# Patient Record
Sex: MALE | Race: OTHER | HISPANIC OR LATINO | URBAN - METROPOLITAN AREA
[De-identification: names, ages, dates, MRNs, and addresses within clinical notes are randomized per-mention and may not be internally consistent; named-entity substitution may affect disease eponyms.]

---

## 2020-01-01 ENCOUNTER — INPATIENT (INPATIENT)
Facility: HOSPITAL | Age: 35
LOS: 1 days | DRG: 432 | End: 2020-12-27
Attending: SURGERY | Admitting: SURGERY
Payer: MEDICAID

## 2020-01-01 ENCOUNTER — INPATIENT (INPATIENT)
Facility: HOSPITAL | Age: 35
LOS: 17 days | Discharge: HOME | DRG: 897 | End: 2020-10-02
Attending: STUDENT IN AN ORGANIZED HEALTH CARE EDUCATION/TRAINING PROGRAM | Admitting: STUDENT IN AN ORGANIZED HEALTH CARE EDUCATION/TRAINING PROGRAM
Payer: MEDICAID

## 2020-01-01 ENCOUNTER — INPATIENT (INPATIENT)
Facility: HOSPITAL | Age: 35
LOS: 1 days | Discharge: HOME | DRG: 432 | End: 2020-06-12
Attending: STUDENT IN AN ORGANIZED HEALTH CARE EDUCATION/TRAINING PROGRAM | Admitting: STUDENT IN AN ORGANIZED HEALTH CARE EDUCATION/TRAINING PROGRAM
Payer: MEDICAID

## 2020-01-01 VITALS
TEMPERATURE: 99 F | WEIGHT: 199.96 LBS | HEIGHT: 61 IN | OXYGEN SATURATION: 98 % | RESPIRATION RATE: 18 BRPM | HEART RATE: 109 BPM | SYSTOLIC BLOOD PRESSURE: 143 MMHG | DIASTOLIC BLOOD PRESSURE: 84 MMHG

## 2020-01-01 VITALS
HEIGHT: 66.14 IN | WEIGHT: 187.39 LBS | DIASTOLIC BLOOD PRESSURE: 75 MMHG | SYSTOLIC BLOOD PRESSURE: 114 MMHG | TEMPERATURE: 98 F | HEART RATE: 94 BPM | OXYGEN SATURATION: 97 % | RESPIRATION RATE: 18 BRPM

## 2020-01-01 VITALS
OXYGEN SATURATION: 100 % | RESPIRATION RATE: 18 BRPM | DIASTOLIC BLOOD PRESSURE: 74 MMHG | TEMPERATURE: 99 F | SYSTOLIC BLOOD PRESSURE: 138 MMHG | HEART RATE: 76 BPM

## 2020-01-01 VITALS
WEIGHT: 229.94 LBS | OXYGEN SATURATION: 95 % | HEIGHT: 61 IN | DIASTOLIC BLOOD PRESSURE: 60 MMHG | TEMPERATURE: 98 F | HEART RATE: 95 BPM | RESPIRATION RATE: 16 BRPM | SYSTOLIC BLOOD PRESSURE: 91 MMHG

## 2020-01-01 VITALS
RESPIRATION RATE: 17 BRPM | DIASTOLIC BLOOD PRESSURE: 75 MMHG | OXYGEN SATURATION: 100 % | TEMPERATURE: 98 F | SYSTOLIC BLOOD PRESSURE: 123 MMHG | HEART RATE: 74 BPM

## 2020-01-01 VITALS — HEART RATE: 65 BPM | RESPIRATION RATE: 28 BRPM

## 2020-01-01 DIAGNOSIS — E87.6 HYPOKALEMIA: ICD-10-CM

## 2020-01-01 DIAGNOSIS — E43 UNSPECIFIED SEVERE PROTEIN-CALORIE MALNUTRITION: ICD-10-CM

## 2020-01-01 DIAGNOSIS — Z02.9 ENCOUNTER FOR ADMINISTRATIVE EXAMINATIONS, UNSPECIFIED: ICD-10-CM

## 2020-01-01 DIAGNOSIS — E83.42 HYPOMAGNESEMIA: ICD-10-CM

## 2020-01-01 DIAGNOSIS — K76.6 PORTAL HYPERTENSION: ICD-10-CM

## 2020-01-01 DIAGNOSIS — Z29.9 ENCOUNTER FOR PROPHYLACTIC MEASURES, UNSPECIFIED: ICD-10-CM

## 2020-01-01 DIAGNOSIS — K74.60 UNSPECIFIED CIRRHOSIS OF LIVER: ICD-10-CM

## 2020-01-01 DIAGNOSIS — Z71.89 OTHER SPECIFIED COUNSELING: ICD-10-CM

## 2020-01-01 DIAGNOSIS — D64.9 ANEMIA, UNSPECIFIED: ICD-10-CM

## 2020-01-01 DIAGNOSIS — E83.39 OTHER DISORDERS OF PHOSPHORUS METABOLISM: ICD-10-CM

## 2020-01-01 DIAGNOSIS — K72.90 HEPATIC FAILURE, UNSPECIFIED WITHOUT COMA: ICD-10-CM

## 2020-01-01 DIAGNOSIS — I51.7 CARDIOMEGALY: ICD-10-CM

## 2020-01-01 DIAGNOSIS — Z72.89 OTHER PROBLEMS RELATED TO LIFESTYLE: ICD-10-CM

## 2020-01-01 DIAGNOSIS — D69.6 THROMBOCYTOPENIA, UNSPECIFIED: ICD-10-CM

## 2020-01-01 DIAGNOSIS — F10.10 ALCOHOL ABUSE, UNCOMPLICATED: ICD-10-CM

## 2020-01-01 DIAGNOSIS — M79.89 OTHER SPECIFIED SOFT TISSUE DISORDERS: ICD-10-CM

## 2020-01-01 DIAGNOSIS — R79.1 ABNORMAL COAGULATION PROFILE: ICD-10-CM

## 2020-01-01 DIAGNOSIS — R09.89 OTHER SPECIFIED SYMPTOMS AND SIGNS INVOLVING THE CIRCULATORY AND RESPIRATORY SYSTEMS: ICD-10-CM

## 2020-01-01 DIAGNOSIS — N17.9 ACUTE KIDNEY FAILURE, UNSPECIFIED: ICD-10-CM

## 2020-01-01 DIAGNOSIS — E87.1 HYPO-OSMOLALITY AND HYPONATREMIA: ICD-10-CM

## 2020-01-01 DIAGNOSIS — F10.239 ALCOHOL DEPENDENCE WITH WITHDRAWAL, UNSPECIFIED: ICD-10-CM

## 2020-01-01 DIAGNOSIS — I85.00 ESOPHAGEAL VARICES WITHOUT BLEEDING: ICD-10-CM

## 2020-01-01 LAB
% ALBUMIN: 39.9 % — SIGNIFICANT CHANGE UP
% ALPHA 1: 2.4 % — SIGNIFICANT CHANGE UP
% ALPHA 2: 3.5 % — SIGNIFICANT CHANGE UP
% BETA: 13.5 % — SIGNIFICANT CHANGE UP
% GAMMA: 40.7 % — SIGNIFICANT CHANGE UP
-  CANDIDA ALBICANS: SIGNIFICANT CHANGE UP
-  CANDIDA GLABRATA: SIGNIFICANT CHANGE UP
-  CANDIDA KRUSEI: SIGNIFICANT CHANGE UP
-  CANDIDA PARAPSILOSIS: SIGNIFICANT CHANGE UP
-  CANDIDA TROPICALIS: SIGNIFICANT CHANGE UP
-  COAGULASE NEGATIVE STAPHYLOCOCCUS: SIGNIFICANT CHANGE UP
-  K. PNEUMONIAE GROUP: SIGNIFICANT CHANGE UP
-  KPC RESISTANCE GENE: SIGNIFICANT CHANGE UP
-  STREPTOCOCCUS SP. (NOT GRP A, B OR S PNEUMONIAE): SIGNIFICANT CHANGE UP
24R-OH-CALCIDIOL SERPL-MCNC: 8.3 NG/ML — LOW (ref 30–80)
A BAUMANNII DNA SPEC QL NAA+PROBE: SIGNIFICANT CHANGE UP
A1AT SERPL-MCNC: 107 MG/DL — SIGNIFICANT CHANGE UP (ref 90–200)
A1C WITH ESTIMATED AVERAGE GLUCOSE RESULT: 5 % — SIGNIFICANT CHANGE UP (ref 4–5.6)
ABO RH CONFIRMATION: SIGNIFICANT CHANGE UP
AFP-TM SERPL-MCNC: 3.6 NG/ML — SIGNIFICANT CHANGE UP
AFP-TM SERPL-MCNC: 4.8 NG/ML — SIGNIFICANT CHANGE UP
ALBUMIN FLD-MCNC: 0.4 G/DL — SIGNIFICANT CHANGE UP
ALBUMIN SERPL ELPH-MCNC: 0.6 G/DL — LOW (ref 3.5–5)
ALBUMIN SERPL ELPH-MCNC: 0.8 G/DL — LOW (ref 3.5–5)
ALBUMIN SERPL ELPH-MCNC: 1.2 G/DL — LOW (ref 3.5–5)
ALBUMIN SERPL ELPH-MCNC: 1.2 G/DL — LOW (ref 3.5–5)
ALBUMIN SERPL ELPH-MCNC: 1.3 G/DL — LOW (ref 3.5–5)
ALBUMIN SERPL ELPH-MCNC: 1.4 G/DL — LOW (ref 3.5–5)
ALBUMIN SERPL ELPH-MCNC: 1.4 G/DL — LOW (ref 3.5–5)
ALBUMIN SERPL ELPH-MCNC: 1.5 G/DL — LOW (ref 3.5–5)
ALBUMIN SERPL ELPH-MCNC: 1.7 G/DL — LOW (ref 3.5–5)
ALBUMIN SERPL ELPH-MCNC: 1.7 G/DL — LOW (ref 3.5–5)
ALBUMIN SERPL ELPH-MCNC: 1.8 G/DL — LOW (ref 3.5–5)
ALBUMIN SERPL ELPH-MCNC: 1.9 G/DL — LOW (ref 3.5–5)
ALBUMIN SERPL ELPH-MCNC: 2 G/DL — LOW (ref 3.5–5)
ALBUMIN SERPL ELPH-MCNC: 2 G/DL — LOW (ref 3.5–5)
ALBUMIN SERPL ELPH-MCNC: 2.1 G/DL — LOW (ref 3.5–5)
ALBUMIN SERPL ELPH-MCNC: 2.1 G/DL — LOW (ref 3.5–5)
ALBUMIN SERPL ELPH-MCNC: 2.3 G/DL — LOW (ref 3.5–5)
ALBUMIN SERPL ELPH-MCNC: 2.3 G/DL — LOW (ref 3.5–5)
ALBUMIN SERPL ELPH-MCNC: 2.4 G/DL — LOW (ref 3.5–5)
ALBUMIN SERPL ELPH-MCNC: 2.4 G/DL — LOW (ref 3.5–5)
ALBUMIN SERPL ELPH-MCNC: 2.6 G/DL — LOW (ref 3.6–5.5)
ALBUMIN SERPL ELPH-MCNC: 2.7 G/DL — LOW (ref 3.5–5)
ALBUMIN SERPL ELPH-MCNC: 2.7 G/DL — LOW (ref 3.5–5)
ALBUMIN/GLOB SERPL ELPH: 0.6 RATIO — SIGNIFICANT CHANGE UP
ALLERGY+IMMUNOLOGY DIAG STUDY NOTE: SIGNIFICANT CHANGE UP
ALP SERPL-CCNC: 109 U/L — SIGNIFICANT CHANGE UP (ref 40–120)
ALP SERPL-CCNC: 114 U/L — SIGNIFICANT CHANGE UP (ref 40–120)
ALP SERPL-CCNC: 131 U/L — HIGH (ref 40–120)
ALP SERPL-CCNC: 141 U/L — HIGH (ref 40–120)
ALP SERPL-CCNC: 142 U/L — HIGH (ref 40–120)
ALP SERPL-CCNC: 144 U/L — HIGH (ref 40–120)
ALP SERPL-CCNC: 149 U/L — HIGH (ref 40–120)
ALP SERPL-CCNC: 149 U/L — HIGH (ref 40–120)
ALP SERPL-CCNC: 150 U/L — HIGH (ref 40–120)
ALP SERPL-CCNC: 153 U/L — HIGH (ref 40–120)
ALP SERPL-CCNC: 159 U/L — HIGH (ref 40–120)
ALP SERPL-CCNC: 160 U/L — HIGH (ref 40–120)
ALP SERPL-CCNC: 163 U/L — HIGH (ref 40–120)
ALP SERPL-CCNC: 170 U/L — HIGH (ref 40–120)
ALP SERPL-CCNC: 176 U/L — HIGH (ref 40–120)
ALP SERPL-CCNC: 177 U/L — HIGH (ref 40–120)
ALP SERPL-CCNC: 178 U/L — HIGH (ref 40–120)
ALP SERPL-CCNC: 184 U/L — HIGH (ref 40–120)
ALP SERPL-CCNC: 190 U/L — HIGH (ref 40–120)
ALP SERPL-CCNC: 193 U/L — HIGH (ref 40–120)
ALP SERPL-CCNC: 201 U/L — HIGH (ref 40–120)
ALP SERPL-CCNC: 203 U/L — HIGH (ref 40–120)
ALP SERPL-CCNC: 250 U/L — HIGH (ref 40–120)
ALP SERPL-CCNC: 259 U/L — HIGH (ref 40–120)
ALP SERPL-CCNC: 264 U/L — HIGH (ref 40–120)
ALP SERPL-CCNC: 75 U/L — SIGNIFICANT CHANGE UP (ref 40–120)
ALPHA1 GLOB SERPL ELPH-MCNC: 0.2 G/DL — SIGNIFICANT CHANGE UP (ref 0.1–0.4)
ALPHA2 GLOB SERPL ELPH-MCNC: 0.2 G/DL — LOW (ref 0.5–1)
ALT FLD-CCNC: 14 U/L DA — SIGNIFICANT CHANGE UP (ref 10–60)
ALT FLD-CCNC: 163 U/L DA — HIGH (ref 10–60)
ALT FLD-CCNC: 18 U/L DA — SIGNIFICANT CHANGE UP (ref 10–60)
ALT FLD-CCNC: 19 U/L DA — SIGNIFICANT CHANGE UP (ref 10–60)
ALT FLD-CCNC: 24 U/L DA — SIGNIFICANT CHANGE UP (ref 10–60)
ALT FLD-CCNC: 34 U/L DA — SIGNIFICANT CHANGE UP (ref 10–60)
ALT FLD-CCNC: 35 U/L DA — SIGNIFICANT CHANGE UP (ref 10–60)
ALT FLD-CCNC: 37 U/L DA — SIGNIFICANT CHANGE UP (ref 10–60)
ALT FLD-CCNC: 38 U/L DA — SIGNIFICANT CHANGE UP (ref 10–60)
ALT FLD-CCNC: 39 U/L DA — SIGNIFICANT CHANGE UP (ref 10–60)
ALT FLD-CCNC: 40 U/L DA — SIGNIFICANT CHANGE UP (ref 10–60)
ALT FLD-CCNC: 42 U/L DA — SIGNIFICANT CHANGE UP (ref 10–60)
ALT FLD-CCNC: 44 U/L DA — SIGNIFICANT CHANGE UP (ref 10–60)
ALT FLD-CCNC: 44 U/L DA — SIGNIFICANT CHANGE UP (ref 10–60)
ALT FLD-CCNC: 46 U/L DA — SIGNIFICANT CHANGE UP (ref 10–60)
ALT FLD-CCNC: 48 U/L DA — SIGNIFICANT CHANGE UP (ref 10–60)
ALT FLD-CCNC: 51 U/L DA — SIGNIFICANT CHANGE UP (ref 10–60)
ALT FLD-CCNC: 51 U/L DA — SIGNIFICANT CHANGE UP (ref 10–60)
ALT FLD-CCNC: 57 U/L DA — SIGNIFICANT CHANGE UP (ref 10–60)
ALT FLD-CCNC: 61 U/L DA — HIGH (ref 10–60)
ALT FLD-CCNC: 65 U/L DA — HIGH (ref 10–60)
ALT FLD-CCNC: 680 U/L DA — HIGH (ref 10–60)
ALT FLD-CCNC: 72 U/L DA — HIGH (ref 10–60)
ALT FLD-CCNC: 75 U/L DA — HIGH (ref 10–60)
AMMONIA BLD-MCNC: 100 UMOL/L — HIGH (ref 11–32)
AMMONIA BLD-MCNC: 181 UMOL/L — HIGH (ref 11–32)
AMMONIA BLD-MCNC: 41 UMOL/L — HIGH (ref 11–32)
AMMONIA BLD-MCNC: 43 UMOL/L — HIGH (ref 11–32)
AMMONIA BLD-MCNC: 58 UMOL/L — HIGH (ref 11–32)
AMMONIA BLD-MCNC: 79 UMOL/L — HIGH (ref 11–32)
ANA TITR SER: NEGATIVE — SIGNIFICANT CHANGE UP
ANION GAP SERPL CALC-SCNC: 11 MMOL/L — SIGNIFICANT CHANGE UP (ref 5–17)
ANION GAP SERPL CALC-SCNC: 17 MMOL/L — SIGNIFICANT CHANGE UP (ref 5–17)
ANION GAP SERPL CALC-SCNC: 21 MMOL/L — HIGH (ref 5–17)
ANION GAP SERPL CALC-SCNC: 24 MMOL/L — HIGH (ref 5–17)
ANION GAP SERPL CALC-SCNC: 25 MMOL/L — HIGH (ref 5–17)
ANION GAP SERPL CALC-SCNC: 27 MMOL/L — HIGH (ref 5–17)
ANION GAP SERPL CALC-SCNC: 3 MMOL/L — LOW (ref 5–17)
ANION GAP SERPL CALC-SCNC: 4 MMOL/L — LOW (ref 5–17)
ANION GAP SERPL CALC-SCNC: 5 MMOL/L — SIGNIFICANT CHANGE UP (ref 5–17)
ANION GAP SERPL CALC-SCNC: 6 MMOL/L — SIGNIFICANT CHANGE UP (ref 5–17)
ANION GAP SERPL CALC-SCNC: 7 MMOL/L — SIGNIFICANT CHANGE UP (ref 5–17)
ANION GAP SERPL CALC-SCNC: 8 MMOL/L — SIGNIFICANT CHANGE UP (ref 5–17)
ANION GAP SERPL CALC-SCNC: 8 MMOL/L — SIGNIFICANT CHANGE UP (ref 5–17)
ANISOCYTOSIS BLD QL: SLIGHT — SIGNIFICANT CHANGE UP
APAP SERPL-MCNC: <2 UG/ML — LOW (ref 10–30)
APPEARANCE UR: CLEAR — SIGNIFICANT CHANGE UP
APPEARANCE UR: CLEAR — SIGNIFICANT CHANGE UP
APTT BLD: 101.6 SEC — HIGH (ref 27.5–35.5)
APTT BLD: 147 SEC — CRITICAL HIGH (ref 27.5–35.5)
APTT BLD: 47 SEC — HIGH (ref 27.5–36.3)
APTT BLD: 51.4 SEC — HIGH (ref 27.5–35.5)
APTT BLD: 51.5 SEC — HIGH (ref 27.5–35.5)
APTT BLD: 51.7 SEC — HIGH (ref 27.5–35.5)
APTT BLD: 54.3 SEC — HIGH (ref 27.5–35.5)
APTT BLD: 55.3 SEC — HIGH (ref 27.5–35.5)
APTT BLD: 57.3 SEC — HIGH (ref 27.5–35.5)
APTT BLD: 57.8 SEC — HIGH (ref 27.5–35.5)
APTT BLD: 71.9 SEC — HIGH (ref 27.5–36.3)
APTT BLD: 72.8 SEC — HIGH (ref 27.5–35.5)
APTT BLD: 77.1 SEC — HIGH (ref 27.5–35.5)
APTT BLD: 78.1 SEC — HIGH (ref 27.5–35.5)
APTT BLD: 83 SEC — HIGH (ref 27.5–35.5)
APTT BLD: >200 SEC — CRITICAL HIGH (ref 27.5–35.5)
APTT BLD: >200 SEC — CRITICAL HIGH (ref 27.5–35.5)
AST SERPL-CCNC: 100 U/L — HIGH (ref 10–40)
AST SERPL-CCNC: 109 U/L — HIGH (ref 10–40)
AST SERPL-CCNC: 137 U/L — HIGH (ref 10–40)
AST SERPL-CCNC: 141 U/L — HIGH (ref 10–40)
AST SERPL-CCNC: 150 U/L — HIGH (ref 10–40)
AST SERPL-CCNC: 157 U/L — HIGH (ref 10–40)
AST SERPL-CCNC: 192 U/L — HIGH (ref 10–40)
AST SERPL-CCNC: 217 U/L — HIGH (ref 10–40)
AST SERPL-CCNC: 243 U/L — HIGH (ref 10–40)
AST SERPL-CCNC: 259 U/L — HIGH (ref 10–40)
AST SERPL-CCNC: 3518 U/L — HIGH (ref 10–40)
AST SERPL-CCNC: 53 U/L — HIGH (ref 10–40)
AST SERPL-CCNC: 55 U/L — HIGH (ref 10–40)
AST SERPL-CCNC: 59 U/L — HIGH (ref 10–40)
AST SERPL-CCNC: 63 U/L — HIGH (ref 10–40)
AST SERPL-CCNC: 669 U/L — HIGH (ref 10–40)
AST SERPL-CCNC: 68 U/L — HIGH (ref 10–40)
AST SERPL-CCNC: 70 U/L — HIGH (ref 10–40)
AST SERPL-CCNC: 71 U/L — HIGH (ref 10–40)
AST SERPL-CCNC: 74 U/L — HIGH (ref 10–40)
AST SERPL-CCNC: 75 U/L — HIGH (ref 10–40)
AST SERPL-CCNC: 78 U/L — HIGH (ref 10–40)
AST SERPL-CCNC: 84 U/L — HIGH (ref 10–40)
AST SERPL-CCNC: 84 U/L — HIGH (ref 10–40)
AST SERPL-CCNC: 92 U/L — HIGH (ref 10–40)
AST SERPL-CCNC: 95 U/L — HIGH (ref 10–40)
B PERT IGG+IGM PNL SER: CLEAR — SIGNIFICANT CHANGE UP
B-GLOBULIN SERPL ELPH-MCNC: 0.9 G/DL — SIGNIFICANT CHANGE UP (ref 0.5–1)
BACTERIA # UR AUTO: ABNORMAL /HPF
BASE EXCESS BLDA CALC-SCNC: -15.7 MMOL/L — LOW (ref -2–2)
BASE EXCESS BLDA CALC-SCNC: -20.7 MMOL/L — LOW (ref -2–2)
BASE EXCESS BLDA CALC-SCNC: -22 MMOL/L — LOW (ref -2–2)
BASE EXCESS BLDA CALC-SCNC: -22 MMOL/L — LOW (ref -2–2)
BASE EXCESS BLDA CALC-SCNC: -23.8 MMOL/L — LOW (ref -2–2)
BASE EXCESS BLDV CALC-SCNC: -14.9 MMOL/L — LOW (ref -2–2)
BASOPHILS # BLD AUTO: 0 K/UL — SIGNIFICANT CHANGE UP (ref 0–0.2)
BASOPHILS # BLD AUTO: 0.01 K/UL — SIGNIFICANT CHANGE UP (ref 0–0.2)
BASOPHILS # BLD AUTO: 0.02 K/UL — SIGNIFICANT CHANGE UP (ref 0–0.2)
BASOPHILS # BLD AUTO: 0.04 K/UL — SIGNIFICANT CHANGE UP (ref 0–0.2)
BASOPHILS NFR BLD AUTO: 0 % — SIGNIFICANT CHANGE UP (ref 0–2)
BASOPHILS NFR BLD AUTO: 0.1 % — SIGNIFICANT CHANGE UP (ref 0–2)
BASOPHILS NFR BLD AUTO: 0.2 % — SIGNIFICANT CHANGE UP (ref 0–2)
BASOPHILS NFR BLD AUTO: 0.3 % — SIGNIFICANT CHANGE UP (ref 0–2)
BASOPHILS NFR BLD AUTO: 0.4 % — SIGNIFICANT CHANGE UP (ref 0–2)
BASOPHILS NFR BLD AUTO: 0.4 % — SIGNIFICANT CHANGE UP (ref 0–2)
BASOPHILS NFR BLD AUTO: 0.6 % — SIGNIFICANT CHANGE UP (ref 0–2)
BILIRUB DIRECT SERPL-MCNC: 1.3 MG/DL — HIGH (ref 0–0.2)
BILIRUB DIRECT SERPL-MCNC: 1.4 MG/DL — HIGH (ref 0–0.2)
BILIRUB DIRECT SERPL-MCNC: 4.9 MG/DL — HIGH (ref 0–0.2)
BILIRUB DIRECT SERPL-MCNC: 5.2 MG/DL — HIGH (ref 0–0.2)
BILIRUB DIRECT SERPL-MCNC: 5.8 MG/DL — HIGH (ref 0–0.2)
BILIRUB DIRECT SERPL-MCNC: 5.8 MG/DL — HIGH (ref 0–0.2)
BILIRUB DIRECT SERPL-MCNC: 5.9 MG/DL — HIGH (ref 0–0.2)
BILIRUB DIRECT SERPL-MCNC: 7.2 MG/DL — HIGH (ref 0–0.2)
BILIRUB INDIRECT FLD-MCNC: 5.5 MG/DL — HIGH (ref 0.2–1)
BILIRUB SERPL-MCNC: 1.6 MG/DL — HIGH (ref 0.2–1.2)
BILIRUB SERPL-MCNC: 10.3 MG/DL — HIGH (ref 0.2–1.2)
BILIRUB SERPL-MCNC: 11 MG/DL — HIGH (ref 0.2–1.2)
BILIRUB SERPL-MCNC: 11 MG/DL — HIGH (ref 0.2–1.2)
BILIRUB SERPL-MCNC: 11.1 MG/DL — HIGH (ref 0.2–1.2)
BILIRUB SERPL-MCNC: 11.3 MG/DL — HIGH (ref 0.2–1.2)
BILIRUB SERPL-MCNC: 11.6 MG/DL — HIGH (ref 0.2–1.2)
BILIRUB SERPL-MCNC: 12.7 MG/DL — HIGH (ref 0.2–1.2)
BILIRUB SERPL-MCNC: 2 MG/DL — HIGH (ref 0.2–1.2)
BILIRUB SERPL-MCNC: 2.9 MG/DL — HIGH (ref 0.2–1.2)
BILIRUB SERPL-MCNC: 3.6 MG/DL — HIGH (ref 0.2–1.2)
BILIRUB SERPL-MCNC: 3.7 MG/DL — HIGH (ref 0.2–1.2)
BILIRUB SERPL-MCNC: 3.9 MG/DL — HIGH (ref 0.2–1.2)
BILIRUB SERPL-MCNC: 6.1 MG/DL — HIGH (ref 0.2–1.2)
BILIRUB SERPL-MCNC: 6.5 MG/DL — HIGH (ref 0.2–1.2)
BILIRUB SERPL-MCNC: 6.9 MG/DL — HIGH (ref 0.2–1.2)
BILIRUB SERPL-MCNC: 7.4 MG/DL — HIGH (ref 0.2–1.2)
BILIRUB SERPL-MCNC: 7.5 MG/DL — HIGH (ref 0.2–1.2)
BILIRUB SERPL-MCNC: 7.8 MG/DL — HIGH (ref 0.2–1.2)
BILIRUB SERPL-MCNC: 7.8 MG/DL — HIGH (ref 0.2–1.2)
BILIRUB SERPL-MCNC: 8.8 MG/DL — HIGH (ref 0.2–1.2)
BILIRUB SERPL-MCNC: 9.8 MG/DL — HIGH (ref 0.2–1.2)
BILIRUB SERPL-MCNC: 9.8 MG/DL — HIGH (ref 0.2–1.2)
BILIRUB SERPL-MCNC: 9.9 MG/DL — HIGH (ref 0.2–1.2)
BILIRUB UR-MCNC: ABNORMAL
BILIRUB UR-MCNC: NEGATIVE — SIGNIFICANT CHANGE UP
BLD GP AB SCN SERPL QL: SIGNIFICANT CHANGE UP
BLOOD GAS COMMENTS ARTERIAL: SIGNIFICANT CHANGE UP
BLOOD GAS COMMENTS, VENOUS: SIGNIFICANT CHANGE UP
BUN SERPL-MCNC: 10 MG/DL — SIGNIFICANT CHANGE UP (ref 7–18)
BUN SERPL-MCNC: 11 MG/DL — SIGNIFICANT CHANGE UP (ref 7–18)
BUN SERPL-MCNC: 13 MG/DL — SIGNIFICANT CHANGE UP (ref 7–18)
BUN SERPL-MCNC: 2 MG/DL — LOW (ref 7–18)
BUN SERPL-MCNC: 27 MG/DL — HIGH (ref 7–18)
BUN SERPL-MCNC: 29 MG/DL — HIGH (ref 7–18)
BUN SERPL-MCNC: 3 MG/DL — LOW (ref 7–18)
BUN SERPL-MCNC: 30 MG/DL — HIGH (ref 7–18)
BUN SERPL-MCNC: 30 MG/DL — HIGH (ref 7–18)
BUN SERPL-MCNC: 32 MG/DL — HIGH (ref 7–18)
BUN SERPL-MCNC: 4 MG/DL — LOW (ref 7–18)
BUN SERPL-MCNC: 5 MG/DL — LOW (ref 7–18)
BUN SERPL-MCNC: 5 MG/DL — LOW (ref 7–18)
BUN SERPL-MCNC: 8 MG/DL — SIGNIFICANT CHANGE UP (ref 7–18)
BUN SERPL-MCNC: 9 MG/DL — SIGNIFICANT CHANGE UP (ref 7–18)
BURR CELLS BLD QL SMEAR: PRESENT — SIGNIFICANT CHANGE UP
CA-I BLD-SCNC: 1.12 MMOL/L — SIGNIFICANT CHANGE UP (ref 1.12–1.3)
CA-I BLD-SCNC: 1.13 MMOL/L — SIGNIFICANT CHANGE UP (ref 1.12–1.3)
CALCIUM SERPL-MCNC: 5.7 MG/DL — CRITICAL LOW (ref 8.4–10.5)
CALCIUM SERPL-MCNC: 6.6 MG/DL — LOW (ref 8.4–10.5)
CALCIUM SERPL-MCNC: 6.7 MG/DL — LOW (ref 8.4–10.5)
CALCIUM SERPL-MCNC: 6.7 MG/DL — LOW (ref 8.4–10.5)
CALCIUM SERPL-MCNC: 6.8 MG/DL — LOW (ref 8.4–10.5)
CALCIUM SERPL-MCNC: 6.8 MG/DL — LOW (ref 8.4–10.5)
CALCIUM SERPL-MCNC: 7.3 MG/DL — LOW (ref 8.4–10.5)
CALCIUM SERPL-MCNC: 7.4 MG/DL — LOW (ref 8.4–10.5)
CALCIUM SERPL-MCNC: 7.5 MG/DL — LOW (ref 8.4–10.5)
CALCIUM SERPL-MCNC: 7.7 MG/DL — LOW (ref 8.4–10.5)
CALCIUM SERPL-MCNC: 7.8 MG/DL — LOW (ref 8.4–10.5)
CALCIUM SERPL-MCNC: 7.8 MG/DL — LOW (ref 8.4–10.5)
CALCIUM SERPL-MCNC: 7.9 MG/DL — LOW (ref 8.4–10.5)
CALCIUM SERPL-MCNC: 7.9 MG/DL — LOW (ref 8.4–10.5)
CALCIUM SERPL-MCNC: 8 MG/DL — LOW (ref 8.4–10.5)
CALCIUM SERPL-MCNC: 8 MG/DL — LOW (ref 8.4–10.5)
CALCIUM SERPL-MCNC: 8.1 MG/DL — LOW (ref 8.4–10.5)
CALCIUM SERPL-MCNC: 8.2 MG/DL — LOW (ref 8.4–10.5)
CALCIUM SERPL-MCNC: 8.2 MG/DL — LOW (ref 8.4–10.5)
CALCIUM SERPL-MCNC: 8.3 MG/DL — LOW (ref 8.4–10.5)
CALCIUM SERPL-MCNC: 8.4 MG/DL — SIGNIFICANT CHANGE UP (ref 8.4–10.5)
CALCIUM SERPL-MCNC: 8.5 MG/DL — SIGNIFICANT CHANGE UP (ref 8.4–10.5)
CALCIUM SERPL-MCNC: 8.7 MG/DL — SIGNIFICANT CHANGE UP (ref 8.4–10.5)
CERULOPLASMIN SERPL-MCNC: 15 MG/DL — SIGNIFICANT CHANGE UP (ref 15–30)
CHLORIDE SERPL-SCNC: 100 MMOL/L — SIGNIFICANT CHANGE UP (ref 96–108)
CHLORIDE SERPL-SCNC: 101 MMOL/L — SIGNIFICANT CHANGE UP (ref 96–108)
CHLORIDE SERPL-SCNC: 102 MMOL/L — SIGNIFICANT CHANGE UP (ref 96–108)
CHLORIDE SERPL-SCNC: 103 MMOL/L — SIGNIFICANT CHANGE UP (ref 96–108)
CHLORIDE SERPL-SCNC: 104 MMOL/L — SIGNIFICANT CHANGE UP (ref 96–108)
CHLORIDE SERPL-SCNC: 104 MMOL/L — SIGNIFICANT CHANGE UP (ref 96–108)
CHLORIDE SERPL-SCNC: 89 MMOL/L — LOW (ref 96–108)
CHLORIDE SERPL-SCNC: 91 MMOL/L — LOW (ref 96–108)
CHLORIDE SERPL-SCNC: 94 MMOL/L — LOW (ref 96–108)
CHLORIDE SERPL-SCNC: 94 MMOL/L — LOW (ref 96–108)
CHLORIDE SERPL-SCNC: 95 MMOL/L — LOW (ref 96–108)
CHLORIDE SERPL-SCNC: 97 MMOL/L — SIGNIFICANT CHANGE UP (ref 96–108)
CHLORIDE SERPL-SCNC: 99 MMOL/L — SIGNIFICANT CHANGE UP (ref 96–108)
CHLORIDE UR-SCNC: 10 MMOL/L — SIGNIFICANT CHANGE UP
CHOLEST SERPL-MCNC: 66 MG/DL — SIGNIFICANT CHANGE UP (ref 10–199)
CMV DNA CSF QL NAA+PROBE: SIGNIFICANT CHANGE UP
CO2 SERPL-SCNC: 10 MMOL/L — CRITICAL LOW (ref 22–31)
CO2 SERPL-SCNC: 12 MMOL/L — LOW (ref 22–31)
CO2 SERPL-SCNC: 13 MMOL/L — LOW (ref 22–31)
CO2 SERPL-SCNC: 17 MMOL/L — LOW (ref 22–31)
CO2 SERPL-SCNC: 19 MMOL/L — LOW (ref 22–31)
CO2 SERPL-SCNC: 25 MMOL/L — SIGNIFICANT CHANGE UP (ref 22–31)
CO2 SERPL-SCNC: 26 MMOL/L — SIGNIFICANT CHANGE UP (ref 22–31)
CO2 SERPL-SCNC: 27 MMOL/L — SIGNIFICANT CHANGE UP (ref 22–31)
CO2 SERPL-SCNC: 28 MMOL/L — SIGNIFICANT CHANGE UP (ref 22–31)
CO2 SERPL-SCNC: 30 MMOL/L — SIGNIFICANT CHANGE UP (ref 22–31)
CO2 SERPL-SCNC: 32 MMOL/L — HIGH (ref 22–31)
CO2 SERPL-SCNC: 37 MMOL/L — HIGH (ref 22–31)
CO2 SERPL-SCNC: 38 MMOL/L — HIGH (ref 22–31)
COLLECT DURATION TIME UR: 24 HR — SIGNIFICANT CHANGE UP
COLOR FLD: YELLOW — SIGNIFICANT CHANGE UP
COLOR SPEC: ABNORMAL
COLOR SPEC: YELLOW — SIGNIFICANT CHANGE UP
COMMENT - FLUIDS: SIGNIFICANT CHANGE UP
COMMENT - URINE: SIGNIFICANT CHANGE UP
COPPER UR-MCNC: 22 MCG/G CR — SIGNIFICANT CHANGE UP
CREAT ?TM UR-MCNC: 131 MG/DL — SIGNIFICANT CHANGE UP
CREAT ?TM UR-MCNC: 284 MG/DL — SIGNIFICANT CHANGE UP
CREAT ?TM UR-MCNC: 34 MG/DL — SIGNIFICANT CHANGE UP
CREAT SERPL-MCNC: 0.46 MG/DL — LOW (ref 0.5–1.3)
CREAT SERPL-MCNC: 0.53 MG/DL — SIGNIFICANT CHANGE UP (ref 0.5–1.3)
CREAT SERPL-MCNC: 0.56 MG/DL — SIGNIFICANT CHANGE UP (ref 0.5–1.3)
CREAT SERPL-MCNC: 0.57 MG/DL — SIGNIFICANT CHANGE UP (ref 0.5–1.3)
CREAT SERPL-MCNC: 0.6 MG/DL — SIGNIFICANT CHANGE UP (ref 0.5–1.3)
CREAT SERPL-MCNC: 0.61 MG/DL — SIGNIFICANT CHANGE UP (ref 0.5–1.3)
CREAT SERPL-MCNC: 0.61 MG/DL — SIGNIFICANT CHANGE UP (ref 0.5–1.3)
CREAT SERPL-MCNC: 0.62 MG/DL — SIGNIFICANT CHANGE UP (ref 0.5–1.3)
CREAT SERPL-MCNC: 0.64 MG/DL — SIGNIFICANT CHANGE UP (ref 0.5–1.3)
CREAT SERPL-MCNC: 0.65 MG/DL — SIGNIFICANT CHANGE UP (ref 0.5–1.3)
CREAT SERPL-MCNC: 0.67 MG/DL — SIGNIFICANT CHANGE UP (ref 0.5–1.3)
CREAT SERPL-MCNC: 0.67 MG/DL — SIGNIFICANT CHANGE UP (ref 0.5–1.3)
CREAT SERPL-MCNC: 0.68 MG/DL — SIGNIFICANT CHANGE UP (ref 0.5–1.3)
CREAT SERPL-MCNC: 0.7 MG/DL — SIGNIFICANT CHANGE UP (ref 0.5–1.3)
CREAT SERPL-MCNC: 0.72 MG/DL — SIGNIFICANT CHANGE UP (ref 0.5–1.3)
CREAT SERPL-MCNC: 0.72 MG/DL — SIGNIFICANT CHANGE UP (ref 0.5–1.3)
CREAT SERPL-MCNC: 0.73 MG/DL — SIGNIFICANT CHANGE UP (ref 0.5–1.3)
CREAT SERPL-MCNC: 0.75 MG/DL — SIGNIFICANT CHANGE UP (ref 0.5–1.3)
CREAT SERPL-MCNC: 0.78 MG/DL — SIGNIFICANT CHANGE UP (ref 0.5–1.3)
CREAT SERPL-MCNC: 0.89 MG/DL — SIGNIFICANT CHANGE UP (ref 0.5–1.3)
CREAT SERPL-MCNC: 0.93 MG/DL — SIGNIFICANT CHANGE UP (ref 0.5–1.3)
CREAT SERPL-MCNC: 1.01 MG/DL — SIGNIFICANT CHANGE UP (ref 0.5–1.3)
CREAT SERPL-MCNC: 1.05 MG/DL — SIGNIFICANT CHANGE UP (ref 0.5–1.3)
CREAT SERPL-MCNC: 1.81 MG/DL — HIGH (ref 0.5–1.3)
CREAT SERPL-MCNC: 1.94 MG/DL — HIGH (ref 0.5–1.3)
CREAT SERPL-MCNC: 2.08 MG/DL — HIGH (ref 0.5–1.3)
CREAT SERPL-MCNC: 2.21 MG/DL — HIGH (ref 0.5–1.3)
CREAT SERPL-MCNC: 2.28 MG/DL — HIGH (ref 0.5–1.3)
CULTURE RESULTS: NO GROWTH — SIGNIFICANT CHANGE UP
D DIMER BLD IA.RAPID-MCNC: 3159 NG/ML DDU — HIGH
D DIMER BLD IA.RAPID-MCNC: 6487 NG/ML DDU — HIGH
DAT IGG-SP REAG RBC-IMP: ABNORMAL
DIFF PNL FLD: ABNORMAL
DIFF PNL FLD: NEGATIVE — SIGNIFICANT CHANGE UP
DIR ANTIGLOB POLYSPECIFIC INTERPRETATION: ABNORMAL
DRUG SCREEN, SERUM: SIGNIFICANT CHANGE UP
E CLOAC COMP DNA BLD POS QL NAA+PROBE: SIGNIFICANT CHANGE UP
E COLI DNA BLD POS QL NAA+NON-PROBE: SIGNIFICANT CHANGE UP
EBV DNA SERPL NAA+PROBE-ACNC: SIGNIFICANT CHANGE UP IU/ML
ENTEROCOC DNA BLD POS QL NAA+NON-PROBE: SIGNIFICANT CHANGE UP
ENTEROCOC DNA BLD POS QL NAA+NON-PROBE: SIGNIFICANT CHANGE UP
EOSINOPHIL # BLD AUTO: 0 K/UL — SIGNIFICANT CHANGE UP (ref 0–0.5)
EOSINOPHIL # BLD AUTO: 0 K/UL — SIGNIFICANT CHANGE UP (ref 0–0.5)
EOSINOPHIL # BLD AUTO: 0.01 K/UL — SIGNIFICANT CHANGE UP (ref 0–0.5)
EOSINOPHIL # BLD AUTO: 0.02 K/UL — SIGNIFICANT CHANGE UP (ref 0–0.5)
EOSINOPHIL # BLD AUTO: 0.03 K/UL — SIGNIFICANT CHANGE UP (ref 0–0.5)
EOSINOPHIL # BLD AUTO: 0.03 K/UL — SIGNIFICANT CHANGE UP (ref 0–0.5)
EOSINOPHIL # BLD AUTO: 0.05 K/UL — SIGNIFICANT CHANGE UP (ref 0–0.5)
EOSINOPHIL # BLD AUTO: 0.06 K/UL — SIGNIFICANT CHANGE UP (ref 0–0.5)
EOSINOPHIL # BLD AUTO: 0.1 K/UL — SIGNIFICANT CHANGE UP (ref 0–0.5)
EOSINOPHIL # BLD AUTO: 0.11 K/UL — SIGNIFICANT CHANGE UP (ref 0–0.5)
EOSINOPHIL # BLD AUTO: 0.12 K/UL — SIGNIFICANT CHANGE UP (ref 0–0.5)
EOSINOPHIL # BLD AUTO: 0.12 K/UL — SIGNIFICANT CHANGE UP (ref 0–0.5)
EOSINOPHIL # BLD AUTO: 0.14 K/UL — SIGNIFICANT CHANGE UP (ref 0–0.5)
EOSINOPHIL # BLD AUTO: 0.14 K/UL — SIGNIFICANT CHANGE UP (ref 0–0.5)
EOSINOPHIL NFR BLD AUTO: 0 % — SIGNIFICANT CHANGE UP (ref 0–6)
EOSINOPHIL NFR BLD AUTO: 0 % — SIGNIFICANT CHANGE UP (ref 0–6)
EOSINOPHIL NFR BLD AUTO: 0.3 % — SIGNIFICANT CHANGE UP (ref 0–6)
EOSINOPHIL NFR BLD AUTO: 0.3 % — SIGNIFICANT CHANGE UP (ref 0–6)
EOSINOPHIL NFR BLD AUTO: 0.4 % — SIGNIFICANT CHANGE UP (ref 0–6)
EOSINOPHIL NFR BLD AUTO: 0.6 % — SIGNIFICANT CHANGE UP (ref 0–6)
EOSINOPHIL NFR BLD AUTO: 0.6 % — SIGNIFICANT CHANGE UP (ref 0–6)
EOSINOPHIL NFR BLD AUTO: 0.8 % — SIGNIFICANT CHANGE UP (ref 0–6)
EOSINOPHIL NFR BLD AUTO: 0.8 % — SIGNIFICANT CHANGE UP (ref 0–6)
EOSINOPHIL NFR BLD AUTO: 1 % — SIGNIFICANT CHANGE UP (ref 0–6)
EOSINOPHIL NFR BLD AUTO: 1.1 % — SIGNIFICANT CHANGE UP (ref 0–6)
EOSINOPHIL NFR BLD AUTO: 1.1 % — SIGNIFICANT CHANGE UP (ref 0–6)
EOSINOPHIL NFR BLD AUTO: 1.2 % — SIGNIFICANT CHANGE UP (ref 0–6)
EOSINOPHIL NFR BLD AUTO: 1.3 % — SIGNIFICANT CHANGE UP (ref 0–6)
EOSINOPHIL NFR BLD AUTO: 1.4 % — SIGNIFICANT CHANGE UP (ref 0–6)
EOSINOPHIL NFR BLD AUTO: 1.5 % — SIGNIFICANT CHANGE UP (ref 0–6)
EOSINOPHIL NFR BLD AUTO: 2 % — SIGNIFICANT CHANGE UP (ref 0–6)
EOSINOPHIL NFR BLD AUTO: 2 % — SIGNIFICANT CHANGE UP (ref 0–6)
EPI CELLS # UR: SIGNIFICANT CHANGE UP /HPF
ESTIMATED AVERAGE GLUCOSE: 97 MG/DL — SIGNIFICANT CHANGE UP (ref 68–114)
ETHANOL SERPL-MCNC: 238 MG/DL — HIGH (ref 0–10)
ETHANOL SERPL-MCNC: 242 MG/DL — HIGH (ref 0–10)
ETHANOL SERPL-MCNC: 6 MG/DL — SIGNIFICANT CHANGE UP (ref 0–10)
FACT VIII ACT/NOR PPP: 218 % — HIGH (ref 60–125)
FERRITIN SERPL-MCNC: 331 NG/ML — SIGNIFICANT CHANGE UP (ref 30–400)
FERRITIN SERPL-MCNC: 47 NG/ML — SIGNIFICANT CHANGE UP (ref 30–400)
FIBRINOGEN AG PPP IA-MCNC: 55 MG/DL — SIGNIFICANT CHANGE UP
FIBRINOGEN PPP-MCNC: <50 MG/DL (ref 242–442)
FLUID INTAKE SUBSTANCE CLASS: SIGNIFICANT CHANGE UP
FLUID SEGMENTED GRANULOCYTES: 13 % — SIGNIFICANT CHANGE UP
FOLATE SERPL-MCNC: 10.6 NG/ML — SIGNIFICANT CHANGE UP
FOLATE SERPL-MCNC: 6.2 NG/ML — SIGNIFICANT CHANGE UP
GAMMA GLOBULIN: 2.7 G/DL — HIGH (ref 0.6–1.6)
GGT SERPL-CCNC: 91 U/L — HIGH (ref 9–50)
GLUCOSE BLDC GLUCOMTR-MCNC: 145 MG/DL — HIGH (ref 70–99)
GLUCOSE SERPL-MCNC: 103 MG/DL — HIGH (ref 70–99)
GLUCOSE SERPL-MCNC: 104 MG/DL — HIGH (ref 70–99)
GLUCOSE SERPL-MCNC: 105 MG/DL — HIGH (ref 70–99)
GLUCOSE SERPL-MCNC: 106 MG/DL — HIGH (ref 70–99)
GLUCOSE SERPL-MCNC: 107 MG/DL — HIGH (ref 70–99)
GLUCOSE SERPL-MCNC: 112 MG/DL — HIGH (ref 70–99)
GLUCOSE SERPL-MCNC: 114 MG/DL — HIGH (ref 70–99)
GLUCOSE SERPL-MCNC: 118 MG/DL — HIGH (ref 70–99)
GLUCOSE SERPL-MCNC: 119 MG/DL — HIGH (ref 70–99)
GLUCOSE SERPL-MCNC: 119 MG/DL — HIGH (ref 70–99)
GLUCOSE SERPL-MCNC: 124 MG/DL — HIGH (ref 70–99)
GLUCOSE SERPL-MCNC: 128 MG/DL — HIGH (ref 70–99)
GLUCOSE SERPL-MCNC: 128 MG/DL — HIGH (ref 70–99)
GLUCOSE SERPL-MCNC: 129 MG/DL — HIGH (ref 70–99)
GLUCOSE SERPL-MCNC: 136 MG/DL — HIGH (ref 70–99)
GLUCOSE SERPL-MCNC: 143 MG/DL — HIGH (ref 70–99)
GLUCOSE SERPL-MCNC: 144 MG/DL — HIGH (ref 70–99)
GLUCOSE SERPL-MCNC: 153 MG/DL — HIGH (ref 70–99)
GLUCOSE SERPL-MCNC: 397 MG/DL — HIGH (ref 70–99)
GLUCOSE SERPL-MCNC: 43 MG/DL — CRITICAL LOW (ref 70–99)
GLUCOSE SERPL-MCNC: 54 MG/DL — CRITICAL LOW (ref 70–99)
GLUCOSE SERPL-MCNC: 77 MG/DL — SIGNIFICANT CHANGE UP (ref 70–99)
GLUCOSE SERPL-MCNC: 82 MG/DL — SIGNIFICANT CHANGE UP (ref 70–99)
GLUCOSE SERPL-MCNC: 90 MG/DL — SIGNIFICANT CHANGE UP (ref 70–99)
GLUCOSE SERPL-MCNC: 91 MG/DL — SIGNIFICANT CHANGE UP (ref 70–99)
GLUCOSE SERPL-MCNC: 94 MG/DL — SIGNIFICANT CHANGE UP (ref 70–99)
GLUCOSE SERPL-MCNC: 99 MG/DL — SIGNIFICANT CHANGE UP (ref 70–99)
GLUCOSE SERPL-MCNC: 99 MG/DL — SIGNIFICANT CHANGE UP (ref 70–99)
GLUCOSE UR QL: NEGATIVE — SIGNIFICANT CHANGE UP
GLUCOSE UR QL: NEGATIVE — SIGNIFICANT CHANGE UP
GP B STREP DNA BLD POS QL NAA+NON-PROBE: SIGNIFICANT CHANGE UP
GRAM STN FLD: SIGNIFICANT CHANGE UP
GRAM STN FLD: SIGNIFICANT CHANGE UP
HAEM INFLU DNA BLD POS QL NAA+NON-PROBE: SIGNIFICANT CHANGE UP
HAPTOGLOB SERPL-MCNC: 54 MG/DL — SIGNIFICANT CHANGE UP (ref 34–200)
HAPTOGLOB SERPL-MCNC: <20 MG/DL — LOW (ref 34–200)
HAV IGG SER QL IA: REACTIVE
HAV IGM SER-ACNC: SIGNIFICANT CHANGE UP
HBV CORE AB SER-ACNC: SIGNIFICANT CHANGE UP
HBV CORE IGM SER-ACNC: SIGNIFICANT CHANGE UP
HBV CORE IGM SER-ACNC: SIGNIFICANT CHANGE UP
HBV SURFACE AB SER-ACNC: REACTIVE
HBV SURFACE AG SER-ACNC: SIGNIFICANT CHANGE UP
HCO3 BLDA-SCNC: 10 MMOL/L — LOW (ref 23–27)
HCO3 BLDA-SCNC: 14 MMOL/L — LOW (ref 23–27)
HCO3 BLDA-SCNC: 7 MMOL/L — LOW (ref 23–27)
HCO3 BLDA-SCNC: 9 MMOL/L — LOW (ref 23–27)
HCO3 BLDA-SCNC: 9 MMOL/L — LOW (ref 23–27)
HCO3 BLDV-SCNC: 14 MMOL/L — LOW (ref 21–29)
HCT VFR BLD CALC: 10.9 % — CRITICAL LOW (ref 39–50)
HCT VFR BLD CALC: 13.4 % — CRITICAL LOW (ref 39–50)
HCT VFR BLD CALC: 14 % — CRITICAL LOW (ref 39–50)
HCT VFR BLD CALC: 14.3 % — CRITICAL LOW (ref 39–50)
HCT VFR BLD CALC: 17.5 % — CRITICAL LOW (ref 39–50)
HCT VFR BLD CALC: 18.4 % — CRITICAL LOW (ref 39–50)
HCT VFR BLD CALC: 18.5 % — CRITICAL LOW (ref 39–50)
HCT VFR BLD CALC: 19.2 % — CRITICAL LOW (ref 39–50)
HCT VFR BLD CALC: 19.7 % — CRITICAL LOW (ref 39–50)
HCT VFR BLD CALC: 19.8 % — CRITICAL LOW (ref 39–50)
HCT VFR BLD CALC: 20 % — CRITICAL LOW (ref 39–50)
HCT VFR BLD CALC: 20.1 % — CRITICAL LOW (ref 39–50)
HCT VFR BLD CALC: 20.2 % — CRITICAL LOW (ref 39–50)
HCT VFR BLD CALC: 20.4 % — CRITICAL LOW (ref 39–50)
HCT VFR BLD CALC: 20.5 % — CRITICAL LOW (ref 39–50)
HCT VFR BLD CALC: 20.6 % — CRITICAL LOW (ref 39–50)
HCT VFR BLD CALC: 20.6 % — CRITICAL LOW (ref 39–50)
HCT VFR BLD CALC: 21 % — CRITICAL LOW (ref 39–50)
HCT VFR BLD CALC: 21 % — CRITICAL LOW (ref 39–50)
HCT VFR BLD CALC: 21.1 % — LOW (ref 39–50)
HCT VFR BLD CALC: 21.3 % — LOW (ref 39–50)
HCT VFR BLD CALC: 21.4 % — LOW (ref 39–50)
HCT VFR BLD CALC: 21.4 % — LOW (ref 39–50)
HCT VFR BLD CALC: 21.5 % — LOW (ref 39–50)
HCT VFR BLD CALC: 21.8 % — LOW (ref 39–50)
HCT VFR BLD CALC: 22.1 % — LOW (ref 39–50)
HCT VFR BLD CALC: 22.2 % — LOW (ref 39–50)
HCT VFR BLD CALC: 22.7 % — LOW (ref 39–50)
HCT VFR BLD CALC: 23 % — LOW (ref 39–50)
HCT VFR BLD CALC: 23.4 % — LOW (ref 39–50)
HCT VFR BLD CALC: 24 % — LOW (ref 39–50)
HCT VFR BLD CALC: 24.2 % — LOW (ref 39–50)
HCT VFR BLD CALC: 24.3 % — LOW (ref 39–50)
HCT VFR BLD CALC: 24.3 % — LOW (ref 39–50)
HCT VFR BLD CALC: 24.5 % — LOW (ref 39–50)
HCT VFR BLD CALC: 25.2 % — LOW (ref 39–50)
HCT VFR BLD CALC: 25.5 % — LOW (ref 39–50)
HCT VFR BLD CALC: 26.1 % — LOW (ref 39–50)
HCT VFR BLD CALC: 26.2 % — LOW (ref 39–50)
HCT VFR BLD CALC: 27.2 % — LOW (ref 39–50)
HCT VFR BLD CALC: 28.2 % — LOW (ref 39–50)
HCV AB S/CO SERPL IA: 0.18 S/CO — SIGNIFICANT CHANGE UP (ref 0–0.99)
HCV AB S/CO SERPL IA: 0.23 S/CO — SIGNIFICANT CHANGE UP (ref 0–0.99)
HCV AB SERPL-IMP: SIGNIFICANT CHANGE UP
HCV AB SERPL-IMP: SIGNIFICANT CHANGE UP
HCV RNA FLD QL NAA+PROBE: SIGNIFICANT CHANGE UP
HCYS SERPL-MCNC: 15 UMOL/L — SIGNIFICANT CHANGE UP
HDLC SERPL-MCNC: 15 MG/DL — LOW
HEV AB FLD QL: NEGATIVE — SIGNIFICANT CHANGE UP
HEV IGM SER QL: SIGNIFICANT CHANGE UP
HGB BLD-MCNC: 3.3 G/DL — CRITICAL LOW (ref 13–17)
HGB BLD-MCNC: 4.2 G/DL — CRITICAL LOW (ref 13–17)
HGB BLD-MCNC: 4.3 G/DL — CRITICAL LOW (ref 13–17)
HGB BLD-MCNC: 4.6 G/DL — CRITICAL LOW (ref 13–17)
HGB BLD-MCNC: 5.3 G/DL — CRITICAL LOW (ref 13–17)
HGB BLD-MCNC: 6.2 G/DL — CRITICAL LOW (ref 13–17)
HGB BLD-MCNC: 6.6 G/DL — CRITICAL LOW (ref 13–17)
HGB BLD-MCNC: 6.8 G/DL — CRITICAL LOW (ref 13–17)
HGB BLD-MCNC: 6.8 G/DL — CRITICAL LOW (ref 13–17)
HGB BLD-MCNC: 6.9 G/DL — CRITICAL LOW (ref 13–17)
HGB BLD-MCNC: 7 G/DL — CRITICAL LOW (ref 13–17)
HGB BLD-MCNC: 7 G/DL — CRITICAL LOW (ref 13–17)
HGB BLD-MCNC: 7.2 G/DL — LOW (ref 13–17)
HGB BLD-MCNC: 7.2 G/DL — LOW (ref 13–17)
HGB BLD-MCNC: 7.3 G/DL — LOW (ref 13–17)
HGB BLD-MCNC: 7.4 G/DL — LOW (ref 13–17)
HGB BLD-MCNC: 7.4 G/DL — LOW (ref 13–17)
HGB BLD-MCNC: 7.5 G/DL — LOW (ref 13–17)
HGB BLD-MCNC: 7.6 G/DL — LOW (ref 13–17)
HGB BLD-MCNC: 7.7 G/DL — LOW (ref 13–17)
HGB BLD-MCNC: 7.8 G/DL — LOW (ref 13–17)
HGB BLD-MCNC: 7.8 G/DL — LOW (ref 13–17)
HGB BLD-MCNC: 8 G/DL — LOW (ref 13–17)
HGB BLD-MCNC: 8 G/DL — LOW (ref 13–17)
HGB BLD-MCNC: 8.2 G/DL — LOW (ref 13–17)
HGB BLD-MCNC: 8.3 G/DL — LOW (ref 13–17)
HGB BLD-MCNC: 8.5 G/DL — LOW (ref 13–17)
HGB BLD-MCNC: 8.6 G/DL — LOW (ref 13–17)
HGB BLD-MCNC: 8.6 G/DL — LOW (ref 13–17)
HGB BLD-MCNC: 8.7 G/DL — LOW (ref 13–17)
HGB BLD-MCNC: 9.1 G/DL — LOW (ref 13–17)
HGB BLD-MCNC: 9.2 G/DL — LOW (ref 13–17)
HGB BLD-MCNC: 9.4 G/DL — LOW (ref 13–17)
HGB BLD-MCNC: 9.5 G/DL — LOW (ref 13–17)
HIV 1+2 AB+HIV1 P24 AG SERPL QL IA: SIGNIFICANT CHANGE UP
HOROWITZ INDEX BLDA+IHG-RTO: 100 — SIGNIFICANT CHANGE UP
HOROWITZ INDEX BLDA+IHG-RTO: SIGNIFICANT CHANGE UP
HOROWITZ INDEX BLDV+IHG-RTO: 100 — SIGNIFICANT CHANGE UP
HSV1 AB FLD QL: SIGNIFICANT CHANGE UP TITER
HSV2 AB FLD-ACNC: SIGNIFICANT CHANGE UP TITER
HYALINE CASTS # UR AUTO: ABNORMAL /LPF
HYPOCHROMIA BLD QL: SLIGHT — SIGNIFICANT CHANGE UP
IAT COMP-SP REAG SERPL QL: SIGNIFICANT CHANGE UP
IGA FLD-MCNC: 1313 MG/DL — HIGH (ref 84–499)
IGG FLD-MCNC: 3258 MG/DL — HIGH (ref 610–1660)
IGG SERPL-MCNC: 4018 MG/DL — HIGH (ref 603–1613)
IGG1 SER-MCNC: 1714 MG/DL — HIGH (ref 248–810)
IGG2 SER-MCNC: 1259 MG/DL — HIGH (ref 130–555)
IGG3 SER-MCNC: 61 MG/DL — SIGNIFICANT CHANGE UP (ref 15–102)
IGG4 SER-MCNC: 218 MG/DL — HIGH (ref 2–96)
IGM SERPL-MCNC: 130 MG/DL — SIGNIFICANT CHANGE UP (ref 35–242)
IMM GRANULOCYTES NFR BLD AUTO: 0 % — SIGNIFICANT CHANGE UP (ref 0–1.5)
IMM GRANULOCYTES NFR BLD AUTO: 0.2 % — SIGNIFICANT CHANGE UP (ref 0–1.5)
IMM GRANULOCYTES NFR BLD AUTO: 0.3 % — SIGNIFICANT CHANGE UP (ref 0–1.5)
IMM GRANULOCYTES NFR BLD AUTO: 0.4 % — SIGNIFICANT CHANGE UP (ref 0–1.5)
IMM GRANULOCYTES NFR BLD AUTO: 0.4 % — SIGNIFICANT CHANGE UP (ref 0–1.5)
IMM GRANULOCYTES NFR BLD AUTO: 0.5 % — SIGNIFICANT CHANGE UP (ref 0–1.5)
IMM GRANULOCYTES NFR BLD AUTO: 0.5 % — SIGNIFICANT CHANGE UP (ref 0–1.5)
IMM GRANULOCYTES NFR BLD AUTO: 0.6 % — SIGNIFICANT CHANGE UP (ref 0–1.5)
IMM GRANULOCYTES NFR BLD AUTO: 0.6 % — SIGNIFICANT CHANGE UP (ref 0–1.5)
IMM GRANULOCYTES NFR BLD AUTO: 0.7 % — SIGNIFICANT CHANGE UP (ref 0–1.5)
IMM GRANULOCYTES NFR BLD AUTO: 0.8 % — SIGNIFICANT CHANGE UP (ref 0–1.5)
IMM GRANULOCYTES NFR BLD AUTO: 0.9 % — SIGNIFICANT CHANGE UP (ref 0–1.5)
IMM GRANULOCYTES NFR BLD AUTO: 0.9 % — SIGNIFICANT CHANGE UP (ref 0–1.5)
IMM GRANULOCYTES NFR BLD AUTO: 1 % — SIGNIFICANT CHANGE UP (ref 0–1.5)
IMM GRANULOCYTES NFR BLD AUTO: 5.5 % — HIGH (ref 0–1.5)
IMM GRANULOCYTES NFR BLD AUTO: 6 % — HIGH (ref 0–1.5)
INR BLD: (no result) RATIO (ref 0.88–1.16)
INR BLD: 2.26 RATIO — HIGH (ref 0.88–1.16)
INR BLD: 2.54 RATIO — HIGH (ref 0.88–1.16)
INR BLD: 2.54 RATIO — HIGH (ref 0.88–1.16)
INR BLD: 2.62 RATIO — HIGH (ref 0.88–1.16)
INR BLD: 2.9 RATIO — HIGH (ref 0.88–1.16)
INR BLD: 3.4 RATIO — HIGH (ref 0.88–1.16)
INR BLD: 3.42 RATIO — HIGH (ref 0.88–1.16)
INR BLD: 3.65 RATIO — HIGH (ref 0.88–1.16)
INR BLD: 3.86 RATIO — HIGH (ref 0.88–1.16)
INR BLD: 4.07 RATIO — HIGH (ref 0.88–1.16)
INR BLD: 4.12 RATIO — HIGH (ref 0.88–1.16)
INR BLD: 4.14 RATIO — HIGH (ref 0.88–1.16)
INR BLD: 4.15 RATIO — HIGH (ref 0.88–1.16)
INR BLD: 4.32 RATIO — HIGH (ref 0.88–1.16)
INR BLD: 4.37 RATIO — HIGH (ref 0.88–1.16)
INR BLD: 4.45 RATIO — HIGH (ref 0.88–1.16)
INR BLD: 4.63 RATIO — HIGH (ref 0.88–1.16)
INR BLD: 4.95 RATIO — HIGH (ref 0.88–1.16)
INR BLD: 5.51 RATIO — CRITICAL HIGH (ref 0.88–1.16)
INR BLD: 6 RATIO — CRITICAL HIGH (ref 0.88–1.16)
INR BLD: >15 RATIO (ref 0.88–1.16)
INTERPRETATION SERPL IFE-IMP: SIGNIFICANT CHANGE UP
IRON SATN MFR SERPL: 110 UG/DL — SIGNIFICANT CHANGE UP (ref 65–170)
IRON SATN MFR SERPL: 20 % — SIGNIFICANT CHANGE UP (ref 20–55)
IRON SATN MFR SERPL: 34 UG/DL — LOW (ref 65–170)
IRON SATN MFR SERPL: 91 % — HIGH (ref 20–55)
K OXYTOCA DNA BLD POS QL NAA+NON-PROBE: SIGNIFICANT CHANGE UP
KAPPA LC SER QL IFE: 4.67 MG/DL — HIGH (ref 0.33–1.94)
KAPPA LC SER QL IFE: 8.33 MG/DL — HIGH (ref 0.33–1.94)
KAPPA/LAMBDA FREE LIGHT CHAIN RATIO, SERUM: 1.06 RATIO — SIGNIFICANT CHANGE UP (ref 0.26–1.65)
KAPPA/LAMBDA FREE LIGHT CHAIN RATIO, SERUM: 1.43 RATIO — SIGNIFICANT CHANGE UP (ref 0.26–1.65)
KETONES UR-MCNC: ABNORMAL
KETONES UR-MCNC: NEGATIVE — SIGNIFICANT CHANGE UP
L MONOCYTOG DNA BLD POS QL NAA+NON-PROBE: SIGNIFICANT CHANGE UP
LACTATE SERPL-SCNC: 13 MMOL/L — CRITICAL HIGH (ref 0.7–2)
LACTATE SERPL-SCNC: 16.6 MMOL/L — CRITICAL HIGH (ref 0.7–2)
LACTATE SERPL-SCNC: 19 MMOL/L — CRITICAL HIGH (ref 0.7–2)
LACTATE SERPL-SCNC: 8.7 MMOL/L — CRITICAL HIGH (ref 0.7–2)
LAMBDA LC SER QL IFE: 3.26 MG/DL — HIGH (ref 0.57–2.63)
LAMBDA LC SER QL IFE: 7.89 MG/DL — HIGH (ref 0.57–2.63)
LDH SERPL L TO P-CCNC: 397 U/L — HIGH (ref 120–225)
LDH SERPL L TO P-CCNC: 401 U/L — HIGH (ref 120–225)
LDH SERPL L TO P-CCNC: 405 U/L — HIGH (ref 120–225)
LDH SERPL L TO P-CCNC: 657 U/L — HIGH (ref 120–225)
LDH SERPL L TO P-CCNC: 809 U/L — HIGH (ref 120–225)
LEUKOCYTE ESTERASE UR-ACNC: NEGATIVE — SIGNIFICANT CHANGE UP
LEUKOCYTE ESTERASE UR-ACNC: NEGATIVE — SIGNIFICANT CHANGE UP
LG PLATELETS BLD QL AUTO: SLIGHT — SIGNIFICANT CHANGE UP
LIPID PNL WITH DIRECT LDL SERPL: 37 MG/DL — SIGNIFICANT CHANGE UP
LKM AB SER-ACNC: <20.1 UNITS — SIGNIFICANT CHANGE UP (ref 0–20)
LKM AB SER-ACNC: <20.1 UNITS — SIGNIFICANT CHANGE UP (ref 0–20)
LYMPHOCYTES # BLD AUTO: 0.42 K/UL — LOW (ref 1–3.3)
LYMPHOCYTES # BLD AUTO: 0.45 K/UL — LOW (ref 1–3.3)
LYMPHOCYTES # BLD AUTO: 0.72 K/UL — LOW (ref 1–3.3)
LYMPHOCYTES # BLD AUTO: 0.76 K/UL — LOW (ref 1–3.3)
LYMPHOCYTES # BLD AUTO: 0.93 K/UL — LOW (ref 1–3.3)
LYMPHOCYTES # BLD AUTO: 0.96 K/UL — LOW (ref 1–3.3)
LYMPHOCYTES # BLD AUTO: 0.96 K/UL — LOW (ref 1–3.3)
LYMPHOCYTES # BLD AUTO: 0.99 K/UL — LOW (ref 1–3.3)
LYMPHOCYTES # BLD AUTO: 1.1 K/UL — SIGNIFICANT CHANGE UP (ref 1–3.3)
LYMPHOCYTES # BLD AUTO: 1.2 K/UL — SIGNIFICANT CHANGE UP (ref 1–3.3)
LYMPHOCYTES # BLD AUTO: 1.27 K/UL — SIGNIFICANT CHANGE UP (ref 1–3.3)
LYMPHOCYTES # BLD AUTO: 1.32 K/UL — SIGNIFICANT CHANGE UP (ref 1–3.3)
LYMPHOCYTES # BLD AUTO: 1.36 K/UL — SIGNIFICANT CHANGE UP (ref 1–3.3)
LYMPHOCYTES # BLD AUTO: 1.47 K/UL — SIGNIFICANT CHANGE UP (ref 1–3.3)
LYMPHOCYTES # BLD AUTO: 1.68 K/UL — SIGNIFICANT CHANGE UP (ref 1–3.3)
LYMPHOCYTES # BLD AUTO: 10.5 % — LOW (ref 13–44)
LYMPHOCYTES # BLD AUTO: 10.5 % — LOW (ref 13–44)
LYMPHOCYTES # BLD AUTO: 11.3 % — LOW (ref 13–44)
LYMPHOCYTES # BLD AUTO: 12 % — LOW (ref 13–44)
LYMPHOCYTES # BLD AUTO: 12.5 % — LOW (ref 13–44)
LYMPHOCYTES # BLD AUTO: 12.8 % — LOW (ref 13–44)
LYMPHOCYTES # BLD AUTO: 14.7 % — SIGNIFICANT CHANGE UP (ref 13–44)
LYMPHOCYTES # BLD AUTO: 15 % — SIGNIFICANT CHANGE UP (ref 13–44)
LYMPHOCYTES # BLD AUTO: 15.4 % — SIGNIFICANT CHANGE UP (ref 13–44)
LYMPHOCYTES # BLD AUTO: 16 % — SIGNIFICANT CHANGE UP (ref 13–44)
LYMPHOCYTES # BLD AUTO: 18 % — SIGNIFICANT CHANGE UP (ref 13–44)
LYMPHOCYTES # BLD AUTO: 18.3 % — SIGNIFICANT CHANGE UP (ref 13–44)
LYMPHOCYTES # BLD AUTO: 2.04 K/UL — SIGNIFICANT CHANGE UP (ref 1–3.3)
LYMPHOCYTES # BLD AUTO: 2.49 K/UL — SIGNIFICANT CHANGE UP (ref 1–3.3)
LYMPHOCYTES # BLD AUTO: 21.3 % — SIGNIFICANT CHANGE UP (ref 13–44)
LYMPHOCYTES # BLD AUTO: 22.9 % — SIGNIFICANT CHANGE UP (ref 13–44)
LYMPHOCYTES # BLD AUTO: 24 % — SIGNIFICANT CHANGE UP (ref 13–44)
LYMPHOCYTES # BLD AUTO: 29.3 % — SIGNIFICANT CHANGE UP (ref 13–44)
LYMPHOCYTES # BLD AUTO: 29.4 % — SIGNIFICANT CHANGE UP (ref 13–44)
LYMPHOCYTES # BLD AUTO: 3.53 K/UL — HIGH (ref 1–3.3)
LYMPHOCYTES # BLD AUTO: 43 % — SIGNIFICANT CHANGE UP (ref 13–44)
LYMPHOCYTES # FLD: 53 % — SIGNIFICANT CHANGE UP
MACROCYTES BLD QL: SLIGHT — SIGNIFICANT CHANGE UP
MAGNESIUM SERPL-MCNC: 1 MG/DL — CRITICAL LOW (ref 1.6–2.6)
MAGNESIUM SERPL-MCNC: 1.3 MG/DL — LOW (ref 1.6–2.6)
MAGNESIUM SERPL-MCNC: 1.5 MG/DL — LOW (ref 1.6–2.6)
MAGNESIUM SERPL-MCNC: 1.6 MG/DL — SIGNIFICANT CHANGE UP (ref 1.6–2.6)
MAGNESIUM SERPL-MCNC: 1.7 MG/DL — SIGNIFICANT CHANGE UP (ref 1.6–2.6)
MAGNESIUM SERPL-MCNC: 1.8 MG/DL — SIGNIFICANT CHANGE UP (ref 1.6–2.6)
MAGNESIUM SERPL-MCNC: 1.8 MG/DL — SIGNIFICANT CHANGE UP (ref 1.6–2.6)
MAGNESIUM SERPL-MCNC: 1.9 MG/DL — SIGNIFICANT CHANGE UP (ref 1.6–2.6)
MAGNESIUM SERPL-MCNC: 2.5 MG/DL — SIGNIFICANT CHANGE UP (ref 1.6–2.6)
MANUAL SMEAR VERIFICATION: SIGNIFICANT CHANGE UP
MCHC RBC-ENTMCNC: 29.2 PG — SIGNIFICANT CHANGE UP (ref 27–34)
MCHC RBC-ENTMCNC: 29.4 GM/DL — LOW (ref 32–36)
MCHC RBC-ENTMCNC: 29.4 PG — SIGNIFICANT CHANGE UP (ref 27–34)
MCHC RBC-ENTMCNC: 30 GM/DL — LOW (ref 32–36)
MCHC RBC-ENTMCNC: 30.3 GM/DL — LOW (ref 32–36)
MCHC RBC-ENTMCNC: 30.3 GM/DL — LOW (ref 32–36)
MCHC RBC-ENTMCNC: 30.9 PG — SIGNIFICANT CHANGE UP (ref 27–34)
MCHC RBC-ENTMCNC: 30.9 PG — SIGNIFICANT CHANGE UP (ref 27–34)
MCHC RBC-ENTMCNC: 31.1 PG — SIGNIFICANT CHANGE UP (ref 27–34)
MCHC RBC-ENTMCNC: 31.4 PG — SIGNIFICANT CHANGE UP (ref 27–34)
MCHC RBC-ENTMCNC: 31.4 PG — SIGNIFICANT CHANGE UP (ref 27–34)
MCHC RBC-ENTMCNC: 31.8 PG — SIGNIFICANT CHANGE UP (ref 27–34)
MCHC RBC-ENTMCNC: 31.9 PG — SIGNIFICANT CHANGE UP (ref 27–34)
MCHC RBC-ENTMCNC: 32 PG — SIGNIFICANT CHANGE UP (ref 27–34)
MCHC RBC-ENTMCNC: 32.1 GM/DL — SIGNIFICANT CHANGE UP (ref 32–36)
MCHC RBC-ENTMCNC: 32.2 GM/DL — SIGNIFICANT CHANGE UP (ref 32–36)
MCHC RBC-ENTMCNC: 32.2 PG — SIGNIFICANT CHANGE UP (ref 27–34)
MCHC RBC-ENTMCNC: 32.2 PG — SIGNIFICANT CHANGE UP (ref 27–34)
MCHC RBC-ENTMCNC: 32.4 PG — SIGNIFICANT CHANGE UP (ref 27–34)
MCHC RBC-ENTMCNC: 32.6 GM/DL — SIGNIFICANT CHANGE UP (ref 32–36)
MCHC RBC-ENTMCNC: 32.6 PG — SIGNIFICANT CHANGE UP (ref 27–34)
MCHC RBC-ENTMCNC: 32.8 PG — SIGNIFICANT CHANGE UP (ref 27–34)
MCHC RBC-ENTMCNC: 32.9 GM/DL — SIGNIFICANT CHANGE UP (ref 32–36)
MCHC RBC-ENTMCNC: 33 GM/DL — SIGNIFICANT CHANGE UP (ref 32–36)
MCHC RBC-ENTMCNC: 33 GM/DL — SIGNIFICANT CHANGE UP (ref 32–36)
MCHC RBC-ENTMCNC: 33 PG — SIGNIFICANT CHANGE UP (ref 27–34)
MCHC RBC-ENTMCNC: 33 PG — SIGNIFICANT CHANGE UP (ref 27–34)
MCHC RBC-ENTMCNC: 33.1 PG — SIGNIFICANT CHANGE UP (ref 27–34)
MCHC RBC-ENTMCNC: 33.2 PG — SIGNIFICANT CHANGE UP (ref 27–34)
MCHC RBC-ENTMCNC: 33.3 GM/DL — SIGNIFICANT CHANGE UP (ref 32–36)
MCHC RBC-ENTMCNC: 33.3 PG — SIGNIFICANT CHANGE UP (ref 27–34)
MCHC RBC-ENTMCNC: 33.3 PG — SIGNIFICANT CHANGE UP (ref 27–34)
MCHC RBC-ENTMCNC: 33.5 GM/DL — SIGNIFICANT CHANGE UP (ref 32–36)
MCHC RBC-ENTMCNC: 33.5 PG — SIGNIFICANT CHANGE UP (ref 27–34)
MCHC RBC-ENTMCNC: 33.6 PG — SIGNIFICANT CHANGE UP (ref 27–34)
MCHC RBC-ENTMCNC: 33.7 GM/DL — SIGNIFICANT CHANGE UP (ref 32–36)
MCHC RBC-ENTMCNC: 33.7 GM/DL — SIGNIFICANT CHANGE UP (ref 32–36)
MCHC RBC-ENTMCNC: 33.7 PG — SIGNIFICANT CHANGE UP (ref 27–34)
MCHC RBC-ENTMCNC: 33.7 PG — SIGNIFICANT CHANGE UP (ref 27–34)
MCHC RBC-ENTMCNC: 33.8 PG — SIGNIFICANT CHANGE UP (ref 27–34)
MCHC RBC-ENTMCNC: 33.9 PG — SIGNIFICANT CHANGE UP (ref 27–34)
MCHC RBC-ENTMCNC: 33.9 PG — SIGNIFICANT CHANGE UP (ref 27–34)
MCHC RBC-ENTMCNC: 34 PG — SIGNIFICANT CHANGE UP (ref 27–34)
MCHC RBC-ENTMCNC: 34 PG — SIGNIFICANT CHANGE UP (ref 27–34)
MCHC RBC-ENTMCNC: 34.1 GM/DL — SIGNIFICANT CHANGE UP (ref 32–36)
MCHC RBC-ENTMCNC: 34.1 PG — HIGH (ref 27–34)
MCHC RBC-ENTMCNC: 34.3 GM/DL — SIGNIFICANT CHANGE UP (ref 32–36)
MCHC RBC-ENTMCNC: 34.3 GM/DL — SIGNIFICANT CHANGE UP (ref 32–36)
MCHC RBC-ENTMCNC: 34.3 PG — HIGH (ref 27–34)
MCHC RBC-ENTMCNC: 34.3 PG — HIGH (ref 27–34)
MCHC RBC-ENTMCNC: 34.4 PG — HIGH (ref 27–34)
MCHC RBC-ENTMCNC: 34.5 GM/DL — SIGNIFICANT CHANGE UP (ref 32–36)
MCHC RBC-ENTMCNC: 34.6 GM/DL — SIGNIFICANT CHANGE UP (ref 32–36)
MCHC RBC-ENTMCNC: 34.6 GM/DL — SIGNIFICANT CHANGE UP (ref 32–36)
MCHC RBC-ENTMCNC: 34.7 GM/DL — SIGNIFICANT CHANGE UP (ref 32–36)
MCHC RBC-ENTMCNC: 34.8 GM/DL — SIGNIFICANT CHANGE UP (ref 32–36)
MCHC RBC-ENTMCNC: 35 GM/DL — SIGNIFICANT CHANGE UP (ref 32–36)
MCHC RBC-ENTMCNC: 35.1 GM/DL — SIGNIFICANT CHANGE UP (ref 32–36)
MCHC RBC-ENTMCNC: 35.1 GM/DL — SIGNIFICANT CHANGE UP (ref 32–36)
MCHC RBC-ENTMCNC: 35.2 GM/DL — SIGNIFICANT CHANGE UP (ref 32–36)
MCHC RBC-ENTMCNC: 35.2 GM/DL — SIGNIFICANT CHANGE UP (ref 32–36)
MCHC RBC-ENTMCNC: 35.7 GM/DL — SIGNIFICANT CHANGE UP (ref 32–36)
MCHC RBC-ENTMCNC: 35.7 GM/DL — SIGNIFICANT CHANGE UP (ref 32–36)
MCHC RBC-ENTMCNC: 35.8 GM/DL — SIGNIFICANT CHANGE UP (ref 32–36)
MCHC RBC-ENTMCNC: 35.9 GM/DL — SIGNIFICANT CHANGE UP (ref 32–36)
MCHC RBC-ENTMCNC: 35.9 GM/DL — SIGNIFICANT CHANGE UP (ref 32–36)
MCHC RBC-ENTMCNC: 36.1 GM/DL — HIGH (ref 32–36)
MCHC RBC-ENTMCNC: 36.3 GM/DL — HIGH (ref 32–36)
MCHC RBC-ENTMCNC: 36.4 GM/DL — HIGH (ref 32–36)
MCHC RBC-ENTMCNC: 36.5 GM/DL — HIGH (ref 32–36)
MCHC RBC-ENTMCNC: 36.7 GM/DL — HIGH (ref 32–36)
MCV RBC AUTO: 100 FL — SIGNIFICANT CHANGE UP (ref 80–100)
MCV RBC AUTO: 100 FL — SIGNIFICANT CHANGE UP (ref 80–100)
MCV RBC AUTO: 102.8 FL — HIGH (ref 80–100)
MCV RBC AUTO: 103.6 FL — HIGH (ref 80–100)
MCV RBC AUTO: 103.7 FL — HIGH (ref 80–100)
MCV RBC AUTO: 107.2 FL — HIGH (ref 80–100)
MCV RBC AUTO: 91.3 FL — SIGNIFICANT CHANGE UP (ref 80–100)
MCV RBC AUTO: 91.3 FL — SIGNIFICANT CHANGE UP (ref 80–100)
MCV RBC AUTO: 91.6 FL — SIGNIFICANT CHANGE UP (ref 80–100)
MCV RBC AUTO: 92 FL — SIGNIFICANT CHANGE UP (ref 80–100)
MCV RBC AUTO: 92.3 FL — SIGNIFICANT CHANGE UP (ref 80–100)
MCV RBC AUTO: 92.6 FL — SIGNIFICANT CHANGE UP (ref 80–100)
MCV RBC AUTO: 92.8 FL — SIGNIFICANT CHANGE UP (ref 80–100)
MCV RBC AUTO: 93.4 FL — SIGNIFICANT CHANGE UP (ref 80–100)
MCV RBC AUTO: 93.4 FL — SIGNIFICANT CHANGE UP (ref 80–100)
MCV RBC AUTO: 93.7 FL — SIGNIFICANT CHANGE UP (ref 80–100)
MCV RBC AUTO: 93.8 FL — SIGNIFICANT CHANGE UP (ref 80–100)
MCV RBC AUTO: 93.9 FL — SIGNIFICANT CHANGE UP (ref 80–100)
MCV RBC AUTO: 94 FL — SIGNIFICANT CHANGE UP (ref 80–100)
MCV RBC AUTO: 94.2 FL — SIGNIFICANT CHANGE UP (ref 80–100)
MCV RBC AUTO: 94.5 FL — SIGNIFICANT CHANGE UP (ref 80–100)
MCV RBC AUTO: 94.9 FL — SIGNIFICANT CHANGE UP (ref 80–100)
MCV RBC AUTO: 95.7 FL — SIGNIFICANT CHANGE UP (ref 80–100)
MCV RBC AUTO: 96 FL — SIGNIFICANT CHANGE UP (ref 80–100)
MCV RBC AUTO: 96.1 FL — SIGNIFICANT CHANGE UP (ref 80–100)
MCV RBC AUTO: 96.2 FL — SIGNIFICANT CHANGE UP (ref 80–100)
MCV RBC AUTO: 96.4 FL — SIGNIFICANT CHANGE UP (ref 80–100)
MCV RBC AUTO: 96.5 FL — SIGNIFICANT CHANGE UP (ref 80–100)
MCV RBC AUTO: 96.7 FL — SIGNIFICANT CHANGE UP (ref 80–100)
MCV RBC AUTO: 96.8 FL — SIGNIFICANT CHANGE UP (ref 80–100)
MCV RBC AUTO: 96.8 FL — SIGNIFICANT CHANGE UP (ref 80–100)
MCV RBC AUTO: 96.9 FL — SIGNIFICANT CHANGE UP (ref 80–100)
MCV RBC AUTO: 97.4 FL — SIGNIFICANT CHANGE UP (ref 80–100)
MCV RBC AUTO: 97.5 FL — SIGNIFICANT CHANGE UP (ref 80–100)
MCV RBC AUTO: 98.5 FL — SIGNIFICANT CHANGE UP (ref 80–100)
MCV RBC AUTO: 99 FL — SIGNIFICANT CHANGE UP (ref 80–100)
MESOTHL CELL # FLD: 17 % — SIGNIFICANT CHANGE UP
METHOD TYPE: SIGNIFICANT CHANGE UP
MICROCYTES BLD QL: SLIGHT — SIGNIFICANT CHANGE UP
MONOCYTES # BLD AUTO: 0.19 K/UL — SIGNIFICANT CHANGE UP (ref 0–0.9)
MONOCYTES # BLD AUTO: 0.26 K/UL — SIGNIFICANT CHANGE UP (ref 0–0.9)
MONOCYTES # BLD AUTO: 0.28 K/UL — SIGNIFICANT CHANGE UP (ref 0–0.9)
MONOCYTES # BLD AUTO: 0.36 K/UL — SIGNIFICANT CHANGE UP (ref 0–0.9)
MONOCYTES # BLD AUTO: 0.38 K/UL — SIGNIFICANT CHANGE UP (ref 0–0.9)
MONOCYTES # BLD AUTO: 0.42 K/UL — SIGNIFICANT CHANGE UP (ref 0–0.9)
MONOCYTES # BLD AUTO: 0.42 K/UL — SIGNIFICANT CHANGE UP (ref 0–0.9)
MONOCYTES # BLD AUTO: 0.56 K/UL — SIGNIFICANT CHANGE UP (ref 0–0.9)
MONOCYTES # BLD AUTO: 0.56 K/UL — SIGNIFICANT CHANGE UP (ref 0–0.9)
MONOCYTES # BLD AUTO: 0.72 K/UL — SIGNIFICANT CHANGE UP (ref 0–0.9)
MONOCYTES # BLD AUTO: 0.85 K/UL — SIGNIFICANT CHANGE UP (ref 0–0.9)
MONOCYTES # BLD AUTO: 0.87 K/UL — SIGNIFICANT CHANGE UP (ref 0–0.9)
MONOCYTES # BLD AUTO: 0.88 K/UL — SIGNIFICANT CHANGE UP (ref 0–0.9)
MONOCYTES # BLD AUTO: 0.88 K/UL — SIGNIFICANT CHANGE UP (ref 0–0.9)
MONOCYTES # BLD AUTO: 1 K/UL — HIGH (ref 0–0.9)
MONOCYTES # BLD AUTO: 1.06 K/UL — HIGH (ref 0–0.9)
MONOCYTES NFR BLD AUTO: 11 % — SIGNIFICANT CHANGE UP (ref 2–14)
MONOCYTES NFR BLD AUTO: 12 % — SIGNIFICANT CHANGE UP (ref 2–14)
MONOCYTES NFR BLD AUTO: 14.4 % — HIGH (ref 2–14)
MONOCYTES NFR BLD AUTO: 14.6 % — HIGH (ref 2–14)
MONOCYTES NFR BLD AUTO: 15.8 % — HIGH (ref 2–14)
MONOCYTES NFR BLD AUTO: 17 % — HIGH (ref 2–14)
MONOCYTES NFR BLD AUTO: 3.1 % — SIGNIFICANT CHANGE UP (ref 2–14)
MONOCYTES NFR BLD AUTO: 4.6 % — SIGNIFICANT CHANGE UP (ref 2–14)
MONOCYTES NFR BLD AUTO: 5 % — SIGNIFICANT CHANGE UP (ref 2–14)
MONOCYTES NFR BLD AUTO: 5.2 % — SIGNIFICANT CHANGE UP (ref 2–14)
MONOCYTES NFR BLD AUTO: 5.4 % — SIGNIFICANT CHANGE UP (ref 2–14)
MONOCYTES NFR BLD AUTO: 8.1 % — SIGNIFICANT CHANGE UP (ref 2–14)
MONOCYTES NFR BLD AUTO: 8.7 % — SIGNIFICANT CHANGE UP (ref 2–14)
MONOCYTES NFR BLD AUTO: 9.1 % — SIGNIFICANT CHANGE UP (ref 2–14)
MONOCYTES NFR BLD AUTO: 9.5 % — SIGNIFICANT CHANGE UP (ref 2–14)
MONOCYTES NFR BLD AUTO: 9.6 % — SIGNIFICANT CHANGE UP (ref 2–14)
MONOCYTES NFR BLD AUTO: 9.6 % — SIGNIFICANT CHANGE UP (ref 2–14)
MONOCYTES NFR BLD AUTO: 9.7 % — SIGNIFICANT CHANGE UP (ref 2–14)
MONOS+MACROS # FLD: 17 % — SIGNIFICANT CHANGE UP
MRSA SPEC QL CULT: SIGNIFICANT CHANGE UP
MSSA DNA SPEC QL NAA+PROBE: SIGNIFICANT CHANGE UP
N MEN ISLT CULT: SIGNIFICANT CHANGE UP
NEUTROPHILS # BLD AUTO: 2.12 K/UL — SIGNIFICANT CHANGE UP (ref 1.8–7.4)
NEUTROPHILS # BLD AUTO: 2.84 K/UL — SIGNIFICANT CHANGE UP (ref 1.8–7.4)
NEUTROPHILS # BLD AUTO: 2.91 K/UL — SIGNIFICANT CHANGE UP (ref 1.8–7.4)
NEUTROPHILS # BLD AUTO: 3.16 K/UL — SIGNIFICANT CHANGE UP (ref 1.8–7.4)
NEUTROPHILS # BLD AUTO: 3.23 K/UL — SIGNIFICANT CHANGE UP (ref 1.8–7.4)
NEUTROPHILS # BLD AUTO: 3.74 K/UL — SIGNIFICANT CHANGE UP (ref 1.8–7.4)
NEUTROPHILS # BLD AUTO: 4.12 K/UL — SIGNIFICANT CHANGE UP (ref 1.8–7.4)
NEUTROPHILS # BLD AUTO: 4.19 K/UL — SIGNIFICANT CHANGE UP (ref 1.8–7.4)
NEUTROPHILS # BLD AUTO: 4.29 K/UL — SIGNIFICANT CHANGE UP (ref 1.8–7.4)
NEUTROPHILS # BLD AUTO: 4.29 K/UL — SIGNIFICANT CHANGE UP (ref 1.8–7.4)
NEUTROPHILS # BLD AUTO: 4.82 K/UL — SIGNIFICANT CHANGE UP (ref 1.8–7.4)
NEUTROPHILS # BLD AUTO: 5.23 K/UL — SIGNIFICANT CHANGE UP (ref 1.8–7.4)
NEUTROPHILS # BLD AUTO: 5.5 K/UL — SIGNIFICANT CHANGE UP (ref 1.8–7.4)
NEUTROPHILS # BLD AUTO: 5.7 K/UL — SIGNIFICANT CHANGE UP (ref 1.8–7.4)
NEUTROPHILS # BLD AUTO: 7.15 K/UL — SIGNIFICANT CHANGE UP (ref 1.8–7.4)
NEUTROPHILS # BLD AUTO: 8.22 K/UL — HIGH (ref 1.8–7.4)
NEUTROPHILS # BLD AUTO: 8.66 K/UL — HIGH (ref 1.8–7.4)
NEUTROPHILS # BLD AUTO: 8.98 K/UL — HIGH (ref 1.8–7.4)
NEUTROPHILS NFR BLD AUTO: 45.6 % — SIGNIFICANT CHANGE UP (ref 43–77)
NEUTROPHILS NFR BLD AUTO: 58 % — SIGNIFICANT CHANGE UP (ref 43–77)
NEUTROPHILS NFR BLD AUTO: 59.5 % — SIGNIFICANT CHANGE UP (ref 43–77)
NEUTROPHILS NFR BLD AUTO: 61.6 % — SIGNIFICANT CHANGE UP (ref 43–77)
NEUTROPHILS NFR BLD AUTO: 65.5 % — SIGNIFICANT CHANGE UP (ref 43–77)
NEUTROPHILS NFR BLD AUTO: 67.2 % — SIGNIFICANT CHANGE UP (ref 43–77)
NEUTROPHILS NFR BLD AUTO: 67.8 % — SIGNIFICANT CHANGE UP (ref 43–77)
NEUTROPHILS NFR BLD AUTO: 68.1 % — SIGNIFICANT CHANGE UP (ref 43–77)
NEUTROPHILS NFR BLD AUTO: 68.5 % — SIGNIFICANT CHANGE UP (ref 43–77)
NEUTROPHILS NFR BLD AUTO: 70 % — SIGNIFICANT CHANGE UP (ref 43–77)
NEUTROPHILS NFR BLD AUTO: 71.9 % — SIGNIFICANT CHANGE UP (ref 43–77)
NEUTROPHILS NFR BLD AUTO: 72.4 % — SIGNIFICANT CHANGE UP (ref 43–77)
NEUTROPHILS NFR BLD AUTO: 75.6 % — SIGNIFICANT CHANGE UP (ref 43–77)
NEUTROPHILS NFR BLD AUTO: 77.3 % — HIGH (ref 43–77)
NEUTROPHILS NFR BLD AUTO: 78.2 % — HIGH (ref 43–77)
NEUTROPHILS NFR BLD AUTO: 80 % — HIGH (ref 43–77)
NEUTROPHILS NFR BLD AUTO: 81.4 % — HIGH (ref 43–77)
NEUTROPHILS NFR BLD AUTO: 83 % — HIGH (ref 43–77)
NITRITE UR-MCNC: NEGATIVE — SIGNIFICANT CHANGE UP
NITRITE UR-MCNC: NEGATIVE — SIGNIFICANT CHANGE UP
NRBC # BLD: 0 /100 WBCS — SIGNIFICANT CHANGE UP (ref 0–0)
NRBC # BLD: 0 /100 — SIGNIFICANT CHANGE UP (ref 0–0)
NRBC # BLD: 13 /100 WBCS — HIGH (ref 0–0)
NRBC # BLD: 2 /100 WBCS — HIGH (ref 0–0)
NRBC # BLD: 21 /100 WBCS — HIGH (ref 0–0)
NRBC # BLD: 7 /100 WBCS — HIGH (ref 0–0)
NT-PROBNP SERPL-SCNC: 18 PG/ML — SIGNIFICANT CHANGE UP (ref 0–125)
OB PNL STL: NEGATIVE — SIGNIFICANT CHANGE UP
OSMOLALITY SERPL: 282 MOSMOL/KG — SIGNIFICANT CHANGE UP (ref 275–300)
OSMOLALITY SERPL: 294 MOSMOL/KG — SIGNIFICANT CHANGE UP (ref 275–300)
OSMOLALITY UR: 524 MOS/KG — SIGNIFICANT CHANGE UP (ref 50–1200)
OSMOLALITY UR: 574 MOS/KG — SIGNIFICANT CHANGE UP (ref 50–1200)
OSMOLALITY UR: 667 MOS/KG — SIGNIFICANT CHANGE UP (ref 50–1200)
OVALOCYTES BLD QL SMEAR: SLIGHT — SIGNIFICANT CHANGE UP
PCO2 BLDA: 46 MMHG — SIGNIFICANT CHANGE UP (ref 32–46)
PCO2 BLDA: 54 MMHG — HIGH (ref 32–46)
PCO2 BLDA: 56 MMHG — HIGH (ref 32–46)
PCO2 BLDA: 57 MMHG — HIGH (ref 32–46)
PCO2 BLDA: 58 MMHG — HIGH (ref 32–46)
PCO2 BLDV: 57 MMHG — HIGH (ref 35–50)
PCP SPEC-MCNC: SIGNIFICANT CHANGE UP
PH BLDA: 6.83 — CRITICAL LOW (ref 7.35–7.45)
PH BLDA: 6.83 — CRITICAL LOW (ref 7.35–7.45)
PH BLDA: 6.84 — CRITICAL LOW (ref 7.35–7.45)
PH BLDA: 6.9 — CRITICAL LOW (ref 7.35–7.45)
PH BLDA: 7 — CRITICAL LOW (ref 7.35–7.45)
PH BLDV: 7.02 — CRITICAL LOW (ref 7.35–7.45)
PH UR: 7 — SIGNIFICANT CHANGE UP (ref 5–8)
PH UR: 8 — SIGNIFICANT CHANGE UP (ref 5–8)
PHOSPHATE SERPL-MCNC: 2.3 MG/DL — LOW (ref 2.5–4.5)
PHOSPHATE SERPL-MCNC: 2.4 MG/DL — LOW (ref 2.5–4.5)
PHOSPHATE SERPL-MCNC: 2.5 MG/DL — SIGNIFICANT CHANGE UP (ref 2.5–4.5)
PHOSPHATE SERPL-MCNC: 2.7 MG/DL — SIGNIFICANT CHANGE UP (ref 2.5–4.5)
PHOSPHATE SERPL-MCNC: 3 MG/DL — SIGNIFICANT CHANGE UP (ref 2.5–4.5)
PHOSPHATE SERPL-MCNC: 3 MG/DL — SIGNIFICANT CHANGE UP (ref 2.5–4.5)
PHOSPHATE SERPL-MCNC: 3.2 MG/DL — SIGNIFICANT CHANGE UP (ref 2.5–4.5)
PHOSPHATE SERPL-MCNC: 3.2 MG/DL — SIGNIFICANT CHANGE UP (ref 2.5–4.5)
PHOSPHATE SERPL-MCNC: 3.3 MG/DL — SIGNIFICANT CHANGE UP (ref 2.5–4.5)
PHOSPHATE SERPL-MCNC: 3.3 MG/DL — SIGNIFICANT CHANGE UP (ref 2.5–4.5)
PHOSPHATE SERPL-MCNC: 3.4 MG/DL — SIGNIFICANT CHANGE UP (ref 2.5–4.5)
PHOSPHATE SERPL-MCNC: 3.6 MG/DL — SIGNIFICANT CHANGE UP (ref 2.5–4.5)
PHOSPHATE SERPL-MCNC: 3.8 MG/DL — SIGNIFICANT CHANGE UP (ref 2.5–4.5)
PHOSPHATE SERPL-MCNC: 4.1 MG/DL — SIGNIFICANT CHANGE UP (ref 2.5–4.5)
PHOSPHATE SERPL-MCNC: 8 MG/DL — HIGH (ref 2.5–4.5)
PHOSPHATE SERPL-MCNC: 8.9 MG/DL — HIGH (ref 2.5–4.5)
PHOSPHATE SERPL-MCNC: 9.4 MG/DL — HIGH (ref 2.5–4.5)
PLAT MORPH BLD: NORMAL — SIGNIFICANT CHANGE UP
PLATELET # BLD AUTO: 104 K/UL — LOW (ref 150–400)
PLATELET # BLD AUTO: 22 K/UL — LOW (ref 150–400)
PLATELET # BLD AUTO: 25 K/UL — LOW (ref 150–400)
PLATELET # BLD AUTO: 37 K/UL — LOW (ref 150–400)
PLATELET # BLD AUTO: 39 K/UL — LOW (ref 150–400)
PLATELET # BLD AUTO: 40 K/UL — LOW (ref 150–400)
PLATELET # BLD AUTO: 42 K/UL — LOW (ref 150–400)
PLATELET # BLD AUTO: 48 K/UL — LOW (ref 150–400)
PLATELET # BLD AUTO: 50 K/UL — LOW (ref 150–400)
PLATELET # BLD AUTO: 51 K/UL — LOW (ref 150–400)
PLATELET # BLD AUTO: 54 K/UL — LOW (ref 150–400)
PLATELET # BLD AUTO: 56 K/UL — LOW (ref 150–400)
PLATELET # BLD AUTO: 56 K/UL — LOW (ref 150–400)
PLATELET # BLD AUTO: 58 K/UL — LOW (ref 150–400)
PLATELET # BLD AUTO: 59 K/UL — LOW (ref 150–400)
PLATELET # BLD AUTO: 60 K/UL — LOW (ref 150–400)
PLATELET # BLD AUTO: 61 K/UL — LOW (ref 150–400)
PLATELET # BLD AUTO: 61 K/UL — LOW (ref 150–400)
PLATELET # BLD AUTO: 62 K/UL — LOW (ref 150–400)
PLATELET # BLD AUTO: 62 K/UL — LOW (ref 150–400)
PLATELET # BLD AUTO: 63 K/UL — LOW (ref 150–400)
PLATELET # BLD AUTO: 67 K/UL — LOW (ref 150–400)
PLATELET # BLD AUTO: 67 K/UL — LOW (ref 150–400)
PLATELET # BLD AUTO: 68 K/UL — LOW (ref 150–400)
PLATELET # BLD AUTO: 68 K/UL — LOW (ref 150–400)
PLATELET # BLD AUTO: 69 K/UL — LOW (ref 150–400)
PLATELET # BLD AUTO: 69 K/UL — LOW (ref 150–400)
PLATELET # BLD AUTO: 70 K/UL — LOW (ref 150–400)
PLATELET # BLD AUTO: 70 K/UL — LOW (ref 150–400)
PLATELET # BLD AUTO: 72 K/UL — LOW (ref 150–400)
PLATELET # BLD AUTO: 73 K/UL — LOW (ref 150–400)
PLATELET # BLD AUTO: 73 K/UL — LOW (ref 150–400)
PLATELET # BLD AUTO: 74 K/UL — LOW (ref 150–400)
PLATELET # BLD AUTO: 76 K/UL — LOW (ref 150–400)
PLATELET # BLD AUTO: 78 K/UL — LOW (ref 150–400)
PLATELET # BLD AUTO: 79 K/UL — LOW (ref 150–400)
PLATELET # BLD AUTO: 95 K/UL — LOW (ref 150–400)
PLATELET COUNT - ESTIMATE: ABNORMAL
PO2 BLDA: 39 MMHG — CRITICAL LOW (ref 74–108)
PO2 BLDA: 45 MMHG — CRITICAL LOW (ref 74–108)
PO2 BLDA: 47 MMHG — CRITICAL LOW (ref 74–108)
PO2 BLDA: 50 MMHG — CRITICAL LOW (ref 74–108)
PO2 BLDA: 62 MMHG — LOW (ref 74–108)
PO2 BLDV: 50 MMHG — HIGH (ref 25–45)
POIKILOCYTOSIS BLD QL AUTO: SLIGHT — SIGNIFICANT CHANGE UP
POLYCHROMASIA BLD QL SMEAR: SLIGHT — SIGNIFICANT CHANGE UP
POTASSIUM SERPL-MCNC: 2.7 MMOL/L — CRITICAL LOW (ref 3.5–5.3)
POTASSIUM SERPL-MCNC: 3.1 MMOL/L — LOW (ref 3.5–5.3)
POTASSIUM SERPL-MCNC: 3.2 MMOL/L — LOW (ref 3.5–5.3)
POTASSIUM SERPL-MCNC: 3.3 MMOL/L — LOW (ref 3.5–5.3)
POTASSIUM SERPL-MCNC: 3.4 MMOL/L — LOW (ref 3.5–5.3)
POTASSIUM SERPL-MCNC: 3.4 MMOL/L — LOW (ref 3.5–5.3)
POTASSIUM SERPL-MCNC: 3.5 MMOL/L — SIGNIFICANT CHANGE UP (ref 3.5–5.3)
POTASSIUM SERPL-MCNC: 3.5 MMOL/L — SIGNIFICANT CHANGE UP (ref 3.5–5.3)
POTASSIUM SERPL-MCNC: 3.6 MMOL/L — SIGNIFICANT CHANGE UP (ref 3.5–5.3)
POTASSIUM SERPL-MCNC: 3.7 MMOL/L — SIGNIFICANT CHANGE UP (ref 3.5–5.3)
POTASSIUM SERPL-MCNC: 3.8 MMOL/L — SIGNIFICANT CHANGE UP (ref 3.5–5.3)
POTASSIUM SERPL-MCNC: 3.9 MMOL/L — SIGNIFICANT CHANGE UP (ref 3.5–5.3)
POTASSIUM SERPL-MCNC: 3.9 MMOL/L — SIGNIFICANT CHANGE UP (ref 3.5–5.3)
POTASSIUM SERPL-MCNC: 4 MMOL/L — SIGNIFICANT CHANGE UP (ref 3.5–5.3)
POTASSIUM SERPL-MCNC: 4.1 MMOL/L — SIGNIFICANT CHANGE UP (ref 3.5–5.3)
POTASSIUM SERPL-MCNC: 4.5 MMOL/L — SIGNIFICANT CHANGE UP (ref 3.5–5.3)
POTASSIUM SERPL-MCNC: 5 MMOL/L — SIGNIFICANT CHANGE UP (ref 3.5–5.3)
POTASSIUM SERPL-MCNC: 5.7 MMOL/L — HIGH (ref 3.5–5.3)
POTASSIUM SERPL-SCNC: 2.7 MMOL/L — CRITICAL LOW (ref 3.5–5.3)
POTASSIUM SERPL-SCNC: 3.1 MMOL/L — LOW (ref 3.5–5.3)
POTASSIUM SERPL-SCNC: 3.2 MMOL/L — LOW (ref 3.5–5.3)
POTASSIUM SERPL-SCNC: 3.3 MMOL/L — LOW (ref 3.5–5.3)
POTASSIUM SERPL-SCNC: 3.4 MMOL/L — LOW (ref 3.5–5.3)
POTASSIUM SERPL-SCNC: 3.4 MMOL/L — LOW (ref 3.5–5.3)
POTASSIUM SERPL-SCNC: 3.5 MMOL/L — SIGNIFICANT CHANGE UP (ref 3.5–5.3)
POTASSIUM SERPL-SCNC: 3.5 MMOL/L — SIGNIFICANT CHANGE UP (ref 3.5–5.3)
POTASSIUM SERPL-SCNC: 3.6 MMOL/L — SIGNIFICANT CHANGE UP (ref 3.5–5.3)
POTASSIUM SERPL-SCNC: 3.7 MMOL/L — SIGNIFICANT CHANGE UP (ref 3.5–5.3)
POTASSIUM SERPL-SCNC: 3.8 MMOL/L — SIGNIFICANT CHANGE UP (ref 3.5–5.3)
POTASSIUM SERPL-SCNC: 3.9 MMOL/L — SIGNIFICANT CHANGE UP (ref 3.5–5.3)
POTASSIUM SERPL-SCNC: 3.9 MMOL/L — SIGNIFICANT CHANGE UP (ref 3.5–5.3)
POTASSIUM SERPL-SCNC: 4 MMOL/L — SIGNIFICANT CHANGE UP (ref 3.5–5.3)
POTASSIUM SERPL-SCNC: 4.1 MMOL/L — SIGNIFICANT CHANGE UP (ref 3.5–5.3)
POTASSIUM SERPL-SCNC: 4.5 MMOL/L — SIGNIFICANT CHANGE UP (ref 3.5–5.3)
POTASSIUM SERPL-SCNC: 5 MMOL/L — SIGNIFICANT CHANGE UP (ref 3.5–5.3)
POTASSIUM SERPL-SCNC: 5.7 MMOL/L — HIGH (ref 3.5–5.3)
POTASSIUM UR-SCNC: 39 MMOL/L — SIGNIFICANT CHANGE UP
PROT 24H UR-MRATE: 972 MG/24 H — HIGH (ref 50–100)
PROT ?TM UR-MCNC: 209 MG/DL — HIGH (ref 0–12)
PROT ?TM UR-MCNC: 249 MG/DL — HIGH (ref 0–12)
PROT ?TM UR-MCNC: 7 MG/DL — SIGNIFICANT CHANGE UP (ref 0–12)
PROT FLD-MCNC: 1.2 G/DL — SIGNIFICANT CHANGE UP
PROT PATTERN SERPL ELPH-IMP: SIGNIFICANT CHANGE UP
PROT SERPL-MCNC: 2 G/DL — LOW (ref 6–8.3)
PROT SERPL-MCNC: 3.2 G/DL — LOW (ref 6–8.3)
PROT SERPL-MCNC: 3.6 G/DL — LOW (ref 6–8.3)
PROT SERPL-MCNC: 5.2 G/DL — LOW (ref 6–8.3)
PROT SERPL-MCNC: 6.5 G/DL — SIGNIFICANT CHANGE UP (ref 6–8.3)
PROT SERPL-MCNC: 6.6 G/DL — SIGNIFICANT CHANGE UP (ref 6–8.3)
PROT SERPL-MCNC: 6.6 G/DL — SIGNIFICANT CHANGE UP (ref 6–8.3)
PROT SERPL-MCNC: 6.8 G/DL — SIGNIFICANT CHANGE UP (ref 6–8.3)
PROT SERPL-MCNC: 6.8 G/DL — SIGNIFICANT CHANGE UP (ref 6–8.3)
PROT SERPL-MCNC: 6.9 G/DL — SIGNIFICANT CHANGE UP (ref 6–8.3)
PROT SERPL-MCNC: 7 G/DL — SIGNIFICANT CHANGE UP (ref 6–8.3)
PROT SERPL-MCNC: 7 G/DL — SIGNIFICANT CHANGE UP (ref 6–8.3)
PROT SERPL-MCNC: 7.1 G/DL — SIGNIFICANT CHANGE UP (ref 6–8.3)
PROT SERPL-MCNC: 7.1 G/DL — SIGNIFICANT CHANGE UP (ref 6–8.3)
PROT SERPL-MCNC: 7.3 G/DL — SIGNIFICANT CHANGE UP (ref 6–8.3)
PROT SERPL-MCNC: 7.4 G/DL — SIGNIFICANT CHANGE UP (ref 6–8.3)
PROT SERPL-MCNC: 7.4 G/DL — SIGNIFICANT CHANGE UP (ref 6–8.3)
PROT SERPL-MCNC: 7.6 G/DL — SIGNIFICANT CHANGE UP (ref 6–8.3)
PROT SERPL-MCNC: 7.6 G/DL — SIGNIFICANT CHANGE UP (ref 6–8.3)
PROT SERPL-MCNC: 7.7 G/DL — SIGNIFICANT CHANGE UP (ref 6–8.3)
PROT SERPL-MCNC: 8 G/DL — SIGNIFICANT CHANGE UP (ref 6–8.3)
PROT SERPL-MCNC: 8 G/DL — SIGNIFICANT CHANGE UP (ref 6–8.3)
PROT SERPL-MCNC: 8.2 G/DL — SIGNIFICANT CHANGE UP (ref 6–8.3)
PROT UR-MCNC: NEGATIVE — SIGNIFICANT CHANGE UP
PROT UR-MCNC: NEGATIVE — SIGNIFICANT CHANGE UP
PROTHROM AB SERPL-ACNC: 26.2 SEC — HIGH (ref 10–12.9)
PROTHROM AB SERPL-ACNC: 29.6 SEC — HIGH (ref 10–12.9)
PROTHROM AB SERPL-ACNC: 29.6 SEC — HIGH (ref 10–12.9)
PROTHROM AB SERPL-ACNC: 30.5 SEC — HIGH (ref 10–12.9)
PROTHROM AB SERPL-ACNC: 32.8 SEC — HIGH (ref 10.6–13.6)
PROTHROM AB SERPL-ACNC: 37.5 SEC — HIGH (ref 10.6–13.6)
PROTHROM AB SERPL-ACNC: 37.7 SEC — HIGH (ref 10.6–13.6)
PROTHROM AB SERPL-ACNC: 40.2 SEC — HIGH (ref 10.6–13.6)
PROTHROM AB SERPL-ACNC: 42.4 SEC — HIGH (ref 10.6–13.6)
PROTHROM AB SERPL-ACNC: 44.5 SEC — HIGH (ref 10.6–13.6)
PROTHROM AB SERPL-ACNC: 45.1 SEC — HIGH (ref 10.6–13.6)
PROTHROM AB SERPL-ACNC: 45.3 SEC — HIGH (ref 10.6–13.6)
PROTHROM AB SERPL-ACNC: 45.4 SEC — HIGH (ref 10.6–13.6)
PROTHROM AB SERPL-ACNC: 47.1 SEC — HIGH (ref 10.6–13.6)
PROTHROM AB SERPL-ACNC: 47.7 SEC — HIGH (ref 10.6–13.6)
PROTHROM AB SERPL-ACNC: 48.5 SEC — HIGH (ref 10.6–13.6)
PROTHROM AB SERPL-ACNC: 50.4 SEC — HIGH (ref 10.6–13.6)
PROTHROM AB SERPL-ACNC: 53.7 SEC — HIGH (ref 10.6–13.6)
PROTHROM AB SERPL-ACNC: 53.9 SEC — HIGH (ref 10.6–13.6)
PROTHROM AB SERPL-ACNC: 55.1 SEC — HIGH (ref 10.6–13.6)
PROTHROM AB SERPL-ACNC: 59.3 SEC — HIGH (ref 10.6–13.6)
PROTHROM AB SERPL-ACNC: 60.4 SEC — HIGH (ref 10.6–13.6)
PROTHROM AB SERPL-ACNC: 65.6 SEC — HIGH (ref 10.6–13.6)
PROTHROM AB SERPL-ACNC: > 150 SEC (ref 10.6–13.6)
RBC # BLD: 1.06 M/UL — LOW (ref 4.2–5.8)
RBC # BLD: 1.25 M/UL — LOW (ref 4.2–5.8)
RBC # BLD: 1.25 M/UL — LOW (ref 4.2–5.8)
RBC # BLD: 1.35 M/UL — LOW (ref 4.2–5.8)
RBC # BLD: 1.43 M/UL — LOW (ref 4.2–5.8)
RBC # BLD: 1.69 M/UL — LOW (ref 4.2–5.8)
RBC # BLD: 1.89 M/UL — LOW (ref 4.2–5.8)
RBC # BLD: 1.98 M/UL — LOW (ref 4.2–5.8)
RBC # BLD: 2.03 M/UL — LOW (ref 4.2–5.8)
RBC # BLD: 2.05 M/UL — LOW (ref 4.2–5.8)
RBC # BLD: 2.06 M/UL — LOW (ref 4.2–5.8)
RBC # BLD: 2.06 M/UL — LOW (ref 4.2–5.8)
RBC # BLD: 2.11 M/UL — LOW (ref 4.2–5.8)
RBC # BLD: 2.15 M/UL — LOW (ref 4.2–5.8)
RBC # BLD: 2.15 M/UL — LOW (ref 4.2–5.8)
RBC # BLD: 2.16 M/UL — LOW (ref 4.2–5.8)
RBC # BLD: 2.18 M/UL — LOW (ref 4.2–5.8)
RBC # BLD: 2.21 M/UL — LOW (ref 4.2–5.8)
RBC # BLD: 2.21 M/UL — LOW (ref 4.2–5.8)
RBC # BLD: 2.23 M/UL — LOW (ref 4.2–5.8)
RBC # BLD: 2.23 M/UL — LOW (ref 4.2–5.8)
RBC # BLD: 2.24 M/UL — LOW (ref 4.2–5.8)
RBC # BLD: 2.24 M/UL — LOW (ref 4.2–5.8)
RBC # BLD: 2.26 M/UL — LOW (ref 4.2–5.8)
RBC # BLD: 2.28 M/UL — LOW (ref 4.2–5.8)
RBC # BLD: 2.29 M/UL — LOW (ref 4.2–5.8)
RBC # BLD: 2.3 M/UL — LOW (ref 4.2–5.8)
RBC # BLD: 2.31 M/UL — LOW (ref 4.2–5.8)
RBC # BLD: 2.33 M/UL — LOW (ref 4.2–5.8)
RBC # BLD: 2.33 M/UL — LOW (ref 4.2–5.8)
RBC # BLD: 2.36 M/UL — LOW (ref 4.2–5.8)
RBC # BLD: 2.38 M/UL — LOW (ref 4.2–5.8)
RBC # BLD: 2.39 M/UL — LOW (ref 4.2–5.8)
RBC # BLD: 2.39 M/UL — LOW (ref 4.2–5.8)
RBC # BLD: 2.42 M/UL — LOW (ref 4.2–5.8)
RBC # BLD: 2.48 M/UL — LOW (ref 4.2–5.8)
RBC # BLD: 2.51 M/UL — LOW (ref 4.2–5.8)
RBC # BLD: 2.54 M/UL — LOW (ref 4.2–5.8)
RBC # BLD: 2.59 M/UL — LOW (ref 4.2–5.8)
RBC # BLD: 2.59 M/UL — LOW (ref 4.2–5.8)
RBC # BLD: 2.6 M/UL — LOW (ref 4.2–5.8)
RBC # BLD: 2.61 M/UL — LOW (ref 4.2–5.8)
RBC # BLD: 2.69 M/UL — LOW (ref 4.2–5.8)
RBC # BLD: 2.77 M/UL — LOW (ref 4.2–5.8)
RBC # BLD: 2.79 M/UL — LOW (ref 4.2–5.8)
RBC # BLD: 2.82 M/UL — LOW (ref 4.2–5.8)
RBC # FLD: 14.3 % — SIGNIFICANT CHANGE UP (ref 10.3–14.5)
RBC # FLD: 14.5 % — SIGNIFICANT CHANGE UP (ref 10.3–14.5)
RBC # FLD: 15.9 % — HIGH (ref 10.3–14.5)
RBC # FLD: 15.9 % — HIGH (ref 10.3–14.5)
RBC # FLD: 16.1 % — HIGH (ref 10.3–14.5)
RBC # FLD: 16.1 % — HIGH (ref 10.3–14.5)
RBC # FLD: 16.2 % — HIGH (ref 10.3–14.5)
RBC # FLD: 16.3 % — HIGH (ref 10.3–14.5)
RBC # FLD: 16.9 % — HIGH (ref 10.3–14.5)
RBC # FLD: 17.2 % — HIGH (ref 10.3–14.5)
RBC # FLD: 17.7 % — HIGH (ref 10.3–14.5)
RBC # FLD: 17.7 % — HIGH (ref 10.3–14.5)
RBC # FLD: 17.8 % — HIGH (ref 10.3–14.5)
RBC # FLD: 17.9 % — HIGH (ref 10.3–14.5)
RBC # FLD: 17.9 % — HIGH (ref 10.3–14.5)
RBC # FLD: 18.2 % — HIGH (ref 10.3–14.5)
RBC # FLD: 18.2 % — HIGH (ref 10.3–14.5)
RBC # FLD: 18.3 % — HIGH (ref 10.3–14.5)
RBC # FLD: 18.6 % — HIGH (ref 10.3–14.5)
RBC # FLD: 18.8 % — HIGH (ref 10.3–14.5)
RBC # FLD: 18.8 % — HIGH (ref 10.3–14.5)
RBC # FLD: 18.9 % — HIGH (ref 10.3–14.5)
RBC # FLD: 19 % — HIGH (ref 10.3–14.5)
RBC # FLD: 19.1 % — HIGH (ref 10.3–14.5)
RBC # FLD: 19.1 % — HIGH (ref 10.3–14.5)
RBC # FLD: 19.2 % — HIGH (ref 10.3–14.5)
RBC # FLD: 19.2 % — HIGH (ref 10.3–14.5)
RBC # FLD: 19.4 % — HIGH (ref 10.3–14.5)
RBC # FLD: 19.6 % — HIGH (ref 10.3–14.5)
RBC # FLD: 19.7 % — HIGH (ref 10.3–14.5)
RBC # FLD: 19.8 % — HIGH (ref 10.3–14.5)
RBC BLD AUTO: ABNORMAL
RBC BLD AUTO: NORMAL — SIGNIFICANT CHANGE UP
RBC CASTS # UR COMP ASSIST: ABNORMAL /HPF (ref 0–2)
RCV VOL RI: 280 /UL — HIGH (ref 0–5)
RETICS #: 108.3 K/UL — SIGNIFICANT CHANGE UP (ref 25–125)
RETICS #: 51.9 K/UL — SIGNIFICANT CHANGE UP (ref 25–125)
RETICS #: 61.6 K/UL — SIGNIFICANT CHANGE UP (ref 25–125)
RETICS #: 63.1 K/UL — SIGNIFICANT CHANGE UP (ref 25–125)
RETICS #: 77.1 K/UL — SIGNIFICANT CHANGE UP (ref 25–125)
RETICS/RBC NFR: 2.5 % — SIGNIFICANT CHANGE UP (ref 0.5–2.5)
RETICS/RBC NFR: 2.6 % — HIGH (ref 0.5–2.5)
RETICS/RBC NFR: 3.4 % — HIGH (ref 0.5–2.5)
RETICS/RBC NFR: 4.5 % — HIGH (ref 0.5–2.5)
RETICS/RBC NFR: 5.1 % — HIGH (ref 0.5–2.5)
S MARCESCENS DNA BLD POS NAA+NON-PROBE: SIGNIFICANT CHANGE UP
S PNEUM DNA BLD POS QL NAA+NON-PROBE: SIGNIFICANT CHANGE UP
S PYO DNA BLD POS QL NAA+NON-PROBE: SIGNIFICANT CHANGE UP
SALICYLATES SERPL-MCNC: <1 MG/DL — LOW (ref 2.8–20)
SAO2 % BLDA: 51 % — LOW (ref 92–96)
SAO2 % BLDA: 60 % — LOW (ref 92–96)
SAO2 % BLDA: 66 % — LOW (ref 92–96)
SAO2 % BLDA: 67 % — LOW (ref 92–96)
SAO2 % BLDA: 79 % — LOW (ref 92–96)
SAO2 % BLDV: 67 % — SIGNIFICANT CHANGE UP (ref 67–88)
SARS-COV-2 IGG SERPL QL IA: NEGATIVE — SIGNIFICANT CHANGE UP
SARS-COV-2 IGG SERPL QL IA: NEGATIVE — SIGNIFICANT CHANGE UP
SARS-COV-2 IGM SERPL IA-ACNC: 0.21 INDEX — SIGNIFICANT CHANGE UP
SARS-COV-2 IGM SERPL IA-ACNC: <0.1 INDEX — SIGNIFICANT CHANGE UP
SARS-COV-2 RNA SPEC QL NAA+PROBE: SIGNIFICANT CHANGE UP
SCHISTOCYTES BLD QL AUTO: SLIGHT — SIGNIFICANT CHANGE UP
SMOOTH MUSCLE AB SER-ACNC: SIGNIFICANT CHANGE UP
SMOOTH MUSCLE AB SER-ACNC: SIGNIFICANT CHANGE UP
SODIUM SERPL-SCNC: 131 MMOL/L — LOW (ref 135–145)
SODIUM SERPL-SCNC: 132 MMOL/L — LOW (ref 135–145)
SODIUM SERPL-SCNC: 133 MMOL/L — LOW (ref 135–145)
SODIUM SERPL-SCNC: 134 MMOL/L — LOW (ref 135–145)
SODIUM SERPL-SCNC: 135 MMOL/L — SIGNIFICANT CHANGE UP (ref 135–145)
SODIUM SERPL-SCNC: 135 MMOL/L — SIGNIFICANT CHANGE UP (ref 135–145)
SODIUM SERPL-SCNC: 136 MMOL/L — SIGNIFICANT CHANGE UP (ref 135–145)
SODIUM SERPL-SCNC: 137 MMOL/L — SIGNIFICANT CHANGE UP (ref 135–145)
SODIUM SERPL-SCNC: 138 MMOL/L — SIGNIFICANT CHANGE UP (ref 135–145)
SODIUM SERPL-SCNC: 139 MMOL/L — SIGNIFICANT CHANGE UP (ref 135–145)
SODIUM SERPL-SCNC: 139 MMOL/L — SIGNIFICANT CHANGE UP (ref 135–145)
SODIUM SERPL-SCNC: 140 MMOL/L — SIGNIFICANT CHANGE UP (ref 135–145)
SODIUM UR-SCNC: 56 MMOL/L — SIGNIFICANT CHANGE UP
SODIUM UR-SCNC: 6 MMOL/L — SIGNIFICANT CHANGE UP
SODIUM UR-SCNC: <5 MMOL/L — SIGNIFICANT CHANGE UP
SOLUBLE LIVER IGG SER IA-ACNC: < 20.1 UNITS — SIGNIFICANT CHANGE UP
SP GR SPEC: 1.01 — SIGNIFICANT CHANGE UP (ref 1.01–1.02)
SP GR SPEC: 1.01 — SIGNIFICANT CHANGE UP (ref 1.01–1.02)
SPECIMEN SOURCE: SIGNIFICANT CHANGE UP
SPECIMEN SOURCE: SIGNIFICANT CHANGE UP
SPHEROCYTES BLD QL SMEAR: SLIGHT — SIGNIFICANT CHANGE UP
TARGETS BLD QL SMEAR: SLIGHT — SIGNIFICANT CHANGE UP
TIBC SERPL-MCNC: 121 UG/DL — LOW (ref 250–450)
TIBC SERPL-MCNC: 169 UG/DL — LOW (ref 250–450)
TOTAL CHOLESTEROL/HDL RATIO MEASUREMENT: 4.4 RATIO — SIGNIFICANT CHANGE UP (ref 3.4–9.6)
TOTAL NUCLEATED CELL COUNT, BODY FLUID: 61 /UL — SIGNIFICANT CHANGE UP
TOTAL VOLUME - 24 HOUR: 900 ML — SIGNIFICANT CHANGE UP
TRANSFERRIN SERPL-MCNC: 136 MG/DL — LOW (ref 200–360)
TRIGL SERPL-MCNC: 68 MG/DL — SIGNIFICANT CHANGE UP (ref 10–149)
TROPONIN I SERPL-MCNC: 0.02 NG/ML — SIGNIFICANT CHANGE UP (ref 0–0.04)
TROPONIN I SERPL-MCNC: <0.015 NG/ML — SIGNIFICANT CHANGE UP (ref 0–0.04)
TSH SERPL-MCNC: 1.39 UU/ML — SIGNIFICANT CHANGE UP (ref 0.34–4.82)
TUBE TYPE: SIGNIFICANT CHANGE UP
UIBC SERPL-MCNC: 11 UG/DL — LOW (ref 110–370)
UIBC SERPL-MCNC: 135 UG/DL — SIGNIFICANT CHANGE UP (ref 110–370)
URATE SERPL-MCNC: 2.7 MG/DL — LOW (ref 3.4–8.8)
URATE UR-MCNC: 57.2 MG/DL — SIGNIFICANT CHANGE UP
URINE CREATININE CALCULATION: 1.7 G/24 H — SIGNIFICANT CHANGE UP (ref 0.6–2.5)
UROBILINOGEN FLD QL: 1
UROBILINOGEN FLD QL: NEGATIVE — SIGNIFICANT CHANGE UP
VIT B12 SERPL-MCNC: 1070 PG/ML — SIGNIFICANT CHANGE UP (ref 232–1245)
VIT B12 SERPL-MCNC: 1775 PG/ML — HIGH (ref 232–1245)
VZV DNA, PCR RESULT: NEGATIVE — SIGNIFICANT CHANGE UP
WBC # BLD: 10.88 K/UL — HIGH (ref 3.8–10.5)
WBC # BLD: 10.99 K/UL — HIGH (ref 3.8–10.5)
WBC # BLD: 11.07 K/UL — HIGH (ref 3.8–10.5)
WBC # BLD: 11.2 K/UL — HIGH (ref 3.8–10.5)
WBC # BLD: 11.23 K/UL — HIGH (ref 3.8–10.5)
WBC # BLD: 11.59 K/UL — HIGH (ref 3.8–10.5)
WBC # BLD: 11.71 K/UL — HIGH (ref 3.8–10.5)
WBC # BLD: 12.7 K/UL — HIGH (ref 3.8–10.5)
WBC # BLD: 2.93 K/UL — LOW (ref 3.8–10.5)
WBC # BLD: 3.48 K/UL — LOW (ref 3.8–10.5)
WBC # BLD: 3.49 K/UL — LOW (ref 3.8–10.5)
WBC # BLD: 4.64 K/UL — SIGNIFICANT CHANGE UP (ref 3.8–10.5)
WBC # BLD: 4.81 K/UL — SIGNIFICANT CHANGE UP (ref 3.8–10.5)
WBC # BLD: 4.95 K/UL — SIGNIFICANT CHANGE UP (ref 3.8–10.5)
WBC # BLD: 5.01 K/UL — SIGNIFICANT CHANGE UP (ref 3.8–10.5)
WBC # BLD: 5.25 K/UL — SIGNIFICANT CHANGE UP (ref 3.8–10.5)
WBC # BLD: 5.3 K/UL — SIGNIFICANT CHANGE UP (ref 3.8–10.5)
WBC # BLD: 5.3 K/UL — SIGNIFICANT CHANGE UP (ref 3.8–10.5)
WBC # BLD: 5.46 K/UL — SIGNIFICANT CHANGE UP (ref 3.8–10.5)
WBC # BLD: 5.46 K/UL — SIGNIFICANT CHANGE UP (ref 3.8–10.5)
WBC # BLD: 5.49 K/UL — SIGNIFICANT CHANGE UP (ref 3.8–10.5)
WBC # BLD: 5.73 K/UL — SIGNIFICANT CHANGE UP (ref 3.8–10.5)
WBC # BLD: 5.96 K/UL — SIGNIFICANT CHANGE UP (ref 3.8–10.5)
WBC # BLD: 5.99 K/UL — SIGNIFICANT CHANGE UP (ref 3.8–10.5)
WBC # BLD: 6.07 K/UL — SIGNIFICANT CHANGE UP (ref 3.8–10.5)
WBC # BLD: 6.22 K/UL — SIGNIFICANT CHANGE UP (ref 3.8–10.5)
WBC # BLD: 6.33 K/UL — SIGNIFICANT CHANGE UP (ref 3.8–10.5)
WBC # BLD: 6.51 K/UL — SIGNIFICANT CHANGE UP (ref 3.8–10.5)
WBC # BLD: 6.87 K/UL — SIGNIFICANT CHANGE UP (ref 3.8–10.5)
WBC # BLD: 6.88 K/UL — SIGNIFICANT CHANGE UP (ref 3.8–10.5)
WBC # BLD: 6.93 K/UL — SIGNIFICANT CHANGE UP (ref 3.8–10.5)
WBC # BLD: 6.95 K/UL — SIGNIFICANT CHANGE UP (ref 3.8–10.5)
WBC # BLD: 7.14 K/UL — SIGNIFICANT CHANGE UP (ref 3.8–10.5)
WBC # BLD: 7.28 K/UL — SIGNIFICANT CHANGE UP (ref 3.8–10.5)
WBC # BLD: 7.35 K/UL — SIGNIFICANT CHANGE UP (ref 3.8–10.5)
WBC # BLD: 7.82 K/UL — SIGNIFICANT CHANGE UP (ref 3.8–10.5)
WBC # BLD: 8.03 K/UL — SIGNIFICANT CHANGE UP (ref 3.8–10.5)
WBC # BLD: 8.21 K/UL — SIGNIFICANT CHANGE UP (ref 3.8–10.5)
WBC # BLD: 8.49 K/UL — SIGNIFICANT CHANGE UP (ref 3.8–10.5)
WBC # BLD: 9.15 K/UL — SIGNIFICANT CHANGE UP (ref 3.8–10.5)
WBC # BLD: 9.97 K/UL — SIGNIFICANT CHANGE UP (ref 3.8–10.5)
WBC # FLD AUTO: 10.88 K/UL — HIGH (ref 3.8–10.5)
WBC # FLD AUTO: 10.99 K/UL — HIGH (ref 3.8–10.5)
WBC # FLD AUTO: 11.07 K/UL — HIGH (ref 3.8–10.5)
WBC # FLD AUTO: 11.2 K/UL — HIGH (ref 3.8–10.5)
WBC # FLD AUTO: 11.23 K/UL — HIGH (ref 3.8–10.5)
WBC # FLD AUTO: 11.59 K/UL — HIGH (ref 3.8–10.5)
WBC # FLD AUTO: 11.71 K/UL — HIGH (ref 3.8–10.5)
WBC # FLD AUTO: 12.7 K/UL — HIGH (ref 3.8–10.5)
WBC # FLD AUTO: 2.93 K/UL — LOW (ref 3.8–10.5)
WBC # FLD AUTO: 3.48 K/UL — LOW (ref 3.8–10.5)
WBC # FLD AUTO: 3.49 K/UL — LOW (ref 3.8–10.5)
WBC # FLD AUTO: 4.64 K/UL — SIGNIFICANT CHANGE UP (ref 3.8–10.5)
WBC # FLD AUTO: 4.81 K/UL — SIGNIFICANT CHANGE UP (ref 3.8–10.5)
WBC # FLD AUTO: 4.95 K/UL — SIGNIFICANT CHANGE UP (ref 3.8–10.5)
WBC # FLD AUTO: 5.01 K/UL — SIGNIFICANT CHANGE UP (ref 3.8–10.5)
WBC # FLD AUTO: 5.25 K/UL — SIGNIFICANT CHANGE UP (ref 3.8–10.5)
WBC # FLD AUTO: 5.3 K/UL — SIGNIFICANT CHANGE UP (ref 3.8–10.5)
WBC # FLD AUTO: 5.3 K/UL — SIGNIFICANT CHANGE UP (ref 3.8–10.5)
WBC # FLD AUTO: 5.46 K/UL — SIGNIFICANT CHANGE UP (ref 3.8–10.5)
WBC # FLD AUTO: 5.46 K/UL — SIGNIFICANT CHANGE UP (ref 3.8–10.5)
WBC # FLD AUTO: 5.49 K/UL — SIGNIFICANT CHANGE UP (ref 3.8–10.5)
WBC # FLD AUTO: 5.73 K/UL — SIGNIFICANT CHANGE UP (ref 3.8–10.5)
WBC # FLD AUTO: 5.96 K/UL — SIGNIFICANT CHANGE UP (ref 3.8–10.5)
WBC # FLD AUTO: 5.99 K/UL — SIGNIFICANT CHANGE UP (ref 3.8–10.5)
WBC # FLD AUTO: 6.07 K/UL — SIGNIFICANT CHANGE UP (ref 3.8–10.5)
WBC # FLD AUTO: 6.22 K/UL — SIGNIFICANT CHANGE UP (ref 3.8–10.5)
WBC # FLD AUTO: 6.33 K/UL — SIGNIFICANT CHANGE UP (ref 3.8–10.5)
WBC # FLD AUTO: 6.51 K/UL — SIGNIFICANT CHANGE UP (ref 3.8–10.5)
WBC # FLD AUTO: 6.87 K/UL — SIGNIFICANT CHANGE UP (ref 3.8–10.5)
WBC # FLD AUTO: 6.88 K/UL — SIGNIFICANT CHANGE UP (ref 3.8–10.5)
WBC # FLD AUTO: 6.93 K/UL — SIGNIFICANT CHANGE UP (ref 3.8–10.5)
WBC # FLD AUTO: 6.95 K/UL — SIGNIFICANT CHANGE UP (ref 3.8–10.5)
WBC # FLD AUTO: 7.14 K/UL — SIGNIFICANT CHANGE UP (ref 3.8–10.5)
WBC # FLD AUTO: 7.28 K/UL — SIGNIFICANT CHANGE UP (ref 3.8–10.5)
WBC # FLD AUTO: 7.35 K/UL — SIGNIFICANT CHANGE UP (ref 3.8–10.5)
WBC # FLD AUTO: 7.82 K/UL — SIGNIFICANT CHANGE UP (ref 3.8–10.5)
WBC # FLD AUTO: 8.03 K/UL — SIGNIFICANT CHANGE UP (ref 3.8–10.5)
WBC # FLD AUTO: 8.21 K/UL — SIGNIFICANT CHANGE UP (ref 3.8–10.5)
WBC # FLD AUTO: 8.49 K/UL — SIGNIFICANT CHANGE UP (ref 3.8–10.5)
WBC # FLD AUTO: 9.15 K/UL — SIGNIFICANT CHANGE UP (ref 3.8–10.5)
WBC # FLD AUTO: 9.97 K/UL — SIGNIFICANT CHANGE UP (ref 3.8–10.5)
WBC UR QL: SIGNIFICANT CHANGE UP /HPF (ref 0–5)

## 2020-01-01 PROCEDURE — 93970 EXTREMITY STUDY: CPT | Mod: 26

## 2020-01-01 PROCEDURE — 87205 SMEAR GRAM STAIN: CPT

## 2020-01-01 PROCEDURE — 82140 ASSAY OF AMMONIA: CPT

## 2020-01-01 PROCEDURE — 99233 SBSQ HOSP IP/OBS HIGH 50: CPT

## 2020-01-01 PROCEDURE — 99252 IP/OBS CONSLTJ NEW/EST SF 35: CPT

## 2020-01-01 PROCEDURE — P9059: CPT

## 2020-01-01 PROCEDURE — 80048 BASIC METABOLIC PNL TOTAL CA: CPT

## 2020-01-01 PROCEDURE — 84155 ASSAY OF PROTEIN SERUM: CPT

## 2020-01-01 PROCEDURE — 93005 ELECTROCARDIOGRAM TRACING: CPT

## 2020-01-01 PROCEDURE — 99223 1ST HOSP IP/OBS HIGH 75: CPT

## 2020-01-01 PROCEDURE — 99053 MED SERV 10PM-8AM 24 HR FAC: CPT

## 2020-01-01 PROCEDURE — 83010 ASSAY OF HAPTOGLOBIN QUANT: CPT

## 2020-01-01 PROCEDURE — 99233 SBSQ HOSP IP/OBS HIGH 50: CPT | Mod: GC

## 2020-01-01 PROCEDURE — 86965 POOLING BLOOD PLATELETS: CPT

## 2020-01-01 PROCEDURE — 82803 BLOOD GASES ANY COMBINATION: CPT

## 2020-01-01 PROCEDURE — 83930 ASSAY OF BLOOD OSMOLALITY: CPT

## 2020-01-01 PROCEDURE — 85610 PROTHROMBIN TIME: CPT

## 2020-01-01 PROCEDURE — 87340 HEPATITIS B SURFACE AG IA: CPT

## 2020-01-01 PROCEDURE — 83036 HEMOGLOBIN GLYCOSYLATED A1C: CPT

## 2020-01-01 PROCEDURE — 86901 BLOOD TYPING SEROLOGIC RH(D): CPT

## 2020-01-01 PROCEDURE — 86900 BLOOD TYPING SEROLOGIC ABO: CPT

## 2020-01-01 PROCEDURE — 86709 HEPATITIS A IGM ANTIBODY: CPT

## 2020-01-01 PROCEDURE — 86880 COOMBS TEST DIRECT: CPT

## 2020-01-01 PROCEDURE — 82105 ALPHA-FETOPROTEIN SERUM: CPT

## 2020-01-01 PROCEDURE — 83550 IRON BINDING TEST: CPT

## 2020-01-01 PROCEDURE — 85379 FIBRIN DEGRADATION QUANT: CPT

## 2020-01-01 PROCEDURE — 87521 HEPATITIS C PROBE&RVRS TRNSC: CPT

## 2020-01-01 PROCEDURE — 99254 IP/OBS CNSLTJ NEW/EST MOD 60: CPT

## 2020-01-01 PROCEDURE — 86038 ANTINUCLEAR ANTIBODIES: CPT

## 2020-01-01 PROCEDURE — P9040: CPT

## 2020-01-01 PROCEDURE — 76705 ECHO EXAM OF ABDOMEN: CPT | Mod: 26

## 2020-01-01 PROCEDURE — 86769 SARS-COV-2 COVID-19 ANTIBODY: CPT

## 2020-01-01 PROCEDURE — 86376 MICROSOMAL ANTIBODY EACH: CPT

## 2020-01-01 PROCEDURE — P9047: CPT

## 2020-01-01 PROCEDURE — 99239 HOSP IP/OBS DSCHRG MGMT >30: CPT | Mod: GC

## 2020-01-01 PROCEDURE — 82330 ASSAY OF CALCIUM: CPT

## 2020-01-01 PROCEDURE — 86255 FLUORESCENT ANTIBODY SCREEN: CPT

## 2020-01-01 PROCEDURE — 99285 EMERGENCY DEPT VISIT HI MDM: CPT | Mod: 25

## 2020-01-01 PROCEDURE — 80074 ACUTE HEPATITIS PANEL: CPT

## 2020-01-01 PROCEDURE — 71045 X-RAY EXAM CHEST 1 VIEW: CPT | Mod: 26

## 2020-01-01 PROCEDURE — 83615 LACTATE (LD) (LDH) ENZYME: CPT

## 2020-01-01 PROCEDURE — 84443 ASSAY THYROID STIM HORMONE: CPT

## 2020-01-01 PROCEDURE — 82746 ASSAY OF FOLIC ACID SERUM: CPT

## 2020-01-01 PROCEDURE — 85027 COMPLETE CBC AUTOMATED: CPT

## 2020-01-01 PROCEDURE — 83540 ASSAY OF IRON: CPT

## 2020-01-01 PROCEDURE — 82784 ASSAY IGA/IGD/IGG/IGM EACH: CPT

## 2020-01-01 PROCEDURE — 70450 CT HEAD/BRAIN W/O DYE: CPT | Mod: 26

## 2020-01-01 PROCEDURE — 87075 CULTR BACTERIA EXCEPT BLOOD: CPT

## 2020-01-01 PROCEDURE — 85045 AUTOMATED RETICULOCYTE COUNT: CPT

## 2020-01-01 PROCEDURE — 71045 X-RAY EXAM CHEST 1 VIEW: CPT

## 2020-01-01 PROCEDURE — 84157 ASSAY OF PROTEIN OTHER: CPT

## 2020-01-01 PROCEDURE — 99232 SBSQ HOSP IP/OBS MODERATE 35: CPT

## 2020-01-01 PROCEDURE — 84156 ASSAY OF PROTEIN URINE: CPT

## 2020-01-01 PROCEDURE — 82570 ASSAY OF URINE CREATININE: CPT

## 2020-01-01 PROCEDURE — 93970 EXTREMITY STUDY: CPT

## 2020-01-01 PROCEDURE — 86923 COMPATIBILITY TEST ELECTRIC: CPT

## 2020-01-01 PROCEDURE — 87150 DNA/RNA AMPLIFIED PROBE: CPT

## 2020-01-01 PROCEDURE — 82390 ASSAY OF CERULOPLASMIN: CPT

## 2020-01-01 PROCEDURE — 82607 VITAMIN B-12: CPT

## 2020-01-01 PROCEDURE — 84165 PROTEIN E-PHORESIS SERUM: CPT

## 2020-01-01 PROCEDURE — 36430 TRANSFUSION BLD/BLD COMPNT: CPT

## 2020-01-01 PROCEDURE — 74174 CTA ABD&PLVS W/CONTRAST: CPT

## 2020-01-01 PROCEDURE — 94003 VENT MGMT INPAT SUBQ DAY: CPT

## 2020-01-01 PROCEDURE — 84484 ASSAY OF TROPONIN QUANT: CPT

## 2020-01-01 PROCEDURE — 80053 COMPREHEN METABOLIC PANEL: CPT

## 2020-01-01 PROCEDURE — 99291 CRITICAL CARE FIRST HOUR: CPT

## 2020-01-01 PROCEDURE — 99255 IP/OBS CONSLTJ NEW/EST HI 80: CPT

## 2020-01-01 PROCEDURE — 81332 SERPINA1 GENE: CPT

## 2020-01-01 PROCEDURE — 86850 RBC ANTIBODY SCREEN: CPT

## 2020-01-01 PROCEDURE — 86870 RBC ANTIBODY IDENTIFICATION: CPT

## 2020-01-01 PROCEDURE — P9037: CPT

## 2020-01-01 PROCEDURE — 36415 COLL VENOUS BLD VENIPUNCTURE: CPT

## 2020-01-01 PROCEDURE — 88112 CYTOPATH CELL ENHANCE TECH: CPT | Mod: 26

## 2020-01-01 PROCEDURE — 71275 CT ANGIOGRAPHY CHEST: CPT | Mod: 26

## 2020-01-01 PROCEDURE — 86922 COMPATIBILITY TEST ANTIGLOB: CPT

## 2020-01-01 PROCEDURE — 82525 ASSAY OF COPPER: CPT

## 2020-01-01 PROCEDURE — 83880 ASSAY OF NATRIURETIC PEPTIDE: CPT

## 2020-01-01 PROCEDURE — 87798 DETECT AGENT NOS DNA AMP: CPT

## 2020-01-01 PROCEDURE — 83735 ASSAY OF MAGNESIUM: CPT

## 2020-01-01 PROCEDURE — 87040 BLOOD CULTURE FOR BACTERIA: CPT

## 2020-01-01 PROCEDURE — 87640 STAPH A DNA AMP PROBE: CPT

## 2020-01-01 PROCEDURE — 80061 LIPID PANEL: CPT

## 2020-01-01 PROCEDURE — 82436 ASSAY OF URINE CHLORIDE: CPT

## 2020-01-01 PROCEDURE — 82728 ASSAY OF FERRITIN: CPT

## 2020-01-01 PROCEDURE — 99222 1ST HOSP IP/OBS MODERATE 55: CPT

## 2020-01-01 PROCEDURE — 76700 US EXAM ABDOM COMPLETE: CPT

## 2020-01-01 PROCEDURE — 83090 ASSAY OF HOMOCYSTEINE: CPT

## 2020-01-01 PROCEDURE — 80307 DRUG TEST PRSMV CHEM ANLYZR: CPT

## 2020-01-01 PROCEDURE — 80076 HEPATIC FUNCTION PANEL: CPT

## 2020-01-01 PROCEDURE — 86706 HEP B SURFACE ANTIBODY: CPT

## 2020-01-01 PROCEDURE — 96374 THER/PROPH/DIAG INJ IV PUSH: CPT

## 2020-01-01 PROCEDURE — 81001 URINALYSIS AUTO W/SCOPE: CPT

## 2020-01-01 PROCEDURE — 71275 CT ANGIOGRAPHY CHEST: CPT

## 2020-01-01 PROCEDURE — 87641 MR-STAPH DNA AMP PROBE: CPT

## 2020-01-01 PROCEDURE — 88112 CYTOPATH CELL ENHANCE TECH: CPT

## 2020-01-01 PROCEDURE — 82787 IGG 1 2 3 OR 4 EACH: CPT

## 2020-01-01 PROCEDURE — 82945 GLUCOSE OTHER FLUID: CPT

## 2020-01-01 PROCEDURE — 87389 HIV-1 AG W/HIV-1&-2 AB AG IA: CPT

## 2020-01-01 PROCEDURE — 87635 SARS-COV-2 COVID-19 AMP PRB: CPT

## 2020-01-01 PROCEDURE — 43235 EGD DIAGNOSTIC BRUSH WASH: CPT

## 2020-01-01 PROCEDURE — 84133 ASSAY OF URINE POTASSIUM: CPT

## 2020-01-01 PROCEDURE — 85025 COMPLETE CBC W/AUTO DIFF WBC: CPT

## 2020-01-01 PROCEDURE — 86790 VIRUS ANTIBODY NOS: CPT

## 2020-01-01 PROCEDURE — P9012: CPT

## 2020-01-01 PROCEDURE — 82272 OCCULT BLD FECES 1-3 TESTS: CPT

## 2020-01-01 PROCEDURE — 83520 IMMUNOASSAY QUANT NOS NONAB: CPT

## 2020-01-01 PROCEDURE — 86704 HEP B CORE ANTIBODY TOTAL: CPT

## 2020-01-01 PROCEDURE — 85385 FIBRINOGEN ANTIGEN: CPT

## 2020-01-01 PROCEDURE — 74174 CTA ABD&PLVS W/CONTRAST: CPT | Mod: 26

## 2020-01-01 PROCEDURE — 84550 ASSAY OF BLOOD/URIC ACID: CPT

## 2020-01-01 PROCEDURE — 85730 THROMBOPLASTIN TIME PARTIAL: CPT

## 2020-01-01 PROCEDURE — 88305 TISSUE EXAM BY PATHOLOGIST: CPT | Mod: 26

## 2020-01-01 PROCEDURE — 83605 ASSAY OF LACTIC ACID: CPT

## 2020-01-01 PROCEDURE — U0003: CPT

## 2020-01-01 PROCEDURE — 82962 GLUCOSE BLOOD TEST: CPT

## 2020-01-01 PROCEDURE — 86695 HERPES SIMPLEX TYPE 1 TEST: CPT

## 2020-01-01 PROCEDURE — 83521 IG LIGHT CHAINS FREE EACH: CPT

## 2020-01-01 PROCEDURE — 99291 CRITICAL CARE FIRST HOUR: CPT | Mod: 25

## 2020-01-01 PROCEDURE — 87799 DETECT AGENT NOS DNA QUANT: CPT

## 2020-01-01 PROCEDURE — 84466 ASSAY OF TRANSFERRIN: CPT

## 2020-01-01 PROCEDURE — 84560 ASSAY OF URINE/URIC ACID: CPT

## 2020-01-01 PROCEDURE — 86334 IMMUNOFIX E-PHORESIS SERUM: CPT

## 2020-01-01 PROCEDURE — P9017: CPT

## 2020-01-01 PROCEDURE — 76705 ECHO EXAM OF ABDOMEN: CPT

## 2020-01-01 PROCEDURE — 82977 ASSAY OF GGT: CPT

## 2020-01-01 PROCEDURE — 82248 BILIRUBIN DIRECT: CPT

## 2020-01-01 PROCEDURE — 86708 HEPATITIS A ANTIBODY: CPT

## 2020-01-01 PROCEDURE — 81003 URINALYSIS AUTO W/O SCOPE: CPT

## 2020-01-01 PROCEDURE — 97162 PT EVAL MOD COMPLEX 30 MIN: CPT

## 2020-01-01 PROCEDURE — 99285 EMERGENCY DEPT VISIT HI MDM: CPT

## 2020-01-01 PROCEDURE — 84100 ASSAY OF PHOSPHORUS: CPT

## 2020-01-01 PROCEDURE — 96375 TX/PRO/DX INJ NEW DRUG ADDON: CPT

## 2020-01-01 PROCEDURE — 82306 VITAMIN D 25 HYDROXY: CPT

## 2020-01-01 PROCEDURE — 84300 ASSAY OF URINE SODIUM: CPT

## 2020-01-01 PROCEDURE — 85384 FIBRINOGEN ACTIVITY: CPT

## 2020-01-01 PROCEDURE — 93306 TTE W/DOPPLER COMPLETE: CPT

## 2020-01-01 PROCEDURE — 85240 CLOT FACTOR VIII AHG 1 STAGE: CPT

## 2020-01-01 PROCEDURE — 87070 CULTURE OTHR SPECIMN AEROBIC: CPT

## 2020-01-01 PROCEDURE — 88305 TISSUE EXAM BY PATHOLOGIST: CPT

## 2020-01-01 PROCEDURE — 76700 US EXAM ABDOM COMPLETE: CPT | Mod: 26

## 2020-01-01 PROCEDURE — 70450 CT HEAD/BRAIN W/O DYE: CPT

## 2020-01-01 PROCEDURE — 83935 ASSAY OF URINE OSMOLALITY: CPT

## 2020-01-01 PROCEDURE — 86860 RBC ANTIBODY ELUTION: CPT

## 2020-01-01 PROCEDURE — 89051 BODY FLUID CELL COUNT: CPT

## 2020-01-01 PROCEDURE — 82042 OTHER SOURCE ALBUMIN QUAN EA: CPT

## 2020-01-01 RX ORDER — MAGNESIUM SULFATE 500 MG/ML
1 VIAL (ML) INJECTION ONCE
Refills: 0 | Status: COMPLETED | OUTPATIENT
Start: 2020-01-01 | End: 2020-01-01

## 2020-01-01 RX ORDER — PHYTONADIONE (VIT K1) 5 MG
2.5 TABLET ORAL ONCE
Refills: 0 | Status: COMPLETED | OUTPATIENT
Start: 2020-01-01 | End: 2020-01-01

## 2020-01-01 RX ORDER — SODIUM CHLORIDE 9 MG/ML
1000 INJECTION, SOLUTION INTRAVENOUS
Refills: 0 | Status: DISCONTINUED | OUTPATIENT
Start: 2020-01-01 | End: 2020-01-01

## 2020-01-01 RX ORDER — PANTOPRAZOLE SODIUM 20 MG/1
40 TABLET, DELAYED RELEASE ORAL DAILY
Refills: 0 | Status: DISCONTINUED | OUTPATIENT
Start: 2020-01-01 | End: 2020-01-01

## 2020-01-01 RX ORDER — PIPERACILLIN AND TAZOBACTAM 4; .5 G/20ML; G/20ML
3.38 INJECTION, POWDER, LYOPHILIZED, FOR SOLUTION INTRAVENOUS EVERY 8 HOURS
Refills: 0 | Status: DISCONTINUED | OUTPATIENT
Start: 2020-01-01 | End: 2020-01-01

## 2020-01-01 RX ORDER — CEFTRIAXONE 500 MG/1
INJECTION, POWDER, FOR SOLUTION INTRAMUSCULAR; INTRAVENOUS
Refills: 0 | Status: DISCONTINUED | OUTPATIENT
Start: 2020-01-01 | End: 2020-01-01

## 2020-01-01 RX ORDER — FOLIC ACID 0.8 MG
1 TABLET ORAL
Qty: 30 | Refills: 0
Start: 2020-01-01 | End: 2020-01-01

## 2020-01-01 RX ORDER — NADOLOL 80 MG/1
1 TABLET ORAL
Qty: 30 | Refills: 0
Start: 2020-01-01 | End: 2020-01-01

## 2020-01-01 RX ORDER — MAGNESIUM SULFATE 500 MG/ML
1 VIAL (ML) INJECTION ONCE
Refills: 0 | Status: DISCONTINUED | OUTPATIENT
Start: 2020-01-01 | End: 2020-01-01

## 2020-01-01 RX ORDER — NADOLOL 80 MG/1
20 TABLET ORAL DAILY
Refills: 0 | Status: DISCONTINUED | OUTPATIENT
Start: 2020-01-01 | End: 2020-01-01

## 2020-01-01 RX ORDER — PANTOPRAZOLE SODIUM 20 MG/1
40 TABLET, DELAYED RELEASE ORAL
Refills: 0 | Status: DISCONTINUED | OUTPATIENT
Start: 2020-01-01 | End: 2020-01-01

## 2020-01-01 RX ORDER — LACTULOSE 10 G/15ML
30 SOLUTION ORAL
Qty: 840 | Refills: 0
Start: 2020-01-01 | End: 2020-01-01

## 2020-01-01 RX ORDER — PREGABALIN 225 MG/1
1000 CAPSULE ORAL DAILY
Refills: 0 | Status: DISCONTINUED | OUTPATIENT
Start: 2020-01-01 | End: 2020-01-01

## 2020-01-01 RX ORDER — PREGABALIN 225 MG/1
1 CAPSULE ORAL
Qty: 30 | Refills: 0
Start: 2020-01-01 | End: 2020-01-01

## 2020-01-01 RX ORDER — PHYTONADIONE (VIT K1) 5 MG
10 TABLET ORAL ONCE
Refills: 0 | Status: COMPLETED | OUTPATIENT
Start: 2020-01-01 | End: 2020-01-01

## 2020-01-01 RX ORDER — ETOMIDATE 2 MG/ML
10 INJECTION INTRAVENOUS ONCE
Refills: 0 | Status: COMPLETED | OUTPATIENT
Start: 2020-01-01 | End: 2020-01-01

## 2020-01-01 RX ORDER — NOREPINEPHRINE BITARTRATE/D5W 8 MG/250ML
5 PLASTIC BAG, INJECTION (ML) INTRAVENOUS
Qty: 32 | Refills: 0 | Status: DISCONTINUED | OUTPATIENT
Start: 2020-01-01 | End: 2020-01-01

## 2020-01-01 RX ORDER — POTASSIUM CHLORIDE 20 MEQ
40 PACKET (EA) ORAL EVERY 4 HOURS
Refills: 0 | Status: COMPLETED | OUTPATIENT
Start: 2020-01-01 | End: 2020-01-01

## 2020-01-01 RX ORDER — FOLIC ACID 0.8 MG
1 TABLET ORAL DAILY
Refills: 0 | Status: DISCONTINUED | OUTPATIENT
Start: 2020-01-01 | End: 2020-01-01

## 2020-01-01 RX ORDER — ACETAMINOPHEN 500 MG
650 TABLET ORAL EVERY 6 HOURS
Refills: 0 | Status: DISCONTINUED | OUTPATIENT
Start: 2020-01-01 | End: 2020-01-01

## 2020-01-01 RX ORDER — THIAMINE MONONITRATE (VIT B1) 100 MG
500 TABLET ORAL EVERY 8 HOURS
Refills: 0 | Status: DISCONTINUED | OUTPATIENT
Start: 2020-01-01 | End: 2020-01-01

## 2020-01-01 RX ORDER — PHYTONADIONE (VIT K1) 5 MG
10 TABLET ORAL DAILY
Refills: 0 | Status: COMPLETED | OUTPATIENT
Start: 2020-01-01 | End: 2020-01-01

## 2020-01-01 RX ORDER — ALBUMIN HUMAN 25 %
100 VIAL (ML) INTRAVENOUS EVERY 6 HOURS
Refills: 0 | Status: COMPLETED | OUTPATIENT
Start: 2020-01-01 | End: 2020-01-01

## 2020-01-01 RX ORDER — LACTULOSE 10 G/15ML
15 SOLUTION ORAL
Refills: 0 | Status: DISCONTINUED | OUTPATIENT
Start: 2020-01-01 | End: 2020-01-01

## 2020-01-01 RX ORDER — THIAMINE MONONITRATE (VIT B1) 100 MG
500 TABLET ORAL DAILY
Refills: 0 | Status: COMPLETED | OUTPATIENT
Start: 2020-01-01 | End: 2020-01-01

## 2020-01-01 RX ORDER — PIPERACILLIN AND TAZOBACTAM 4; .5 G/20ML; G/20ML
3.38 INJECTION, POWDER, LYOPHILIZED, FOR SOLUTION INTRAVENOUS ONCE
Refills: 0 | Status: DISCONTINUED | OUTPATIENT
Start: 2020-01-01 | End: 2020-01-01

## 2020-01-01 RX ORDER — POTASSIUM CHLORIDE 20 MEQ
40 PACKET (EA) ORAL ONCE
Refills: 0 | Status: COMPLETED | OUTPATIENT
Start: 2020-01-01 | End: 2020-01-01

## 2020-01-01 RX ORDER — CEFTRIAXONE 500 MG/1
1000 INJECTION, POWDER, FOR SOLUTION INTRAMUSCULAR; INTRAVENOUS EVERY 24 HOURS
Refills: 0 | Status: COMPLETED | OUTPATIENT
Start: 2020-01-01 | End: 2020-01-01

## 2020-01-01 RX ORDER — TRAMADOL HYDROCHLORIDE 50 MG/1
50 TABLET ORAL ONCE
Refills: 0 | Status: DISCONTINUED | OUTPATIENT
Start: 2020-01-01 | End: 2020-01-01

## 2020-01-01 RX ORDER — CIPROFLOXACIN LACTATE 400MG/40ML
1 VIAL (ML) INTRAVENOUS
Qty: 14 | Refills: 0
Start: 2020-01-01 | End: 2020-01-01

## 2020-01-01 RX ORDER — LISINOPRIL 2.5 MG/1
1 TABLET ORAL
Qty: 14 | Refills: 0
Start: 2020-01-01 | End: 2020-01-01

## 2020-01-01 RX ORDER — DIAZEPAM 5 MG
5 TABLET ORAL ONCE
Refills: 0 | Status: DISCONTINUED | OUTPATIENT
Start: 2020-01-01 | End: 2020-01-01

## 2020-01-01 RX ORDER — THIAMINE MONONITRATE (VIT B1) 100 MG
500 TABLET ORAL EVERY 8 HOURS
Refills: 0 | Status: COMPLETED | OUTPATIENT
Start: 2020-01-01 | End: 2020-01-01

## 2020-01-01 RX ORDER — METOCLOPRAMIDE HCL 10 MG
10 TABLET ORAL ONCE
Refills: 0 | Status: COMPLETED | OUTPATIENT
Start: 2020-01-01 | End: 2020-01-01

## 2020-01-01 RX ORDER — NOREPINEPHRINE BITARTRATE/D5W 8 MG/250ML
2 PLASTIC BAG, INJECTION (ML) INTRAVENOUS
Qty: 32 | Refills: 0 | Status: DISCONTINUED | OUTPATIENT
Start: 2020-01-01 | End: 2020-01-01

## 2020-01-01 RX ORDER — LISINOPRIL 2.5 MG/1
2.5 TABLET ORAL DAILY
Refills: 0 | Status: DISCONTINUED | OUTPATIENT
Start: 2020-01-01 | End: 2020-01-01

## 2020-01-01 RX ORDER — FUROSEMIDE 40 MG
20 TABLET ORAL DAILY
Refills: 0 | Status: DISCONTINUED | OUTPATIENT
Start: 2020-01-01 | End: 2020-01-01

## 2020-01-01 RX ORDER — PANTOPRAZOLE SODIUM 20 MG/1
80 TABLET, DELAYED RELEASE ORAL ONCE
Refills: 0 | Status: COMPLETED | OUTPATIENT
Start: 2020-01-01 | End: 2020-01-01

## 2020-01-01 RX ORDER — PANTOPRAZOLE SODIUM 20 MG/1
8 TABLET, DELAYED RELEASE ORAL
Qty: 80 | Refills: 0 | Status: DISCONTINUED | OUTPATIENT
Start: 2020-01-01 | End: 2020-01-01

## 2020-01-01 RX ORDER — PANTOPRAZOLE SODIUM 20 MG/1
1 TABLET, DELAYED RELEASE ORAL
Qty: 14 | Refills: 0
Start: 2020-01-01 | End: 2020-01-01

## 2020-01-01 RX ORDER — SODIUM BICARBONATE 1 MEQ/ML
0.11 SYRINGE (ML) INTRAVENOUS
Qty: 150 | Refills: 0 | Status: DISCONTINUED | OUTPATIENT
Start: 2020-01-01 | End: 2020-01-01

## 2020-01-01 RX ORDER — FUROSEMIDE 40 MG
1 TABLET ORAL
Qty: 30 | Refills: 0
Start: 2020-01-01 | End: 2020-01-01

## 2020-01-01 RX ORDER — PREDNISOLONE 5 MG
40 TABLET ORAL DAILY
Refills: 0 | Status: DISCONTINUED | OUTPATIENT
Start: 2020-01-01 | End: 2020-01-01

## 2020-01-01 RX ORDER — SODIUM BICARBONATE 1 MEQ/ML
100 SYRINGE (ML) INTRAVENOUS ONCE
Refills: 0 | Status: DISCONTINUED | OUTPATIENT
Start: 2020-01-01 | End: 2020-01-01

## 2020-01-01 RX ORDER — DEXTROSE 50 % IN WATER 50 %
100 SYRINGE (ML) INTRAVENOUS ONCE
Refills: 0 | Status: DISCONTINUED | OUTPATIENT
Start: 2020-01-01 | End: 2020-01-01

## 2020-01-01 RX ORDER — DEXTROSE 50 % IN WATER 50 %
50 SYRINGE (ML) INTRAVENOUS ONCE
Refills: 0 | Status: COMPLETED | OUTPATIENT
Start: 2020-01-01 | End: 2020-01-01

## 2020-01-01 RX ORDER — LACTULOSE 10 G/15ML
20 SOLUTION ORAL THREE TIMES A DAY
Refills: 0 | Status: DISCONTINUED | OUTPATIENT
Start: 2020-01-01 | End: 2020-01-01

## 2020-01-01 RX ORDER — SODIUM CHLORIDE 9 MG/ML
1000 INJECTION INTRAMUSCULAR; INTRAVENOUS; SUBCUTANEOUS ONCE
Refills: 0 | Status: COMPLETED | OUTPATIENT
Start: 2020-01-01 | End: 2020-01-01

## 2020-01-01 RX ORDER — HEPARIN SODIUM 5000 [USP'U]/ML
5000 INJECTION INTRAVENOUS; SUBCUTANEOUS EVERY 8 HOURS
Refills: 0 | Status: DISCONTINUED | OUTPATIENT
Start: 2020-01-01 | End: 2020-01-01

## 2020-01-01 RX ORDER — PREDNISOLONE 5 MG
40 TABLET ORAL DAILY
Refills: 0 | Status: COMPLETED | OUTPATIENT
Start: 2020-01-01 | End: 2020-01-01

## 2020-01-01 RX ORDER — SODIUM CHLORIDE 9 MG/ML
3 INJECTION INTRAMUSCULAR; INTRAVENOUS; SUBCUTANEOUS ONCE
Refills: 0 | Status: COMPLETED | OUTPATIENT
Start: 2020-01-01 | End: 2020-01-01

## 2020-01-01 RX ORDER — PHENYLEPHRINE HYDROCHLORIDE 10 MG/ML
6 INJECTION INTRAVENOUS
Qty: 160 | Refills: 0 | Status: DISCONTINUED | OUTPATIENT
Start: 2020-01-01 | End: 2020-01-01

## 2020-01-01 RX ORDER — SODIUM BICARBONATE 1 MEQ/ML
150 SYRINGE (ML) INTRAVENOUS ONCE
Refills: 0 | Status: COMPLETED | OUTPATIENT
Start: 2020-01-01 | End: 2020-01-01

## 2020-01-01 RX ORDER — VASOPRESSIN 20 [USP'U]/ML
0.04 INJECTION INTRAVENOUS
Qty: 50 | Refills: 0 | Status: DISCONTINUED | OUTPATIENT
Start: 2020-01-01 | End: 2020-01-01

## 2020-01-01 RX ORDER — ALBUMIN HUMAN 25 %
50 VIAL (ML) INTRAVENOUS ONCE
Refills: 0 | Status: COMPLETED | OUTPATIENT
Start: 2020-01-01 | End: 2020-01-01

## 2020-01-01 RX ORDER — SPIRONOLACTONE 25 MG/1
2 TABLET, FILM COATED ORAL
Qty: 28 | Refills: 0
Start: 2020-01-01 | End: 2020-01-01

## 2020-01-01 RX ORDER — THIAMINE MONONITRATE (VIT B1) 100 MG
100 TABLET ORAL DAILY
Refills: 0 | Status: COMPLETED | OUTPATIENT
Start: 2020-01-01 | End: 2020-01-01

## 2020-01-01 RX ORDER — PHYTONADIONE (VIT K1) 5 MG
5 TABLET ORAL ONCE
Refills: 0 | Status: DISCONTINUED | OUTPATIENT
Start: 2020-01-01 | End: 2020-01-01

## 2020-01-01 RX ORDER — THIAMINE MONONITRATE (VIT B1) 100 MG
100 TABLET ORAL DAILY
Refills: 0 | Status: DISCONTINUED | OUTPATIENT
Start: 2020-01-01 | End: 2020-01-01

## 2020-01-01 RX ORDER — MAGNESIUM SULFATE 500 MG/ML
2 VIAL (ML) INJECTION ONCE
Refills: 0 | Status: COMPLETED | OUTPATIENT
Start: 2020-01-01 | End: 2020-01-01

## 2020-01-01 RX ORDER — ACETYLCYSTEINE 200 MG/ML
9 VIAL (ML) MISCELLANEOUS ONCE
Refills: 0 | Status: COMPLETED | OUTPATIENT
Start: 2020-01-01 | End: 2020-01-01

## 2020-01-01 RX ORDER — SPIRONOLACTONE 25 MG/1
2 TABLET, FILM COATED ORAL
Qty: 60 | Refills: 0
Start: 2020-01-01 | End: 2020-01-01

## 2020-01-01 RX ORDER — CHLORHEXIDINE GLUCONATE 213 G/1000ML
15 SOLUTION TOPICAL EVERY 12 HOURS
Refills: 0 | Status: DISCONTINUED | OUTPATIENT
Start: 2020-01-01 | End: 2020-01-01

## 2020-01-01 RX ORDER — PHENYLEPHRINE HYDROCHLORIDE 10 MG/ML
0.3 INJECTION INTRAVENOUS
Qty: 40 | Refills: 0 | Status: DISCONTINUED | OUTPATIENT
Start: 2020-01-01 | End: 2020-01-01

## 2020-01-01 RX ORDER — NADOLOL 80 MG/1
1 TABLET ORAL
Qty: 14 | Refills: 0
Start: 2020-01-01 | End: 2020-01-01

## 2020-01-01 RX ORDER — PHYTONADIONE (VIT K1) 5 MG
2.5 TABLET ORAL ONCE
Refills: 0 | Status: DISCONTINUED | OUTPATIENT
Start: 2020-01-01 | End: 2020-01-01

## 2020-01-01 RX ORDER — POTASSIUM CHLORIDE 20 MEQ
10 PACKET (EA) ORAL
Refills: 0 | Status: DISCONTINUED | OUTPATIENT
Start: 2020-01-01 | End: 2020-01-01

## 2020-01-01 RX ORDER — POTASSIUM CHLORIDE 20 MEQ
10 PACKET (EA) ORAL ONCE
Refills: 0 | Status: COMPLETED | OUTPATIENT
Start: 2020-01-01 | End: 2020-01-01

## 2020-01-01 RX ORDER — IRON SUCROSE 20 MG/ML
100 INJECTION, SOLUTION INTRAVENOUS ONCE
Refills: 0 | Status: COMPLETED | OUTPATIENT
Start: 2020-01-01 | End: 2020-01-01

## 2020-01-01 RX ORDER — LACTULOSE 10 G/15ML
10 SOLUTION ORAL
Refills: 0 | Status: DISCONTINUED | OUTPATIENT
Start: 2020-01-01 | End: 2020-01-01

## 2020-01-01 RX ORDER — ACETYLCYSTEINE 200 MG/ML
9 VIAL (ML) MISCELLANEOUS
Refills: 0 | Status: DISCONTINUED | OUTPATIENT
Start: 2020-01-01 | End: 2020-01-01

## 2020-01-01 RX ORDER — POTASSIUM CHLORIDE 20 MEQ
10 PACKET (EA) ORAL
Refills: 0 | Status: COMPLETED | OUTPATIENT
Start: 2020-01-01 | End: 2020-01-01

## 2020-01-01 RX ORDER — SODIUM BICARBONATE 1 MEQ/ML
100 SYRINGE (ML) INTRAVENOUS ONCE
Refills: 0 | Status: COMPLETED | OUTPATIENT
Start: 2020-01-01 | End: 2020-01-01

## 2020-01-01 RX ORDER — THIAMINE MONONITRATE (VIT B1) 100 MG
1 TABLET ORAL
Qty: 14 | Refills: 0
Start: 2020-01-01 | End: 2020-01-01

## 2020-01-01 RX ORDER — ERGOCALCIFEROL 1.25 MG/1
1 CAPSULE ORAL
Qty: 6 | Refills: 0
Start: 2020-01-01 | End: 2020-01-01

## 2020-01-01 RX ORDER — ONDANSETRON 8 MG/1
4 TABLET, FILM COATED ORAL ONCE
Refills: 0 | Status: COMPLETED | OUTPATIENT
Start: 2020-01-01 | End: 2020-01-01

## 2020-01-01 RX ORDER — FUROSEMIDE 40 MG
1 TABLET ORAL
Qty: 14 | Refills: 0
Start: 2020-01-01 | End: 2020-01-01

## 2020-01-01 RX ORDER — OCTREOTIDE ACETATE 200 UG/ML
50 INJECTION, SOLUTION INTRAVENOUS; SUBCUTANEOUS
Qty: 500 | Refills: 0 | Status: DISCONTINUED | OUTPATIENT
Start: 2020-01-01 | End: 2020-01-01

## 2020-01-01 RX ORDER — PANTOPRAZOLE SODIUM 20 MG/1
1 TABLET, DELAYED RELEASE ORAL
Qty: 30 | Refills: 0
Start: 2020-01-01 | End: 2020-01-01

## 2020-01-01 RX ORDER — CEFTRIAXONE 500 MG/1
1000 INJECTION, POWDER, FOR SOLUTION INTRAMUSCULAR; INTRAVENOUS EVERY 24 HOURS
Refills: 0 | Status: DISCONTINUED | OUTPATIENT
Start: 2020-01-01 | End: 2020-01-01

## 2020-01-01 RX ORDER — ETOMIDATE 2 MG/ML
20 INJECTION INTRAVENOUS ONCE
Refills: 0 | Status: DISCONTINUED | OUTPATIENT
Start: 2020-01-01 | End: 2020-01-01

## 2020-01-01 RX ORDER — SODIUM,POTASSIUM PHOSPHATES 278-250MG
1 POWDER IN PACKET (EA) ORAL
Refills: 0 | Status: COMPLETED | OUTPATIENT
Start: 2020-01-01 | End: 2020-01-01

## 2020-01-01 RX ORDER — ACETYLCYSTEINE 200 MG/ML
9 VIAL (ML) MISCELLANEOUS
Refills: 0 | Status: COMPLETED | OUTPATIENT
Start: 2020-01-01 | End: 2020-01-01

## 2020-01-01 RX ORDER — DEXMEDETOMIDINE HYDROCHLORIDE IN 0.9% SODIUM CHLORIDE 4 UG/ML
0.5 INJECTION INTRAVENOUS
Qty: 400 | Refills: 0 | Status: DISCONTINUED | OUTPATIENT
Start: 2020-01-01 | End: 2020-01-01

## 2020-01-01 RX ORDER — INFLUENZA VIRUS VACCINE 15; 15; 15; 15 UG/.5ML; UG/.5ML; UG/.5ML; UG/.5ML
0.5 SUSPENSION INTRAMUSCULAR ONCE
Refills: 0 | Status: DISCONTINUED | OUTPATIENT
Start: 2020-01-01 | End: 2020-01-01

## 2020-01-01 RX ORDER — SODIUM CHLORIDE 9 MG/ML
500 INJECTION INTRAMUSCULAR; INTRAVENOUS; SUBCUTANEOUS ONCE
Refills: 0 | Status: COMPLETED | OUTPATIENT
Start: 2020-01-01 | End: 2020-01-01

## 2020-01-01 RX ORDER — HALOPERIDOL DECANOATE 100 MG/ML
2 INJECTION INTRAMUSCULAR ONCE
Refills: 0 | Status: COMPLETED | OUTPATIENT
Start: 2020-01-01 | End: 2020-01-01

## 2020-01-01 RX ORDER — SPIRONOLACTONE 25 MG/1
50 TABLET, FILM COATED ORAL DAILY
Refills: 0 | Status: DISCONTINUED | OUTPATIENT
Start: 2020-01-01 | End: 2020-01-01

## 2020-01-01 RX ORDER — CEFTRIAXONE 500 MG/1
1000 INJECTION, POWDER, FOR SOLUTION INTRAMUSCULAR; INTRAVENOUS ONCE
Refills: 0 | Status: COMPLETED | OUTPATIENT
Start: 2020-01-01 | End: 2020-01-01

## 2020-01-01 RX ORDER — MAGNESIUM SULFATE 500 MG/ML
2 VIAL (ML) INJECTION
Refills: 0 | Status: COMPLETED | OUTPATIENT
Start: 2020-01-01 | End: 2020-01-01

## 2020-01-01 RX ORDER — NADOLOL 80 MG/1
20 TABLET ORAL AT BEDTIME
Refills: 0 | Status: DISCONTINUED | OUTPATIENT
Start: 2020-01-01 | End: 2020-01-01

## 2020-01-01 RX ORDER — NOREPINEPHRINE BITARTRATE/D5W 8 MG/250ML
0.05 PLASTIC BAG, INJECTION (ML) INTRAVENOUS
Qty: 32 | Refills: 0 | Status: DISCONTINUED | OUTPATIENT
Start: 2020-01-01 | End: 2020-01-01

## 2020-01-01 RX ORDER — NOREPINEPHRINE BITARTRATE/D5W 8 MG/250ML
0.05 PLASTIC BAG, INJECTION (ML) INTRAVENOUS
Qty: 16 | Refills: 0 | Status: DISCONTINUED | OUTPATIENT
Start: 2020-01-01 | End: 2020-01-01

## 2020-01-01 RX ORDER — DEXMEDETOMIDINE HYDROCHLORIDE IN 0.9% SODIUM CHLORIDE 4 UG/ML
0.1 INJECTION INTRAVENOUS
Qty: 200 | Refills: 0 | Status: DISCONTINUED | OUTPATIENT
Start: 2020-01-01 | End: 2020-01-01

## 2020-01-01 RX ORDER — CIPROFLOXACIN LACTATE 400MG/40ML
500 VIAL (ML) INTRAVENOUS DAILY
Refills: 0 | Status: DISCONTINUED | OUTPATIENT
Start: 2020-01-01 | End: 2020-01-01

## 2020-01-01 RX ORDER — FOLIC ACID 0.8 MG
1 TABLET ORAL
Qty: 14 | Refills: 0
Start: 2020-01-01 | End: 2020-01-01

## 2020-01-01 RX ORDER — ACETYLCYSTEINE 200 MG/ML
4.3 VIAL (ML) MISCELLANEOUS ONCE
Refills: 0 | Status: COMPLETED | OUTPATIENT
Start: 2020-01-01 | End: 2020-01-01

## 2020-01-01 RX ORDER — PREDNISOLONE 5 MG
13 TABLET ORAL
Qty: 134 | Refills: 0
Start: 2020-01-01 | End: 2020-01-01

## 2020-01-01 RX ORDER — IRON SUCROSE 20 MG/ML
100 INJECTION, SOLUTION INTRAVENOUS EVERY 24 HOURS
Refills: 0 | Status: COMPLETED | OUTPATIENT
Start: 2020-01-01 | End: 2020-01-01

## 2020-01-01 RX ORDER — OCTREOTIDE ACETATE 200 UG/ML
50 INJECTION, SOLUTION INTRAVENOUS; SUBCUTANEOUS ONCE
Refills: 0 | Status: COMPLETED | OUTPATIENT
Start: 2020-01-01 | End: 2020-01-01

## 2020-01-01 RX ORDER — CHLORHEXIDINE GLUCONATE 213 G/1000ML
1 SOLUTION TOPICAL DAILY
Refills: 0 | Status: DISCONTINUED | OUTPATIENT
Start: 2020-01-01 | End: 2020-01-01

## 2020-01-01 RX ORDER — ERGOCALCIFEROL 1.25 MG/1
50000 CAPSULE ORAL
Refills: 0 | Status: DISCONTINUED | OUTPATIENT
Start: 2020-01-01 | End: 2020-01-01

## 2020-01-01 RX ORDER — LACTULOSE 10 G/15ML
10 SOLUTION ORAL THREE TIMES A DAY
Refills: 0 | Status: DISCONTINUED | OUTPATIENT
Start: 2020-01-01 | End: 2020-01-01

## 2020-01-01 RX ORDER — ACETYLCYSTEINE 200 MG/ML
13 VIAL (ML) MISCELLANEOUS ONCE
Refills: 0 | Status: COMPLETED | OUTPATIENT
Start: 2020-01-01 | End: 2020-01-01

## 2020-01-01 RX ORDER — LACTULOSE 10 G/15ML
22.5 SOLUTION ORAL
Qty: 1350 | Refills: 0
Start: 2020-01-01 | End: 2020-01-01

## 2020-01-01 RX ORDER — CHLORHEXIDINE GLUCONATE 213 G/1000ML
1 SOLUTION TOPICAL
Refills: 0 | Status: DISCONTINUED | OUTPATIENT
Start: 2020-01-01 | End: 2020-01-01

## 2020-01-01 RX ORDER — DEXTROSE 10 % IN WATER 10 %
1000 INTRAVENOUS SOLUTION INTRAVENOUS
Refills: 0 | Status: DISCONTINUED | OUTPATIENT
Start: 2020-01-01 | End: 2020-01-01

## 2020-01-01 RX ORDER — LACTULOSE 10 G/15ML
20 SOLUTION ORAL
Refills: 0 | Status: DISCONTINUED | OUTPATIENT
Start: 2020-01-01 | End: 2020-01-01

## 2020-01-01 RX ADMIN — Medication 65.31 GRAM(S): at 18:24

## 2020-01-01 RX ADMIN — ONDANSETRON 4 MILLIGRAM(S): 8 TABLET, FILM COATED ORAL at 21:24

## 2020-01-01 RX ADMIN — Medication 2 MILLIGRAM(S): at 02:55

## 2020-01-01 RX ADMIN — Medication 2 MILLIGRAM(S): at 05:27

## 2020-01-01 RX ADMIN — Medication 25 MILLIGRAM(S): at 17:16

## 2020-01-01 RX ADMIN — LACTULOSE 20 GRAM(S): 10 SOLUTION ORAL at 11:17

## 2020-01-01 RX ADMIN — Medication 40 MILLIEQUIVALENT(S): at 08:37

## 2020-01-01 RX ADMIN — Medication 105 MILLIGRAM(S): at 05:47

## 2020-01-01 RX ADMIN — LACTULOSE 15 GRAM(S): 10 SOLUTION ORAL at 22:52

## 2020-01-01 RX ADMIN — SPIRONOLACTONE 50 MILLIGRAM(S): 25 TABLET, FILM COATED ORAL at 22:52

## 2020-01-01 RX ADMIN — Medication 4.89 MICROGRAM(S)/KG/MIN: at 16:53

## 2020-01-01 RX ADMIN — Medication 105 MILLIGRAM(S): at 15:20

## 2020-01-01 RX ADMIN — LACTULOSE 20 GRAM(S): 10 SOLUTION ORAL at 21:12

## 2020-01-01 RX ADMIN — PANTOPRAZOLE SODIUM 40 MILLIGRAM(S): 20 TABLET, DELAYED RELEASE ORAL at 05:27

## 2020-01-01 RX ADMIN — LACTULOSE 20 GRAM(S): 10 SOLUTION ORAL at 13:52

## 2020-01-01 RX ADMIN — Medication 50 MILLIGRAM(S): at 08:26

## 2020-01-01 RX ADMIN — LACTULOSE 15 GRAM(S): 10 SOLUTION ORAL at 05:12

## 2020-01-01 RX ADMIN — Medication 2 MILLIGRAM(S): at 20:20

## 2020-01-01 RX ADMIN — Medication 1 MILLIGRAM(S): at 12:33

## 2020-01-01 RX ADMIN — LACTULOSE 20 GRAM(S): 10 SOLUTION ORAL at 17:16

## 2020-01-01 RX ADMIN — LACTULOSE 20 GRAM(S): 10 SOLUTION ORAL at 21:38

## 2020-01-01 RX ADMIN — Medication 1 MILLIGRAM(S): at 11:49

## 2020-01-01 RX ADMIN — PANTOPRAZOLE SODIUM 40 MILLIGRAM(S): 20 TABLET, DELAYED RELEASE ORAL at 06:17

## 2020-01-01 RX ADMIN — Medication 2 MILLIGRAM(S): at 07:32

## 2020-01-01 RX ADMIN — Medication 40 MILLIEQUIVALENT(S): at 12:03

## 2020-01-01 RX ADMIN — Medication 105 MILLIGRAM(S): at 05:12

## 2020-01-01 RX ADMIN — Medication 100 GRAM(S): at 11:02

## 2020-01-01 RX ADMIN — PANTOPRAZOLE SODIUM 40 MILLIGRAM(S): 20 TABLET, DELAYED RELEASE ORAL at 05:51

## 2020-01-01 RX ADMIN — Medication 100 GRAM(S): at 17:16

## 2020-01-01 RX ADMIN — Medication 100 MILLIEQUIVALENT(S): at 13:44

## 2020-01-01 RX ADMIN — OCTREOTIDE ACETATE 10 MICROGRAM(S)/HR: 200 INJECTION, SOLUTION INTRAVENOUS; SUBCUTANEOUS at 17:18

## 2020-01-01 RX ADMIN — Medication 105 MILLIGRAM(S): at 14:37

## 2020-01-01 RX ADMIN — Medication 25 MILLIGRAM(S): at 15:24

## 2020-01-01 RX ADMIN — NADOLOL 20 MILLIGRAM(S): 80 TABLET ORAL at 06:10

## 2020-01-01 RX ADMIN — LACTULOSE 15 GRAM(S): 10 SOLUTION ORAL at 05:26

## 2020-01-01 RX ADMIN — SODIUM CHLORIDE 3 MILLILITER(S): 9 INJECTION INTRAMUSCULAR; INTRAVENOUS; SUBCUTANEOUS at 00:10

## 2020-01-01 RX ADMIN — Medication 25 MILLIGRAM(S): at 17:44

## 2020-01-01 RX ADMIN — NADOLOL 20 MILLIGRAM(S): 80 TABLET ORAL at 05:23

## 2020-01-01 RX ADMIN — PANTOPRAZOLE SODIUM 40 MILLIGRAM(S): 20 TABLET, DELAYED RELEASE ORAL at 17:56

## 2020-01-01 RX ADMIN — Medication 4.89 MICROGRAM(S)/KG/MIN: at 18:03

## 2020-01-01 RX ADMIN — PREGABALIN 1000 MICROGRAM(S): 225 CAPSULE ORAL at 11:34

## 2020-01-01 RX ADMIN — Medication 125 MILLIGRAM(S): at 06:25

## 2020-01-01 RX ADMIN — Medication 65.31 GRAM(S): at 14:06

## 2020-01-01 RX ADMIN — OCTREOTIDE ACETATE 10 MICROGRAM(S)/HR: 200 INJECTION, SOLUTION INTRAVENOUS; SUBCUTANEOUS at 00:17

## 2020-01-01 RX ADMIN — Medication 2 MILLIGRAM(S): at 18:22

## 2020-01-01 RX ADMIN — LACTULOSE 20 GRAM(S): 10 SOLUTION ORAL at 14:47

## 2020-01-01 RX ADMIN — Medication 105 MILLIGRAM(S): at 13:42

## 2020-01-01 RX ADMIN — Medication 50 MILLILITER(S): at 23:01

## 2020-01-01 RX ADMIN — LACTULOSE 20 GRAM(S): 10 SOLUTION ORAL at 17:43

## 2020-01-01 RX ADMIN — LACTULOSE 20 GRAM(S): 10 SOLUTION ORAL at 13:44

## 2020-01-01 RX ADMIN — Medication 105 MILLIGRAM(S): at 05:22

## 2020-01-01 RX ADMIN — SODIUM CHLORIDE 75 MILLILITER(S): 9 INJECTION, SOLUTION INTRAVENOUS at 16:02

## 2020-01-01 RX ADMIN — Medication 1 MILLIGRAM(S): at 12:13

## 2020-01-01 RX ADMIN — PANTOPRAZOLE SODIUM 40 MILLIGRAM(S): 20 TABLET, DELAYED RELEASE ORAL at 17:15

## 2020-01-01 RX ADMIN — LACTULOSE 20 GRAM(S): 10 SOLUTION ORAL at 23:08

## 2020-01-01 RX ADMIN — Medication 100 MILLIEQUIVALENT(S): at 00:24

## 2020-01-01 RX ADMIN — Medication 500 MILLIGRAM(S): at 18:41

## 2020-01-01 RX ADMIN — PHENYLEPHRINE HYDROCHLORIDE 117 MICROGRAM(S)/KG/MIN: 10 INJECTION INTRAVENOUS at 12:25

## 2020-01-01 RX ADMIN — Medication 105 MILLIGRAM(S): at 21:42

## 2020-01-01 RX ADMIN — Medication 4.89 MICROGRAM(S)/KG/MIN: at 06:34

## 2020-01-01 RX ADMIN — LACTULOSE 20 GRAM(S): 10 SOLUTION ORAL at 06:10

## 2020-01-01 RX ADMIN — Medication 105 MILLIGRAM(S): at 22:04

## 2020-01-01 RX ADMIN — Medication 105 MILLIGRAM(S): at 13:28

## 2020-01-01 RX ADMIN — HEPARIN SODIUM 5000 UNIT(S): 5000 INJECTION INTRAVENOUS; SUBCUTANEOUS at 05:12

## 2020-01-01 RX ADMIN — Medication 500 MILLIGRAM(S): at 12:23

## 2020-01-01 RX ADMIN — Medication 50 MILLILITER(S): at 23:08

## 2020-01-01 RX ADMIN — LACTULOSE 20 GRAM(S): 10 SOLUTION ORAL at 11:41

## 2020-01-01 RX ADMIN — LACTULOSE 20 GRAM(S): 10 SOLUTION ORAL at 00:50

## 2020-01-01 RX ADMIN — LACTULOSE 20 GRAM(S): 10 SOLUTION ORAL at 17:33

## 2020-01-01 RX ADMIN — Medication 100 MILLIEQUIVALENT(S): at 11:31

## 2020-01-01 RX ADMIN — PANTOPRAZOLE SODIUM 80 MILLIGRAM(S): 20 TABLET, DELAYED RELEASE ORAL at 05:00

## 2020-01-01 RX ADMIN — CHLORHEXIDINE GLUCONATE 15 MILLILITER(S): 213 SOLUTION TOPICAL at 17:25

## 2020-01-01 RX ADMIN — Medication 2 MILLIGRAM(S): at 06:13

## 2020-01-01 RX ADMIN — LACTULOSE 20 GRAM(S): 10 SOLUTION ORAL at 05:40

## 2020-01-01 RX ADMIN — Medication 1 MILLIGRAM(S): at 11:15

## 2020-01-01 RX ADMIN — Medication 2 MILLIGRAM(S): at 06:12

## 2020-01-01 RX ADMIN — Medication 10 MILLIGRAM(S): at 08:48

## 2020-01-01 RX ADMIN — Medication 50 MILLILITER(S): at 12:33

## 2020-01-01 RX ADMIN — OCTREOTIDE ACETATE 50 MICROGRAM(S): 200 INJECTION, SOLUTION INTRAVENOUS; SUBCUTANEOUS at 23:16

## 2020-01-01 RX ADMIN — TRAMADOL HYDROCHLORIDE 50 MILLIGRAM(S): 50 TABLET ORAL at 23:08

## 2020-01-01 RX ADMIN — IRON SUCROSE 210 MILLIGRAM(S): 20 INJECTION, SOLUTION INTRAVENOUS at 09:57

## 2020-01-01 RX ADMIN — Medication 50 MILLIGRAM(S): at 13:30

## 2020-01-01 RX ADMIN — LACTULOSE 20 GRAM(S): 10 SOLUTION ORAL at 00:27

## 2020-01-01 RX ADMIN — Medication 1 MILLIGRAM(S): at 11:17

## 2020-01-01 RX ADMIN — Medication 1 MILLIGRAM(S): at 12:23

## 2020-01-01 RX ADMIN — Medication 40 MILLIGRAM(S): at 05:50

## 2020-01-01 RX ADMIN — Medication 20 MILLIGRAM(S): at 05:12

## 2020-01-01 RX ADMIN — LACTULOSE 20 GRAM(S): 10 SOLUTION ORAL at 21:42

## 2020-01-01 RX ADMIN — LACTULOSE 20 GRAM(S): 10 SOLUTION ORAL at 06:14

## 2020-01-01 RX ADMIN — LACTULOSE 20 GRAM(S): 10 SOLUTION ORAL at 14:23

## 2020-01-01 RX ADMIN — CEFTRIAXONE 100 MILLIGRAM(S): 500 INJECTION, POWDER, FOR SOLUTION INTRAMUSCULAR; INTRAVENOUS at 11:37

## 2020-01-01 RX ADMIN — PANTOPRAZOLE SODIUM 40 MILLIGRAM(S): 20 TABLET, DELAYED RELEASE ORAL at 05:15

## 2020-01-01 RX ADMIN — Medication 2.5 MILLIGRAM(S): at 17:36

## 2020-01-01 RX ADMIN — PANTOPRAZOLE SODIUM 10 MG/HR: 20 TABLET, DELAYED RELEASE ORAL at 03:21

## 2020-01-01 RX ADMIN — Medication 105 MILLIGRAM(S): at 13:04

## 2020-01-01 RX ADMIN — PREGABALIN 1000 MICROGRAM(S): 225 CAPSULE ORAL at 22:52

## 2020-01-01 RX ADMIN — ETOMIDATE 10 MILLIGRAM(S): 2 INJECTION INTRAVENOUS at 04:01

## 2020-01-01 RX ADMIN — PANTOPRAZOLE SODIUM 40 MILLIGRAM(S): 20 TABLET, DELAYED RELEASE ORAL at 05:36

## 2020-01-01 RX ADMIN — Medication 50 MILLIGRAM(S): at 21:22

## 2020-01-01 RX ADMIN — Medication 40 MILLIGRAM(S): at 06:10

## 2020-01-01 RX ADMIN — Medication 4.89 MICROGRAM(S)/KG/MIN: at 13:39

## 2020-01-01 RX ADMIN — IRON SUCROSE 210 MILLIGRAM(S): 20 INJECTION, SOLUTION INTRAVENOUS at 09:54

## 2020-01-01 RX ADMIN — Medication 4.89 MICROGRAM(S)/KG/MIN: at 07:03

## 2020-01-01 RX ADMIN — OCTREOTIDE ACETATE 10 MICROGRAM(S)/HR: 200 INJECTION, SOLUTION INTRAVENOUS; SUBCUTANEOUS at 23:23

## 2020-01-01 RX ADMIN — VASOPRESSIN 2.4 UNIT(S)/MIN: 20 INJECTION INTRAVENOUS at 16:37

## 2020-01-01 RX ADMIN — Medication 50 MILLILITER(S): at 17:03

## 2020-01-01 RX ADMIN — Medication 1 MILLIGRAM(S): at 11:29

## 2020-01-01 RX ADMIN — SPIRONOLACTONE 50 MILLIGRAM(S): 25 TABLET, FILM COATED ORAL at 06:17

## 2020-01-01 RX ADMIN — Medication 50 MILLILITER(S): at 12:45

## 2020-01-01 RX ADMIN — PANTOPRAZOLE SODIUM 40 MILLIGRAM(S): 20 TABLET, DELAYED RELEASE ORAL at 05:44

## 2020-01-01 RX ADMIN — Medication 25 MILLIGRAM(S): at 05:21

## 2020-01-01 RX ADMIN — Medication 100 MILLIGRAM(S): at 12:56

## 2020-01-01 RX ADMIN — Medication 1 MILLIGRAM(S): at 12:03

## 2020-01-01 RX ADMIN — LACTULOSE 20 GRAM(S): 10 SOLUTION ORAL at 17:08

## 2020-01-01 RX ADMIN — LACTULOSE 10 GRAM(S): 10 SOLUTION ORAL at 22:04

## 2020-01-01 RX ADMIN — Medication 150 MILLIEQUIVALENT(S): at 14:17

## 2020-01-01 RX ADMIN — LACTULOSE 20 GRAM(S): 10 SOLUTION ORAL at 05:22

## 2020-01-01 RX ADMIN — Medication 2 MILLIGRAM(S): at 13:23

## 2020-01-01 RX ADMIN — Medication 2.5 MILLIGRAM(S): at 13:35

## 2020-01-01 RX ADMIN — Medication 1 MILLIGRAM(S): at 11:32

## 2020-01-01 RX ADMIN — Medication 40 MILLIGRAM(S): at 20:39

## 2020-01-01 RX ADMIN — OCTREOTIDE ACETATE 10 MICROGRAM(S)/HR: 200 INJECTION, SOLUTION INTRAVENOUS; SUBCUTANEOUS at 01:23

## 2020-01-01 RX ADMIN — Medication 2 MILLIGRAM(S): at 12:38

## 2020-01-01 RX ADMIN — LACTULOSE 20 GRAM(S): 10 SOLUTION ORAL at 06:17

## 2020-01-01 RX ADMIN — Medication 1 MILLIGRAM(S): at 12:02

## 2020-01-01 RX ADMIN — Medication 2 MILLIGRAM(S): at 05:02

## 2020-01-01 RX ADMIN — CEFTRIAXONE 100 MILLIGRAM(S): 500 INJECTION, POWDER, FOR SOLUTION INTRAMUSCULAR; INTRAVENOUS at 11:17

## 2020-01-01 RX ADMIN — IRON SUCROSE 210 MILLIGRAM(S): 20 INJECTION, SOLUTION INTRAVENOUS at 09:26

## 2020-01-01 RX ADMIN — Medication 100 MILLIEQUIVALENT(S): at 10:16

## 2020-01-01 RX ADMIN — PANTOPRAZOLE SODIUM 40 MILLIGRAM(S): 20 TABLET, DELAYED RELEASE ORAL at 17:32

## 2020-01-01 RX ADMIN — Medication 1 MILLIGRAM(S): at 11:01

## 2020-01-01 RX ADMIN — CHLORHEXIDINE GLUCONATE 1 APPLICATION(S): 213 SOLUTION TOPICAL at 05:56

## 2020-01-01 RX ADMIN — LACTULOSE 20 GRAM(S): 10 SOLUTION ORAL at 22:16

## 2020-01-01 RX ADMIN — CEFTRIAXONE 100 MILLIGRAM(S): 500 INJECTION, POWDER, FOR SOLUTION INTRAMUSCULAR; INTRAVENOUS at 11:41

## 2020-01-01 RX ADMIN — Medication 100 MILLIEQUIVALENT(S): at 09:50

## 2020-01-01 RX ADMIN — Medication 25 MILLIGRAM(S): at 05:45

## 2020-01-01 RX ADMIN — SODIUM CHLORIDE 3 MILLILITER(S): 9 INJECTION INTRAMUSCULAR; INTRAVENOUS; SUBCUTANEOUS at 01:27

## 2020-01-01 RX ADMIN — PHENYLEPHRINE HYDROCHLORIDE 117 MICROGRAM(S)/KG/MIN: 10 INJECTION INTRAVENOUS at 10:37

## 2020-01-01 RX ADMIN — Medication 50 MILLIGRAM(S): at 21:45

## 2020-01-01 RX ADMIN — PHENYLEPHRINE HYDROCHLORIDE 117 MICROGRAM(S)/KG/MIN: 10 INJECTION INTRAVENOUS at 14:51

## 2020-01-01 RX ADMIN — SODIUM CHLORIDE 1000 MILLILITER(S): 9 INJECTION INTRAMUSCULAR; INTRAVENOUS; SUBCUTANEOUS at 21:24

## 2020-01-01 RX ADMIN — CHLORHEXIDINE GLUCONATE 1 APPLICATION(S): 213 SOLUTION TOPICAL at 07:00

## 2020-01-01 RX ADMIN — PANTOPRAZOLE SODIUM 40 MILLIGRAM(S): 20 TABLET, DELAYED RELEASE ORAL at 05:40

## 2020-01-01 RX ADMIN — Medication 25 GRAM(S): at 09:20

## 2020-01-01 RX ADMIN — Medication 4.89 MICROGRAM(S)/KG/MIN: at 21:50

## 2020-01-01 RX ADMIN — Medication 25 MILLIGRAM(S): at 05:39

## 2020-01-01 RX ADMIN — Medication 50 GRAM(S): at 13:30

## 2020-01-01 RX ADMIN — PANTOPRAZOLE SODIUM 40 MILLIGRAM(S): 20 TABLET, DELAYED RELEASE ORAL at 05:23

## 2020-01-01 RX ADMIN — Medication 1 MILLIGRAM(S): at 17:46

## 2020-01-01 RX ADMIN — Medication 40 MILLIEQUIVALENT(S): at 15:23

## 2020-01-01 RX ADMIN — Medication 40 MILLIGRAM(S): at 05:51

## 2020-01-01 RX ADMIN — SPIRONOLACTONE 50 MILLIGRAM(S): 25 TABLET, FILM COATED ORAL at 05:25

## 2020-01-01 RX ADMIN — OCTREOTIDE ACETATE 10 MICROGRAM(S)/HR: 200 INJECTION, SOLUTION INTRAVENOUS; SUBCUTANEOUS at 03:40

## 2020-01-01 RX ADMIN — Medication 1 MILLIGRAM(S): at 12:26

## 2020-01-01 RX ADMIN — IRON SUCROSE 210 MILLIGRAM(S): 20 INJECTION, SOLUTION INTRAVENOUS at 12:56

## 2020-01-01 RX ADMIN — Medication 102 MILLIGRAM(S): at 11:02

## 2020-01-01 RX ADMIN — Medication 650 MILLIGRAM(S): at 10:41

## 2020-01-01 RX ADMIN — PANTOPRAZOLE SODIUM 40 MILLIGRAM(S): 20 TABLET, DELAYED RELEASE ORAL at 06:10

## 2020-01-01 RX ADMIN — PANTOPRAZOLE SODIUM 40 MILLIGRAM(S): 20 TABLET, DELAYED RELEASE ORAL at 17:43

## 2020-01-01 RX ADMIN — CHLORHEXIDINE GLUCONATE 1 APPLICATION(S): 213 SOLUTION TOPICAL at 11:26

## 2020-01-01 RX ADMIN — LACTULOSE 15 GRAM(S): 10 SOLUTION ORAL at 17:03

## 2020-01-01 RX ADMIN — Medication 100 MILLIEQUIVALENT(S): at 03:29

## 2020-01-01 RX ADMIN — Medication 105 MILLIGRAM(S): at 14:30

## 2020-01-01 RX ADMIN — HALOPERIDOL DECANOATE 2 MILLIGRAM(S): 100 INJECTION INTRAMUSCULAR at 15:29

## 2020-01-01 RX ADMIN — PANTOPRAZOLE SODIUM 40 MILLIGRAM(S): 20 TABLET, DELAYED RELEASE ORAL at 17:09

## 2020-01-01 RX ADMIN — Medication 50 MILLILITER(S): at 18:45

## 2020-01-01 RX ADMIN — PHENYLEPHRINE HYDROCHLORIDE 11.7 MICROGRAM(S)/KG/MIN: 10 INJECTION INTRAVENOUS at 00:41

## 2020-01-01 RX ADMIN — LACTULOSE 20 GRAM(S): 10 SOLUTION ORAL at 17:02

## 2020-01-01 RX ADMIN — LACTULOSE 20 GRAM(S): 10 SOLUTION ORAL at 17:05

## 2020-01-01 RX ADMIN — LACTULOSE 20 GRAM(S): 10 SOLUTION ORAL at 00:13

## 2020-01-01 RX ADMIN — LACTULOSE 20 GRAM(S): 10 SOLUTION ORAL at 17:49

## 2020-01-01 RX ADMIN — ERGOCALCIFEROL 50000 UNIT(S): 1.25 CAPSULE ORAL at 19:08

## 2020-01-01 RX ADMIN — Medication 1 MILLIGRAM(S): at 11:41

## 2020-01-01 RX ADMIN — PANTOPRAZOLE SODIUM 40 MILLIGRAM(S): 20 TABLET, DELAYED RELEASE ORAL at 17:50

## 2020-01-01 RX ADMIN — Medication 50 MILLILITER(S): at 12:48

## 2020-01-01 RX ADMIN — Medication 105 MILLIGRAM(S): at 13:18

## 2020-01-01 RX ADMIN — PHENYLEPHRINE HYDROCHLORIDE 117 MICROGRAM(S)/KG/MIN: 10 INJECTION INTRAVENOUS at 23:59

## 2020-01-01 RX ADMIN — Medication 100 MILLIGRAM(S): at 11:32

## 2020-01-01 RX ADMIN — Medication 100 GRAM(S): at 17:40

## 2020-01-01 RX ADMIN — PANTOPRAZOLE SODIUM 40 MILLIGRAM(S): 20 TABLET, DELAYED RELEASE ORAL at 05:17

## 2020-01-01 RX ADMIN — NADOLOL 20 MILLIGRAM(S): 80 TABLET ORAL at 06:17

## 2020-01-01 RX ADMIN — Medication 100 MILLIEQUIVALENT(S): at 09:03

## 2020-01-01 RX ADMIN — LACTULOSE 20 GRAM(S): 10 SOLUTION ORAL at 12:03

## 2020-01-01 RX ADMIN — Medication 20 MILLIGRAM(S): at 05:20

## 2020-01-01 RX ADMIN — LACTULOSE 20 GRAM(S): 10 SOLUTION ORAL at 05:51

## 2020-01-01 RX ADMIN — CEFTRIAXONE 1000 MILLIGRAM(S): 500 INJECTION, POWDER, FOR SOLUTION INTRAMUSCULAR; INTRAVENOUS at 04:30

## 2020-01-01 RX ADMIN — LACTULOSE 20 GRAM(S): 10 SOLUTION ORAL at 05:36

## 2020-01-01 RX ADMIN — Medication 40 MILLIEQUIVALENT(S): at 13:15

## 2020-01-01 RX ADMIN — Medication 1 MILLIGRAM(S): at 12:16

## 2020-01-01 RX ADMIN — Medication 1 TABLET(S): at 11:33

## 2020-01-01 RX ADMIN — LACTULOSE 15 GRAM(S): 10 SOLUTION ORAL at 17:02

## 2020-01-01 RX ADMIN — CEFTRIAXONE 100 MILLIGRAM(S): 500 INJECTION, POWDER, FOR SOLUTION INTRAMUSCULAR; INTRAVENOUS at 04:05

## 2020-01-01 RX ADMIN — LACTULOSE 10 GRAM(S): 10 SOLUTION ORAL at 05:41

## 2020-01-01 RX ADMIN — Medication 102 MILLIGRAM(S): at 16:46

## 2020-01-01 RX ADMIN — Medication 315 GRAM(S): at 10:43

## 2020-01-01 RX ADMIN — Medication 4.89 MICROGRAM(S)/KG/MIN: at 15:50

## 2020-01-01 RX ADMIN — Medication 40 MILLIGRAM(S): at 05:28

## 2020-01-01 RX ADMIN — CHLORHEXIDINE GLUCONATE 15 MILLILITER(S): 213 SOLUTION TOPICAL at 05:56

## 2020-01-01 RX ADMIN — LACTULOSE 20 GRAM(S): 10 SOLUTION ORAL at 13:15

## 2020-01-01 RX ADMIN — CHLORHEXIDINE GLUCONATE 1 APPLICATION(S): 213 SOLUTION TOPICAL at 11:01

## 2020-01-01 RX ADMIN — Medication 50 MILLIGRAM(S): at 05:16

## 2020-01-01 RX ADMIN — Medication 50 MILLIGRAM(S): at 16:51

## 2020-01-01 RX ADMIN — PANTOPRAZOLE SODIUM 40 MILLIGRAM(S): 20 TABLET, DELAYED RELEASE ORAL at 17:57

## 2020-01-01 RX ADMIN — CEFTRIAXONE 100 MILLIGRAM(S): 500 INJECTION, POWDER, FOR SOLUTION INTRAMUSCULAR; INTRAVENOUS at 11:01

## 2020-01-01 RX ADMIN — Medication 105 MILLIGRAM(S): at 05:23

## 2020-01-01 RX ADMIN — Medication 1 TABLET(S): at 08:04

## 2020-01-01 RX ADMIN — Medication 105 MILLIGRAM(S): at 21:15

## 2020-01-01 RX ADMIN — Medication 2 MILLIGRAM(S): at 02:03

## 2020-01-01 RX ADMIN — Medication 65.31 GRAM(S): at 12:56

## 2020-01-01 RX ADMIN — Medication 4 MILLIGRAM(S): at 13:07

## 2020-01-01 RX ADMIN — Medication 100 MILLIEQUIVALENT(S): at 12:02

## 2020-01-01 RX ADMIN — TRAMADOL HYDROCHLORIDE 50 MILLIGRAM(S): 50 TABLET ORAL at 23:53

## 2020-01-01 RX ADMIN — Medication 10 MILLIGRAM(S): at 08:38

## 2020-01-01 RX ADMIN — Medication 500 MILLIGRAM(S): at 12:15

## 2020-01-01 RX ADMIN — LACTULOSE 20 GRAM(S): 10 SOLUTION ORAL at 05:23

## 2020-01-01 RX ADMIN — Medication 50 MILLILITER(S): at 17:33

## 2020-01-01 RX ADMIN — SPIRONOLACTONE 50 MILLIGRAM(S): 25 TABLET, FILM COATED ORAL at 05:20

## 2020-01-01 RX ADMIN — Medication 105 MILLIGRAM(S): at 06:15

## 2020-01-01 RX ADMIN — Medication 100 MILLIEQUIVALENT(S): at 10:48

## 2020-01-01 RX ADMIN — Medication 20 MILLIGRAM(S): at 06:17

## 2020-01-01 RX ADMIN — Medication 500 MILLIGRAM(S): at 12:33

## 2020-01-01 RX ADMIN — Medication 4.89 MICROGRAM(S)/KG/MIN: at 12:38

## 2020-01-01 RX ADMIN — SODIUM CHLORIDE 500 MILLILITER(S): 9 INJECTION INTRAMUSCULAR; INTRAVENOUS; SUBCUTANEOUS at 23:24

## 2020-01-01 RX ADMIN — NADOLOL 20 MILLIGRAM(S): 80 TABLET ORAL at 22:04

## 2020-01-01 RX ADMIN — Medication 1 MILLIGRAM(S): at 12:09

## 2020-01-01 RX ADMIN — PANTOPRAZOLE SODIUM 40 MILLIGRAM(S): 20 TABLET, DELAYED RELEASE ORAL at 18:27

## 2020-01-01 RX ADMIN — Medication 100 MILLIEQUIVALENT(S): at 01:05

## 2020-01-01 RX ADMIN — VASOPRESSIN 2.4 UNIT(S)/MIN: 20 INJECTION INTRAVENOUS at 05:52

## 2020-01-01 RX ADMIN — Medication 10 MILLIGRAM(S): at 12:13

## 2020-01-01 RX ADMIN — OCTREOTIDE ACETATE 10 MICROGRAM(S)/HR: 200 INJECTION, SOLUTION INTRAVENOUS; SUBCUTANEOUS at 13:01

## 2020-01-01 RX ADMIN — Medication 25 MILLIGRAM(S): at 05:50

## 2020-01-01 RX ADMIN — Medication 500 MILLIGRAM(S): at 11:29

## 2020-01-01 RX ADMIN — PANTOPRAZOLE SODIUM 40 MILLIGRAM(S): 20 TABLET, DELAYED RELEASE ORAL at 18:22

## 2020-01-01 RX ADMIN — Medication 100 MILLIGRAM(S): at 12:26

## 2020-01-01 RX ADMIN — PANTOPRAZOLE SODIUM 10 MG/HR: 20 TABLET, DELAYED RELEASE ORAL at 06:37

## 2020-01-01 RX ADMIN — Medication 100 GRAM(S): at 12:24

## 2020-01-01 RX ADMIN — Medication 40 MILLIGRAM(S): at 11:40

## 2020-01-01 RX ADMIN — Medication 105 MILLIGRAM(S): at 21:43

## 2020-01-01 RX ADMIN — Medication 100 MILLIGRAM(S): at 12:16

## 2020-01-01 RX ADMIN — CEFTRIAXONE 100 MILLIGRAM(S): 500 INJECTION, POWDER, FOR SOLUTION INTRAMUSCULAR; INTRAVENOUS at 11:49

## 2020-01-01 RX ADMIN — Medication 40 MILLIEQUIVALENT(S): at 21:35

## 2020-01-01 RX ADMIN — Medication 100 MILLIGRAM(S): at 12:55

## 2020-01-01 RX ADMIN — Medication 40 MILLIEQUIVALENT(S): at 01:05

## 2020-01-01 RX ADMIN — Medication 10 MILLIGRAM(S): at 12:04

## 2020-01-01 RX ADMIN — SODIUM CHLORIDE 1000 MILLILITER(S): 9 INJECTION INTRAMUSCULAR; INTRAVENOUS; SUBCUTANEOUS at 10:12

## 2020-01-01 RX ADMIN — Medication 100 GRAM(S): at 09:14

## 2020-01-01 RX ADMIN — Medication 20 MILLIGRAM(S): at 22:52

## 2020-01-01 RX ADMIN — NADOLOL 20 MILLIGRAM(S): 80 TABLET ORAL at 16:16

## 2020-01-01 RX ADMIN — Medication 650 MILLIGRAM(S): at 23:12

## 2020-01-01 RX ADMIN — Medication 196 MICROGRAM(S)/KG/MIN: at 03:22

## 2020-01-01 RX ADMIN — Medication 105 MILLIGRAM(S): at 21:27

## 2020-01-01 RX ADMIN — PHENYLEPHRINE HYDROCHLORIDE 117 MICROGRAM(S)/KG/MIN: 10 INJECTION INTRAVENOUS at 16:54

## 2020-01-01 RX ADMIN — Medication 20 MILLIGRAM(S): at 18:27

## 2020-01-01 RX ADMIN — PANTOPRAZOLE SODIUM 40 MILLIGRAM(S): 20 TABLET, DELAYED RELEASE ORAL at 11:02

## 2020-01-01 RX ADMIN — Medication 40 MILLIEQUIVALENT(S): at 17:41

## 2020-01-01 RX ADMIN — Medication 40 MILLIGRAM(S): at 05:22

## 2020-01-01 RX ADMIN — Medication 50 MILLILITER(S): at 05:24

## 2020-01-01 RX ADMIN — Medication 1 TABLET(S): at 17:50

## 2020-01-01 RX ADMIN — Medication 40 MILLIGRAM(S): at 07:00

## 2020-01-01 RX ADMIN — Medication 105 MILLIGRAM(S): at 21:46

## 2020-01-01 RX ADMIN — OCTREOTIDE ACETATE 10 MICROGRAM(S)/HR: 200 INJECTION, SOLUTION INTRAVENOUS; SUBCUTANEOUS at 12:44

## 2020-01-01 RX ADMIN — PREGABALIN 1000 MICROGRAM(S): 225 CAPSULE ORAL at 11:02

## 2020-01-01 RX ADMIN — Medication 40 MILLIEQUIVALENT(S): at 10:20

## 2020-01-01 RX ADMIN — LACTULOSE 20 GRAM(S): 10 SOLUTION ORAL at 23:00

## 2020-01-01 RX ADMIN — IRON SUCROSE 210 MILLIGRAM(S): 20 INJECTION, SOLUTION INTRAVENOUS at 09:17

## 2020-01-01 RX ADMIN — DEXMEDETOMIDINE HYDROCHLORIDE IN 0.9% SODIUM CHLORIDE 10.6 MICROGRAM(S)/KG/HR: 4 INJECTION INTRAVENOUS at 13:54

## 2020-01-01 RX ADMIN — Medication 105 MILLIGRAM(S): at 05:05

## 2020-01-01 RX ADMIN — Medication 10 MILLIGRAM(S): at 17:02

## 2020-01-01 RX ADMIN — LACTULOSE 20 GRAM(S): 10 SOLUTION ORAL at 14:21

## 2020-01-01 RX ADMIN — Medication 2 MILLIGRAM(S): at 18:43

## 2020-01-01 RX ADMIN — Medication 100 MILLIEQUIVALENT(S): at 05:28

## 2020-01-01 RX ADMIN — LACTULOSE 10 GRAM(S): 10 SOLUTION ORAL at 17:36

## 2020-01-01 RX ADMIN — Medication 105 MILLIGRAM(S): at 13:35

## 2020-01-01 RX ADMIN — Medication 102 MILLIGRAM(S): at 12:10

## 2020-01-01 RX ADMIN — Medication 100 GRAM(S): at 18:33

## 2020-01-01 RX ADMIN — Medication 100 MILLIEQUIVALENT(S): at 04:59

## 2020-01-01 RX ADMIN — LACTULOSE 20 GRAM(S): 10 SOLUTION ORAL at 11:01

## 2020-01-01 RX ADMIN — Medication 2 MILLIGRAM(S): at 21:14

## 2020-01-01 RX ADMIN — Medication 102 MILLIGRAM(S): at 10:06

## 2020-01-01 RX ADMIN — LACTULOSE 20 GRAM(S): 10 SOLUTION ORAL at 21:04

## 2020-01-01 RX ADMIN — ERGOCALCIFEROL 50000 UNIT(S): 1.25 CAPSULE ORAL at 17:57

## 2020-01-01 RX ADMIN — Medication 105 MILLIGRAM(S): at 21:13

## 2020-01-01 RX ADMIN — Medication 2 MILLIGRAM(S): at 05:05

## 2020-01-01 RX ADMIN — Medication 25 GRAM(S): at 10:50

## 2020-01-01 RX ADMIN — LACTULOSE 20 GRAM(S): 10 SOLUTION ORAL at 05:05

## 2020-01-01 RX ADMIN — Medication 102 MILLIGRAM(S): at 11:49

## 2020-01-01 RX ADMIN — PANTOPRAZOLE SODIUM 40 MILLIGRAM(S): 20 TABLET, DELAYED RELEASE ORAL at 17:19

## 2020-01-01 RX ADMIN — LACTULOSE 20 GRAM(S): 10 SOLUTION ORAL at 23:18

## 2020-01-01 RX ADMIN — CEFTRIAXONE 100 MILLIGRAM(S): 500 INJECTION, POWDER, FOR SOLUTION INTRAMUSCULAR; INTRAVENOUS at 12:02

## 2020-01-01 RX ADMIN — Medication 2 MILLIGRAM(S): at 03:17

## 2020-01-01 RX ADMIN — Medication 1 MILLIGRAM(S): at 11:33

## 2020-01-01 RX ADMIN — PANTOPRAZOLE SODIUM 40 MILLIGRAM(S): 20 TABLET, DELAYED RELEASE ORAL at 05:48

## 2020-01-01 RX ADMIN — LACTULOSE 20 GRAM(S): 10 SOLUTION ORAL at 00:09

## 2020-01-01 RX ADMIN — Medication 100 MILLIGRAM(S): at 11:29

## 2020-01-01 RX ADMIN — CHLORHEXIDINE GLUCONATE 1 APPLICATION(S): 213 SOLUTION TOPICAL at 12:10

## 2020-01-01 RX ADMIN — LACTULOSE 20 GRAM(S): 10 SOLUTION ORAL at 05:27

## 2020-01-01 RX ADMIN — Medication 100 MILLIGRAM(S): at 13:56

## 2020-01-01 RX ADMIN — NADOLOL 20 MILLIGRAM(S): 80 TABLET ORAL at 05:35

## 2020-01-01 RX ADMIN — PHENYLEPHRINE HYDROCHLORIDE 117 MICROGRAM(S)/KG/MIN: 10 INJECTION INTRAVENOUS at 09:16

## 2020-01-01 RX ADMIN — LACTULOSE 20 GRAM(S): 10 SOLUTION ORAL at 11:50

## 2020-01-01 RX ADMIN — Medication 100 MILLIGRAM(S): at 12:57

## 2020-01-01 RX ADMIN — Medication 40 MILLIGRAM(S): at 05:40

## 2020-01-01 RX ADMIN — HEPARIN SODIUM 5000 UNIT(S): 5000 INJECTION INTRAVENOUS; SUBCUTANEOUS at 13:17

## 2020-01-01 RX ADMIN — Medication 2 MILLIGRAM(S): at 21:45

## 2020-01-01 RX ADMIN — Medication 10 MILLIGRAM(S): at 21:24

## 2020-01-01 RX ADMIN — Medication 50 MILLIGRAM(S): at 06:14

## 2020-01-01 RX ADMIN — Medication 50 MILLILITER(S): at 19:40

## 2020-01-01 RX ADMIN — Medication 4.89 MICROGRAM(S)/KG/MIN: at 14:59

## 2020-01-01 RX ADMIN — Medication 50 MILLILITER(S): at 05:05

## 2020-01-01 RX ADMIN — Medication 102 MILLIGRAM(S): at 05:20

## 2020-01-01 RX ADMIN — PHENYLEPHRINE HYDROCHLORIDE 117 MICROGRAM(S)/KG/MIN: 10 INJECTION INTRAVENOUS at 04:22

## 2020-01-01 RX ADMIN — CHLORHEXIDINE GLUCONATE 15 MILLILITER(S): 213 SOLUTION TOPICAL at 08:59

## 2020-01-01 RX ADMIN — Medication 105 MILLIGRAM(S): at 15:08

## 2020-01-01 RX ADMIN — Medication 100 MILLIGRAM(S): at 11:41

## 2020-01-01 RX ADMIN — Medication 130.38 GRAM(S): at 12:56

## 2020-01-01 RX ADMIN — Medication 10 MILLIGRAM(S): at 15:25

## 2020-01-01 RX ADMIN — Medication 105 MILLIGRAM(S): at 05:14

## 2020-01-01 RX ADMIN — LACTULOSE 20 GRAM(S): 10 SOLUTION ORAL at 23:03

## 2020-01-01 RX ADMIN — Medication 2 MILLIGRAM(S): at 14:01

## 2020-01-01 RX ADMIN — Medication 100 GRAM(S): at 12:57

## 2020-01-01 RX ADMIN — HEPARIN SODIUM 5000 UNIT(S): 5000 INJECTION INTRAVENOUS; SUBCUTANEOUS at 21:41

## 2020-01-01 RX ADMIN — CHLORHEXIDINE GLUCONATE 1 APPLICATION(S): 213 SOLUTION TOPICAL at 11:49

## 2020-01-01 RX ADMIN — Medication 40 MILLIEQUIVALENT(S): at 09:13

## 2020-01-01 RX ADMIN — PREGABALIN 1000 MICROGRAM(S): 225 CAPSULE ORAL at 11:20

## 2020-01-01 RX ADMIN — LACTULOSE 20 GRAM(S): 10 SOLUTION ORAL at 11:14

## 2020-01-01 RX ADMIN — LACTULOSE 20 GRAM(S): 10 SOLUTION ORAL at 05:14

## 2020-01-01 RX ADMIN — PANTOPRAZOLE SODIUM 40 MILLIGRAM(S): 20 TABLET, DELAYED RELEASE ORAL at 11:19

## 2020-01-01 RX ADMIN — Medication 1 MILLIGRAM(S): at 11:20

## 2020-01-01 RX ADMIN — LACTULOSE 20 GRAM(S): 10 SOLUTION ORAL at 05:44

## 2020-01-01 RX ADMIN — Medication 2 MILLIGRAM(S): at 23:35

## 2020-01-01 RX ADMIN — LACTULOSE 20 GRAM(S): 10 SOLUTION ORAL at 12:17

## 2020-01-01 RX ADMIN — Medication 500 MILLIGRAM(S): at 12:04

## 2020-01-01 RX ADMIN — LACTULOSE 20 GRAM(S): 10 SOLUTION ORAL at 12:25

## 2020-01-01 RX ADMIN — OCTREOTIDE ACETATE 10 MICROGRAM(S)/HR: 200 INJECTION, SOLUTION INTRAVENOUS; SUBCUTANEOUS at 04:42

## 2020-06-10 NOTE — H&P ADULT - PROBLEM SELECTOR PLAN 1
Pt coming in with leg swelling for 1 week  Can be secondary to liver disease, nephrotic, dvt or cardiac cause  No significant warmth, redness noted  Will perform doppler lower ext to evaluate for dvt  Pt noted to have negative trop with bnp within normal limit  F/u urine protein and creatinine ratio

## 2020-06-10 NOTE — CONSULT NOTE ADULT - ASSESSMENT
GI asked to evaluate this 34 yr old male with h/o liver disease and ETOH use who presented to ED for evaluation for leg swelling x 1 week. Patient denies chest pain, dizziness, SOB or palpitations. Reports drinking 6-7 cans of beers daily. Patient reports left foot fracture around 3 years ago for which he was hospitalized and found to have liver disease. He states he has not seen a doctor since that time. He denies bloody stools but reports bloody vomitus around 2 weeks ago after taking Advil for a headache. Patient denies ever having an EGD or colonoscopy. GI asked to evaluate this 34 yr old male with h/o liver disease and ETOH use who presented to ED for evaluation for leg swelling x 1 week. Patient denies chest pain, dizziness, SOB or palpitations. Reports drinking 6-7 cans of beers daily. Patient reports left foot fracture around 3 years ago for which he was hospitalized and found to have liver disease. He states he has not seen a doctor since that time. He denies bloody stools but reports bloody vomitus around 2 weeks ago after taking Advil for a headache. Patient denies ever having an EGD or colonoscopy.  [place]  # [id number] and [id number]

## 2020-06-10 NOTE — ED ADULT NURSE NOTE - OBJECTIVE STATEMENT
pt compliant of swelling to both legs that he noticed 1 week ago and he started feeling short of breathe. pt talking in complete sentences

## 2020-06-10 NOTE — H&P ADULT - PROBLEM SELECTOR PLAN 2
Pt noted to have transaminitis with elevated INR, elevated bili and low albumin  Pt noted to have cirrhosis on ct chest, due to recent contrast consider going ct abdomen with contrast for further evaluation  Will order usg abdomen to evaluate for ascites, liver and spleen  Meld 17, 6% 3 month mortality  Will consult GI Dr Gerber  Unsure if pt had liver biopsy  Will check hepatitis panel, hiv, auto-immune workup

## 2020-06-10 NOTE — CONSULT NOTE ADULT - PROBLEM SELECTOR RECOMMENDATION 4
SW consult  [facility] protocol  thiamine, folate and vitamin b 12 supplementation. SW consult  [facility] protocol  thiamine, folate and vitamin b 12 supplementation  add MVI

## 2020-06-10 NOTE — ED ADULT TRIAGE NOTE - CHIEF COMPLAINT QUOTE
pt compliant of swelling to both legs that he noticed 1 week ago and he started feeling short of breathe. pt talking in complete sentences.

## 2020-06-10 NOTE — CONSULT NOTE ADULT - SUBJECTIVE AND OBJECTIVE BOX
[facility]    Patient is a 34y old  Male who presents with a chief complaint of Leg swelling (10 Edy 2020 05:49)    HPI:  Pt is a 34 yr old male with h/o liver disease presented to ED for evaluation for leg swelling. Pt states that his leg swelling started 1 week ago, denies any similar episodes in past. Pt denies any prolonged sitting, recent travel. Pt also complain of face, arm and epigastric pain. Pt unable to explain if he underwent tissue biopsy for diagnosis of his liver disease. Pt states he uses some syrup to clean up his stomach, does report some episodes of bloody vomiting with post recent 2 weeks ago. Pt states he doesn't follow a liver doctor as out-patient. Pt denies any fever, headache, weakness, chest pain, shortness of breath, palpitations, nausea or diarrhea. In ED, Pt vitals were  Temp 99    /84  RR 18/98 (10 Edy 2020 05:49)    PMH/PSH:  PAST MEDICAL & SURGICAL HISTORY:  No pertinent past medical history  No significant past surgical history    FH:  FAMILY HISTORY:      MEDS:  MEDICATIONS  (STANDING):  cyanocobalamin 1000 MICROGram(s) Oral daily  folic acid 1 milliGRAM(s) Oral daily  heparin   Injectable 5000 Unit(s) SubCutaneous every 8 hours  pantoprazole  Injectable 40 milliGRAM(s) IV Push daily  thiamine IVPB 500 milliGRAM(s) IV Intermittent every 8 hours    MEDICATIONS  (PRN):  LORazepam   Injectable 2 milliGRAM(s) IV Push every 6 hours PRN Symptom-triggered: each CIWA -Ar score 8 or GREATER    Allergies    No Known Allergies    Intolerances            CONSTITUTIONAL:  No weight loss, fever, chills, weakness or fatigue.  HEENT:  Eyes:  No visual loss, blurred vision, double vision or yellow sclerae. Ears, Nose, Throat:  No hearing loss, sneezing, congestion, runny nose or sore throat.  SKIN:  No rash or itching.  CARDIOVASCULAR:  No chest pain, chest pressure or chest discomfort. No palpitations or edema.  RESPIRATORY:  No shortness of breath, cough or sputum.  GASTROINTESTINAL:  SEE HPI  GENITOURINARY:  No dysuria, hematuria, urinary frequency  NEUROLOGICAL: +headache at times, no dizziness, syncope, paralysis, ataxia, numbness or tingling in the extremities. No change in bowel or bladder control.  MUSCULOSKELETAL:  No muscle, back pain, joint pain or stiffness.  HEMATOLOGIC:  No anemia, bleeding or bruising.  LYMPHATICS:  No enlarged nodes. No history of splenectomy.  PSYCHIATRIC:  No history of depression or anxiety.  ENDOCRINOLOGIC:  No reports of sweating, cold or heat intolerance. No polyuria or polydipsia.      ______________________________________________________________________  PHYSICAL EXAM:  T(C): 36.9 (06-10-20 @ 07:54), Max: 37.3 (06-10-20 @ 00:26)  HR: 102 (06-10-20 @ 07:54)  BP: 127/76 (06-10-20 @ 07:54)  RR: 16 (06-10-20 @ 07:54)  SpO2: 100% (06-10-20 @ 07:54)  Wt(kg): --      GEN: NAD, normocephalic - Barbadian speaking male  CVS: S1S2+  CHEST: clear to auscultation  ABD: soft , nontender, nondistended, bowel sounds present  EXTR: LE swelling L>R left LLE 2+  NEURO: Awake and alert; oriented to person, place, time, and situation  SKIN:  warm;  non icteric    ______________________________________________________________________  LABS:                        7.3    6.93  )-----------( 104      ( 10 Edy 2020 01:28 )             22.1     06-10    139  |  103  |  3<L>  ----------------------------<  119<H>  4.0   |  28  |  0.61    Ca    7.4<L>      10 Edy 2020 01:28    TPro  8.0  /  Alb  1.9<L>  /  TBili  1.6<H>  /  DBili  x   /  AST  75<H>  /  ALT  19  /  AlkPhos  193<H>  06-10    LIVER FUNCTIONS - ( 10 Edy 2020 01:28 )  Alb: 1.9 g/dL / Pro: 8.0 g/dL / ALK PHOS: 193 U/L / ALT: 19 U/L DA / AST: 75 U/L / GGT: x           PT/INR - ( 10 Edy 2020 01:28 )   PT: 26.2 sec;   INR: 2.26 ratio         PTT - ( 10 Edy 2020 01:28 )  PTT:47.0 sec  ____________________________________________    IMAGING:    ______________________________________________________________________  ASSESSMENT:  34y Male    [facility]    Patient is a 34y old  Male who presents with a chief complaint of Leg swelling (10 Edy 2020 05:49)    HPI:  Pt is a 34 yr old male with h/o liver disease presented to ED for evaluation for leg swelling. Pt states that his leg swelling started 1 week ago, denies any similar episodes in past. Pt denies any prolonged sitting, recent travel. Pt also complain of face, arm and epigastric pain. Pt unable to explain if he underwent tissue biopsy for diagnosis of his liver disease. Pt states he uses some syrup to clean up his stomach, does report some episodes of bloody vomiting with post recent 2 weeks ago. Pt states he doesn't follow a liver doctor as out-patient. Pt denies any fever, headache, weakness, chest pain, shortness of breath, palpitations, nausea or diarrhea. In ED, Pt vitals were  Temp 99    /84  RR 18/98 (10 Edy 2020 05:49)    PMH/PSH:  PAST MEDICAL & SURGICAL HISTORY:  No pertinent past medical history  No significant past surgical history    FH:  FAMILY HISTORY:      MEDS:  MEDICATIONS  (STANDING):  cyanocobalamin 1000 MICROGram(s) Oral daily  folic acid 1 milliGRAM(s) Oral daily  heparin   Injectable 5000 Unit(s) SubCutaneous every 8 hours  pantoprazole  Injectable 40 milliGRAM(s) IV Push daily  thiamine IVPB 500 milliGRAM(s) IV Intermittent every 8 hours    MEDICATIONS  (PRN):  LORazepam   Injectable 2 milliGRAM(s) IV Push every 6 hours PRN Symptom-triggered: each CIWA -Ar score 8 or GREATER    Allergies    No Known Allergies    Intolerances            CONSTITUTIONAL:  No weight loss, fever, chills, weakness or fatigue.  HEENT:  Eyes:  No visual loss, blurred vision, double vision or yellow sclerae. Ears, Nose, Throat:  No hearing loss, sneezing, congestion, runny nose or sore throat.  SKIN:  No rash or itching.  CARDIOVASCULAR:  No chest pain, chest pressure or chest discomfort. No palpitations or edema.  RESPIRATORY:  No shortness of breath, cough or sputum.  GASTROINTESTINAL:  SEE HPI  GENITOURINARY:  No dysuria, hematuria, urinary frequency  NEUROLOGICAL: +headache at times, no dizziness, syncope, paralysis, ataxia, numbness or tingling in the extremities. No change in bowel or bladder control.  MUSCULOSKELETAL:  No muscle, back pain, joint pain or stiffness.  HEMATOLOGIC:  No anemia, bleeding or bruising.  LYMPHATICS:  No enlarged nodes. No history of splenectomy.  PSYCHIATRIC:  No history of depression or anxiety.  ENDOCRINOLOGIC:  No reports of sweating, cold or heat intolerance. No polyuria or polydipsia.      ______________________________________________________________________  PHYSICAL EXAM:  T(C): 36.9 (06-10-20 @ 07:54), Max: 37.3 (06-10-20 @ 00:26)  HR: 102 (06-10-20 @ 07:54)  BP: 127/76 (06-10-20 @ 07:54)  RR: 16 (06-10-20 @ 07:54)  SpO2: 100% (06-10-20 @ 07:54)  Wt(kg): --      GEN: NAD, normocephalic - Bulgarian speaking male  CVS: S1S2+  CHEST: clear to auscultation  ABD: soft , nontender, nondistended, bowel sounds present  EXTR: LE swelling L>R left LLE 2+  NEURO: Awake and alert; oriented to person, place, time, and situation  SKIN:  warm;  non icteric    ______________________________________________________________________  LABS:                        7.3    6.93  )-----------( 104      ( 10 Edy 2020 01:28 )             22.1     06-10    139  |  103  |  3<L>  ----------------------------<  119<H>  4.0   |  28  |  0.61    Ca    7.4<L>      10 Edy 2020 01:28    TPro  8.0  /  Alb  1.9<L>  /  TBili  1.6<H>  /  DBili  x   /  AST  75<H>  /  ALT  19  /  AlkPhos  193<H>  06-10    LIVER FUNCTIONS - ( 10 Edy 2020 01:28 )  Alb: 1.9 g/dL / Pro: 8.0 g/dL / ALK PHOS: 193 U/L / ALT: 19 U/L DA / AST: 75 U/L / GGT: x           PT/INR - ( 10 Edy 2020 01:28 )   PT: 26.2 sec;   INR: 2.26 ratio         PTT - ( 10 Edy 2020 01:28 )  PTT:47.0 sec  ____________________________________________

## 2020-06-10 NOTE — H&P ADULT - PROBLEM SELECTOR PLAN 5
Pt high risk due to increased risk of coagulopathy  Will start on heparin q 8 for dvt ppx  Pantoprazole for GI ppx

## 2020-06-10 NOTE — ED PROVIDER NOTE - OBJECTIVE STATEMENT
Chief complaint of b/l leg swelling x 1 week L.R. pt with intermittent shortness of breath. No chest pain on evaluation. Denies fever, no blood per rectum, no melena.

## 2020-06-10 NOTE — CONSULT NOTE ADULT - PROBLEM SELECTOR RECOMMENDATION 2
likely 2/2 to ETOH  f/u US  f/u hep panel likely 2/2 to ETOH  US showing significant ascites - recommend starting diuretics  lasix 20mg daily  Aldactone 50mg daily  daily weights  monitor PT/INR

## 2020-06-10 NOTE — ED ADULT NURSE REASSESSMENT NOTE - NS ED NURSE REASSESS COMMENT FT1
Pt reassessed, observed laying in bed, breathing via NC @ 2l/min, tolerating well, in no respiratory distress at this time. Pt is A&O x3, able ot make needs known, denies nay distress/discomfort at this time. Pt ambulates independently, skin intact, right AC #20Ga in place, no meds to be administered at this time.   Admitted to [facility], awaiting bed, endorsed to PERLA [facility] for holding.

## 2020-06-10 NOTE — H&P ADULT - ATTENDING COMMENTS
Patient seen and examined ; case was discussed with the admitting resident  ROS: as in the HPI; all other ROS negative  SH and family history as above    Vital Signs Last 24 Hrs  T(C): 36.7 (10 Edy 2020 06:35), Max: 37.3 (10 Edy 2020 00:26)  T(F): 98.1 (10 Edy 2020 06:35), Max: 99.2 (10 Edy 2020 00:26)  HR: 100 (10 Edy 2020 06:35) (100 - 109)  BP: 128/78 (10 Edy 2020 06:35) (128/78 - 143/84)  BP(mean): --  RR: 17 (10 Edy 2020 06:35) (17 - 18)  SpO2: 100% (10 Edy 2020 06:35) (98% - 100%)    GEN: NAD  HEENT- normocephalic; mouth moist  CVS- S1S2+  LUNGS- clear to auscultation; no wheezing  ABD: Soft , nontender, nondistended, Bowel sounds are present  EXTREMITY: RLE no calf tenderness, no cyanosis, no edema LLE- pitting edema to thigh   NEURO: AAOx3; non focal neurologic exam; cranial nerves grossly intact  PSYCH: normal affect and behavior  BACK: no swelling or mass;   VASCULAR: ++ distal peripheral pulses  SKIN: warm and dry.       Labs Reviewed:                         7.3    6.93  )-----------( 104      ( 10 Edy 2020 01:28 )             22.1     06-10    139  |  103  |  3<L>  ----------------------------<  119<H>  4.0   |  28  |  0.61    Ca    7.4<L>      10 Edy 2020 01:28    TPro  8.0  /  Alb  1.9<L>  /  TBili  1.6<H>  /  DBili  x   /  AST  75<H>  /  ALT  19  /  AlkPhos  193<H>  06-10    CARDIAC MARKERS ( 10 Edy 2020 01:28 )  <0.015 ng/mL / x     / x     / x     / x            PT/INR - ( 10 Edy 2020 01:28 )   PT: 26.2 sec;   INR: 2.26 ratio         PTT - ( 10 Edy 2020 01:28 )  PTT:47.0 sec  BNP: Serum Pro-Brain Natriuretic Peptide: 18 pg/mL (06-10 @ 01:28)    MEDICATIONS  (STANDING):  cyanocobalamin 1000 MICROGram(s) Oral daily  folic acid 1 milliGRAM(s) Oral daily  heparin   Injectable 5000 Unit(s) SubCutaneous every 8 hours  pantoprazole  Injectable 40 milliGRAM(s) IV Push daily  thiamine IVPB 500 milliGRAM(s) IV Intermittent every 8 hours    MEDICATIONS  (PRN):  LORazepam   Injectable 2 milliGRAM(s) IV Push every 6 hours PRN Symptom-triggered: each CIWA -Ar score 8 or GREATER  CXR reviewed  EKG Reviewed        Plan of care discussed with patient ;  all questions and concerns were addressed. Patient seen and examined ; case was discussed with the admitting resident  ROS: as in the HPI; all other ROS negative  SH and family history as above    Vital Signs Last 24 Hrs  T(C): 36.7 (10 Edy 2020 06:35), Max: 37.3 (10 Edy 2020 00:26)  T(F): 98.1 (10 Edy 2020 06:35), Max: 99.2 (10 Edy 2020 00:26)  HR: 100 (10 Edy 2020 06:35) (100 - 109)  BP: 128/78 (10 Edy 2020 06:35) (128/78 - 143/84)  BP(mean): --  RR: 17 (10 Edy 2020 06:35) (17 - 18)  SpO2: 100% (10 Edy 2020 06:35) (98% - 100%)    GEN: NAD  HEENT- normocephalic; mouth moist  CVS- S1S2+  LUNGS- clear to auscultation; no wheezing  ABD: Soft , nontender, nondistended, Bowel sounds are present  EXTREMITY: RLE no calf tenderness, no cyanosis, no edema LLE- pitting edema to thigh   NEURO: AAOx3; non focal neurologic exam; cranial nerves grossly intact  PSYCH: normal affect and behavior  BACK: no swelling or mass;   VASCULAR: ++ distal peripheral pulses  SKIN: warm and dry.       Labs Reviewed:                         7.3    6.93  )-----------( 104      ( 10 Edy 2020 01:28 )             22.1     06-10    139  |  103  |  3<L>  ----------------------------<  119<H>  4.0   |  28  |  0.61    Ca    7.4<L>      10 Edy 2020 01:28    TPro  8.0  /  Alb  1.9<L>  /  TBili  1.6<H>  /  DBili  x   /  AST  75<H>  /  ALT  19  /  AlkPhos  193<H>  06-10    CARDIAC MARKERS ( 10 Edy 2020 01:28 )  <0.015 ng/mL / x     / x     / x     / x            PT/INR - ( 10 Edy 2020 01:28 )   PT: 26.2 sec;   INR: 2.26 ratio         PTT - ( 10 Edy 2020 01:28 )  PTT:47.0 sec  BNP: Serum Pro-Brain Natriuretic Peptide: 18 pg/mL (06-10 @ 01:28)    MEDICATIONS  (STANDING):  cyanocobalamin 1000 MICROGram(s) Oral daily  folic acid 1 milliGRAM(s) Oral daily  heparin   Injectable 5000 Unit(s) SubCutaneous every 8 hours  pantoprazole  Injectable 40 milliGRAM(s) IV Push daily  thiamine IVPB 500 milliGRAM(s) IV Intermittent every 8 hours    MEDICATIONS  (PRN):  LORazepam   Injectable 2 milliGRAM(s) IV Push every 6 hours PRN Symptom-triggered: each CIWA -Ar score 8 or GREATER  CXR reviewed  EKG Reviewed    35 y/o M with cirrhosis, chronic alcohol abuse admitted with LLE edema concerning for DVT, also found to have anemia and thrombocytopenia.     1. Cirrhosis- appears compensated but needs US to eval       Plan of care discussed with patient ;  all questions and concerns were addressed. Patient seen and examined ; case was discussed with the admitting resident  ROS: as in the HPI; all other ROS negative  SH and family history as above    Vital Signs Last 24 Hrs  T(C): 36.7 (10 Edy 2020 06:35), Max: 37.3 (10 Edy 2020 00:26)  T(F): 98.1 (10 Edy 2020 06:35), Max: 99.2 (10 Edy 2020 00:26)  HR: 100 (10 Edy 2020 06:35) (100 - 109)  BP: 128/78 (10 Edy 2020 06:35) (128/78 - 143/84)  BP(mean): --  RR: 17 (10 Edy 2020 06:35) (17 - 18)  SpO2: 100% (10 Edy 2020 06:35) (98% - 100%)    GEN: NAD  HEENT- normocephalic; mouth moist  CVS- S1S2+  LUNGS- clear to auscultation; no wheezing  ABD: Soft , nontender, nondistended, Bowel sounds are present  EXTREMITY: RLE no calf tenderness, no cyanosis, no edema LLE- pitting edema to thigh   NEURO: AAOx3; non focal neurologic exam; cranial nerves grossly intact  PSYCH: normal affect and behavior  BACK: no swelling or mass;   VASCULAR: ++ distal peripheral pulses  SKIN: warm and dry.       Labs Reviewed:                         7.3    6.93  )-----------( 104      ( 10 Edy 2020 01:28 )             22.1     06-10    139  |  103  |  3<L>  ----------------------------<  119<H>  4.0   |  28  |  0.61    Ca    7.4<L>      10 Edy 2020 01:28    TPro  8.0  /  Alb  1.9<L>  /  TBili  1.6<H>  /  DBili  x   /  AST  75<H>  /  ALT  19  /  AlkPhos  193<H>  06-10    CARDIAC MARKERS ( 10 Edy 2020 01:28 )  <0.015 ng/mL / x     / x     / x     / x            PT/INR - ( 10 Edy 2020 01:28 )   PT: 26.2 sec;   INR: 2.26 ratio         PTT - ( 10 Edy 2020 01:28 )  PTT:47.0 sec  BNP: Serum Pro-Brain Natriuretic Peptide: 18 pg/mL (06-10 @ 01:28)    MEDICATIONS  (STANDING):  cyanocobalamin 1000 MICROGram(s) Oral daily  folic acid 1 milliGRAM(s) Oral daily  heparin   Injectable 5000 Unit(s) SubCutaneous every 8 hours  pantoprazole  Injectable 40 milliGRAM(s) IV Push daily  thiamine IVPB 500 milliGRAM(s) IV Intermittent every 8 hours    MEDICATIONS  (PRN):  LORazepam   Injectable 2 milliGRAM(s) IV Push every 6 hours PRN Symptom-triggered: each CIWA -Ar score 8 or GREATER  CXR reviewed  EKG Reviewed    33 y/o M with cirrhosis, chronic alcohol abuse admitted with LLE edema concerning for DVT, also found to have anemia and thrombocytopenia. Denies chest pain, dyspnea, N/V/D, bleeding. Patient is a limited historian, given different answers to same questions asked at different times.      1. Cirrhosis- appears compensated but needs US to eval ascites, MELD 18, check AIH markers, hepatitis panel, markers of infiltrative disease, although suspect from EtOH. Patient denies drinking but with positive EtOH level. Counseled on alcohol cessation. Will request GI consultation.   2. Alcohol abuse- thiamine IV, MVI, folate, monitor CIWA for withdrawal, SW for resources   3. Elevated hepatic enzymes   4. LE edema- r/o DVT  5. Elevated D dimer- DVT eval as above, no clear PE on CT chest although limited study for lobar through subsegmental branches but patient denies all symptoms.   6. Protein gap- check HIV   7. Elevated hepatic enzymes- high PT with high DF but presentation not consistent with alcoholic hepatitis. Appreciate GI eval as above   8. Anemia- denies overt bleeding, check anemia panel, suspect some BM suppression from EtOH   9. Thrombocytopenia- likely due to cirrhosis     Plan of care discussed with patient ;  all questions and concerns were addressed. Patient seen and examined ; case was discussed with the admitting resident  ROS: as in the HPI; all other ROS negative  SH and family history as above    Vital Signs Last 24 Hrs  T(C): 36.7 (10 Edy 2020 06:35), Max: 37.3 (10 Edy 2020 00:26)  T(F): 98.1 (10 Edy 2020 06:35), Max: 99.2 (10 Edy 2020 00:26)  HR: 100 (10 Edy 2020 06:35) (100 - 109)  BP: 128/78 (10 Edy 2020 06:35) (128/78 - 143/84)  BP(mean): --  RR: 17 (10 Edy 2020 06:35) (17 - 18)  SpO2: 100% (10 Edy 2020 06:35) (98% - 100%)    GEN: NAD  HEENT- normocephalic; mouth moist  CVS- S1S2+  LUNGS- clear to auscultation; no wheezing  ABD: Soft , nontender, nondistended, Bowel sounds are present  EXTREMITY: RLE no calf tenderness, no cyanosis, no edema LLE- pitting edema to thigh   NEURO: AAOx3; non focal neurologic exam; cranial nerves grossly intact  PSYCH: normal affect and behavior  BACK: no swelling or mass;   VASCULAR: ++ distal peripheral pulses  SKIN: warm and dry.       Labs Reviewed:                         7.3    6.93  )-----------( 104      ( 10 Edy 2020 01:28 )             22.1     06-10    139  |  103  |  3<L>  ----------------------------<  119<H>  4.0   |  28  |  0.61    Ca    7.4<L>      10 Edy 2020 01:28    TPro  8.0  /  Alb  1.9<L>  /  TBili  1.6<H>  /  DBili  x   /  AST  75<H>  /  ALT  19  /  AlkPhos  193<H>  06-10    CARDIAC MARKERS ( 10 Edy 2020 01:28 )  <0.015 ng/mL / x     / x     / x     / x            PT/INR - ( 10 Edy 2020 01:28 )   PT: 26.2 sec;   INR: 2.26 ratio         PTT - ( 10 Dey 2020 01:28 )  PTT:47.0 sec  BNP: Serum Pro-Brain Natriuretic Peptide: 18 pg/mL (06-10 @ 01:28)    MEDICATIONS  (STANDING):  cyanocobalamin 1000 MICROGram(s) Oral daily  folic acid 1 milliGRAM(s) Oral daily  heparin   Injectable 5000 Unit(s) SubCutaneous every 8 hours  pantoprazole  Injectable 40 milliGRAM(s) IV Push daily  thiamine IVPB 500 milliGRAM(s) IV Intermittent every 8 hours    MEDICATIONS  (PRN):  LORazepam   Injectable 2 milliGRAM(s) IV Push every 6 hours PRN Symptom-triggered: each CIWA -Ar score 8 or GREATER  CXR reviewed  EKG Reviewed    33 y/o M with cirrhosis, chronic alcohol abuse admitted with LLE edema concerning for DVT, also found to have anemia and thrombocytopenia. Denies chest pain, dyspnea, N/V/D, bleeding. Patient is a limited historian, given different answers to same questions asked at different times.      1. Cirrhosis- appears compensated but needs US to eval ascites, MELD 18, check AIH markers, hepatitis panel, markers of infiltrative disease, although suspect from EtOH. Patient denies drinking but with positive EtOH level. Counseled on alcohol cessation. Will request GI consultation.   2. Alcohol abuse- thiamine IV, MVI, folate, monitor CIWA for withdrawal, SW for resources   3. Elevated hepatic enzymes-high PT with high DF but presentation not consistent with alcoholic hepatitis. Appreciate GI eval as above  4. LE edema- r/o DVT  5. Elevated D dimer- DVT eval as above, no clear PE on CT chest although limited study for lobar through subsegmental branches but patient denies all symptoms.   6. Protein gap- check HIV   7. Anemia- denies overt bleeding, check anemia panel, suspect some BM suppression from EtOH   8. Thrombocytopenia- likely due to cirrhosis     Plan of care discussed with patient ;  all questions and concerns were addressed.

## 2020-06-10 NOTE — ED PROVIDER NOTE - CLINICAL SUMMARY MEDICAL DECISION MAKING FREE TEXT BOX
Pt admitted for H/H monitoring, GI/liver consult, Duplex LE, anemia work up.   MAR and Dr. Nieto endorsed. Pt agrees with admission. I had a detailed discussion with the patient and/or guardian regarding the historical points, exam findings, and any diagnostic results supporting the admit diagnosis.

## 2020-06-10 NOTE — H&P ADULT - PROBLEM SELECTOR PLAN 3
Pt noted to have anemia, with h/o bloody vomiting  NO acute episode at present and occult negative  F/u anemia panel  Holding blood transfusion due to concern for increasing risk of portal venous pressure

## 2020-06-10 NOTE — H&P ADULT - NSHPPHYSICALEXAM_GEN_ALL_CORE
PHYSICAL EXAM:  GENERAL: NAD, speaks in full sentences, no signs of respiratory distress  HEAD:  Atraumatic, Normocephalic  EYES: EOMI, PERRLA, conjunctiva and sclera clear  NECK: Supple, No JVD  CHEST/LUNG: Clear to auscultation bilaterally; No wheeze; No crackles; No accessory muscles used  HEART: Regular rate and rhythm; No murmurs;   ABDOMEN: full, mild tenderness on luq region, + bs,   EXTREMITIES:  b/l lower ext swelling, + 2 extending up to knees  PSYCH: AAOx3  NEUROLOGY: non-focal  SKIN: No rashes or lesions

## 2020-06-10 NOTE — CONSULT NOTE ADULT - PROBLEM SELECTOR RECOMMENDATION 9
Stool for occult blood negative  Monitor CBC  PPI daily  f/u Anemia panel Stool for occult blood negative  Monitor CBC  PPI daily  f/u Anemia panel  will likely need anemia BLACK Stool for occult blood negative  Monitor CBC  PPI daily  f/u Anemia panel  will likely need anemia BLACK (r/o Varices) Stool for occult blood negative  Monitor CBC  PPI daily  f/u Anemia panel  will need anemia BLACK (r/o Varices) plan for EGD tomorrow  NPO after MN  give Vit K 10mg now  can give another dose tomorrow  INR goal <1.0 Stool for occult blood negative  Monitor CBC  PPI daily  f/u Anemia panel  Send T&S  will need anemia BLACK (r/o Varices) plan for EGD tomorrow  NPO after MN  give Vit K 10mg now  can give another dose tomorrow  INR goal <1.0
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
## 2020-06-10 NOTE — H&P ADULT - HISTORY OF PRESENT ILLNESS
Pt is a 34 yr old male with h/o liver disease presented to ED for evaluation for leg swelling. Pt states that his leg swelling started 1 week ago, denies any similar episodes in past. Pt denies any prolonged sitting, recent travel. Pt also complain of face, arm and epigastric pain. Pt unable to explain if he underwent tissue biopsy for diagnosis of his liver disease. Pt states he uses some syrup to clean up his stomach, does report some episodes of bloody vomiting with post recent 2 weeks ago. Pt states he doesn't follow a liver doctor as out-patient. Pt denies any fever, headache, weakness, chest pain, shortness of breath, palpitations, nausea or diarrhea. In ED, Pt vitals were  Temp 99    /84  RR 18/98

## 2020-06-10 NOTE — H&P ADULT - PROBLEM SELECTOR PLAN 4
Pt states to have 8-10 beers about 4 times a week  Last drink on friday as per patient    Will start on ciwa protocol with prn coverage  Social work consult Pt states to have 8-10 beers about 4 times a week  Last drink on friday as per patient    Will start on ciwa protocol with prn coverage  Social work consult  thiamine, folate and vitamin b 12 supplementation

## 2020-06-11 NOTE — DISCHARGE NOTE PROVIDER - NSDCPNSUBOBJ_GEN_ALL_CORE
Patient is a 34y old  Male who presents with a chief complaint of Leg swelling (11 Jun 2020 17:00)        Patient was seen and examined at bedside      used [id number]    Denies any pain in abd, no nausea, vomiting         INTERVAL HPI/OVERNIGHT EVENTS:    T(C): 36.6 (06-12-20 @ 08:26), Max: 37.2 (06-11-20 @ 15:36)    HR: 71 (06-12-20 @ 08:26) (71 - 87)    BP: 128/86 (06-12-20 @ 08:26) (121/80 - 141/86)    RR: 17 (06-12-20 @ 08:26) (17 - 25)    SpO2: 100% (06-12-20 @ 08:26) (99% - 100%)    Wt(kg): --    I&O's Summary        11 Jun 2020 07:01  -  12 Jun 2020 07:00    --------------------------------------------------------    IN: 390 mL / OUT: 0 mL / NET: 390 mL                REVIEW OF SYSTEMS: denies fever, chills, SOB, palpitations, chest pain, abdominal pain, nausea, vomiting, diarrhea, constipation, dizziness        MEDICATIONS  (STANDING):    cyanocobalamin 1000 MICROGram(s) Oral daily    folic acid 1 milliGRAM(s) Oral daily    furosemide    Tablet 20 milliGRAM(s) Oral daily    lactulose Syrup 15 Gram(s) Oral two times a day    nadolol 20 milliGRAM(s) Oral at bedtime    pantoprazole    Tablet 40 milliGRAM(s) Oral before breakfast    spironolactone 50 milliGRAM(s) Oral daily    thiamine IVPB 500 milliGRAM(s) IV Intermittent every 8 hours        MEDICATIONS  (PRN):            PHYSICAL EXAM:    GENERAL: NAD    NERVOUS SYSTEM:  Alert & Oriented X3, Good concentration; Motor Strength 5/5 B/L upper and lower extremities    CHEST/LUNG: Clear to auscultation bilaterally; No rales, rhonchi, wheezing, or rubs    HEART: Regular rate and rhythm; No murmurs, rubs, or gallops    ABDOMEN: Soft, Nontender, Nondistended; Bowel sounds present    EXTREMITIES:  2+ Peripheral Pulses, No clubbing, cyanosis, or edema    SKIN: No rashes or lesions        LABS:                            8.0      5.49  )-----------( 78       ( 12 Jun 2020 06:08 )               24.3             136  |  101  |  5<L>    ----------------------------<  82    3.3<L>   |  28  |  0.56        Ca    8.0<L>      12 Jun 2020 06:08        TPro  7.4  /  Alb  1.7<L>  /  TBili  3.9<H>  /  DBili  x   /  AST  55<H>  /  ALT  14  /  AlkPhos  149<H>  06-12        PT/INR - ( 12 Jun 2020 06:08 )   PT: 30.5 sec;   INR: 2.62 ratio           Assessment and plan:    1. Normocytic anemia    2. Decompensated Cirrhosis of liver     3. Chronic alcohol abuse     4. Esophageal varices     5. Portal hypertensive gastropathy         Plan:    Hb stable    EGD showed small esophageal varices distally, not banded as FFP did not arrive due to lab error. Had portal hypertensive gastropathy. Biopsy not taken due to INR. He was started on Nadolol 20mg qhs. Needs transplant referral but actively drinking.     Cont with starting Lasix, spironolactone     FOBT neg     Lactulose to ensure 2-3 bowel movement/day     Thiamine and folic acid    Bleeding precaution     Patient reports left ankle swollen since a fall few years ago    LE doppler neg for DVT    Patient was counselled extensively to stop drinking alcohol, to follow up with Dr Gerber as outpatient.

## 2020-06-11 NOTE — PROGRESS NOTE ADULT - PROBLEM SELECTOR PLAN 1
Pt admitted for liver cirrhosis likely due to ETOH use  Noted to have transaminitis with elevated INR, elevated bilirubin and low albumin on admission   US significant for ascites-currently on diuretics  Unsure if pt had liver bx  PO Lasix  PO Aldactone 50 mg  Daily weights  Monitor PT/INR Pt admitted for liver cirrhosis likely due to ETOH use  Noted to have transaminitis with elevated INR, elevated bilirubin and low albumin on admission  US significant for ascites-currently on diuretics  Unsure if pt had liver bx  PO Lasix  PO Aldactone 50 mg  Daily weights  Monitor PT/INR Pt admitted for liver cirrhosis likely due to ETOH use  Noted to have transaminitis with elevated INR, elevated bilirubin and low albumin on admission  US significant for ascites-currently on diuretics  Unsure if pt had liver bx  PO Lasix  PO Aldactone 50 mg  Daily weights  Monitor CBC, CMP, PT/INR

## 2020-06-11 NOTE — PROGRESS NOTE ADULT - ASSESSMENT
1. Normocytic anemia  2. Decompensated Cirrhosis of liver   3. Chronic alcohol abuse     Plan:  Will transfuse one unit PRBC Hb 6.9  2 large bore IV canula  Monitor Hb q12hrs   EGD today  Agree with starting Lasix, spironolactone   FOBT neg   Lactulose to ensure 2-3 bowel movement/day   Thiamine and folic acid  CIWA protocol Ativan as needed  Heparin SQ for DVT ppx   Bleeding precaution 1. Normocytic anemia  2. Decompensated Cirrhosis of liver   3. Chronic alcohol abuse     Plan:  Will transfuse one unit PRBC Hb 6.9  2 large bore IV canula  Monitor Hb q12hrs   EGD today  Agree with starting Lasix, spironolactone   FOBT neg   Lactulose to ensure 2-3 bowel movement/day   Thiamine and folic acid  CIWA protocol Ativan as needed  Heparin SQ for DVT ppx   Bleeding precaution   Patient reports left ankle swollen since a fall few years ago  LE doppler neg for DVT

## 2020-06-11 NOTE — CHART NOTE - NSCHARTNOTEFT_GEN_A_CORE
Paged by RN that patient is back from PACU- reinstated previous orders, holding DVT chemo ppx as pt's hgb was low in the am. will order SCD. Primary team to follow up on resuming chemo ppx. Resuming clear liquid diet as per GI Paged by RN that patient is back from PACU- reinstated previous orders, holding DVT chemo ppx as pt's hgb was low in the am s/p 1 unit prbc. EGD report shows banding and cautery and clipping was performed. Writer will order SCD. Primary team to follow up on resuming chemo ppx. Resuming clear liquid diet as GI is ok with resuming diet

## 2020-06-11 NOTE — PROGRESS NOTE ADULT - SUBJECTIVE AND OBJECTIVE BOX
Pt is a 34 yr old male with h/o liver disease presented to ED for evaluation for leg swelling. Pt states that his leg swelling started 1 week ago. Pt denies any prolonged sitting, recent travel. Pt also complain of face, arm and epigastric pain. Pt unable to explain if he underwent tissue biopsy for diagnosis of his liver disease. He reports using a syrup to clean up his stomach. He also reports history of bloody vomiting most recently 2 weeks ago. Pt states he doesn't follow a liver doctor as out patient. Pt is for possible EGD this AM, but found to have low H/H on morning labs. 1 unit PRBC to be transfused today prior to procedure. Yesterday he received PO Vitamin K 10 mg for INR 2.26. INR elevated again this AM, and another dose of Vitamin K was given.     INTERVAL HPI/OVERNIGHT EVENTS: Seen at bedside, no new complaints     MEDICATIONS  (STANDING):  cyanocobalamin 1000 MICROGram(s) Oral daily  folic acid 1 milliGRAM(s) Oral daily  furosemide    Tablet 20 milliGRAM(s) Oral daily  heparin   Injectable 5000 Unit(s) SubCutaneous every 8 hours  lactulose Syrup 15 Gram(s) Oral two times a day  pantoprazole  Injectable 40 milliGRAM(s) IV Push daily  spironolactone 50 milliGRAM(s) Oral daily  thiamine IVPB 500 milliGRAM(s) IV Intermittent every 8 hours    MEDICATIONS  (PRN):  acetaminophen   Tablet .. 650 milliGRAM(s) Oral every 6 hours PRN Mild Pain (1 - 3)  LORazepam   Injectable 2 milliGRAM(s) IV Push every 6 hours PRN Symptom-triggered: each CIWA -Ar score 8 or GREATER    __________________________________________________  REVIEW OF SYSTEMS:    CONSTITUTIONAL: No fever,   EYES: no acute visual disturbances  NECK: No pain or stiffness  RESPIRATORY: No cough; No shortness of breath  CARDIOVASCULAR: No chest pain, no palpitations  GASTROINTESTINAL: No pain. No nausea or vomiting; No diarrhea   NEUROLOGICAL: No headache or numbness, no tremors  MUSCULOSKELETAL: No joint pain, no muscle pain  GENITOURINARY: no dysuria, no frequency, no hesitancy  PSYCHIATRY: no depression , no anxiety  ALL OTHER  ROS negative        Vital Signs Last 24 Hrs  T(C): 36.9 (2020 00:18), Max: 36.9 (2020 00:18)  T(F): 98.5 (2020 00:18), Max: 98.5 (2020 00:18)  HR: 94 (2020 00:18) (94 - 101)  BP: 137/73 (2020 00:18) (111/71 - 137/73)  BP(mean): --  RR: 15 (2020 00:18) (15 - 16)  SpO2: 100% (2020 00:18) (99% - 100%)    ________________________________________________  PHYSICAL EXAM:  GENERAL: NAD  CHEST/LUNG: Clear to auscultation bilaterally with good air entry   HEART: S1 S2  regular; no murmurs, gallops or rubs  ABDOMEN: Soft, Nontender, Nondistended; Bowel sounds present  EXTREMITIES: no cyanosis; no edema; no calf tenderness  SKIN: warm and dry; no rash  NERVOUS SYSTEM:  Awake and alert; Oriented  to place, person and time     _________________________________________________  LABS:                        6.9    5.46  )-----------( 95       ( 2020 06:50 )             21.4     06-11    139  |  103  |  2<L>  ----------------------------<  90  3.5   |  25  |  0.46<L>    Ca    8.3<L>      2020 06:50    TPro  7.6  /  Alb  1.7<L>  /  TBili  2.9<H>  /  DBili  1.4<H>  /  AST  59<H>  /  ALT  18  /  AlkPhos  163<H>  06-11    PT/INR - ( 2020 06:50 )   PT: 29.6 sec;   INR: 2.54 ratio         PTT - ( 2020 06:50 )  PTT:71.9 sec  Urinalysis Basic - ( 10 Edy 2020 08:51 )    Color: Yellow / Appearance: Clear / S.010 / pH: x  Gluc: x / Ketone: Negative  / Bili: Negative / Urobili: Negative   Blood: x / Protein: Negative / Nitrite: Negative   Leuk Esterase: Negative / RBC: x / WBC x   Sq Epi: x / Non Sq Epi: x / Bacteria: x      CAPILLARY BLOOD GLUCOSE    RADIOLOGY & ADDITIONAL TESTS:    INTERPRETATION:  CLINICAL INFORMATION: Elevated d-dimer    COMPARISON: None.    PROCEDURE:   CT Angiography of the Chest.    IMPRESSION:   No pulmonary embolus in the pulmonary trunk or main pulmonary arteries. Lobar through subsegmental branches are not well evaluated due to suboptimal opacification.  Elevated right diaphragm with adjacent compressive atelectasis.  Cirrhosis and upper abdominal ascites.          EXAM:  XR CHEST PORTABLE IMMED 1V                          PROCEDURE DATE:  06/10/2020      INTERPRETATION:  CLINICAL INFORMATION: Shortness of Breath.    IMPRESSION:  Mild bibasilar linear atelectasis. No consolidation or pleural effusion. Low lung volumes limits detailed elevation.  Diffuse haziness throughout the limited visualized upper abdomen, concerning for ascites.        EXAM:  US ABDOMEN COMPLETE                          PROCEDURE DATE:  06/10/2020  FINDINGS:  Liver: Borderline liver size. Liver is 16.0cm in sagittal diameter. Irregular hepatic borders concerning for cirrhosis. No hepatic mass.  Bile ducts: Normal caliber. Common bile duct measures 6mm.   Gallbladder: Increased intestinal gas limits visualization. Tiny calcific gallstones are present. No significant gallbladder wall thickening.     Pancreas: Visualized portions are within normal limits.  Spleen: 12.3cm. Spleen normal-sized. There is hypoechoic splenic cyst vs. accessory spleen - measuring 1.9 x 1.8 x 1.6cm.  Right kidney: 11.9cm. No hydronephrosis.   Left kidney: 11.5cm.  No hydronephrosis. Lower pole cyst is 2.9cm.  Ascites: Significant ascites. RUQ is 9.7cm; RLQ = 7.7cm;LLQ = 4.7cm; LUQ = 6.9cm.  Aorta and IVC: Visualized portions are within normal limits.    IMPRESSION:   1. Cirrhotic liver.  2. Tiny calcific gallstones.  3. Question of small  accessory spleen.  4. Left kidney cyst.  5. Significant ascites.        EXAM:  US DPLX LWR EXT VEINS COMPL BI                          PROCEDURE DATE:  06/10/2020      INTERPRETATION:  CLINICAL INFORMATION: Leg swelling    FINDINGS:  There is normal compressibility of the bilateral common femoral, femoral and popliteal veins.   Doppler examination shows normal spontaneous and phasic flow.    No calf vein thrombosis is detected.    IMPRESSION:   No evidence of deep venous thrombosis in either lower extremity.              Imaging  Reviewed:  YES    Consultant(s) Notes Reviewed:   YES      Plan of care was discussed with patient and /or primary care giver; all questions and concerns were addressed Pt is a 34 yr old male with h/o liver disease presented to ED for evaluation for leg swelling. Pt states that his leg swelling started 1 week ago. Pt denies any prolonged sitting, recent travel. Pt also complain of face, arm and epigastric pain. Pt unable to explain if he underwent tissue biopsy for diagnosis of his liver disease. He reports using a syrup to clean up his stomach. He also reports history of bloody vomiting most recently 2 weeks ago. Pt states he doesn't follow a liver doctor as out patient. Pt is for possible EGD this AM, but found to have low H/H on morning labs. 1 unit PRBC to be transfused today prior to procedure. Yesterday he received PO Vitamin K 10 mg for INR 2.26. INR elevated again this AM, and another dose of Vitamin K was given.     INTERVAL HPI/OVERNIGHT EVENTS: Seen at bedside, no new complaints     MEDICATIONS  (STANDING):  cyanocobalamin 1000 MICROGram(s) Oral daily  folic acid 1 milliGRAM(s) Oral daily  furosemide    Tablet 20 milliGRAM(s) Oral daily  heparin   Injectable 5000 Unit(s) SubCutaneous every 8 hours  lactulose Syrup 15 Gram(s) Oral two times a day  pantoprazole  Injectable 40 milliGRAM(s) IV Push daily  spironolactone 50 milliGRAM(s) Oral daily  thiamine IVPB 500 milliGRAM(s) IV Intermittent every 8 hours    MEDICATIONS  (PRN):  acetaminophen   Tablet .. 650 milliGRAM(s) Oral every 6 hours PRN Mild Pain (1 - 3)  LORazepam   Injectable 2 milliGRAM(s) IV Push every 6 hours PRN Symptom-triggered: each CIWA -Ar score 8 or GREATER    __________________________________________________  REVIEW OF SYSTEMS:    CONSTITUTIONAL: No fever,   EYES: no acute visual disturbances  NECK: No pain or stiffness  RESPIRATORY: No cough; No shortness of breath  CARDIOVASCULAR: No chest pain, no palpitations  GASTROINTESTINAL: No pain. No nausea or vomiting; No diarrhea   NEUROLOGICAL: No headache or numbness, no tremors  MUSCULOSKELETAL: No joint pain, no muscle pain  GENITOURINARY: no dysuria, no frequency, no hesitancy  PSYCHIATRY: no depression , no anxiety  ALL OTHER  ROS negative        Vital Signs Last 24 Hrs  T(C): 36.9 (2020 00:18), Max: 36.9 (2020 00:18)  T(F): 98.5 (2020 00:18), Max: 98.5 (2020 00:18)  HR: 94 (2020 00:18) (94 - 101)  BP: 137/73 (2020 00:18) (111/71 - 137/73)  BP(mean): --  RR: 15 (2020 00:18) (15 - 16)  SpO2: 100% (2020 00:18) (99% - 100%)    ________________________________________________  PHYSICAL EXAM: Lying in bed, calm and cooperative   GENERAL: NAD  CHEST/LUNG: Clear to auscultation bilaterally with good air entry   HEART: S1 S2  regular; no murmurs, gallops or rubs  ABDOMEN: Soft, Nontender, Nondistended; Bowel sounds present  EXTREMITIES: BLLE with swelling   SKIN: warm and dry; no rash  NERVOUS SYSTEM:  Awake and alert; Oriented  to place, person and time     _________________________________________________  LABS:                        6.9    5.46  )-----------( 95       ( 2020 06:50 )             21.4     06-11    139  |  103  |  2<L>  ----------------------------<  90  3.5   |  25  |  0.46<L>    Ca    8.3<L>      2020 06:50    TPro  7.6  /  Alb  1.7<L>  /  TBili  2.9<H>  /  DBili  1.4<H>  /  AST  59<H>  /  ALT  18  /  AlkPhos  163<H>  06-11    PT/INR - ( 2020 06:50 )   PT: 29.6 sec;   INR: 2.54 ratio         PTT - ( 2020 06:50 )  PTT:71.9 sec  Urinalysis Basic - ( 10 Edy 2020 08:51 )    Color: Yellow / Appearance: Clear / S.010 / pH: x  Gluc: x / Ketone: Negative  / Bili: Negative / Urobili: Negative   Blood: x / Protein: Negative / Nitrite: Negative   Leuk Esterase: Negative / RBC: x / WBC x   Sq Epi: x / Non Sq Epi: x / Bacteria: x      CAPILLARY BLOOD GLUCOSE    RADIOLOGY & ADDITIONAL TESTS:    INTERPRETATION:  CLINICAL INFORMATION: Elevated d-dimer    COMPARISON: None.    PROCEDURE:   CT Angiography of the Chest.    IMPRESSION:   No pulmonary embolus in the pulmonary trunk or main pulmonary arteries. Lobar through subsegmental branches are not well evaluated due to suboptimal opacification.  Elevated right diaphragm with adjacent compressive atelectasis.  Cirrhosis and upper abdominal ascites.          EXAM:  XR CHEST PORTABLE IMMED 1V                          PROCEDURE DATE:  06/10/2020      INTERPRETATION:  CLINICAL INFORMATION: Shortness of Breath.    IMPRESSION:  Mild bibasilar linear atelectasis. No consolidation or pleural effusion. Low lung volumes limits detailed elevation.  Diffuse haziness throughout the limited visualized upper abdomen, concerning for ascites.        EXAM:  US ABDOMEN COMPLETE                          PROCEDURE DATE:  06/10/2020  FINDINGS:  Liver: Borderline liver size. Liver is 16.0cm in sagittal diameter. Irregular hepatic borders concerning for cirrhosis. No hepatic mass.  Bile ducts: Normal caliber. Common bile duct measures 6mm.   Gallbladder: Increased intestinal gas limits visualization. Tiny calcific gallstones are present. No significant gallbladder wall thickening.     Pancreas: Visualized portions are within normal limits.  Spleen: 12.3cm. Spleen normal-sized. There is hypoechoic splenic cyst vs. accessory spleen - measuring 1.9 x 1.8 x 1.6cm.  Right kidney: 11.9cm. No hydronephrosis.   Left kidney: 11.5cm.  No hydronephrosis. Lower pole cyst is 2.9cm.  Ascites: Significant ascites. RUQ is 9.7cm; RLQ = 7.7cm;LLQ = 4.7cm; LUQ = 6.9cm.  Aorta and IVC: Visualized portions are within normal limits.    IMPRESSION:   1. Cirrhotic liver.  2. Tiny calcific gallstones.  3. Question of small  accessory spleen.  4. Left kidney cyst.  5. Significant ascites.        EXAM:  US DPLX LWR EXT VEINS COMPL BI                          PROCEDURE DATE:  06/10/2020      INTERPRETATION:  CLINICAL INFORMATION: Leg swelling    FINDINGS:  There is normal compressibility of the bilateral common femoral, femoral and popliteal veins.   Doppler examination shows normal spontaneous and phasic flow.    No calf vein thrombosis is detected.    IMPRESSION:   No evidence of deep venous thrombosis in either lower extremity.              Imaging  Reviewed:  YES    Consultant(s) Notes Reviewed:   YES      Plan of care was discussed with patient and /or primary care giver; all questions and concerns were addressed Pt is a 34 yr old male with h/o liver disease presented to ED for evaluation for leg swelling. Pt states that his leg swelling started 1 week ago. Pt denies any prolonged sitting, recent travel. Pt also complain of face, arm and epigastric pain. Pt unable to explain if he underwent tissue biopsy for diagnosis of his liver disease. He reports using a syrup to clean up his stomach. He also reports history of bloody vomiting most recently 2 weeks ago. Pt states he doesn't follow a liver doctor as out patient. Pt is for possible EGD this AM, but found to have low H/H on morning labs. 1 unit PRBC to be transfused today prior to procedure. Yesterday he received PO Vitamin K 10 mg for INR 2.26. INR elevated again this AM, and another dose of Vitamin K was given.     INTERVAL HPI/OVERNIGHT EVENTS: Seen at bedside, no new complaints     MEDICATIONS  (STANDING):  cyanocobalamin 1000 MICROGram(s) Oral daily  folic acid 1 milliGRAM(s) Oral daily  furosemide    Tablet 20 milliGRAM(s) Oral daily  heparin   Injectable 5000 Unit(s) SubCutaneous every 8 hours  lactulose Syrup 15 Gram(s) Oral two times a day  pantoprazole  Injectable 40 milliGRAM(s) IV Push daily  spironolactone 50 milliGRAM(s) Oral daily  thiamine IVPB 500 milliGRAM(s) IV Intermittent every 8 hours    MEDICATIONS  (PRN):  acetaminophen   Tablet .. 650 milliGRAM(s) Oral every 6 hours PRN Mild Pain (1 - 3)  LORazepam   Injectable 2 milliGRAM(s) IV Push every 6 hours PRN Symptom-triggered: each CIWA -Ar score 8 or GREATER    __________________________________________________  REVIEW OF SYSTEMS:    CONSTITUTIONAL: No fever,   EYES: no acute visual disturbances  NECK: No pain or stiffness  RESPIRATORY: No cough; No shortness of breath  CARDIOVASCULAR: No chest pain, no palpitations  GASTROINTESTINAL: No pain. No nausea or vomiting; No diarrhea   NEUROLOGICAL: No headache or numbness, no tremors  MUSCULOSKELETAL: No joint pain, no muscle pain  GENITOURINARY: no dysuria, no frequency, no hesitancy  PSYCHIATRY: no depression , no anxiety  ALL OTHER  ROS negative        Vital Signs Last 24 Hrs  T(C): 36.9 (2020 00:18), Max: 36.9 (2020 00:18)  T(F): 98.5 (2020 00:18), Max: 98.5 (2020 00:18)  HR: 94 (2020 00:18) (94 - 101)  BP: 137/73 (2020 00:18) (111/71 - 137/73)  BP(mean): --  RR: 15 (2020 00:18) (15 - 16)  SpO2: 100% (2020 00:18) (99% - 100%)    ________________________________________________  PHYSICAL EXAM: Lying in bed, calm and cooperative   GENERAL: NAD  CHEST/LUNG: Clear to auscultation bilaterally with good air entry   HEART: S1 S2  regular; no murmurs, gallops or rubs  ABDOMEN: Soft, Nontender, Nondistended; Bowel sounds present  EXTREMITIES: BLLE with swelling   SKIN: warm and dry; no rash  NERVOUS SYSTEM:  Awake and alert; Oriented  to place, person and time     _________________________________________________  LABS:                        6.9    5.46  )-----------( 95       ( 2020 06:50 )             21.4     06-11    139  |  103  |  2<L>  ----------------------------<  90  3.5   |  25  |  0.46<L>    Ca    8.3<L>      2020 06:50    TPro  7.6  /  Alb  1.7<L>  /  TBili  2.9<H>  /  DBili  1.4<H>  /  AST  59<H>  /  ALT  18  /  AlkPhos  163<H>  06-11    PT/INR - ( 2020 06:50 )   PT: 29.6 sec;   INR: 2.54 ratio         PTT - ( 2020 06:50 )  PTT:71.9 sec  Urinalysis Basic - ( 10 Edy 2020 08:51 )    Color: Yellow / Appearance: Clear / S.010 / pH: x  Gluc: x / Ketone: Negative  / Bili: Negative / Urobili: Negative   Blood: x / Protein: Negative / Nitrite: Negative   Leuk Esterase: Negative / RBC: x / WBC x   Sq Epi: x / Non Sq Epi: x / Bacteria: x      CAPILLARY BLOOD GLUCOSE    RADIOLOGY & ADDITIONAL TESTS:    INTERPRETATION:  CLINICAL INFORMATION: Elevated d-dimer    COMPARISON: None.    PROCEDURE:   CT Angiography of the Chest.    IMPRESSION:   No pulmonary embolus in the pulmonary trunk or main pulmonary arteries. Lobar through subsegmental branches are not well evaluated due to suboptimal opacification.  Elevated right diaphragm with adjacent compressive atelectasis.  Cirrhosis and upper abdominal ascites.        EXAM:  XR CHEST PORTABLE IMMED 1V                          PROCEDURE DATE:  06/10/2020      INTERPRETATION:  CLINICAL INFORMATION: Shortness of Breath.    IMPRESSION:  Mild bibasilar linear atelectasis. No consolidation or pleural effusion. Low lung volumes limits detailed elevation.  Diffuse haziness throughout the limited visualized upper abdomen, concerning for ascites.        EXAM:  US ABDOMEN COMPLETE                          PROCEDURE DATE:  06/10/2020  FINDINGS:  Liver: Borderline liver size. Liver is 16.0cm in sagittal diameter. Irregular hepatic borders concerning for cirrhosis. No hepatic mass.  Bile ducts: Normal caliber. Common bile duct measures 6mm.   Gallbladder: Increased intestinal gas limits visualization. Tiny calcific gallstones are present. No significant gallbladder wall thickening.     Pancreas: Visualized portions are within normal limits.  Spleen: 12.3cm. Spleen normal-sized. There is hypoechoic splenic cyst vs. accessory spleen - measuring 1.9 x 1.8 x 1.6cm.  Right kidney: 11.9cm. No hydronephrosis.   Left kidney: 11.5cm.  No hydronephrosis. Lower pole cyst is 2.9cm.  Ascites: Significant ascites. RUQ is 9.7cm; RLQ = 7.7cm;LLQ = 4.7cm; LUQ = 6.9cm.  Aorta and IVC: Visualized portions are within normal limits.    IMPRESSION:   1. Cirrhotic liver.  2. Tiny calcific gallstones.  3. Question of small  accessory spleen.  4. Left kidney cyst.  5. Significant ascites.        EXAM:  US DPLX LWR EXT VEINS COMPL BI                          PROCEDURE DATE:  06/10/2020      INTERPRETATION:  CLINICAL INFORMATION: Leg swelling    FINDINGS:  There is normal compressibility of the bilateral common femoral, femoral and popliteal veins.   Doppler examination shows normal spontaneous and phasic flow.    No calf vein thrombosis is detected.    IMPRESSION:   No evidence of deep venous thrombosis in either lower extremity.      Imaging  Reviewed:  YES    Consultant(s) Notes Reviewed:   YES      Plan of care was discussed with patient and /or primary care giver; all questions and concerns were addressed

## 2020-06-11 NOTE — DISCHARGE NOTE PROVIDER - CARE PROVIDER_API CALL
Sophy Krishnan)  [facility]  [address] [place] [address]  Phone: [phone]  Fax: [phone]  Follow Up Time: 1 week Sophy Krishnan)  [facility]  [address] [place] [address]  Phone: [phone]  Fax: [phone]  Follow Up Time: 1 week    Sridhar Gerber)  [facility]  [address] [place] [address]  Phone: [phone]  Fax: [phone]  Follow Up Time: 1 week Sridhar Gerber)  [facility]  [address] [address] [place] [address]  Phone: [phone]  Fax: [phone]  Follow Up Time: 1 week

## 2020-06-11 NOTE — DISCHARGE NOTE PROVIDER - PROVIDER TOKENS
PROVIDER:[TOKEN:[[id number]],FOLLOWUP:[1 week]] PROVIDER:[TOKEN:[[id number]],FOLLOWUP:[1 week]],PROVIDER:[TOKEN:[[id number]],FOLLOWUP:[1 week]] PROVIDER:[TOKEN:[[id number]],FOLLOWUP:[1 week]]

## 2020-06-11 NOTE — DISCHARGE NOTE PROVIDER - NSDCCPCAREPLAN_GEN_ALL_CORE_FT
PRINCIPAL DISCHARGE DIAGNOSIS  Diagnosis: Liver cirrhosis  Assessment and Plan of Treatment: Cirrhosis is scarring of the liver which can cause heavy bleeding, swelling, & breathing problems  Call your doctor with belly & leg swelling, shortness of breath, bruising or bleeding easily, feeling full, tired, trouble getting enough or too much sleep, yellowing of skin or whites of eye, sudden confusion, or coma  Causes may include heavy alcohol use, hepatitis B or C, or fatty liver.    You can help yourself by avoiding alcohol, speak with your doctor before starting on any new medication, herbs, vitamins, or supplements which may cause more damage to the liver  Treatment includes treating the cause, reducing blood pressure, low salt diet, diuretics, belly fluid drainage, treating infections, getting vaccinations to prevent common infections, &/or lactulose to improve confusion  It is important to get help if you have an alcohol problem, use condoms when having sex, and nor sharing drug needles if this applies to you        SECONDARY DISCHARGE DIAGNOSES  Diagnosis: Anemia  Assessment and Plan of Treatment: Your blood count was critically low, so you recieved a blood transfusion. You blood counts were monitored. It is now at an acceptable range. Please contact your PCP if you experience these symptoms: bleeding, palpitations, fatigue, pale skin, cold skin, dizziness. PRINCIPAL DISCHARGE DIAGNOSIS  Diagnosis: Liver cirrhosis  Assessment and Plan of Treatment: Cirrhosis is scarring of the liver which can cause heavy bleeding, swelling, & breathing problems.  Call your doctor with belly & leg swelling, shortness of breath, bruising or bleeding easily, feeling full, tired, trouble getting enough or too much sleep, yellowing of skin or whites of eye, sudden confusion, or coma  Causes may include heavy alcohol use, hepatitis B or C, or fatty liver.    You can help yourself by avoiding alcohol, speak with your doctor before starting on any new medication, herbs, vitamins, or supplements which may cause more damage to the liver.  Treatment includes treating the cause, reducing blood pressure, low salt diet, diuretics, belly fluid drainage, treating infections, getting vaccinations to prevent common infections, &/or lactulose to improve bowel movement. You should have at least 2 bowel movements a day.   It is important that you stop drinking alcohol. You should follow up with gastroenterologist within 1 week of your discharge. You need referral for transplant.      SECONDARY DISCHARGE DIAGNOSES  Diagnosis: Esophageal varices in cirrhosis  Assessment and Plan of Treatment: You had endoscopy which showed you have dilated blood vessels in your esophagus. Please continue to take Nadolol to decrease pressure in the blood vessels. Please come to emergency ASAP if you notice any bloody vomiting, blood in your stool or black stool.    Diagnosis: Alcohol use  Assessment and Plan of Treatment: Please stop alcohol intake.    Diagnosis: Anemia  Assessment and Plan of Treatment: Your blood count was critically low, so you recieved a blood transfusion. You blood counts were monitored. It is now at an acceptable range. Please contact your PCP if you experience these symptoms: bleeding, palpitations, fatigue, pale skin, cold skin, dizziness. You have to follow up with your primary care provider to repeat blood work.

## 2020-06-11 NOTE — PROGRESS NOTE ADULT - PROBLEM SELECTOR PLAN 2
H/H is critical today-->6.9/21.4- 1 unit PRBC to be transfused today  For possible EGD after blood transfusion  INR elevated-->2.54, Vitamin K 10 mg now   INR goal <1.0  Stool for occult blood, negative  Monitor CBC  Continue PPI H/H is critical today-->6.9/21.4- 1 unit PRBC to be transfused today  NPO status For possible EGD after blood transfusion  INR elevated-->2.54, Vitamin K 10 mg now   INR goal <1.0  Stool for occult blood, negative  Monitor CBC  Continue PPI H/H is critical today-->6.9/21.4- 1 unit PRBC to be transfused today. Consent obtained via  ID # [id number]   NPO status For possible EGD after blood transfusion  INR elevated-->2.54, Vitamin K 10 mg now   INR goal <1.0  Stool for occult blood, negative  Monitor CBC  Continue PPI H/H is critical today-->6.9/21.4- 1 unit PRBC to be transfused today. Consent obtained via  ID # [id number]   NPO status for possible EGD after blood transfusion  INR elevated-->2.54, Vitamin K 10 mg now   INR goal <1.0  Stool for occult blood, negative  Monitor CBC  Continue PPI

## 2020-06-11 NOTE — PROGRESS NOTE ADULT - SUBJECTIVE AND OBJECTIVE BOX
Patient is a 34y old  Male who presents with a chief complaint of Leg swelling (2020 08:50)    Patient was seen and examined at bedside   used [id number]  Reports diffuse abd pain and nausea, denies any vomiting, blood in stool   Reports he knows about known liver disease due to heavy alcohol intake, denies any family h/o liver disease, denies any IV drug use     INTERVAL HPI/OVERNIGHT EVENTS:  T(C): 36.9 (20 @ 12:13), Max: 36.9 (20 @ 00:18)  HR: 90 (20 @ 12:13) (88 - 101)  BP: 134/77 (20 @ 12:13) (111/71 - 137/73)  RR: 18 (20 @ 12:13) (15 - 18)  SpO2: 98% (20 @ 12:13) (98% - 100%)  Wt(kg): --  I&O's Summary      REVIEW OF SYSTEMS: denies fever, chills, SOB, palpitations, chest pain, constipation, dizziness    MEDICATIONS  (STANDING):  cyanocobalamin 1000 MICROGram(s) Oral daily  folic acid 1 milliGRAM(s) Oral daily  furosemide    Tablet 20 milliGRAM(s) Oral daily  heparin   Injectable 5000 Unit(s) SubCutaneous every 8 hours  lactulose Syrup 15 Gram(s) Oral two times a day  pantoprazole  Injectable 40 milliGRAM(s) IV Push daily  spironolactone 50 milliGRAM(s) Oral daily  thiamine IVPB 500 milliGRAM(s) IV Intermittent every 8 hours    MEDICATIONS  (PRN):  acetaminophen   Tablet .. 650 milliGRAM(s) Oral every 6 hours PRN Mild Pain (1 - 3)  LORazepam   Injectable 2 milliGRAM(s) IV Push every 6 hours PRN Symptom-triggered: each CIWA -Ar score 8 or GREATER      PHYSICAL EXAM:  GENERAL: NA  NERVOUS SYSTEM:  Alert & Oriented X3, Good concentration; Motor Strength 5/5 B/L upper and lower extremities  CHEST/LUNG: Clear to auscultation bilaterally; No rales, rhonchi, wheezing, or rubs  HEART: Regular rate and rhythm; No murmurs, rubs, or gallops  ABDOMEN: Soft, mild tenderness in epigastric area, Nondistended; Bowel sounds present  EXTREMITIES:  2+ Peripheral Pulses, No clubbing, cyanosis, or edema  SKIN: No rashes or lesions  LABS:                        6.9    5.46  )-----------( 95       ( 2020 06:50 )             21.4       139  |  103  |  2<L>  ----------------------------<  90  3.5   |  25  |  0.46<L>    Ca    8.3<L>      2020 06:50    TPro  7.6  /  Alb  1.7<L>  /  TBili  2.9<H>  /  DBili  1.4<H>  /  AST  59<H>  /  ALT  18  /  AlkPhos  163<H>  0611    PT/INR - ( 2020 06:50 )   PT: 29.6 sec;   INR: 2.54 ratio         PTT - ( 2020 06:50 )  PTT:71.9 sec  Urinalysis Basic - ( 10 Edy 2020 08:51 )    Color: Yellow / Appearance: Clear / S.010 / pH: x  Gluc: x / Ketone: Negative  / Bili: Negative / Urobili: Negative   Blood: x / Protein: Negative / Nitrite: Negative   Leuk Esterase: Negative / RBC: x / WBC x   Sq Epi: x / Non Sq Epi: x / Bacteria: x

## 2020-06-11 NOTE — DISCHARGE NOTE PROVIDER - NSDCMRMEDTOKEN_GEN_ALL_CORE_FT
cyanocobalamin 1000 mcg oral tablet: 1 tab(s) orally once a day  folic acid 1 mg oral tablet: 1 tab(s) orally once a day  furosemide 20 mg oral tablet: 1 tab(s) orally once a day  lactulose 10 g/15 mL oral syrup: 22.5 milliliter(s) orally 2 times a day  nadolol 20 mg oral tablet: 1 tab(s) orally once a day (at bedtime)  pantoprazole 40 mg oral delayed release tablet: 1 tab(s) orally once a day (before a meal)  spironolactone 25 mg oral tablet: 2 tab(s) orally once a day

## 2020-06-11 NOTE — CHART NOTE - NSCHARTNOTEFT_GEN_A_CORE
Repeat INR, remains unchanged 2.54 s/p Vitamin K 10 mg PO in AM.  Pt is for EGD this afternoon, will give 1 unit FFP now prior to procedure.     Above discussed with GI.

## 2020-06-11 NOTE — DISCHARGE NOTE PROVIDER - HOSPITAL COURSE
Pt is a 34 yr old male with PMH of  liver disease presented to ED for evaluation for leg swelling. Pt states that his leg swelling started 1 week ago. Pt denies any prolonged sitting, recent travel. Pt also complained of face, arm and epigastric pain. Pt unable to explain if he underwent tissue biopsy for diagnosis of liver disease. He reports using a syrup to clean up his stomach. He also reports history of  recent bloody vomiting. Pt does not follow up with a liver doctor as out patient. In the ED, pt was found to have transaminitis with elevated INR, elevated bilirubin and low albumin. CT chest revealed cirrhosis and upper abdominal ascites. Pt was admitted to medicine for hepatic cirrhosis. GI consulted. He was found to be anemic and received 1 unit of PRBC. INR levels remained elevated, PO Vit k was given. However, repeat INR remained unchanged. 1 unit FFP was given. EGD showed small varices with no bleed. Pt is a 34 yr old male with PMH of  liver disease presented to ED for evaluation for leg swelling. Pt states that his leg swelling started 1 week ago. Pt denies any prolonged sitting, recent travel. Pt also complained of face, arm and epigastric pain. Pt unable to explain if he underwent tissue biopsy for diagnosis of liver disease. He reports using a syrup to clean up his stomach. He also reports history of  recent bloody vomiting. Pt does not follow up with a liver doctor as out patient. In the ED, pt was found to have transaminitis with elevated INR, elevated bilirubin and low albumin. CT chest revealed cirrhosis and upper abdominal ascites. Pt was admitted to medicine for hepatic cirrhosis. GI consulted. He was found to be anemic and received 1 unit of PRBC. INR levels remained elevated, PO Vit k was given x2. However, repeat INR remained unchanged. s/p 1 unit FFP.  EGD showed small esophageal varices with no bleed. unable to perform biopsy due to elevated INR.     Pt is medically optimized for discharge with [facility], which was sent. Pt is advised for follow up with GI within one week after discharge.         last update on 6/12.     awaiting for [facility] to be delivered. Pt is a 34 yr old male with PMH of  liver disease presented to ED for evaluation for leg swelling. Pt states that his leg swelling started 1 week ago. Pt denies any prolonged sitting, recent travel. Pt also complained of face, arm and epigastric pain. Pt unable to explain if he underwent tissue biopsy for diagnosis of liver disease. He reports using a syrup to clean up his stomach. He also reports history of  recent bloody vomiting. Pt does not follow up with a liver doctor as out patient. In the ED, pt was found to have transaminitis with elevated INR, elevated bilirubin and low albumin. CT chest revealed cirrhosis and upper abdominal ascites. Pt was admitted to medicine for hepatic cirrhosis. GI consulted. He was found to be anemic and received 1 unit of PRBC. INR levels remained elevated, PO Vit k was given x2. However, repeat INR remained unchanged. s/p 1 unit FFP.  EGD showed small esophageal varices with no bleed. unable to perform biopsy due to elevated INR.     Pt is medically optimized for discharge with [facility]. Pt is advised for follow up with GI within one week after discharge.

## 2020-06-12 NOTE — PROGRESS NOTE ADULT - PROBLEM SELECTOR PLAN 8
DC home when [facility] are delivered.   pt needs to f/u w/ outpatient GI dr. Gerber within one week.

## 2020-06-12 NOTE — PROGRESS NOTE ADULT - PROBLEM SELECTOR PLAN 2
s/p I unit PRBC 6/11 for H/H 6.9/21.4   H/H stable today  s/p EGD  INR elevated-->2.62 Vitamin K 10 mg given x1  INR goal <1.0  Stool for occult blood, negative  Monitor CBC  Continue PPI

## 2020-06-12 NOTE — PROGRESS NOTE ADULT - PROBLEM SELECTOR PLAN 5
Pt came in for leg swelling x 1 week  US doppler, negative for DVT  Continue DVT ppx
DVT ppx--SQ heparin  GI ppx-- IV protonix

## 2020-06-12 NOTE — PROGRESS NOTE ADULT - SUBJECTIVE AND OBJECTIVE BOX
NP Note discussed with  Primary Attending    Patient is a 34y old  Male who presents with a chief complaint of Leg swelling (11 Jun 2020 17:00)      Pt is a 34 yr old male with PMH of  liver disease presented to ED for evaluation for leg swelling. Pt states that his leg swelling started 1 week ago. Pt denies any prolonged sitting, recent travel. Pt also complained of face, arm and epigastric pain. Pt unable to explain if he underwent tissue biopsy for diagnosis of liver disease. He reports using a syrup to clean up his stomach. He also reports history of  recent bloody vomiting. Pt does not follow up with a liver doctor as out patient. In the ED, pt was found to have transaminitis with elevated INR, elevated bilirubin and low albumin. CT chest revealed cirrhosis and upper abdominal ascites.   Pt was admitted to medicine for hepatic cirrhosis. GI consulted. He was found to be anemic and received 1 unit of PRBC. INR levels remained elevated, PO Vit k was given. However, repeat INR remained unchanged. s/p 1 unit FFP.  EGD showed small esophageal varices with no bleed. unable to perform biopsy due to elevated INR.   INR 2.62 today. another vt K was given. K was replaced.     Pt is medically optimized for discharge [facility], awaiting for delivery. Pt is advised for follow up with GI within one week after discharge.     INTERVAL HPI/OVERNIGHT EVENTS: no new complaints. tolerates well with resumed diet.     MEDICATIONS  (STANDING):  cyanocobalamin 1000 MICROGram(s) Oral daily  folic acid 1 milliGRAM(s) Oral daily  furosemide    Tablet 20 milliGRAM(s) Oral daily  lactulose Syrup 15 Gram(s) Oral two times a day  nadolol 20 milliGRAM(s) Oral at bedtime  pantoprazole    Tablet 40 milliGRAM(s) Oral before breakfast  spironolactone 50 milliGRAM(s) Oral daily  thiamine IVPB 500 milliGRAM(s) IV Intermittent every 8 hours    MEDICATIONS  (PRN):      __________________________________________________  REVIEW OF SYSTEMS:    CONSTITUTIONAL: No fever,   EYES: no acute visual disturbances  NECK: No pain or stiffness  RESPIRATORY: No cough; No shortness of breath  CARDIOVASCULAR: No chest pain, no palpitations  GASTROINTESTINAL: No pain. No nausea or vomiting; No diarrhea   NEUROLOGICAL: No headache or numbness, no tremors  MUSCULOSKELETAL: No joint pain, no muscle pain  GENITOURINARY: no dysuria, no frequency, no hesitancy  PSYCHIATRY: no depression , no anxiety  ALL OTHER  ROS negative        Vital Signs Last 24 Hrs  T(C): 36.4 (12 Jun 2020 16:09), Max: 36.6 (12 Jun 2020 08:26)  T(F): 97.6 (12 Jun 2020 16:09), Max: 97.8 (12 Jun 2020 08:26)  HR: 74 (12 Jun 2020 16:09) (71 - 85)  BP: 123/75 (12 Jun 2020 16:09) (121/80 - 132/79)  BP(mean): --  RR: 17 (12 Jun 2020 16:09) (17 - 17)  SpO2: 100% (12 Jun 2020 16:09) (100% - 100%)    ________________________________________________  PHYSICAL EXAM:    GENERAL: NAD conversant. calm and cooperative on exam.   CHEST/LUNG: Clear to auscultation bilaterally with good air entry   HEART: S1 S2  regular; no murmurs, gallops or rubs  ABDOMEN: Soft, Nontender, Nondistended; Bowel sounds present  EXTREMITIES: BLLE with swelling   SKIN: warm and dry; no rash  NERVOUS SYSTEM:  Awake and alert; Oriented  to place, person, forgetful with date/time.    _________________________________________________  LABS:                        8.0    5.49  )-----------( 78       ( 12 Jun 2020 06:08 )             24.3     06-12    136  |  101  |  5<L>  ----------------------------<  82  3.3<L>   |  28  |  0.56    Ca    8.0<L>      12 Jun 2020 06:08    TPro  7.4  /  Alb  1.7<L>  /  TBili  3.9<H>  /  DBili  x   /  AST  55<H>  /  ALT  14  /  AlkPhos  149<H>  06-12    PT/INR - ( 12 Jun 2020 06:08 )   PT: 30.5 sec;   INR: 2.62 ratio         PTT - ( 11 Jun 2020 06:50 )  PTT:71.9 sec    CAPILLARY BLOOD GLUCOSE            RADIOLOGY & ADDITIONAL TESTS:    Imaging  Reviewed:  YES  < from: CT Angio Chest w/ IV Cont (06.10.20 @ 03:38) >    EXAM:  CT ANGIO CHEST (W)AW IC                            PROCEDURE DATE:  06/10/2020          INTERPRETATION:  CLINICAL INFORMATION: Elevated d-dimer    COMPARISON: None.    PROCEDURE:   CT Angiography of the Chest.  70 ml of Omnipaque 350 was injected intravenously. 30 ml were discarded.  Sagittal and coronal reformats were performed as well as 3D (MIP) reconstructions.    FINDINGS:    LUNGS AND AIRWAYS: Patent central airways.  Elevated right diaphragm with adjacent compressive atelectasis. Subsegmental lingular atelectasis..  PLEURA: No pleural effusion.  MEDIASTINUM AND HEATHER: No lymphadenopathy.  VESSELS: Suboptimal opacification of the pulmonary arteries limits evaluation. No evidence for pulmonary embolus in the pulmonary trunk or main pulmonary arteries. Lobar through subsegmental branches not well evaluated. Coronary artery calcifications. Aorta within normal limits.  HEART: Heart size is normal. No pericardial effusion.  CHEST WALL AND LOWER NECK: Within normal limits.  VISUALIZED UPPER ABDOMEN: Hepatic cirrhosis. Upper abdominal ascites..  BONES: Degenerative changes of the spine.    IMPRESSION:   No pulmonary embolus in the pulmonary trunk or main pulmonary arteries. Lobar through subsegmental branches are not well evaluated due to suboptimal opacification.    Elevated right diaphragm with adjacent compressive atelectasis.    Cirrhosis and upper abdominal ascites.                NANCY ANDERSON M.D., ATTENDING RADIOLOGIST  This document has been electronically signed.Edy 10 2020  3:51AM                < end of copied text >  < from: US Duplex Venous Lower Ext Complete, Bilateral (06.10.20 @ 10:00) >    EXAM:  US DPLX LWR EXT VEINS COMPL BI                            PROCEDURE DATE:  06/10/2020          INTERPRETATION:  CLINICAL INFORMATION: Leg swelling    COMPARISON: None available.    TECHNIQUE: Duplex sonography of the BILATERAL LOWER extremity veins with color and spectral Doppler, with and without compression.      FINDINGS:  There is normal compressibility of the bilateral common femoral, femoral and popliteal veins.   Doppler examination shows normal spontaneous and phasic flow.    No calf vein thrombosis is detected.    IMPRESSION:   No evidence of deep venous thrombosis in either lower extremity.                    RICH PARKER M.D., ATTENDING RADIOLOGIST  This document has been electronically signed. Edy 10 2020 10:18AM              < end of copied text >  < from: Xray Chest 1 View-PORTABLE IMMEDIATE (06.10.20 @ 01:47) >    EXAM:  XR CHEST PORTABLE IMMED 1V                            PROCEDURE DATE:  06/10/2020          INTERPRETATION:  CLINICAL INFORMATION: Shortness of Breath.    EXAM: Single frontal view chest radiograph    COMPARISON: None.    FINDINGS:    Apparent cardiomegaly, likely due to suboptimal respiratory effort and technique.    Mild bibasilar linear atelectasis. No consolidation or pleural effusion. Low lung volumes limits detailed elevation.    Diffuse haziness throughout the limited visualized upper abdomen, concerning for ascites.    Multilevel degenerative changes of the spine.    IMPRESSION:    Mild bibasilar linear atelectasis. No consolidation or pleural effusion. Low lung volumes limits detailed elevation.    Diffuse haziness throughoutthe limited visualized upper abdomen, concerning for ascites.                SJ SILVA M.D., ATTENDING RADIOLOGIST  This document has been electronically signed. Edy 10 2020  5:44AM                < end of copied text >        Consultant(s) Notes Reviewed:   YES    GI     Plan of care was discussed with patient and /or primary care giver; all questions and concerns were addressed

## 2020-06-12 NOTE — PROGRESS NOTE ADULT - PROBLEM SELECTOR PLAN 4
JAKY protocol   Thiamine, Folate and Vitamin B 12 supplementation   SW consult
Pt came in for leg swelling x 1 week  US doppler, negative for DVT  Continue DVT ppx

## 2020-06-12 NOTE — DISCHARGE NOTE NURSING/CASE MANAGEMENT/SOCIAL WORK - PATIENT PORTAL LINK FT
You can access the FollowMyHealth Patient Portal offered by [facility] by registering at the following website: [email or web address]. By joining [facility]’s FollowMyHealth portal, you will also be able to view your health information using other applications (apps) compatible with our system.

## 2020-06-12 NOTE — PROGRESS NOTE ADULT - PROBLEM SELECTOR PLAN 1
Pt admitted for liver cirrhosis likely due to ETOH use  Noted to have transaminitis with elevated INR, elevated bilirubin and low albumin on admission  US significant for ascites-currently on diuretics  Unsure if pt had liver bx  Followed by GI dr. Gerber- esophageal varices unable to perform biopsy due to risk for bleeding. GI f/u after discharge.   PO Lasix  PO Aldactone 50 mg  Daily weights  Monitor CBC, CMP, PT/INR

## 2020-09-14 NOTE — ED PROVIDER NOTE - CARE PLAN
Principal Discharge DX:	Hypokalemia  Secondary Diagnosis:	Liver function abnormality  Secondary Diagnosis:	Thrombocytopenia

## 2020-09-14 NOTE — ED PROVIDER NOTE - X-RAY INTERPRETATION
10/8/2018    Brandy Kinney  1966  [id number]        Assessment  History of nephrolithiasis, incontinence, family history of renal cell carcinoma      Discussion  I recommend obtaining a retroperitoneal ultrasound  This will assess for any possible stone burden at this time as well as to look for any renal abnormalities or lesions  We discussed timed voiding and weight loss for her incontinence and will reassess the possibility of anticholinergic medication at her next office visit in 6 weeks time  History of Present Illness  46 y o  female with a history of incontinence which has been present for some time  She does not describe classic stress nor classic urge incontinence  She states that she wears 1-2 thin panty liners per day and that there often wet at the end of the day but she does not even realize that she may be leaking  She is trying to empty her bladder on a more regular basis  She denies ever seeing gross hematuria  Her brother has a history of a right renal cell carcinoma and is status post right hand assisted laparoscopic nephrectomy  She is concerned about the possibility of an underlying renal cell carcinoma  She denies any other first-degree family members with renal cell carcinoma  She states that on a low carbohydrate diet that she has lost 17 lb in the last few months  AUA Symptom Score      Review of Systems  Review of Systems   Constitutional: Negative  HENT: Negative  Eyes: Negative  Respiratory: Negative  Cardiovascular: Negative  Gastrointestinal: Negative  Endocrine: Negative  Genitourinary: Negative  Musculoskeletal: Negative  Skin: Negative  Allergic/Immunologic: Negative  Neurological: Negative  Hematological: Negative  Psychiatric/Behavioral: Negative            Past Medical History  Past Medical History:   Diagnosis Date    Asthma due to seasonal allergies     Kidney stone        Past Social History  Past Surgical History:   Procedure Laterality Date    APPENDECTOMY  10/1982     SECTION      two- [address]    partial    REPLACEMENT TOTAL KNEE BILATERAL      right- , left-        Past Family History  Family History   Problem Relation Age of Onset    Heart disease Father     Hypertension Father     Urolithiasis Mother     Kidney cancer Brother     Urolithiasis Brother     Hepatitis Brother         hep c    Kidney disease Brother     Urolithiasis Sister     Diabetes Sister     Kidney disease Sister     Urolithiasis Daughter        Past Social history  Social History     Social History    Marital status: /Civil Union     Spouse name: N/A    Number of children: N/A    Years of education: N/A     Occupational History    Not on file  Social History Main Topics    Smoking status: Never Smoker    Smokeless tobacco: Never Used    Alcohol use Yes      Comment: occasional    Drug use: No    Sexual activity: Not on file     Other Topics Concern    Not on file     Social History Narrative    No narrative on file       Current Medications  Current Outpatient Prescriptions   Medication Sig Dispense Refill    cetirizine (ZyrTEC) 10 mg tablet Take 10 mg by mouth      omeprazole (PriLOSEC) 10 mg delayed release capsule Take 10 mg by mouth       No current facility-administered medications for this visit  Allergies  Allergies   Allergen Reactions    Prednisone Other (See Comments)     Headaches and blood pressure spikes      Sulfa Antibiotics Hives    Vicodin [Hydrocodone-Acetaminophen] Anxiety    Codeine Hives    Colistin      Other reaction(s): Other (See Comments)  colymycin otic    Other      Other reaction(s): Other (See Comments)  burn       Past Medical History, Social History, Family History, medications and allergies were reviewed      Vitals  Vitals:    10/08/18 1323   BP: 142/98   BP Location: Right arm   Patient Position: Sitting   Cuff Size: Extra-Large   Weight: 119 kg (262 lb)   Height: 5' 6 5" (1 689 m)       Physical Exam  Physical Exam  On examination she is in no acute distress  Her abdomen is soft nontender nondistended, obese   examination reveals no CVA tenderness  The bladder is not palpable  Skin is warm  Extremities without edema  Bilateral knee replacement scars noted  Skin is warm  Extremities without edema    Neurologic is grossly intact and nonfocal   Gait normal   Affect normal       Results  No results found for: PSA  Lab Results   Component Value Date    GLUCOSE 91 06/15/2015    CALCIUM 8 6 06/15/2015     06/15/2015    K 3 9 06/15/2015    CO2 25 06/15/2015     06/15/2015    BUN 16 06/15/2015    CREATININE 0 62 06/15/2015     Lab Results   Component Value Date    WBC 9 44 06/15/2015    HGB 14 9 06/15/2015    HCT 46 4 (H) 06/15/2015    MCV 87 06/15/2015     06/15/2015         Office Urine Dip  Recent Results (from the past 1 hour(s))   POCT urine dip    Collection Time: 10/08/18  1:26 PM   Result Value Ref Range    LEUKOCYTE ESTERASE,UA -     NITRITE,UA -     SL AMB POCT UROBILINOGEN -     SL AMB POCT URINE PROTEIN -      PH,UA 6 5      BLOOD,UA -     SPECIFIC GRAVITY,UA 1 010     KETONES,UA -      BILIRUBIN,UA -     GLUCOSE, UA -      COLOR,UA yellow      CLARITY,UA transparent    ] ER physician

## 2020-09-14 NOTE — ED PROVIDER NOTE - CLINICAL SUMMARY MEDICAL DECISION MAKING FREE TEXT BOX
Pt with hypokalemia, worsening LFTs, assumed thrombocytopenia (count pending), +ecchymosis. MAR and Dr. Workman endorsed. Pt agrees with admission for K correction , LFT monitoring, possible platelet transfusion. . I had a detailed discussion with the patient and/or guardian regarding the historical points, exam findings, and any diagnostic results supporting the admit diagnosis.

## 2020-09-14 NOTE — ED PROVIDER NOTE - OBJECTIVE STATEMENT
Chief complaint of progressive weakness, nausea, x 1 week. Pt also noted ecchymosis to arms. No trauma. No chest pain, no fever, no shortness of breath.   wife states pt hd hx of frequent ETOH consumption, [t states last consumed alcohol 2 days ago.

## 2020-09-15 NOTE — PROGRESS NOTE ADULT - PROBLEM SELECTOR PLAN 3
Pt with hx of liver cirrhosis with mild ascites   US hepatic and pancreatic-see above  Diuretics on hold due to worsening liver fx test, pending diagnostic paracentesis-once INR improves   Hepatologist following Pt with hx of liver cirrhosis with mild ascites   US hepatic and pancreatic-see above  Diuretics on hold due to worsening liver fx test, pending diagnostic paracentesis-once INR improves   F/u hep panel   Hepatologist following

## 2020-09-15 NOTE — H&P ADULT - NSHPPHYSICALEXAM_GEN_ALL_CORE
Vital Signs Last 24 Hrs  T(C): 36.6 (15 Sep 2020 01:34), Max: 36.6 (15 Sep 2020 01:34)  T(F): 97.9 (15 Sep 2020 01:34), Max: 97.9 (15 Sep 2020 01:34)  HR: 88 (15 Sep 2020 01:34) (88 - 94)  BP: 106/62 (15 Sep 2020 01:34) (106/62 - 114/75)  BP(mean): --  RR: 18 (15 Sep 2020 01:34) (18 - 18)  SpO2: 98% (15 Sep 2020 01:34) (97% - 98%)    GENERAL: NAD, speaks in full sentences, no signs of respiratory distress  HEAD:  Atraumatic, Normocephalic  EYES: EOMI, PERRLA, scleral icterus +ve   NECK: Supple, No JVD  CHEST/LUNG: Clear to auscultation bilaterally; No wheeze; No crackles; No accessory muscles used  HEART: Regular rate and rhythm; No murmurs;   ABDOMEN: Soft, Nontender, Nondistended; Bowel sounds present; No guarding  EXTREMITIES:  2+ Peripheral Pulses,  small echymotic patches on upper extremities   PSYCH:  Normal Affect  NEUROLOGY: non-focal, AAO X 3. Strength is 5/5. no sensory loss.  SKIN: No rashes or lesions

## 2020-09-15 NOTE — H&P ADULT - PROBLEM SELECTOR PLAN 2
Patients potassium was low 2.7, likely secondary to vomiting .  replaced 40 meq KCL PO X 2 and IV 10 meq KCL X 3 .  follow BMP in am

## 2020-09-15 NOTE — H&P ADULT - ATTENDING COMMENTS
Patient is a 34 year old male with a PMH of Chronic Alcohol Abuse,         P/E: As above MAR    A/P:  Alcohol Withdrawal:  -Blood Alcohol= 242  -Serial CIWA scoring  -Due to patient's liver cirrhosis, will avoid adminstration of longer acting benzodiazepines  -Ativan Q4H PRN      Chronic Alcohol Abuse:  -Should ask  to see patient to discuss his willingness to participate in outpatient programs to help with cessation of alcohol (such as Alcoholics Anonymous, etc.)  -Should also send Thiamine, Folate, B12 levels with AM labs      Liver Cirrhosis:  -CTA (6/10/2020) identified cirrhosis and upper abdominal ascites  -Child-Bojorquez Score for Cirrhosis Mortality=   -MELD Score=   -Patient should be seen by Gastroenterology to determine if any additional diagnostic workup is warranted at this time or as outpatient (ie. Endoscopy to evaluate for varices)  -Should also send Ammonia and GGT with AM labs      Severe Thrombocytopenia:  -Platelet= 61 (78 on 6/12/2020)  -As patient is chronically thrombocytopenic as a result of liver cirrhosis, without any s/sx of active bleeding and with no planned invasive procedure to be performed at this time, no current need for transfusion of blood products  -Must continue to closely monitor for development of s/sx of bleeding  -Should send Retic count and Iron Panel (Iron, TIBC, Ferritin)  -Should have hematology weigh-in for additional treatment options      Hypokalemia:  -Will replete as necessary  -Will recheck K and Mg with AM labs      DVT PPx:  -Compression Stockings (for now) Patient is a 34 year old male with a PMH of Chronic Alcohol Abuse, Liver Cirrhosis with Esophageal Varices, who presented with a chief complaint of generalized weakness.  Patient states that over the past week he has been experiencing a progressively worsening generalized body weakness, associated with nausea, as well as at least one episode of hematemesis as well as blood soaked stools.    Patient endorses to be an everyday drinker of a type of liquor (though he was unable to elucidate further), and his last reported drink was around 2 days prior to current presentation.    Patient denies any headache, dizziness, chest pain, abdominal pain, nausea, vomiting, diarrhea.    T(C): 36.6 (09-15-20 @ 01:34), Max: 36.6 (09-15-20 @ 01:34)  T(F): 97.9 (09-15-20 @ 01:34), Max: 97.9 (09-15-20 @ 01:34)  HR: 88 (09-15-20 @ 01:34) (88 - 94)  BP: 106/62 (09-15-20 @ 01:34) (106/62 - 114/75)  RR: 18 (09-15-20 @ 01:34) (18 - 18)  SpO2: 98% (09-15-20 @ 01:34) (97% - 98%)  Wt(kg): --    P/E: As above MAR    A/P:  Alcohol Withdrawal:  -Blood Alcohol= 242  -Serial CIWA scoring  -Due to patient's liver cirrhosis, will avoid adminstration of longer acting benzodiazepines  -Ativan Q4H PRN      Chronic Alcohol Abuse:  -Should ask  to see patient to discuss his willingness to participate in outpatient programs to help with cessation of alcohol (such as Alcoholics Anonymous, etc.)  -Should also send Thiamine, Folate, B12 levels with AM labs      Liver Cirrhosis with Esophageal Varices  -CTA (6/10/2020) identified cirrhosis and upper abdominal ascites  -Child-Bojorquez Score for Cirrhosis Mortality=   -MELD Score=   -Patient should be seen by Gastroenterology to determine if any additional diagnostic workup is warranted at this time or as outpatient (ie. repeat Endoscopy)  -Should also send Ammonia and GGT with AM labs  -FOBT, hold administration of H2-blocker and/or PPI until result available    Severe Thrombocytopenia:  -Platelet= 61 (78 on 6/12/2020)  -As patient is chronically thrombocytopenic as a result of liver cirrhosis, without any s/sx of active bleeding and with no planned invasive procedure to be performed at this time, no current need for transfusion of blood products  -Must continue to closely monitor for development of s/sx of bleeding  -Should send Retic count and Iron Panel (Iron, TIBC, Ferritin)  -Should have hematology weigh-in for additional treatment options      Hypokalemia:  -Will replete as necessary  -Will recheck K and Mg with AM labs      DVT PPx:  -Compression Stockings (for now) Patient is a 34 year old male with a PMH of Chronic Alcohol Abuse, Liver Cirrhosis with Esophageal Varices, who presented with a chief complaint of generalized weakness.  Patient states that over the past week he has been experiencing a progressively worsening generalized body weakness, associated with nausea, as well as at least one episode of hematemesis as well as blood soaked stools.    Patient endorses to be an everyday drinker of a type of liquor (though he was unable to elucidate further), and his last reported drink was around 2 days prior to current presentation.    Patient denies any headache, dizziness, chest pain, abdominal pain, nausea, vomiting, diarrhea.    T(C): 36.6 (09-15-20 @ 01:34), Max: 36.6 (09-15-20 @ 01:34)  T(F): 97.9 (09-15-20 @ 01:34), Max: 97.9 (09-15-20 @ 01:34)  HR: 88 (09-15-20 @ 01:34) (88 - 94)  BP: 106/62 (09-15-20 @ 01:34) (106/62 - 114/75)  RR: 18 (09-15-20 @ 01:34) (18 - 18)  SpO2: 98% (09-15-20 @ 01:34) (97% - 98%)  Wt(kg): --    P/E: As above MAR    A/P:  Alcohol Withdrawal:  -Blood Alcohol= 242  -Serial CIWA scoring  -Due to patient's liver cirrhosis, will avoid adminstration of longer acting benzodiazepines  -Ativan Q4H PRN      Chronic Alcohol Abuse:  -Should ask  to see patient to discuss his willingness to participate in outpatient programs to help with cessation of alcohol (such as Alcoholics Anonymous, etc.)  -Should also send Thiamine, Folate, B12 levels with AM labs      Liver Cirrhosis with Esophageal Varices  -CTA (6/10/2020) identified cirrhosis and upper abdominal ascites  -Child-Bojorquez Score for Cirrhosis Mortality= 13 points (Bilirubin >3, Albumin<2.8, INR>2.2, Ascites= Slight, Encephalopathy= Grade 1-2), Child Class C, Life Expectancy : 1-3 years, Abdominal surgery molly-operative mortality: 82%  -MELD Score= 32 points, 52.6% Estimated 3-Month Mortality  -Patient should be seen by Gastroenterology to determine if any additional diagnostic workup is warranted at this time or as outpatient (ie. repeat Endoscopy)  -Should also send Ammonia and GGT with AM labs  -FOBT, hold administration of H2-blocker and/or PPI until result available    Severe Thrombocytopenia:  -Platelet= 61 (78 on 6/12/2020)  -As patient is chronically thrombocytopenic as a result of liver cirrhosis, without any s/sx of active bleeding and with no planned invasive procedure to be performed at this time, no current need for transfusion of blood products  -Must continue to closely monitor for development of s/sx of bleeding  -Should send Retic count and Iron Panel (Iron, TIBC, Ferritin)  -Should have hematology weigh-in for additional treatment options      Hypokalemia:  -Will replete as necessary  -Will recheck K and Mg with AM labs      DVT PPx:  -Compression Stockings (for now) Patient is a 34 year old male with a PMH of Chronic Alcohol Abuse, Liver Cirrhosis with Esophageal Varices, who presented with a chief complaint of generalized weakness.  Patient states that over the past week he has been experiencing a progressively worsening generalized body weakness, associated with nausea, as well as at least one episode of hematemesis as well as blood soaked stools.    Patient endorses to be an everyday drinker of a type of liquor (though he was unable to elucidate further), and his last reported drink was around 2 days prior to current presentation.    Patient denies any headache, dizziness, chest pain, abdominal pain, nausea, vomiting, diarrhea.    T(C): 36.6 (09-15-20 @ 01:34), Max: 36.6 (09-15-20 @ 01:34)  T(F): 97.9 (09-15-20 @ 01:34), Max: 97.9 (09-15-20 @ 01:34)  HR: 88 (09-15-20 @ 01:34) (88 - 94)  BP: 106/62 (09-15-20 @ 01:34) (106/62 - 114/75)  RR: 18 (09-15-20 @ 01:34) (18 - 18)  SpO2: 98% (09-15-20 @ 01:34) (97% - 98%)  Wt(kg): --    P/E: As above MAR    A/P:  Alcohol Withdrawal:  -Blood Alcohol= 242  -Serial CIWA scoring  -Due to patient's liver cirrhosis, will avoid adminstration of longer acting benzodiazepines  -Ativan Q4H PRN      Chronic Alcohol Abuse:  -Should ask  to see patient to discuss his willingness to participate in outpatient programs to help with cessation of alcohol (such as Alcoholics Anonymous, etc.)  -Should also send Thiamine, Folate, B12 levels with AM labs      Liver Cirrhosis with Esophageal Varices  -CTA (6/10/2020) identified cirrhosis and upper abdominal ascites  -Child-Bojorquez Score for Cirrhosis Mortality= 13 points (Bilirubin >3, Albumin<2.8, INR>2.2, Ascites= Slight, Encephalopathy= Grade 1-2), Child Class C, Life Expectancy : 1-3 years, Abdominal surgery molly-operative mortality: 82%  -MELD Score= 32 points, 52.6% Estimated 3-Month Mortality  -Patient should be seen by Gastroenterology to determine if any additional diagnostic workup is warranted at this time or as outpatient (ie. repeat Endoscopy)  -Should also send Ammonia and GGT with AM labs  -If Ammonia level is >30, should start Lactulose 30mL PO QID titrated to achieve two to three soft stools daily  -FOBT, hold administration of H2-blocker and/or PPI until result available    Severe Thrombocytopenia:  -Platelet= 61 (78 on 6/12/2020)  -As patient is chronically thrombocytopenic as a result of liver cirrhosis, without any s/sx of active bleeding and with no planned invasive procedure to be performed at this time, no current need for transfusion of blood products  -Must continue to closely monitor for development of s/sx of bleeding  -Should send Retic count and Iron Panel (Iron, TIBC, Ferritin)  -Should have hematology weigh-in for additional treatment options      Hypokalemia:  -Will replete as necessary  -Will recheck K and Mg with AM labs      DVT PPx:  -Compression Stockings (for now)

## 2020-09-15 NOTE — H&P ADULT - ASSESSMENT
34 year old male with a PMH of Chronic Alcohol Abuse, Liver Cirrhosis with Esophageal Varices, who presented with a chief complaint of generalized weakness.

## 2020-09-15 NOTE — H&P ADULT - PROBLEM SELECTOR PLAN 4
Patients Platelets are low . 61K .  no need for transfusion. likely in the setting of liver cirrhosis.

## 2020-09-15 NOTE — H&P ADULT - PROBLEM SELECTOR PLAN 6
RISK                                                          Points  [] Previous VTE                                           3  [] Thrombophilia                                        2  [] Lower limb paralysis                              2   [] Current Cancer                                       2   [x] Immobilization > 24 hrs                        1  [] ICU/CCU stay > 24 hours                       1  [] Age > 60                                                   1    Will hold DVT chemo prophylaxis for elevated INR and thrombocytopenia

## 2020-09-15 NOTE — CONSULT NOTE ADULT - SUBJECTIVE AND OBJECTIVE BOX
Chief Complaint:  Patient is a 34y old  Male who presents with a chief complaint of Alcohol abuse (15 Sep 2020 03:05)     ID: [id number]    HPI:  JOHN AUGUSTIN is a 34y Male with hx of alcohol abuse (since age 20, currently drinking, on admission EtOH level 242), alcoholic liver disease (was diagnosed ~ 2 years ago), decompensated cirrhosis with ascites (non-compliant with diuretics, no hx of LVP), hx of small esophageal varices on last EGD (6/2020) (non-compliant with BB, never banded), with recent hospitalization (6/2020 for leg swelling, anemia) presented with malaise, generalized weakness for 1-2 weeks, along with nausea, vomiting. He states that every morning "spitting up lots of saliva", and seen "pinkish blood in it". He also states that had dark, black stool, last time yesterday. He denies abdominal pain, fever, chills, confusion, diarrhea (has 2-3 formed BM), urinary complaints, CP, SOB or any other complaints (see detailed ROS below).   He is hemodynamically stable, afebrile, alert, oriented x3. Labs are significant for normocytic anemia (Hb 8.0-7.7, MCV 91.3), thrombocytopenia (PLT 61-51), and worsening liver function since June (INR 2.62-4.14, albumin 1.4, bilirubin 3.9-7.4, [id number], ALT 14-72, -250), with MELD score last admission 23, and on this admission 32, CTP C 12.  He was also found to have hyponatremia, hypokalemia, hypomagnesemia. Cr on prior d/c 0.56, now 0.60.  Last HCC screening 6/2020 negative.     Notably, he is not following with any PCP or GI/hepatology outpatient. He stopped taking medications after hospital discharge, but takes "shakes" for his liver.     PMHX/PSHX:   Liver cirrhosis  Anemia    Allergies:  No Known Allergies    Home Medications: reviewed  Hospital Medications:  folic acid 1 milliGRAM(s) Oral daily  LORazepam   Injectable 2 milliGRAM(s) IV Push every 2 hours PRN  potassium chloride  10 mEq/100 mL IVPB 10 milliEquivalent(s) IV Intermittent every 1 hour  thiamine IVPB 500 milliGRAM(s) IV Intermittent daily      Social History:   Tob: Denies  EtOH: since age 20, ~ 5 beers/day, denies hard liquor   Illicit Drugs: Denies    Family history:    Denies family history of colon cancer/polyps, stomach cancer/polyps, pancreatic cancer/masses, liver cancer/disease, ovarian cancer and endometrial cancer.    ROS:   General:  No  fevers, chills, night sweats, fatigue  Eyes:  Good vision, no reported pain  ENT:  No sore throat, pain, runny nose  CV:  No pain, palpitations  Pulm:  No dyspnea, cough  GI:  See HPI, otherwise negative  :  No  incontinence, nocturia  Muscle:  No pain, or focal weakness  Neuro:  No memory problems  Psych:  No insomnia, mood problems, depression  Endocrine:  No polyuria, polydipsia, cold/heat intolerance  Heme:  No petechiae, ecchymosis, easy bruisability  Skin:  No rash    PHYSICAL EXAM:   Vital Signs:  Vital Signs Last 24 Hrs  T(C): 36.4 (15 Sep 2020 06:17), Max: 36.6 (15 Sep 2020 01:34)  T(F): 97.5 (15 Sep 2020 06:17), Max: 97.9 (15 Sep 2020 01:34)  HR: 91 (15 Sep 2020 06:17) (88 - 94)  BP: 116/66 (15 Sep 2020 06:17) (106/62 - 116/66)  BP(mean): --  RR: 17 (15 Sep 2020 06:17) (16 - 18)  SpO2: 98% (15 Sep 2020 06:17) (97% - 98%)  Daily Height in cm: 168 (14 Sep 2020 22:55)    Daily     GENERAL: no acute distress  NEURO: alert, oriented x3, no asterixis  HEENT: icteric sclera  CHEST: no respiratory distress, no accessory muscle use  CARDIAC: regular rate, rhythm  ABDOMEN: soft, mildly distended, + fluid wave, non-tender, no rebound or guarding  EXTREMITIES: warm, well perfused, no edema  SKIN: ecchymoses  Rectal exam: no mass, light brown stool, no visible hematochezia or melena    LABS: reviewed                        7.7    4.81  )-----------( 51       ( 15 Sep 2020 06:01 )             21.1     09-15    134<L>  |  91<L>  |  3<L>  ----------------------------<  94  3.1<L>   |  37<H>  |  0.60    Ca    6.6<L>      15 Sep 2020 06:01  Phos  3.4     09-15  Mg     1.0     09-15    TPro  7.7  /  Alb  1.4<L>  /  TBili  7.4<H>  /  DBili  5.2<H>  /  AST  243<H>  /  ALT  72<H>  /  AlkPhos  250<H>  09-15    LIVER FUNCTIONS - ( 15 Sep 2020 06:01 )  Alb: 1.4 g/dL / Pro: 7.7 g/dL / ALK PHOS: 250 U/L / ALT: 72 U/L DA / AST: 243 U/L / GGT: x           < from: US Abdomen Complete (US Abdomen Complete .) (06.10.20 @ 09:38) >  EXAM:  US ABDOMEN COMPLETE                          FINDINGS:  Liver: Borderline liver size. Liver is 16.0cm in sagittal diameter. Irregular hepatic borders concerning for cirrhosis. No hepatic mass.  Bile ducts: Normal caliber. Common bile duct measures 6mm.   Gallbladder: Increased intestinal gas limits visualization. Tiny calcific gallstones are present. No significant gallbladder wall thickening.     Pancreas: Visualized portions are within normal limits.  Spleen: 12.3cm. Spleen normal-sized. There is hypoechoic splenic cyst vs. accessory spleen - measuring 1.9 x 1.8 x 1.6cm.  Right kidney: 11.9cm. No hydronephrosis.   Left kidney: 11.5cm.  No hydronephrosis. Lower pole cyst is 2.9cm.  Ascites: Significant ascites. RUQ is 9.7cm; RLQ = 7.7cm;LLQ = 4.7cm; LUQ = 6.9cm.  Aorta and IVC: Visualized portions are within normal limits.    IMPRESSION:   1. Cirrhotic liver.  2. Tiny calcific gallstones.  3. Question of small  accessory spleen.  4. Left kidney cyst.  5. Significant ascites.    < end of copied text >      Diagnostic Studies: see sunrise for full report

## 2020-09-15 NOTE — H&P ADULT - HISTORY OF PRESENT ILLNESS
34 male with medical hx of Alcohol abuse and liver cirrhosis  presented to Ed because he was feeling week . Patient states that he drinks every day, for past week he was having severe nausea with vomiting with blood ( only tea spoon some times ) and also reported blood in stools. Patient is feeling very weak. denies any episodes of fever, chills , nausea , vomiting abdominal pain, epigastric pain, diarrhea or urinary problems. Patient states last drink was 2 days ago.      off note patient was admitted to hospital in June 2020 and US abdomen was done showed cirrhotic liver and significant ascites    Patient went for EGD  , it  showed small esophageal varices distally, not banded as patient did not get FFP at that time. Had portal hypertensive gastropathy. Biopsy not taken due to elevated  INR. He was started on Nadolol 20mg qhs and spironolactone and Lasix .   Patient doesn't take any medications currently.   34 male with medical hx of Alcohol abuse and liver cirrhosis  , esophageal varices presented to Ed because he was feeling week . Patient states that he drinks every day, for past week he was having severe nausea with vomiting with blood ( only tea spoon some times ) and also reported blood in stools. Patient is feeling very weak. Patient states last drink was 2 days ago drinks 4-5 cans of beer every day.      denies any episodes of fever, chills , nausea , abdominal pain, epigastric pain, diarrhea or urinary problems.       off note patient was admitted to hospital in June 2020 and US abdomen was done showed cirrhotic liver and significant ascites    Patient went for EGD  , it  showed small esophageal varices distally, not banded as patient did not get FFP at that time. Had portal hypertensive gastropathy. Biopsy not taken due to elevated  INR. He was started on Nadolol 20mg qhs and spironolactone and Lasix .   Patient doesn't take any medications currently.

## 2020-09-15 NOTE — PROGRESS NOTE ADULT - PROBLEM SELECTOR PLAN 5
INR 4.14  Vitamin K 5 mg ordered  Goal INR 1.5, in order to have diagnostic paracentesis, discussed with IR   Consider FFP prior to possible paracentesis-discussed with Dr. Morales   INR in AM

## 2020-09-15 NOTE — CONSULT NOTE ADULT - ASSESSMENT
35yo male with hx of chronic alcohol abuse (currently drinking), decompensated cirrhosis with ascites (non-compliant with diuretics, no hx of LVP), hx of small esophageal varices on last EGD (6/2020) (non-compliant with BB, never banded), with recent hospitalization (6/2020 for leg swelling, anemia) presented with malaise, generalized weakness for 1-2 weeks, found to have worsening liver function, with MELD score 32 compared to 23 on prior admission, electrolyte abnormalities, and questionable blood in stool (reported black stools) or vomitus. Not encephalopathic, Cr is ~ at baseline, and no fever, abdominal pain.     Decompensated cirrhosis w ascites, small EV, MELD 32, etiology most likely EtOH, active drinker    #Worsening of liver tests (MELD from 23 to 32), ACLF  - would repeat acute hepatitis panel   - need to r/o SPB as below, also would obtain UA and repeat CXR  - discussed quitting EtOH, also pt would need to bring in what "shakes" exactly drinking and avoid all herbal supplements  - avoid hepatotoxic meds, avoid NSAIDs    #Small EV on last EGD, was non compliant w BB, no overt bleeding but slightly worsening anemia  - D/w GI possible EGD (discuss target INR prior potential procedure)  - given reported black stool would monitor H/H closely and would hold nadolol for now  - keep Hb >7, keep T/S active, 2 large bore iv lines   - would give po 5 mg vit K for 3 days    #Ascites  - Please repeat US abdomen  - As per AASLD guidelines every cirrhotic patient admitted to hospital, would need diagnostic paracentesis to r/o SBP (concern given worsening liver tests), if on US abdomen moderate amount of ascites, please d/w IR (also discuss target INR)  - Would hold diuretics for now, due to the worsening liver tests, pending dx paracentesis  - When ascites sent, please send for albumin, protein, cell count and differential, Gram stain, culture   - Continue to monitor renal function closely, along with electrolytes       #No evidence of hepatic encephalopathy this time, AAOX3, no asterixis, can give lactulose to have 2-3 soft BM/day, avoid long acting BZD    #HCC screening - last US and AFP in 6/2020, no focal mass, nl AFP, next due 12/2020  #Vaccinations: Check Hep A and B immunity (Hep A IgG, HBsAb, HBcAb total) along with the acute hepatitis panel as above and offer vaccination if not immune.    # EtOH abuse  Emphasized the importance of abstinence, had lengthy discussion about quitting drinking  C/w CIWA protocol, c/w folic, thiamin, monitor electrolytes, including, Mg and P, and replace as needed     Will continue to follow 33yo male with hx of chronic alcohol abuse (currently drinking), decompensated cirrhosis with ascites (non-compliant with diuretics, no hx of LVP), hx of small esophageal varices on last EGD (6/2020) (non-compliant with BB, never banded), with recent hospitalization (6/2020 for leg swelling, anemia) presented with malaise, generalized weakness for 1-2 weeks, found to have worsening liver function, with MELD score 32 compared to 23 on prior admission, electrolyte abnormalities, and questionable blood in stool (reported black stools) or vomitus. Not encephalopathic, Cr is ~ at baseline, and no fever, abdominal pain.     Decompensated cirrhosis w ascites, small EV, MELD 32, etiology most likely EtOH, active drinker    #Worsening of liver tests (MELD from 23 to 32), ACLF  - would repeat acute hepatitis panel   - need to r/o SPB as below, also would obtain UA and repeat CXR  - discussed quitting EtOH, also pt would need to bring in what "shakes" exactly drinking and avoid all herbal supplements  - avoid hepatotoxic meds, avoid NSAIDs    #Small EV on last EGD, was non compliant w BB, no overt bleeding but slightly worsening anemia  - D/w GI possible EGD (discuss target INR prior potential procedure)  - given reported black stool would monitor H/H closely and would hold nadolol for now  - keep Hb >7, keep T/S active, 2 large bore iv lines   - would give 10 mg vit K    #Ascites  - Please repeat US abdomen  - As per AASLD guidelines every cirrhotic patient admitted to hospital, would need diagnostic paracentesis to r/o SBP (concern given worsening liver tests), if on US abdomen moderate amount of ascites, please d/w IR (also discuss target INR)  - Would hold diuretics for now, due to the worsening liver tests, pending dx paracentesis  - When ascites sent, please send for albumin, protein, cell count and differential, Gram stain, culture   - Continue to monitor renal function closely, along with electrolytes       #No evidence of hepatic encephalopathy this time, AAOX3, no asterixis, can give lactulose to have 2-3 soft BM/day, avoid long acting BZD    #HCC screening - last US and AFP in 6/2020, no focal mass, nl AFP, next due 12/2020  #Vaccinations: Check Hep A and B immunity (Hep A IgG, HBsAb, HBcAb total) along with the acute hepatitis panel as above and offer vaccination if not immune.    # EtOH abuse  Emphasized the importance of abstinence, had lengthy discussion about quitting drinking  C/w CIWA protocol, c/w folic, thiamin, monitor electrolytes, including, Mg and P, and replace as needed     Will continue to follow 33yo male with hx of chronic alcohol abuse (currently drinking), decompensated cirrhosis with ascites (non-compliant with diuretics, no hx of LVP), hx of small esophageal varices on last EGD (6/2020) (non-compliant with BB, never banded), with recent hospitalization (6/2020 for leg swelling, anemia) presented with malaise, generalized weakness for 1-2 weeks, found to have worsening liver function, with MELD score 32 compared to 23 on prior admission, electrolyte abnormalities, and questionable blood in stool (reported black stools) or vomitus. Not encephalopathic, Cr is ~ at baseline, and no fever, abdominal pain.     Decompensated cirrhosis w ascites, small EV, MELD 32, etiology most likely EtOH, active drinker    # Acute worsening of liver tests (MELD from 23 to 32), without evidence of additional organ failure this time  - would repeat acute hepatitis panel (Hep A IgM, HBsAg, HBcAb IgM, HCV ab, HCV PCR), along with Hep A IgG, HBcAb total, HBsAb to see whether exposed / immune in past, and along with Hep E IgM and IgG  - need to r/o SPB as below, also would obtain UA and repeat CXR  - discussed quitting EtOH, also pt would need to bring in what "shakes" exactly drinking and avoid all herbal supplements  - avoid hepatotoxic meds, avoid NSAIDs  - c/w monitoring LFTs closely, including INR, at least q12 hrs  - discussed EtOH abstinence    #Small EV on last EGD, was non compliant w BB, no overt bleeding but slightly worsening anemia  - D/w GI possible EGD (discuss target INR prior potential procedure)  - given reported black stool would monitor H/H closely and would hold nadolol for now  - keep Hb >7, keep T/S active, 2 large bore iv lines   - would give 10 mg vit K    #Ascites  - Please repeat US abdomen  - As per AASLD guidelines every cirrhotic patient admitted to hospital, would need diagnostic paracentesis to r/o SBP (concern given worsening liver tests), if on US abdomen moderate amount of ascites, please d/w IR (also discuss target INR)  - Would hold diuretics for now, due to the worsening liver tests, pending dx paracentesis  - When ascites sent, please send for albumin, protein, cell count and differential, Gram stain, culture   - Continue to monitor renal function closely, along with electrolytes       #No evidence of hepatic encephalopathy this time, AAOX3, no asterixis, can give lactulose to have 2-3 soft BM/day, avoid long acting BZD    #HCC screening - last US and AFP in 6/2020, no focal mass, nl AFP, next due 12/2020  #Vaccinations: Check Hep A and B immunity (Hep A IgG, HBsAb, HBcAb total) along with the acute hepatitis panel as above and offer vaccination if not immune.    # EtOH abuse  Emphasized the importance of abstinence, had lengthy discussion about quitting drinking  C/w CIWA protocol, c/w folic, thiamin, monitor electrolytes, including, Mg and P, and replace as needed     Will continue to follow 33yo male with hx of chronic alcohol abuse (currently drinking), decompensated cirrhosis with ascites (non-compliant with diuretics, no hx of LVP), hx of small esophageal varices on last EGD (6/2020) (non-compliant with BB, never banded), with recent hospitalization (6/2020 for leg swelling, anemia) presented with malaise, generalized weakness for 1-2 weeks, found to have worsening liver function, with MELD score 32 compared to 23 on prior admission, electrolyte abnormalities, and questionable blood in stool (reported black stools) or vomitus. Not encephalopathic, Cr is ~ at baseline, and no fever, abdominal pain.     Decompensated cirrhosis w ascites, small EV, MELD 32, etiology most likely EtOH, active drinker    # Acute worsening of liver tests (MELD from 23 to 32), without evidence of additional organ failure this time  - would repeat acute hepatitis panel (Hep A IgM, HBsAg, HBcAb IgM, HCV ab, HCV PCR), along with Hep A IgG, HBcAb total, HBsAb to see whether exposed / immune in past, and along with Hep E IgM and IgG  - need to r/o SPB as below, also would obtain UA and repeat CXR  - discussed quitting EtOH, also pt would need to bring in what "shakes" exactly drinking and avoid all herbal supplements  - avoid hepatotoxic meds, avoid NSAIDs  - c/w monitoring LFTs closely, including INR, at least q12 hrs  - discussed EtOH abstinence (patient is actively drinking this time    #Small EV on last EGD, was non compliant w BB, no overt bleeding but slightly worsening anemia  - Please d/w GI possible EGD (discuss target INR prior potential procedure)  - given reported black stool would monitor H/H closely and would hold nadolol for now  - keep Hb >7, keep T/S active, 2 large bore iv lines   - would give 10 mg vit K    #Ascites  - Please repeat US abdomen  - As per AASLD guidelines every cirrhotic patient admitted to hospital, would need diagnostic paracentesis to r/o SBP (concern given worsening liver tests), if on US abdomen moderate amount of ascites, please d/w IR (also discuss target INR)  - Would hold diuretics for now, due to the worsening liver tests, pending dx paracentesis  - When ascites sent, please send for albumin, protein, cell count and differential, Gram stain, culture   - Continue to monitor renal function closely, along with electrolytes       #No evidence of hepatic encephalopathy this time, AAOX3, no asterixis, can give lactulose to have 2-3 soft BM/day, avoid long acting BZD    #HCC screening - last US and AFP in 6/2020, no focal mass, nl AFP, next due 12/2020  #Vaccinations: Check Hep A and B immunity (Hep A IgG, HBsAb, HBcAb total) along with the acute hepatitis panel as above and offer vaccination if not immune.    # EtOH abuse  Emphasized the importance of abstinence, had lengthy discussion about quitting drinking  C/w CIWA protocol, c/w folic, thiamin, monitor electrolytes, including, Mg and P, and replace as needed     Will continue to follow

## 2020-09-15 NOTE — H&P ADULT - PROBLEM SELECTOR PLAN 5
Patients Hb is low.  stable as compared to june 2020, no need for PRBC as of now.  MCV 91.6  Follow anemia panel, Serum iron, TIBC, Ferritin, LDH , B12, Folate .

## 2020-09-15 NOTE — H&P ADULT - PROBLEM SELECTOR PLAN 1
Patient has hx of Alcohol abuse .  drinks beer every day.  will continue with Ciwa protocol, thiamine and folic acid .  started him on clear liquid diet .  advance as tolerated .

## 2020-09-15 NOTE — PROGRESS NOTE ADULT - SUBJECTIVE AND OBJECTIVE BOX
34 year old male with PMH of alcohol abuse, liver cirrhosis , and esophageal varices presented to ED with complaints of weakness . Patient states that he drinks every day and his last drink was 2 days ago, drinks 4-5 cans of beer every day.  For the past week he was having hematemesis and blood in stools.  Patient was recently admitted to hospital in June 2020 and US abdomen was done showed cirrhotic liver and significant ascites. Patient underwent EGD and it showed small esophageal varices distally, not banded as patient did not get FFP at that time. Biopsy not taken due to elevated INR. Pt is admitted to medicine for ETOH abuse, on CIWA protocol. He was found to have worsening liver function, with MELD score 32 compared to 23 on prior admission, electrolyte abnormalities, and questionable blood in stool (reported black stools) or vomitus. GI was reconsulted, no EGD at this time. Hepatology following-recommending diagnostic paracentesis, currently on hold due to elevated INR.     INTERVAL HPI/OVERNIGHT EVENTS: Seen at bedside, complaints of mild abdominal pain.     MEDICATIONS  (STANDING):  folic acid 1 milliGRAM(s) Oral daily  lactulose Syrup 10 Gram(s) Oral two times a day  LORazepam   Injectable 2 milliGRAM(s) IV Push every 6 hours  magnesium sulfate  IVPB 1 Gram(s) IV Intermittent once  phytonadione   Solution 2.5 milliGRAM(s) Oral once  potassium chloride    Tablet ER 40 milliEquivalent(s) Oral every 4 hours  potassium phosphate / sodium phosphate Powder (PHOS-NaK) 1 Packet(s) Oral two times a day  thiamine IVPB 500 milliGRAM(s) IV Intermittent daily    MEDICATIONS  (PRN):  LORazepam   Injectable 2 milliGRAM(s) IV Push every 2 hours PRN CIWA-Ar score increase by 2 points and a total score of 7 or less    __________________________________________________  REVIEW OF SYSTEMS:    CONSTITUTIONAL: No fever,   RESPIRATORY: No cough; No shortness of breath  CARDIOVASCULAR: No chest pain, no palpitations  GASTROINTESTINAL: Complaints of mild abdominal pain   NEUROLOGICAL: No headache or numbness, no tremors  MUSCULOSKELETAL: No joint pain, no muscle pain  GENITOURINARY: no dysuria, no frequency, no hesitancy  PSYCHIATRY: no depression , no anxiety  ALL OTHER  ROS negative      Vital Signs Last 24 Hrs  T(C): 36.4 (15 Sep 2020 13:13), Max: 36.6 (15 Sep 2020 01:34)  T(F): 97.5 (15 Sep 2020 13:13), Max: 97.9 (15 Sep 2020 01:34)  HR: 81 (15 Sep 2020 13:13) (81 - 94)  BP: 101/55 (15 Sep 2020 13:13) (101/55 - 116/66)  BP(mean): --  RR: 17 (15 Sep 2020 13:13) (16 - 18)  SpO2: 98% (15 Sep 2020 13:13) (97% - 98%)    ________________________________________________  PHYSICAL EXAM:  GENERAL: NAD  CHEST/LUNG: Clear to auscultation bilaterally with good air entry   HEART: S1 S2  regular; no murmurs, gallops or rubs  ABDOMEN: soft, mildly distended, non tender   EXTREMITIES: no cyanosis; no edema; no calf tenderness  SKIN: warm and dry; no rash  NERVOUS SYSTEM:  Awake and alert; Oriented  to place, person and time ; no new deficits    _________________________________________________  LABS:                        7.7    4.81  )-----------( 51       ( 15 Sep 2020 06:01 )             21.1     09-15    132<L>  |  94<L>  |  3<L>  ----------------------------<  143<H>  3.2<L>   |  32<H>  |  0.57    Ca    6.8<L>      15 Sep 2020 15:47  Phos  2.4     09-15  Mg     1.3     09-15    TPro  7.7  /  Alb  1.4<L>  /  TBili  7.4<H>  /  DBili  5.2<H>  /  AST  243<H>  /  ALT  72<H>  /  AlkPhos  250<H>  09-15    PT/INR - ( 15 Sep 2020 00:23 )   PT: 45.3 sec;   INR: 4.14 ratio         PTT - ( 15 Sep 2020 00:23 )  PTT:55.3 sec    CAPILLARY BLOOD GLUCOSE    RADIOLOGY & ADDITIONAL TESTS:    < from: US Hepatic & Pancreatic (09.15.20 @ 11:41) >  IMPRESSION:    Cirrhosis.  Cholelithiasis.  No biliary ductal dilatation.  Mild ascites.      Imaging  Reviewed:  YES    Consultant(s) Notes Reviewed:   Hepatology       Plan of care was discussed with patient and /or primary care giver; all questions and concerns were addressed

## 2020-09-15 NOTE — H&P ADULT - PROBLEM SELECTOR PLAN 3
Patients has hx of liver cirrhosis .  Patient has elevated liver enzymes with elevated biliribin of 7.8.  INR is 4 and has thrombocytopenia   MELDs score 32 . 3 month mortality 52.6%  Patient will need liver transplant eventually .  GI consult Dr Gerber Patients has hx of liver cirrhosis .  Patient has elevated liver enzymes with elevated biliribin of 7.8.  INR is 4 and has thrombocytopenia   MELDs score 32 . 3 month mortality 52.6%  Child-Bojorquez Score for Cirrhosis Mortality= 13 points (Bilirubin >3, Albumin<2.8, INR>2.2, Ascites= Slight, Encephalopathy= Grade 1-2), Child Class C, Life Expectancy : 1-3 years, Abdominal surgery molly-operative mortality: 82%  -MELD Score= 32 points, 52.6% Estimated 3-Month Mortality        GI consult Dr Gerber

## 2020-09-15 NOTE — PATIENT PROFILE ADULT - BRADEN SENSORY
Improving-1 BM in last 24 hours  - Patient with profuse diarrhea 7 X daily of watery stool in the setting of daily keflex  - C diff Positive, first episode of C. diff colitis  - Continue with Vancomycin po 125 mg q6 X 10 days  - Will hold home pantoprazole  given active c difficile infection and home donepezil given side effects of diarrhea (4) no impairment

## 2020-09-16 NOTE — PROGRESS NOTE ADULT - PROBLEM SELECTOR PLAN 1
Pt with ETOH abuse  C/w Thiamine, folic acid   CIWA protocol  Maintain seizure precautions Pt with ETOH abuse  Ativan 2 mg IV q 2 hrs PRN and Ativan 2 mg IV q8 hrs standing  C/w Ativan taper   C/w Thiamine, folic acid   CIWA protocol  Maintain seizure precautions Pt with ETOH abuse  Ativan 2 mg IV q 2 hrs PRN and Ativan 2 mg IV q12 hrs standing  C/w Ativan taper   C/w Thiamine, folic acid   CIWA protocol  Maintain seizure precautions

## 2020-09-16 NOTE — PROGRESS NOTE ADULT - SUBJECTIVE AND OBJECTIVE BOX
Chief Complaint:  Patient is a 34y old  Male who presents with a chief complaint of Alcohol abuse (15 Sep 2020 16:57)     ID: [id number]    Reason for consult: Decompensated cirrhosis    Interval Events:   Patient was seen and examined at bedside, no acute events overnight. He remained afebrile, normotensive, not tachycardic, and awake, alert, oriented x3, and with stable / improving Cr. He still denies any abdominal pain, N/V, he had 2 formed BM today and denies melena or blood in it, Hb 7.7-7.4. His hepatic panel not available from today yet. INR 3.68 (got 1 dose Vit K yesterday and another one today).  Pending diagnostic paracentesis.     Hospital Medications:  folic acid 1 milliGRAM(s) Oral daily  lactulose Syrup 10 Gram(s) Oral two times a day  LORazepam   Injectable 2 milliGRAM(s) IV Push every 2 hours PRN  LORazepam   Injectable 1 milliGRAM(s) IV Push every 8 hours  mupirocin 2% Nasal 1 Application(s) Nasal two times a day  thiamine IVPB 500 milliGRAM(s) IV Intermittent daily      ROS:   General:  No  fevers, chills, night sweats, fatigue  Eyes:  Good vision, no reported pain  ENT:  No sore throat, pain, runny nose  CV:  No pain, palpitations  Pulm:  No dyspnea, cough  GI:  See HPI, otherwise negative  :  No  incontinence, nocturia  Muscle:  No pain, weakness  Neuro:  No memory problems, AAOx3  Psych:  No insomnia, mood problems, depression  Endocrine:  No polyuria, polydipsia, cold/heat intolerance  Heme:  Some old ecchymosis  Skin:  No rash    PHYSICAL EXAM:   Vital Signs:  Vital Signs Last 24 Hrs  T(C): 37.1 (16 Sep 2020 12:59), Max: 37.1 (15 Sep 2020 20:19)  T(F): 98.7 (16 Sep 2020 12:59), Max: 98.7 (15 Sep 2020 20:19)  HR: 85 (16 Sep 2020 12:59) (85 - 97)  BP: 120/73 (16 Sep 2020 12:59) (101/60 - 149/81)  BP(mean): 70 (16 Sep 2020 00:55) (70 - 70)  RR: 16 (16 Sep 2020 12:59) (16 - 16)  SpO2: 97% (16 Sep 2020 12:59) (94% - 97%)  Daily     Daily     GENERAL: no acute distress  NEURO: alert, oriented x3, no asterixis  HEENT: icteric sclera  CHEST: no respiratory distress, no accessory muscle use, barrel chest  CARDIAC: regular rate, rhythm  ABDOMEN: soft, mildly distended with positive fluid wave, non-tender, no rebound or guarding, neg Field  EXTREMITIES: warm, well perfused, no edema  SKIN: no lesions noted    LABS: reviewed                        7.4    4.24  )-----------( 51       ( 16 Sep 2020 06:32 )             20.9     09-16    132<L>  |  95<L>  |  2<L>  ----------------------------<  87  3.7   |  32<H>  |  0.51    Ca    7.2<L>      16 Sep 2020 09:32  Phos  2.6     09-16  Mg     1.4     09-16    TPro  7.7  /  Alb  1.4<L>  /  TBili  7.4<H>  /  DBili  5.2<H>  /  AST  243<H>  /  ALT  72<H>  /  AlkPhos  250<H>  09-15    LIVER FUNCTIONS - ( 15 Sep 2020 06:01 )  Alb: 1.4 g/dL / Pro: 7.7 g/dL / ALK PHOS: 250 U/L / ALT: 72 U/L DA / AST: 243 U/L / GGT: x             Interval Diagnostic Studies: see sunrise for full report

## 2020-09-16 NOTE — PROGRESS NOTE ADULT - PROBLEM SELECTOR PLAN 8
Likely discharge home pending diagnostic paracentesis, and improvement in anemia/electrolytes  SW and CM following

## 2020-09-16 NOTE — PROGRESS NOTE ADULT - SUBJECTIVE AND OBJECTIVE BOX
34y old  Male who presents with alcohol withdrawal     Patient was seen and examined at bedside   Denies any complains including abd pain   CIWA 3    INTERVAL HPI/OVERNIGHT EVENTS:  T(C): 37.1 (20 @ 12:59), Max: 37.1 (20 @ 05:24)  HR: 85 (20 @ 12:59) (85 - 97)  BP: 120/73 (20 @ 12:59) (101/60 - 149/81)  RR: 16 (20 @ 12:59) (16 - 16)  SpO2: 97% (20 @ 12:59) (96% - 97%)  Wt(kg): --  I&O's Summary      REVIEW OF SYSTEMS: denies fever, chills, SOB, palpitations, chest pain, abdominal pain, nausea, vomiting diarrhea, constipation, dizziness    MEDICATIONS  (STANDING):  folic acid 1 milliGRAM(s) Oral daily  lactulose Syrup 10 Gram(s) Oral three times a day  LORazepam   Injectable 2 milliGRAM(s) IV Push every 12 hours  mupirocin 2% Nasal 1 Application(s) Nasal two times a day  thiamine IVPB 500 milliGRAM(s) IV Intermittent daily    MEDICATIONS  (PRN):  LORazepam   Injectable 2 milliGRAM(s) IV Push every 2 hours PRN CIWA-Ar score increase by 2 points and a total score of 7 or less      PHYSICAL EXAM:  GENERAL: NAD  NERVOUS SYSTEM:  Alert & Oriented X2-3, Good concentration; no focal deficit   CHEST/LUNG: Clear to  auscultation bilaterally; No rales, rhonchi, wheezing, or rubs  HEART: Regular rate and rhythm; No murmurs, rubs, or gallops  ABDOMEN: Soft, Nontender, distended; Bowel sounds present  EXTREMITIES:  2+ Peripheral Pulses, No clubbing, cyanosis, or edema  SKIN: No rashes or lesions    LABS:                        7.4    4.24  )-----------( 51       ( 16 Sep 2020 06:32 )             20.9     132<L>  |  95<L>  |  2<L>  ----------------------------<  87  3.7   |  32<H>  |  0.51    Ca    7.2<L>      16 Sep 2020 09:32  Phos  2.6     -  Mg     1.4         TPro  7.7  /  Alb  1.4<L>  /  TBili  7.4<H>  /  DBili  5.2<H>  /  AST  243<H>  /  ALT  72<H>  /  AlkPhos  250<H>  15    PT/INR - ( 16 Sep 2020 06:32 )   PT: 40.5 sec;   INR: 3.68 ratio         PTT - ( 16 Sep 2020 06:32 )  PTT:59.7 sec  Urinalysis Basic - ( 16 Sep 2020 06:33 )    Color: Brown / Appearance: Clear / S.010 / pH: x  Gluc: x / Ketone: Negative  / Bili: Moderate / Urobili: 12   Blood: x / Protein: 30 mg/dL / Nitrite: Negative   Leuk Esterase: Trace / RBC: 0-2 /HPF / WBC 3-5 /HPF   Sq Epi: x / Non Sq Epi: Few /HPF / Bacteria: Moderate /HPF      CAPILLARY BLOOD GLUCOSE            Urinalysis Basic - ( 16 Sep 2020 06:33 )    Color: Brown / Appearance: Clear / S.010 / pH: x  Gluc: x / Ketone: Negative  / Bili: Moderate / Urobili: 12   Blood: x / Protein: 30 mg/dL / Nitrite: Negative   Leuk Esterase: Trace / RBC: 0-2 /HPF / WBC 3-5 /HPF   Sq Epi: x / Non Sq Epi: Few /HPF / Bacteria: Moderate /HPF

## 2020-09-16 NOTE — PROGRESS NOTE ADULT - ASSESSMENT
35yo male with hx of chronic alcohol abuse (actively drinking), decompensated cirrhosis with ascites (non-compliant with diuretics, no hx of LVP), hx of small esophageal varices on last EGD (6/2020) (non-compliant with BB, never banded), with recent hospitalization (6/2020 for leg swelling, anemia) presented with malaise, generalized weakness for 1-2 weeks, found to have worsening liver function, with MELD score 32 compared to 23 on prior admission, electrolyte abnormalities, and questionable blood in stool (reported black stools) or vomitus. Acute worsening might have been caused by heavy alcohol intake, but need to rule out other causes, including infectious, pending workup. Denies taking any medications, herbal supplements.     Decompensated cirrhosis w ascites, small EV, MELD 32 on admission,  but patient is still active drinker    #Etiology of cirrhosis  - while most likely ETOH cirrhosis, given the young age would check ceruloplasmin, LONNY, anti SMA, anti LKM, IgG, alpha1-antitrypsin    # Acute worsening of liver tests (MELD from 23 to 32, today labs pending), but still without evidence of additional organ failure this time  - c/w monitoring LFTs closely, including INR, at least q12 hrs, please obtain hepatic panel for today  - pending diagnostic paracentesis to r/o SBP  - UA 3-5 WBC, 0-2 RBC, CXR no infiltrate on portable 1 view, afebrile, no leukocytosis  - viral workup in process/ordered, including acute hepatitis panel, Hep E IgM/IgG; please add HCV PCR (ab might be neg in acute infection), HBcAb total, HB surface antibody (to check whether exposed, immune); EBV and CMV PCR  - avoid hepatotoxic meds, avoid NSAIDs   - pt again advised on alcohol abstinence      #Small EV on last EGD (no report available whether there were high risk signs, but he was started on BB in June), was non compliant w BB, no overt bleeding but slightly worsening anemia, today Hb 7.4 (from 7.7)  - discussed with GI Dr. Gerber, no endoscopy for now  - keep Hb >7, keep T/S active, 2 large bore iv lines   - received vit K today and yesterday (due to the pending dx paracentesis)  - c/w monitoring H/H and call GI if any bleeding or significant Hb drop      #Ascites  - Repeat US abdomen was done, showing the cirrhosis and ascites, but no information available on splanchnic vasculature, please obtain US with Doppler to evaluate portal, hepatic and splenic veins  - Every cirrhotic patient admitted to hospital, would need diagnostic paracentesis to r/o SBP (in this case additional concern given worsening liver tests); discussed with IR, and IR requested further correction of INR prior paracentesis to minimize bleeding risk. As per IR tentatively scheduled for tomorrow after checking am INR. Notably, "there is no data-supported cutoff of coagulation parameters beyond which paracentesis should be avoided. " ([facility]ASLD Guidelines 2012)  - Would continue holding diuretics for now, due to the worsening liver tests, pending dx paracentesis  - When ascites sent, please send for albumin, protein, cell count and differential, Gram stain, culture   - Continue to monitor renal function closely, Cr downtrending (0.60-0.51), was around his baseline on admission, c/w monitoring electrolytes (still very low Mg).     #No evidence of hepatic encephalopathy this time, AAOX3, no asterixis, c/w lactulose to have 2-3 soft BM/day, avoid long acting BZD, could titrate down Ativan     #Anemia and thrombocytopenia  - smear normal morphology   - PLT >50, Hb w minimal downtrend (7.7-7.4), without overt bleeding    #HCC screening - last US and AFP in 6/2020, no focal mass, nl AFP, next due 12/2020  #Vaccinations: Follow up Hep A and B immunity (Hep A IgG, HBsAb, HBcAb total) along with the acute hepatitis panel as above and offer vaccination if not immune.    # EtOH abuse  Again emphasized the importance of abstinence, had lengthy discussion about quitting drinking  C/w CIWA protocol, c/w folic, thiamin, monitor electrolytes, including, Mg and P, and replace as needed     Will continue to follow  D/w primary team and IR

## 2020-09-16 NOTE — PROGRESS NOTE ADULT - ASSESSMENT
Alcohol withdrawal   Acute decompensation of Cirrhosis of liver with ascites and esophageal varices   Anemia  Thrombocytopenia  Elevated INR  Severe electrolyte imbalance        Plan:   Decreased Ativan to 2mg q12  Monitor CIWA  Monitor CBC, LFT  S/p one dose PO vit K yesterday, will give one more dose, IR for paracentesis is INR acceptable range around 2  Hepatitis panel   Cont Lactulose, ensure 2-3 BM every day  Appreciate Hepatology consult   Counselled about Alcohol abstinence   SW consult

## 2020-09-16 NOTE — PROGRESS NOTE ADULT - SUBJECTIVE AND OBJECTIVE BOX
34 year old male with PMH of alcohol abuse, liver cirrhosis , and esophageal varices presented to ED with complaints of weakness . Patient states that he drinks every day and his last drink was 2 days ago, drinks 4-5 cans of beer every day.  For the past week he was having hematemesis and blood in stools.  Patient was recently admitted to hospital in 2020 and US abdomen was done showed cirrhotic liver and significant ascites. Patient underwent EGD and it showed small esophageal varices distally, not banded as patient did not get FFP at that time. Biopsy not taken due to elevated INR. Pt is admitted to medicine for ETOH abuse, on CIWA protocol. He was found to have worsening liver function, with MELD score 32 compared to 23 on prior admission, electrolyte abnormalities, and questionable blood in stool (reported black stools) or vomitus. GI was reconsulted, no EGD at this time. Hepatology following-recommending diagnostic paracentesis, currently on hold due to elevated INR. INR is 3.68, Vitamin 5 mg ordered. Tentatively planned for diagnostic paracentesis tomorrow, pending AM INR.     INTERVAL HPI/OVERNIGHT EVENTS: no new complaints    MEDICATIONS  (STANDING):  folic acid 1 milliGRAM(s) Oral daily  lactulose Syrup 10 Gram(s) Oral two times a day  LORazepam   Injectable 1 milliGRAM(s) IV Push every 8 hours  mupirocin 2% Nasal 1 Application(s) Nasal two times a day  thiamine IVPB 500 milliGRAM(s) IV Intermittent daily    MEDICATIONS  (PRN):  LORazepam   Injectable 2 milliGRAM(s) IV Push every 2 hours PRN CIWA-Ar score increase by 2 points and a total score of 7 or less    __________________________________________________  REVIEW OF SYSTEMS:    CONSTITUTIONAL: No fever,   EYES: no acute visual disturbances  NECK: No pain or stiffness  RESPIRATORY: No cough; No shortness of breath  CARDIOVASCULAR: No chest pain, no palpitations  GASTROINTESTINAL: No pain. No nausea or vomiting; No diarrhea   NEUROLOGICAL: No headache or numbness, no tremors  MUSCULOSKELETAL: No joint pain, no muscle pain  GENITOURINARY: no dysuria, no frequency, no hesitancy  PSYCHIATRY: no depression , no anxiety  ALL OTHER  ROS negative      Vital Signs Last 24 Hrs  T(C): 37.1 (16 Sep 2020 12:59), Max: 37.1 (15 Sep 2020 20:19)  T(F): 98.7 (16 Sep 2020 12:59), Max: 98.7 (15 Sep 2020 20:19)  HR: 85 (16 Sep 2020 12:59) (85 - 97)  BP: 120/73 (16 Sep 2020 12:59) (101/60 - 149/81)  BP(mean): 70 (16 Sep 2020 00:55) (70 - 70)  RR: 16 (16 Sep 2020 12:59) (16 - 16)  SpO2: 97% (16 Sep 2020 12:59) (94% - 97%)    ________________________________________________  PHYSICAL EXAM:  GENERAL: NAD  CHEST/LUNG: Clear to auscultation bilaterally with good air entry   HEART: S1 S2  regular; no murmurs, gallops or rubs  ABDOMEN: Soft, mildly distended  EXTREMITIES: no cyanosis; no edema; no calf tenderness  SKIN: warm and dry; no rash  NERVOUS SYSTEM:  Awake and alert; Oriented  to place, person and time ; no new deficits    _________________________________________________  LABS:                        7.4    4.24  )-----------( 51       ( 16 Sep 2020 06:32 )             20.9     09-16    132<L>  |  95<L>  |  2<L>  ----------------------------<  87  3.7   |  32<H>  |  0.51    Ca    7.2<L>      16 Sep 2020 09:32  Phos  2.6     09-16  Mg     1.4     09-16    TPro  7.7  /  Alb  1.4<L>  /  TBili  7.4<H>  /  DBili  5.2<H>  /  AST  243<H>  /  ALT  72<H>  /  AlkPhos  250<H>  09-15    PT/INR - ( 16 Sep 2020 06:32 )   PT: 40.5 sec;   INR: 3.68 ratio         PTT - ( 16 Sep 2020 06:32 )  PTT:59.7 sec  Urinalysis Basic - ( 16 Sep 2020 06:33 )    Color: Brown / Appearance: Clear / S.010 / pH: x  Gluc: x / Ketone: Negative  / Bili: Moderate / Urobili: 12   Blood: x / Protein: 30 mg/dL / Nitrite: Negative   Leuk Esterase: Trace / RBC: 0-2 /HPF / WBC 3-5 /HPF   Sq Epi: x / Non Sq Epi: Few /HPF / Bacteria: Moderate /HPF      CAPILLARY BLOOD GLUCOSE    RADIOLOGY & ADDITIONAL TESTS:    < from: US Hepatic & Pancreatic (09.15.20 @ 11:41) >  IMPRESSION:    Cirrhosis.  Cholelithiasis.  No biliary ductal dilatation.  Mild ascites.    Imaging  Reviewed:  YES    Consultant(s) Notes Reviewed:   YES      Plan of care was discussed with patient and /or primary care giver; all questions and concerns were addressed

## 2020-09-16 NOTE — PROGRESS NOTE ADULT - PROBLEM SELECTOR PLAN 2
Low trending H/H  No overt bleeding  No endoscopy for now per GI  Consider reconsulting GI if any bleeding or further drop in Hgb  CBC in AM  GI Dr. Gerber

## 2020-09-16 NOTE — PROGRESS NOTE ADULT - PROBLEM SELECTOR PLAN 4
Pt with hx of liver cirrhosis with mild ascites   US hepatic and pancreatic-see above  Diuretics on hold due to worsening liver fx test, pending diagnostic paracentesis-pending AM INR  F/u hep panel   Hepatologist following

## 2020-09-17 NOTE — CONSULT NOTE ADULT - ATTENDING COMMENTS
Assessment:  1. Alcohol withdrawal syndrome  2. Decompensated cirrhosis   3. Anemia  4. Thrombocytopenia   5. Ascites   6. Cholelithiasis     Plan  - Patient under treated for alcohol withdrawal  - Recommend to start Ativan regimen with break through as needed for elevated Ciwa until symptoms controlled  - Cont. 1:1 observation  - Monitor for signs of worsening delirium, at this time no evidence of hallucinations  - Oral diet, as wants to eat  - Will need diagnostic paracentesis once stable   - Hepatology recs noted  - No obvious signs of bleeding, can monitor hgb for now   - High dose thiamine supplementation, cont. folate supplementation   - Lactulose   - Cont. to monitor patient, if has worsening withdrawal symptoms will reevaluate for ICU admission Assessment:  1. Alcohol withdrawal syndrome  2. Decompensated cirrhosis   3. Anemia  4. Thrombocytopenia   5. Ascites   6. Cholelithiasis     Plan  - Patient under treated for alcohol withdrawal  - Recommend to start Ativan regimen with break through as needed for elevated Ciwa until symptoms controlled  - Cont. 1:1 observation  - Monitor for signs of worsening delirium, at this time no evidence of hallucinations  - Oral diet, as wants to eat  - Will need diagnostic paracentesis once stable   - Hepatology recs noted  - No obvious signs of bleeding, can monitor hgb for now   - High dose thiamine supplementation, cont. folate supplementation   - Lactulose   - Will transfer to ICU for closer monitoring and control of delirium

## 2020-09-17 NOTE — PROGRESS NOTE ADULT - PROBLEM SELECTOR PLAN 3
INR 3.6 yesterday  Received Vitamin K 5 mg   Goal INR is 2.0 per IR  F/u INR in AM INR 3.6 yesterday  Received Vitamin K 5 mg   Goal INR is 2.0 per IR  Will f/u INR

## 2020-09-17 NOTE — CHART NOTE - NSCHARTNOTEFT_GEN_A_CORE
34 year old male with PMH of alcohol abuse, liver cirrhosis , and esophageal varices presented to ED with complaints of weakness . Patient states that he drinks every day and his last drink was 2 days ago, drinks 4-5 cans of beer every day.  For the past week he was having hematemesis and blood in stools.  Patient was recently admitted to hospital in 2020 and US abdomen was done showed cirrhotic liver and significant ascites. Patient underwent EGD and it showed small esophageal varices distally, not banded as patient did not get FFP at that time. Biopsy not taken due to elevated INR. Pt is admitted to medicine for ETOH abuse, on CIWA protocol. He was found to have worsening liver function, with MELD score 32 compared to 23 on prior admission, electrolyte abnormalities, and questionable blood in stool (reported black stools) or vomitus. GI was reconsulted, no EGD at this time. Hepatology following-recommending diagnostic paracentesis, currently on hold due to elevated INR. Tentatively planned for diagnostic paracentesis tomorrow, pending AM INR.     INTERVAL HPI/OVERNIGHT EVENTS: Very agitated and confused this AM-CIWA 12, attempting to leave the floor several times despite redirection. Placed on 1:1 supervision.     MEDICATIONS  (STANDING):  chlorhexidine 2% Cloths 1 Application(s) Topical daily  folic acid 1 milliGRAM(s) Oral daily  lactulose Syrup 10 Gram(s) Oral three times a day  LORazepam   Injectable 2 milliGRAM(s) IV Push every 6 hours  mupirocin 2% Nasal 1 Application(s) Nasal two times a day  thiamine IVPB 500 milliGRAM(s) IV Intermittent daily    MEDICATIONS  (PRN):  LORazepam   Injectable 2 milliGRAM(s) IV Push every 2 hours PRN CIWA-Ar score increase by 2 points and a total score of 7 or less    __________________________________________________  REVIEW OF SYSTEMS:  Unable to assess due to confusion/agitation    Vital Signs Last 24 Hrs  T(C): 36.8 (17 Sep 2020 05:13), Max: 37.1 (16 Sep 2020 12:59)  T(F): 98.2 (17 Sep 2020 05:13), Max: 98.7 (16 Sep 2020 12:59)  HR: 115 (17 Sep 2020 05:13) (85 - 115)  BP: 122/80 (17 Sep 2020 05:13) (119/79 - 122/80)  BP(mean): --  RR: 16 (17 Sep 2020 05:13) (16 - 17)  SpO2: 99% (17 Sep 2020 05:13) (97% - 99%)    ________________________________________________  PHYSICAL EXAM:   GENERAL: NAD  CHEST/LUNG: Clear to auscultation bilaterally with good air entry   HEART: S1 S2  regular; no murmurs, gallops or rubs  ABDOMEN: Soft, mildly distended  EXTREMITIES: no cyanosis; LE with 2+ edema   SKIN: warm and dry; no rash; Jaundiced   NERVOUS SYSTEM:  Awake and alert; Oriented  to place, person and time ; no new deficits    _________________________________________________  LABS: Unable to obtain due to agitation per RN    PTT - ( 16 Sep 2020 06:32 )  PTT:59.7 sec  Urinalysis Basic - ( 16 Sep 2020 06:33 )    Color: Brown / Appearance: Clear / S.010 / pH: x  Gluc: x / Ketone: Negative  / Bili: Moderate / Urobili: 12   Blood: x / Protein: 30 mg/dL / Nitrite: Negative   Leuk Esterase: Trace / RBC: 0-2 /HPF / WBC 3-5 /HPF   Sq Epi: x / Non Sq Epi: Few /HPF / Bacteria: Moderate /HPF      CAPILLARY BLOOD GLUCOSE    RADIOLOGY & ADDITIONAL TESTS:    < from: US Hepatic & Pancreatic (09.15.20 @ 11:41) >  IMPRESSION:    Cirrhosis.  Cholelithiasis.  No biliary ductal dilatation.  Mild ascites.    Imaging  Reviewed:  YES    Consultant(s) Notes Reviewed:   YES      Plan of care was discussed with patient and /or primary care giver; all questions and concerns were addressed

## 2020-09-17 NOTE — PROCEDURE NOTE - NSSITEPREP_SKIN_A_CORE
povidone iodine (if allergic to chlorhexidine)/Adherence to aseptic technique: hand hygiene prior to donning barriers (gown, gloves), don cap and mask, sterile drape over patient/chlorhexidine/povidone-iodine ( under 2 weeks of age or 1500 grams) Adherence to aseptic technique: hand hygiene prior to donning barriers (gown, gloves), don cap and mask, sterile drape over patient/chlorhexidine

## 2020-09-17 NOTE — PROGRESS NOTE ADULT - SUBJECTIVE AND OBJECTIVE BOX
34 year old male with PMH of alcohol abuse, liver cirrhosis , and esophageal varices presented to ED with complaints of weakness . Patient states that he drinks every day and his last drink was 2 days ago, drinks 4-5 cans of beer every day.  For the past week he was having hematemesis and blood in stools.  Patient was recently admitted to hospital in 2020 and US abdomen was done showed cirrhotic liver and significant ascites. Patient underwent EGD and it showed small esophageal varices distally, not banded as patient did not get FFP at that time. Biopsy not taken due to elevated INR. Pt is admitted to medicine for ETOH abuse, on CIWA protocol. He was found to have worsening liver function, with MELD score 32 compared to 23 on prior admission, electrolyte abnormalities, and questionable blood in stool (reported black stools) or vomitus. GI was reconsulted, no EGD at this time. Hepatology following-recommending diagnostic paracentesis, currently on hold due to elevated INR. Tentatively planned for diagnostic paracentesis tomorrow, pending INR this AM.       INTERVAL HPI/OVERNIGHT EVENTS: Very agitated and confused this AM-CIWA 12. Attempting to leave the unit despite redirection. Placed on 1:1 observation.     MEDICATIONS  (STANDING):  chlorhexidine 2% Cloths 1 Application(s) Topical daily  folic acid 1 milliGRAM(s) Oral daily  lactulose Syrup 10 Gram(s) Oral three times a day  LORazepam   Injectable 2 milliGRAM(s) IV Push every 6 hours  mupirocin 2% Nasal 1 Application(s) Nasal two times a day  thiamine IVPB 500 milliGRAM(s) IV Intermittent daily    MEDICATIONS  (PRN):  LORazepam   Injectable 2 milliGRAM(s) IV Push every 2 hours PRN CIWA-Ar score increase by 2 points and a total score of 7 or less    __________________________________________________  REVIEW OF SYSTEMS: Unable to assess due to agitation     Vital Signs Last 24 Hrs  T(C): 36.8 (17 Sep 2020 05:13), Max: 37.1 (16 Sep 2020 12:59)  T(F): 98.2 (17 Sep 2020 05:13), Max: 98.7 (16 Sep 2020 12:59)  HR: 115 (17 Sep 2020 05:13) (85 - 115)  BP: 122/80 (17 Sep 2020 05:13) (119/79 - 122/80)  BP(mean): --  RR: 16 (17 Sep 2020 05:13) (16 - 17)  SpO2: 99% (17 Sep 2020 05:13) (97% - 99%)    ________________________________________________  PHYSICAL EXAM:  GENERAL: NAD  CHEST/LUNG: Clear to auscultation bilaterally with good air entry   HEART: S1 S2  regular; no murmurs, gallops or rubs  ABDOMEN: Soft, mildly distended  EXTREMITIES: no cyanosis; no edema; no calf tenderness  SKIN: warm and dry; no rash, Jaundiced   NERVOUS SYSTEM:  Confused    _________________________________________________  LABS: Unable to obtain due to agitation              PTT - ( 16 Sep 2020 06:32 )  PTT:59.7 sec  Urinalysis Basic - ( 16 Sep 2020 06:33 )    Color: Brown / Appearance: Clear / S.010 / pH: x  Gluc: x / Ketone: Negative  / Bili: Moderate / Urobili: 12   Blood: x / Protein: 30 mg/dL / Nitrite: Negative   Leuk Esterase: Trace / RBC: 0-2 /HPF / WBC 3-5 /HPF   Sq Epi: x / Non Sq Epi: Few /HPF / Bacteria: Moderate /HPF      CAPILLARY BLOOD GLUCOSE    RADIOLOGY & ADDITIONAL TESTS:    < from: US Hepatic & Pancreatic (09.15.20 @ 11:41) >  IMPRESSION:    Cirrhosis.  Cholelithiasis.  No biliary ductal dilatation.  Mild ascites.    Imaging  Reviewed:  YES    Consultant(s) Notes Reviewed:   YES      Plan of care was discussed with patient and /or primary care giver; all questions and concerns were addressed 34 year old male with PMH of alcohol abuse, liver cirrhosis , and esophageal varices presented to ED with complaints of weakness . Patient states that he drinks every day and his last drink was 2 days ago, drinks 4-5 cans of beer every day.  For the past week he was having hematemesis and blood in stools.  Patient was recently admitted to hospital in 2020 and US abdomen was done showed cirrhotic liver and significant ascites. Patient underwent EGD and it showed small esophageal varices distally, not banded as patient did not get FFP at that time. Biopsy not taken due to elevated INR. Pt is admitted to medicine for ETOH abuse, on CIWA protocol. He was found to have worsening liver function, with MELD score 32 compared to 23 on prior admission, electrolyte abnormalities, and questionable blood in stool (reported black stools) or vomitus. GI was reconsulted, no EGD at this time. Hepatology following-recommending diagnostic paracentesis, currently on hold due to elevated INR. Planned for diagnostic paracentesis today, pending INR this AM however cancelled due to active withdrawal.     INTERVAL HPI/OVERNIGHT EVENTS: Very agitated and confused this AM-CIWA 12. Attempting to leave the unit despite redirection. Multiple code sandy called overnight and this morning. Placed on 1:1 observation. ICU consult placed.     MEDICATIONS  (STANDING):  chlorhexidine 2% Cloths 1 Application(s) Topical daily  folic acid 1 milliGRAM(s) Oral daily  lactulose Syrup 10 Gram(s) Oral three times a day  LORazepam   Injectable 2 milliGRAM(s) IV Push every 6 hours  mupirocin 2% Nasal 1 Application(s) Nasal two times a day  thiamine IVPB 500 milliGRAM(s) IV Intermittent daily    MEDICATIONS  (PRN):  LORazepam   Injectable 2 milliGRAM(s) IV Push every 2 hours PRN CIWA-Ar score increase by 2 points and a total score of 7 or less    __________________________________________________  REVIEW OF SYSTEMS: Unable to assess due to agitation     Vital Signs Last 24 Hrs  T(C): 36.8 (17 Sep 2020 05:13), Max: 37.1 (16 Sep 2020 12:59)  T(F): 98.2 (17 Sep 2020 05:13), Max: 98.7 (16 Sep 2020 12:59)  HR: 115 (17 Sep 2020 05:13) (85 - 115)  BP: 122/80 (17 Sep 2020 05:13) (119/79 - 122/80)  BP(mean): --  RR: 16 (17 Sep 2020 05:13) (16 - 17)  SpO2: 99% (17 Sep 2020 05:13) (97% - 99%)    ________________________________________________  PHYSICAL EXAM:  GENERAL: NAD  CHEST/LUNG: Clear to auscultation bilaterally with good air entry   HEART: S1 S2  regular; no murmurs, gallops or rubs  ABDOMEN: Soft, mildly distended  EXTREMITIES: no cyanosis; no edema; no calf tenderness  SKIN: warm and dry; no rash, Jaundiced   NERVOUS SYSTEM:  Confused    _________________________________________________  LABS: Unable to obtain due to agitation              PTT - ( 16 Sep 2020 06:32 )  PTT:59.7 sec  Urinalysis Basic - ( 16 Sep 2020 06:33 )    Color: Brown / Appearance: Clear / S.010 / pH: x  Gluc: x / Ketone: Negative  / Bili: Moderate / Urobili: 12   Blood: x / Protein: 30 mg/dL / Nitrite: Negative   Leuk Esterase: Trace / RBC: 0-2 /HPF / WBC 3-5 /HPF   Sq Epi: x / Non Sq Epi: Few /HPF / Bacteria: Moderate /HPF      CAPILLARY BLOOD GLUCOSE    RADIOLOGY & ADDITIONAL TESTS:    < from: US Hepatic & Pancreatic (09.15.20 @ 11:41) >  IMPRESSION:    Cirrhosis.  Cholelithiasis.  No biliary ductal dilatation.  Mild ascites.    Imaging  Reviewed:  YES    Consultant(s) Notes Reviewed:   YES      Plan of care was discussed with patient and /or primary care giver; all questions and concerns were addressed 34 year old male with PMH of alcohol abuse, liver cirrhosis , and esophageal varices presented to ED with complaints of weakness . Patient states that he drinks every day and his last drink was 2 days ago, drinks 4-5 cans of beer every day.  For the past week he was having hematemesis and blood in stools.  Patient was recently admitted to hospital in 2020 and US abdomen was done showed cirrhotic liver and significant ascites. Patient underwent EGD and it showed small esophageal varices distally, not banded as patient did not get FFP at that time. Biopsy not taken due to elevated INR. Pt is admitted to medicine for ETOH abuse, on CIWA protocol. He was found to have worsening liver function, with MELD score 32 compared to 23 on prior admission, electrolyte abnormalities, and questionable blood in stool (reported black stools) or vomitus. GI was reconsulted, no EGD at this time. Hepatology following-recommending diagnostic paracentesis, currently on hold due to elevated INR. Planned for diagnostic paracentesis today, pending INR this AM however cancelled due to active withdrawal.     INTERVAL HPI/OVERNIGHT EVENTS: Very agitated and confused this AM-CIWA 12. Attempting to leave the unit despite redirection. Multiple code sandy called overnight and this morning. Received a total of Ativan 3 mg IV overnight/this morning. Placed on 1:1 observation. ICU consult placed.     MEDICATIONS  (STANDING):  chlorhexidine 2% Cloths 1 Application(s) Topical daily  folic acid 1 milliGRAM(s) Oral daily  lactulose Syrup 10 Gram(s) Oral three times a day  LORazepam   Injectable 2 milliGRAM(s) IV Push every 6 hours  mupirocin 2% Nasal 1 Application(s) Nasal two times a day  thiamine IVPB 500 milliGRAM(s) IV Intermittent daily    MEDICATIONS  (PRN):  LORazepam   Injectable 2 milliGRAM(s) IV Push every 2 hours PRN CIWA-Ar score increase by 2 points and a total score of 7 or less    __________________________________________________  REVIEW OF SYSTEMS: Unable to assess due to agitation     Vital Signs Last 24 Hrs  T(C): 36.8 (17 Sep 2020 05:13), Max: 37.1 (16 Sep 2020 12:59)  T(F): 98.2 (17 Sep 2020 05:13), Max: 98.7 (16 Sep 2020 12:59)  HR: 115 (17 Sep 2020 05:13) (85 - 115)  BP: 122/80 (17 Sep 2020 05:13) (119/79 - 122/80)  BP(mean): --  RR: 16 (17 Sep 2020 05:13) (16 - 17)  SpO2: 99% (17 Sep 2020 05:13) (97% - 99%)    ________________________________________________  PHYSICAL EXAM:  GENERAL: NAD  CHEST/LUNG: Clear to auscultation bilaterally with good air entry   HEART: S1 S2  regular; no murmurs, gallops or rubs  ABDOMEN: Soft, mildly distended  EXTREMITIES: no cyanosis; no edema; no calf tenderness  SKIN: warm and dry; no rash, Jaundiced   NERVOUS SYSTEM:  Confused    _________________________________________________  LABS: Unable to obtain due to agitation              PTT - ( 16 Sep 2020 06:32 )  PTT:59.7 sec  Urinalysis Basic - ( 16 Sep 2020 06:33 )    Color: Brown / Appearance: Clear / S.010 / pH: x  Gluc: x / Ketone: Negative  / Bili: Moderate / Urobili: 12   Blood: x / Protein: 30 mg/dL / Nitrite: Negative   Leuk Esterase: Trace / RBC: 0-2 /HPF / WBC 3-5 /HPF   Sq Epi: x / Non Sq Epi: Few /HPF / Bacteria: Moderate /HPF      CAPILLARY BLOOD GLUCOSE    RADIOLOGY & ADDITIONAL TESTS:    < from: US Hepatic & Pancreatic (09.15.20 @ 11:41) >  IMPRESSION:    Cirrhosis.  Cholelithiasis.  No biliary ductal dilatation.  Mild ascites.    Imaging  Reviewed:  YES    Consultant(s) Notes Reviewed:   YES      Plan of care was discussed with patient and /or primary care giver; all questions and concerns were addressed 34 year old male with PMH of alcohol abuse, liver cirrhosis , and esophageal varices presented to ED with complaints of weakness . Patient states that he drinks every day and his last drink was 2 days ago, drinks 4-5 cans of beer every day.  For the past week he was having hematemesis and blood in stools.  Patient was recently admitted to hospital in 2020 and US abdomen was done showed cirrhotic liver and significant ascites. Patient underwent EGD and it showed small esophageal varices distally, not banded as patient did not get FFP at that time. Biopsy not taken due to elevated INR. Pt is admitted to medicine for ETOH abuse, on CIWA protocol. He was found to have worsening liver function, with MELD score 32 compared to 23 on prior admission, electrolyte abnormalities, and questionable blood in stool (reported black stools) or vomitus. GI was reconsulted, no EGD at this time. Hepatology following-recommending diagnostic paracentesis, currently on hold due to elevated INR. Planned for diagnostic paracentesis today, pending INR this AM however cancelled due to active withdrawal.     INTERVAL HPI/OVERNIGHT EVENTS: Very agitated and confused this AM-CIWA 10. Attempting to leave the unit despite redirection. Multiple code sandy called overnight and this morning. Received a total of Ativan 3 mg IV overnight/this morning. Placed on 1:1 observation. ICU consult placed.     MEDICATIONS  (STANDING):  chlorhexidine 2% Cloths 1 Application(s) Topical daily  folic acid 1 milliGRAM(s) Oral daily  lactulose Syrup 10 Gram(s) Oral three times a day  LORazepam   Injectable 2 milliGRAM(s) IV Push every 6 hours  mupirocin 2% Nasal 1 Application(s) Nasal two times a day  thiamine IVPB 500 milliGRAM(s) IV Intermittent daily    MEDICATIONS  (PRN):  LORazepam   Injectable 2 milliGRAM(s) IV Push every 2 hours PRN CIWA-Ar score increase by 2 points and a total score of 7 or less    __________________________________________________  REVIEW OF SYSTEMS: Unable to assess due to agitation     Vital Signs Last 24 Hrs  T(C): 36.8 (17 Sep 2020 05:13), Max: 37.1 (16 Sep 2020 12:59)  T(F): 98.2 (17 Sep 2020 05:13), Max: 98.7 (16 Sep 2020 12:59)  HR: 115 (17 Sep 2020 05:13) (85 - 115)  BP: 122/80 (17 Sep 2020 05:13) (119/79 - 122/80)  BP(mean): --  RR: 16 (17 Sep 2020 05:13) (16 - 17)  SpO2: 99% (17 Sep 2020 05:13) (97% - 99%)    ________________________________________________  PHYSICAL EXAM:  GENERAL: NAD  CHEST/LUNG: Clear to auscultation bilaterally with good air entry   HEART: S1 S2  regular; no murmurs, gallops or rubs  ABDOMEN: Soft, mildly distended  EXTREMITIES: no cyanosis; no edema; no calf tenderness  SKIN: warm and dry; no rash, Jaundiced   NERVOUS SYSTEM:  Confused    _________________________________________________  LABS: Unable to obtain due to agitation              PTT - ( 16 Sep 2020 06:32 )  PTT:59.7 sec  Urinalysis Basic - ( 16 Sep 2020 06:33 )    Color: Brown / Appearance: Clear / S.010 / pH: x  Gluc: x / Ketone: Negative  / Bili: Moderate / Urobili: 12   Blood: x / Protein: 30 mg/dL / Nitrite: Negative   Leuk Esterase: Trace / RBC: 0-2 /HPF / WBC 3-5 /HPF   Sq Epi: x / Non Sq Epi: Few /HPF / Bacteria: Moderate /HPF      CAPILLARY BLOOD GLUCOSE    RADIOLOGY & ADDITIONAL TESTS:    < from: US Hepatic & Pancreatic (09.15.20 @ 11:41) >  IMPRESSION:    Cirrhosis.  Cholelithiasis.  No biliary ductal dilatation.  Mild ascites.    Imaging  Reviewed:  YES    Consultant(s) Notes Reviewed:   YES      Plan of care was discussed with patient and /or primary care giver; all questions and concerns were addressed

## 2020-09-17 NOTE — CONSULT NOTE ADULT - SUBJECTIVE AND OBJECTIVE BOX
Patient is a 34y old  Male who presents with a chief complaint of Alcohol abuse (17 Sep 2020 09:30)      HPI:  33 y/o M with PMH of Alcohol abuse and liver cirrhosis  , esophageal varices presented to Ed because he was feeling week . Patient states that he drinks every day, for past week he was having severe nausea with vomiting with blood ( only tea spoon some times ) and also reported blood in stools. Patient states last drink was 2 days ago drinks 4-5 cans of beer every day.   Denied any episodes of fever, chills , nausea , abdominal pain, epigastric pain, diarrhea or urinary problems.    Patient was admitted to hospital in 2020 and US abdomen was done showed cirrhotic liver and significant ascites    Patient went for EGD  , it  showed small esophageal varices distally, not banded as patient did not get FFP at that time. Had portal hypertensive gastropathy. Biopsy not taken due to elevated  INR. He was started on Nadolol 20mg qhs and spironolactone and Lasix . Patient doesn't take any medications currently.    Pt was admitted ti medicine floor for alcohol abuse, anemia, thrombocytopenia. MELD score on admission was 32. US abdomen was done which showed mild ascites, cholelithiasis and liver cirrhosis.  Ammonia level was noted to be elevated 32. Pt was started on lactulose.     Interval history: pt was agitated as per RN since morning, trying to get out of bed. Code grey was called. Pt's CIWA score was noted to be elevated. Pt had received 3 mg Lorazepam since last night.    ICU was consulted for worsening alcohol withdrawal. Pt was seen and examined at bedside. Spoke with the pt using  [id number]. Pt is disoriented to time and place. He thinks he is at work. Pt has only mild tremor, does not seem to be agitated but trying to get out of bed for food.         Allergies    No Known Allergies    Intolerances        MEDICATIONS  (STANDING):  chlorhexidine 2% Cloths 1 Application(s) Topical daily  folic acid 1 milliGRAM(s) Oral daily  lactulose Syrup 10 Gram(s) Oral three times a day  LORazepam   Injectable 2 milliGRAM(s) IV Push every 4 hours  magnesium sulfate  IVPB 2 Gram(s) IV Intermittent once  multivitamin 1 Tablet(s) Oral daily  mupirocin 2% Nasal 1 Application(s) Nasal two times a day  thiamine IVPB 500 milliGRAM(s) IV Intermittent daily    MEDICATIONS  (PRN):  LORazepam   Injectable 2 milliGRAM(s) IV Push every 2 hours PRN CIWA >7      Daily     Daily     Drug Dosing Weight  Height (cm): 168 (14 Sep 2020 22:55)  Weight (kg): 85 (14 Sep 2020 22:55)  BMI (kg/m2): 30.1 (14 Sep 2020 22:55)  BSA (m2): 1.95 (14 Sep 2020 22:55)    PAST MEDICAL & SURGICAL HISTORY:  Liver cirrhosis    Anemia    No significant past surgical history        FAMILY HISTORY:      SOCIAL HISTORY:    ADVANCE DIRECTIVES:    REVIEW OF SYSTEMS:   Pt says he is hungry but denies all other review of systems.           ICU Vital Signs Last 24 Hrs  T(C): 36.8 (17 Sep 2020 05:13), Max: 37.1 (16 Sep 2020 12:59)  T(F): 98.2 (17 Sep 2020 05:13), Max: 98.7 (16 Sep 2020 12:59)  HR: 115 (17 Sep 2020 05:13) (85 - 115)  BP: 122/80 (17 Sep 2020 05:13) (119/79 - 122/80)  BP(mean): --  ABP: --  ABP(mean): --  RR: 16 (17 Sep 2020 05:13) (16 - 17)  SpO2: 99% (17 Sep 2020 05:13) (97% - 99%)          I&O's Detail      PHYSICAL EXAM:    GENERAL:  Encephalopathic,  HEAD:  Atraumatic, Normocephalic  EYES: Yellowish discoloration of sclera, EOMI, PERRLA,   ENMT: No tonsillar erythema, exudates, or enlargement; Moist mucous membranes, Good dentition, No lesions  NECK: Supple, No JVD, Normal thyroid  NERVOUS SYSTEM:  Not oriented to time and place, confused   CHEST/LUNG: Clear to percussion bilaterally; No rales, rhonchi, wheezing, or rubs  HEART: Regular rate and rhythm; No murmurs, rubs, or gallops  ABDOMEN: Mild distension of abdomen, soft, non tender  EXTREMITIES:  2+ Peripheral Pulses, No clubbing, cyanosis, or edema  LYMPH: No lymphadenopathy noted  SKIN: No rashes or lesions, mild jaundice    LABS:  CBC Full  -  ( 16 Sep 2020 06:32 )  WBC Count : 4.24 K/uL  RBC Count : 2.23 M/uL  Hemoglobin : 7.4 g/dL  Hematocrit : 20.9 %  Platelet Count - Automated : 51 K/uL  Mean Cell Volume : 93.7 fl  Mean Cell Hemoglobin : 33.2 pg  Mean Cell Hemoglobin Concentration : 35.4 gm/dL  Auto Neutrophil # : x  Auto Lymphocyte # : x  Auto Monocyte # : x  Auto Eosinophil # : x  Auto Basophil # : x  Auto Neutrophil % : x  Auto Lymphocyte % : x  Auto Monocyte % : x  Auto Eosinophil % : x  Auto Basophil % : x        132<L>  |  95<L>  |  2<L>  ----------------------------<  87  3.7   |  32<H>  |  0.51    Ca    7.2<L>      16 Sep 2020 09:32  Phos  2.6       Mg     1.4           CAPILLARY BLOOD GLUCOSE        PT/INR - ( 16 Sep 2020 06:32 )   PT: 40.5 sec;   INR: 3.68 ratio         PTT - ( 16 Sep 2020 06:32 )  PTT:59.7 sec  Urinalysis Basic - ( 16 Sep 2020 06:33 )    Color: Brown / Appearance: Clear / S.010 / pH: x  Gluc: x / Ketone: Negative  / Bili: Moderate / Urobili: 12   Blood: x / Protein: 30 mg/dL / Nitrite: Negative   Leuk Esterase: Trace / RBC: 0-2 /HPF / WBC 3-5 /HPF   Sq Epi: x / Non Sq Epi: Few /HPF / Bacteria: Moderate /HPF              EKG: NSR    ECHO, US:    RADIOLOGY:   < from: US Hepatic & Pancreatic (09.15.20 @ 11:41) >    EXAM:  US LIVER AND PANCREAS                            PROCEDURE DATE:  09/15/2020          INTERPRETATION:  CLINICAL INFORMATION: Alcohol abuse    COMPARISON: 6/10/2020    TECHNIQUE: Sonography of the right upper quadrant.    FINDINGS:    Liver: Cirrhosis. No focal lesion.  Bile ducts: Normal caliber. Common bile duct measures 3.7 mm.  Gallbladder: Cholelithiasis. Mild wall thickening. Negative sonographic Field's sign.  Pancreas: Poorly visualized.  Right kidney: 11.6 cm. No hydronephrosis.  Ascites: Mild.  IVC: Visualized portions are within normal limits.    IMPRESSION:    Cirrhosis.  Cholelithiasis.  No biliary ductal dilatation.  Mild ascites.        CRITICAL CARE TIME SPENT: 45 minutes   Patient is a 34y old  Male who presents with a chief complaint of Alcohol abuse (17 Sep 2020 09:30)      HPI:  35 y/o M with PMH of Alcohol abuse and liver cirrhosis  , esophageal varices presented to Ed because he was feeling week . Patient states that he drinks every day, for past week he was having severe nausea with vomiting with blood ( only tea spoon some times ) and also reported blood in stools. Patient states last drink was 2 days ago drinks 4-5 cans of beer every day.   Denied any episodes of fever, chills , nausea , abdominal pain, epigastric pain, diarrhea or urinary problems.    Patient was admitted to hospital in 2020 and US abdomen was done showed cirrhotic liver and significant ascites    Patient went for EGD  , it  showed small esophageal varices distally, not banded as patient did not get FFP at that time. Had portal hypertensive gastropathy. Biopsy not taken due to elevated  INR. He was started on Nadolol 20mg qhs and spironolactone and Lasix . Patient doesn't take any medications currently.    Pt was admitted ti medicine floor for alcohol abuse, anemia, thrombocytopenia. MELD score on admission was 32. US abdomen was done which showed mild ascites, cholelithiasis and liver cirrhosis.  Ammonia level was noted to be elevated 32. Pt was started on lactulose.     Interval history: pt was agitated as per RN since morning, trying to get out of bed. Code grey was called. Pt's CIWA score was noted to be elevated. Pt had received 3 mg Lorazepam since last night.    ICU was consulted for worsening alcohol withdrawal. Pt was seen and examined at bedside. Spoke with the pt using  [id number]. Pt is disoriented to time and place. He thinks he is at work. Pt has only mild tremor, does not seem to be agitated but trying to get out of bed for food.     Allergies    No Known Allergies    Intolerances        MEDICATIONS  (STANDING):  chlorhexidine 2% Cloths 1 Application(s) Topical daily  folic acid 1 milliGRAM(s) Oral daily  lactulose Syrup 10 Gram(s) Oral three times a day  LORazepam   Injectable 2 milliGRAM(s) IV Push every 4 hours  magnesium sulfate  IVPB 2 Gram(s) IV Intermittent once  multivitamin 1 Tablet(s) Oral daily  mupirocin 2% Nasal 1 Application(s) Nasal two times a day  thiamine IVPB 500 milliGRAM(s) IV Intermittent daily    MEDICATIONS  (PRN):  LORazepam   Injectable 2 milliGRAM(s) IV Push every 2 hours PRN CIWA >7      Daily     Daily     Drug Dosing Weight  Height (cm): 168 (14 Sep 2020 22:55)  Weight (kg): 85 (14 Sep 2020 22:55)  BMI (kg/m2): 30.1 (14 Sep 2020 22:55)  BSA (m2): 1.95 (14 Sep 2020 22:55)    PAST MEDICAL & SURGICAL HISTORY:  Liver cirrhosis    Anemia    No significant past surgical history        FAMILY HISTORY: Unknown      SOCIAL HISTORY: + alcohol abuse    ADVANCE DIRECTIVES:    REVIEW OF SYSTEMS:   Pt says he is hungry but denies all other review of systems.     ICU Vital Signs Last 24 Hrs  T(C): 36.8 (17 Sep 2020 05:13), Max: 37.1 (16 Sep 2020 12:59)  T(F): 98.2 (17 Sep 2020 05:13), Max: 98.7 (16 Sep 2020 12:59)  HR: 115 (17 Sep 2020 05:13) (85 - 115)  BP: 122/80 (17 Sep 2020 05:13) (119/79 - 122/80)  BP(mean): --  ABP: --  ABP(mean): --  RR: 16 (17 Sep 2020 05:13) (16 - 17)  SpO2: 99% (17 Sep 2020 05:13) (97% - 99%)          I&O's Detail      PHYSICAL EXAM:    GENERAL:  Encephalopathic,  HEAD:  Atraumatic, Normocephalic  EYES: Yellowish discoloration of sclera, EOMI, PERRLA,   ENMT: No tonsillar erythema, exudates, or enlargement; Moist mucous membranes, Good dentition, No lesions  NECK: Supple, No JVD, Normal thyroid  NERVOUS SYSTEM:  Not oriented to time and place, confused   CHEST/LUNG: Clear to percussion bilaterally; No rales, rhonchi, wheezing, or rubs  HEART: Regular rate and rhythm; No murmurs, rubs, or gallops  ABDOMEN: Mild distension of abdomen, soft, non tender  EXTREMITIES:  2+ Peripheral Pulses, No clubbing, cyanosis, or edema  LYMPH: No lymphadenopathy noted  SKIN: No rashes or lesions, mild jaundice    LABS:  CBC Full  -  ( 16 Sep 2020 06:32 )  WBC Count : 4.24 K/uL  RBC Count : 2.23 M/uL  Hemoglobin : 7.4 g/dL  Hematocrit : 20.9 %  Platelet Count - Automated : 51 K/uL  Mean Cell Volume : 93.7 fl  Mean Cell Hemoglobin : 33.2 pg  Mean Cell Hemoglobin Concentration : 35.4 gm/dL  Auto Neutrophil # : x  Auto Lymphocyte # : x  Auto Monocyte # : x  Auto Eosinophil # : x  Auto Basophil # : x  Auto Neutrophil % : x  Auto Lymphocyte % : x  Auto Monocyte % : x  Auto Eosinophil % : x  Auto Basophil % : x        132<L>  |  95<L>  |  2<L>  ----------------------------<  87  3.7   |  32<H>  |  0.51    Ca    7.2<L>      16 Sep 2020 09:32  Phos  2.6       Mg     1.4     16      CAPILLARY BLOOD GLUCOSE        PT/INR - ( 16 Sep 2020 06:32 )   PT: 40.5 sec;   INR: 3.68 ratio         PTT - ( 16 Sep 2020 06:32 )  PTT:59.7 sec  Urinalysis Basic - ( 16 Sep 2020 06:33 )    Color: Brown / Appearance: Clear / S.010 / pH: x  Gluc: x / Ketone: Negative  / Bili: Moderate / Urobili: 12   Blood: x / Protein: 30 mg/dL / Nitrite: Negative   Leuk Esterase: Trace / RBC: 0-2 /HPF / WBC 3-5 /HPF   Sq Epi: x / Non Sq Epi: Few /HPF / Bacteria: Moderate /HPF              EKG: NSR    ECHO, US:    RADIOLOGY:   < from: US Hepatic & Pancreatic (09.15.20 @ 11:41) >    EXAM:  US LIVER AND PANCREAS                            PROCEDURE DATE:  09/15/2020          INTERPRETATION:  CLINICAL INFORMATION: Alcohol abuse    COMPARISON: 6/10/2020    TECHNIQUE: Sonography of the right upper quadrant.    FINDINGS:    Liver: Cirrhosis. No focal lesion.  Bile ducts: Normal caliber. Common bile duct measures 3.7 mm.  Gallbladder: Cholelithiasis. Mild wall thickening. Negative sonographic Field's sign.  Pancreas: Poorly visualized.  Right kidney: 11.6 cm. No hydronephrosis.  Ascites: Mild.  IVC: Visualized portions are within normal limits.    IMPRESSION:    Cirrhosis.  Cholelithiasis.  No biliary ductal dilatation.  Mild ascites.        CRITICAL CARE TIME SPENT: 45 minutes

## 2020-09-17 NOTE — PROGRESS NOTE ADULT - ASSESSMENT
35yo male with hx of chronic alcohol abuse (actively drinking), decompensated cirrhosis with ascites (non-compliant with diuretics, no hx of LVP), hx of small esophageal varices on last EGD (6/2020) (non-compliant with BB, never banded), with recent hospitalization (6/2020 for leg swelling, anemia) presented with malaise, generalized weakness for 1-2 weeks, found to have worsening liver function, with MELD score 32 compared to 23 on prior admission, electrolyte abnormalities, and questionable blood in stool (reported black stools) or vomitus, but without overt bleeding in hospital. Acute worsening might have been caused by heavy alcohol intake, but need to rule out other causes, including infectious, pending workup. Denies taking any medications, herbal supplements.   Now with changes in behaviour, most likely withdrawal, but cannot r/o GrI HE. There was no evidence of LEDA so far, but no labs were obtained yesterday, today pending.     Decompensated cirrhosis w ascites, small EV, MELD 32 on admission, pending labs from today but patient is still active drinker    # Acute worsening of liver tests (MELD from 23 to 32, today labs pending) now with possible Grade I HE vs withdrawal (see below), but no evidence of LEDA or other organ failure so far  - ICU consult appreciated  - c/w monitoring LFTs closely, including INR, at least q12 hrs, please obtain hepatic panel for today  - pending diagnostic paracentesis to r/o SBP  - UA 3-5 WBC, 0-2 RBC, CXR no infiltrate on portable 1 view, afebrile, no leukocytosis  - viral workup in process  - avoid hepatotoxic meds, avoid NSAIDs   - alcohol abstinence      #Agitation - while most likely from withdrawal, cannot r/u Grade I HE; no asterixis  - continue close monitoring of mental status  - c/w lactulose to have 2-3 soft BM/day  - add rifaximin 550 mg bid  - avoid long acting BZD  - increased ammonia alone does not add any diagnostic, staging or prognostic value for HE in [facility]D patients   - would continue with close observation, 1:1    #Ascites  - Would continue holding diuretics for now, due to the worsening liver tests (need to f/u labs from today), pending dx paracentesis  - When ascites sent, please send for albumin, protein, cell count and differential, Gram stain, culture   - Continue to monitor renal function closely, Cr was downtrending (0.60-0.51), was around his baseline on admission. Notably, in a cirrhotic patient already 0.3 mg/dl increase w/in 48 hrs means LEDA (even if Cr wnl). Please, measure urine output, I/O.   - C/w monitoring electrolytes including Mg and P, and replace as needed  - Diagnostic paracentesis was canceled by IR given withdrawal  - Notably, dx paracentesis would be important to r/o SBP as it can be asymptomatic and can result in worsening liver tests    #Small EV on last EGD (no report available whether there were high risk signs, but he was started on BB in June), was non compliant w BB, no overt bleeding but slightly worsening anemia, today Hb 7.7-7.4-7.3  - discussed with GI Dr. Gerber, no endoscopy for now  - keep Hb >7, keep T/S active, 2 large bore iv lines   - received 2 doses vit K, can give one additional dose today    - c/w monitoring H/H and call GI if any bleeding or significant Hb drop    #Anemia and thrombocytopenia  - smear normal morphology   - PLT >50, Hb w minimal downtrend (7.7-7.4), without overt bleeding    # EtOH abuse, alcohol withdrawal  - C/w CIWA protocol, c/w folic, thiamin, monitor electrolytes    #Etiology of cirrhosis  - while most likely ETOH cirrhosis, given the young age f/u ceruloplasmin, LONNY, anti SMA, anti LKM, IgG, alpha1-antitrypsin    #HCM  HCC screening - last US and AFP in 6/2020, no focal mass, nl AFP, next due 12/2020  Can check vit D  Vaccinations: Hep A immune (IgG pos), Hep B neg, would check immunity/prior exposure (HBsAB, HBcAb) and if not immune would offer vaccine        Will continue to follow  Discussed with primary team   33yo male with hx of chronic alcohol abuse (actively drinking), decompensated cirrhosis with ascites (non-compliant with diuretics, no hx of LVP), hx of small esophageal varices on last EGD (6/2020) (non-compliant with BB, never banded), with recent hospitalization (6/2020 for leg swelling, anemia) presented with malaise, generalized weakness for 1-2 weeks, found to have worsening liver function, with MELD score 32 compared to 23 on prior admission, electrolyte abnormalities, and questionable blood in stool (reported black stools) or vomitus, but without overt bleeding in hospital. Acute worsening might have been caused by heavy alcohol intake, but need to rule out other causes, including infectious, pending workup. Denies taking any medications, herbal supplements.   Now with changes in behaviour, most likely withdrawal, but cannot r/o GrI HE. There was no evidence of LEDA so far, but now Cr up-trending compared to yesterday.     Decompensated cirrhosis w ascites, small EV, MELD 34 (from 32), but unfortunately still active drinker, with withdrawal now    # Acute worsening of liver tests (MELD from 23 (in June) to 32 and now 34) now with possible Grade I HE vs withdrawal (see below), had no LEDA on admission, now up-trending Cr (0.51-0.72), although does not meet LEDA criteria yet; concern for ACLF  - ICU consult appreciated, pt is for transfer to ICU  - c/w monitoring LFTs closely, including INR, at least q12 hrs  - pending diagnostic paracentesis to r/o SBP  - UA 3-5 WBC, 0-2 RBC, CXR no infiltrate on portable 1 view, afebrile, no leukocytosis, would complete septic workup, and send BC  - viral workup in process (see prior note)  - avoid hepatotoxic meds, avoid NSAIDs   - alcohol abstinence (would not consider steroid pending infectious workup; NAC given along with steroid showed some promising results in terms of 1 month mortality)       #Agitation - while most likely from withdrawal, cannot r/u Grade I HE; no asterixis  - continue close monitoring of mental status  - c/w lactulose to have 2-3 soft BM/day  - add rifaximin 550 mg bid  - avoid long acting BZD  - increased ammonia alone does not add any diagnostic, staging or prognostic value for HE in [facility] patients   - would continue with close observation, 1:1    #Ascites  - Would continue holding diuretics for now, due to the worsening liver tests and now up-trending Cr, pending dx paracentesis  - Diagnostic paracentesis would be important to r/o SBP as it can be asymptomatic and can result in worsening liver tests in cirrhotic patient (despite the lack of abdominal pain, fever or leukocytosis).   - When ascites sent, please send for albumin, protein, cell count and differential, Gram stain, culture   - On US abdomen was no comment on portal or hepatic veins, d/w radiologist whether can be evaluated based on saved US  pictures    #Up-trending Creatinine  - Continue to monitor renal function closely, Cr was downtrending (0.60-0.51), was around his baseline on admission, but today there is an increase (0.72). Notably, in a cirrhotic patient already 0.3 mg/dl increase above baseline w/in 48 hrs fulfills LEDA criteria (even if Cr wnl); or > 50% increase w/in 7 days. Please, measure urine output, I/O and send urine electrolytes.   - C/w monitoring electrolytes including Mg and P, and replace as needed  - If Cr further up-trending and will meet LEDA criteria, will need volume expansion with albumin 1g/bwkg       #Small EV on last EGD (no report available whether there were high risk signs, but he was started on BB in June), was non compliant w BB, no overt bleeding but slightly worsening anemia, Hb 7.7-7.4-7.3  - discussed with GI Dr. Gerber, no endoscopy for now  - keep Hb >7, keep T/S active, 2 large bore iv lines   - received 2 doses vit K, can give one additional dose today  pending dx paracentesis  - c/w monitoring H/H and call GI if any bleeding or significant Hb drop  - BB held for now    #Anemia, thrombocytopenia   - smear normal morphology   - PLT >50, Hb w  no significant change compared to yesterday (7.4-7.3), without overt bleeding  - fibrinogen in process    # EtOH abuse, alcohol withdrawal  - C/w CIWA protocol, c/w folic, thiamin, monitor electrolytes    #Etiology of cirrhosis  - while most likely ETOH cirrhosis, given the young age f/u ceruloplasmin, LONNY, anti SMA, anti LKM, IgG, alpha1-antitrypsin    #HCM  HCC screening - last US and AFP in 6/2020, no focal mass, nl AFP, next due 12/2020  Can check vit D  Vaccinations: Hep A immune (IgG pos), Hep B neg, would check immunity/prior exposure (HBsAB, HBcAb) and if not immune would offer vaccine      Will continue to follow  Discussed with primary team and ICU attending

## 2020-09-17 NOTE — CHART NOTE - NSCHARTNOTEFT_GEN_A_CORE
EVENT: Notified by RN, pt is agitated and attempting to leave the floor. Attempted to redirect pt using  ID # [id number] unsuccessful.     HPI:  34 male with medical hx of Alcohol abuse and liver cirrhosis  , esophageal varices presented to Ed because he was feeling week . Patient states that he drinks every day, for past week he was having severe nausea with vomiting with blood ( only tea spoon some times ) and also reported blood in stools. Patient is feeling very weak. Patient states last drink was 2 days ago drinks 4-5 cans of beer every day.      denies any episodes of fever, chills , nausea , abdominal pain, epigastric pain, diarrhea or urinary problems.     off note patient was admitted to hospital in June 2020 and US abdomen was done showed cirrhotic liver and significant ascites    Patient went for EGD  , it  showed small esophageal varices distally, not banded as patient did not get FFP at that time. Had portal hypertensive gastropathy. Biopsy not taken due to elevated  INR. He was started on Nadolol 20mg qhs and spironolactone and Lasix . Pt admitted due to ETOH withdrawal, on CIWA protocol.     SUBJECTIVE: Unable to assess    OBJECTIVE:  Vital Signs Last 24 Hrs  T(C): 36.8 (17 Sep 2020 05:13), Max: 37.1 (16 Sep 2020 12:59)  T(F): 98.2 (17 Sep 2020 05:13), Max: 98.7 (16 Sep 2020 12:59)  HR: 115 (17 Sep 2020 05:13) (85 - 115)  BP: 122/80 (17 Sep 2020 05:13) (119/79 - 122/80)  BP(mean): --  RR: 16 (17 Sep 2020 05:13) (16 - 17)  SpO2: 99% (17 Sep 2020 05:13) (97% - 99%)    LABS:                        7.4    4.24  )-----------( 51       ( 16 Sep 2020 06:32 )             20.9     09-16    132<L>  |  95<L>  |  2<L>  ----------------------------<  87  3.7   |  32<H>  |  0.51    Ca    7.2<L>      16 Sep 2020 09:32  Phos  2.6     09-16  Mg     1.4     09-16    ASSESSMENT: Acute confusion/agitation likely due to ETOH withdrawal      PLAN:   -Placed on 1:1 supervision  -Ativan 1 mg IVP once ordered  -Ativan 2 mg q 12 hrs changed to q 6 hrs  -JAKY protocol   -Discussed with Attending

## 2020-09-17 NOTE — CHART NOTE - NSCHARTNOTEFT_GEN_A_CORE
EVENT:  35 y/o male with medical hx of Alcohol abuse, Liver cirrhosis, Esophageal varices presented to ED because he was feeling week. Patient states that he drinks every day for past week. He started having severe nausea with vomiting with blood ( only tea spoon some times ) and also reported blood in stools. Patient drinks 4-5 cans of beer every day.   Patient was admitted to hospital in June 2020 and US abdomen showed cirrhotic liver and significant ascites   Patient went for EGD, it showed small esophageal varices distally, not banded as patient did not get FFP at that time. Had portal hypertensive gastropathy. Biopsy not taken due to elevated INR. He was started on Nadolol 20mg qhs and spironolactone and Lasix. Patient doesn't take any medications currently.    I was called by the nurse last night that the patient wanted to leave AMA. On arrival to floor, patient was fully dressed. Using  [id number], I was able to convince patient to stay. He is scheduled for paracentesis. The importance of the procedure explained to the patient and the needs to follow his care plan. This morning, code flight was called twice because pt got dressed and attempted to leave. With assistance of security, nursing staffs, he agreed to stay.      SUBJECTIVE:  Awake, confused    OBJECTIVE:  Vital Signs Last 24 Hrs  T(C): 36.8 (17 Sep 2020 05:13), Max: 37.1 (16 Sep 2020 12:59)  T(F): 98.2 (17 Sep 2020 05:13), Max: 98.7 (16 Sep 2020 12:59)  HR: 115 (17 Sep 2020 05:13) (85 - 115)  BP: 122/80 (17 Sep 2020 05:13) (119/79 - 122/80)  BP(mean): --  RR: 16 (17 Sep 2020 05:13) (16 - 17)  SpO2: 99% (17 Sep 2020 05:13) (97% - 99%)    LABS:                        7.4    4.24  )-----------( 51       ( 16 Sep 2020 06:32 )             20.9     09-16    132<L>  |  95<L>  |  2<L>  ----------------------------<  87  3.7   |  32<H>  |  0.51    Ca    7.2<L>      16 Sep 2020 09:32  Phos  2.6     09-16  Mg     1.4     09-16      Problem #1:  Decompensated hepatic cirrhosis.    Pt with hx of liver cirrhosis with mild ascites     Plan:   -Diuretics on hold due to worsening liver fx test, pending diagnostic paracentesis-pending AM INR  -F/u hep panel   -Hepatologist following.       Problem #2:  Pt with ETOH abuse    Plan:  -Ativan 2 mg IV q 2 hrs PRN and Ativan 2 mg IV q12 hrs standing  -C/w Ativan taper   -C/w Thiamine, folic acid   -CIWA protocol  -Maintain seizure precautions.      PLAN:

## 2020-09-17 NOTE — PROGRESS NOTE ADULT - SUBJECTIVE AND OBJECTIVE BOX
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  When asked via , he could tell his , and date of today by year and month. However he needed several times reorientation to answer questions, because started to get up and tried to leave for work. He denied any abdominal or other pain, he stated that had a dark brown BM, he denied N/V (later in am seen eating breakfast at bedside). Afebrile.     Hospital Medications:  chlorhexidine 2% Cloths 1 Application(s) Topical daily  folic acid 1 milliGRAM(s) Oral daily  lactulose Syrup 10 Gram(s) Oral three times a day  LORazepam   Injectable 2 milliGRAM(s) IV Push every 4 hours  LORazepam   Injectable 2 milliGRAM(s) IV Push every 2 hours PRN  magnesium sulfate  IVPB 2 Gram(s) IV Intermittent once  multivitamin 1 Tablet(s) Oral daily  mupirocin 2% Nasal 1 Application(s) Nasal two times a day  thiamine IVPB 500 milliGRAM(s) IV Intermittent daily      ROS:   General:  No  fevers, chills, night sweats, fatigue  Eyes:  Good vision, no reported pain  ENT:  No sore throat, pain, runny nose  CV:  No pain, palpitations  Pulm:  No dyspnea, cough  GI:  See HPI, otherwise negative  :  No  incontinence, nocturia  Muscle:  No pain, weakness  Neuro: agitated  Heme:  some ecchymosis  Skin:  No rash    PHYSICAL EXAM:   Vital Signs:  Vital Signs Last 24 Hrs  T(C): 36.8 (17 Sep 2020 05:13), Max: 37.1 (16 Sep 2020 12:59)  T(F): 98.2 (17 Sep 2020 05:13), Max: 98.7 (16 Sep 2020 12:59)  HR: 115 (17 Sep 2020 05:13) (85 - 115)  BP: 122/80 (17 Sep 2020 05:13) (119/79 - 122/80)  BP(mean): --  RR: 16 (17 Sep 2020 05:13) (16 - 17)  SpO2: 99% (17 Sep 2020 05:13) (97% - 99%)  Daily     Daily     GENERAL: no acute distress  NEURO: awake, alert, oriented to self, person, time, but very anxious, agitated; no asterixis  HEENT: icteric sclera, no conjunctival pallor appreciated  CHEST: no respiratory distress, no accessory muscle use  CARDIAC: regular rate, rhythm  ABDOMEN: soft, mildly distended with positive fluid wave, non tender, no rebound or guarding  EXTREMITIES: warm, well perfused, no edema  SKIN: no lesions noted    LABS: reviewed                        7.3    4.95  )-----------( 50       ( 17 Sep 2020 11:02 )             20.2     09-16    132<L>  |  95<L>  |  2<L>  ----------------------------<  87  3.7   |  32<H>  |  0.51    Ca    7.2<L>      16 Sep 2020 09:32  Phos  2.6     09-16  Mg     1.4     09-16          Interval Diagnostic Studies: see sunrise for full report   Chief Complaint:  Patient is a 34y old  Male who presents with a chief complaint of Alcohol abuse (17 Sep 2020 10:07)    : [id number]    Reason for consult: Decompensated cirrhosis    Interval Events:   Patient was seen getting out of bed, anxious, pacing in the room, 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  When asked via , he could tell his , and date of today by year and month. However he needed several times reorientation to answer any questions, because started to get up and tried to leave for work. He denied any abdominal or other pain, he stated that had a dark brown BM, no blood in it, no diarrhea, he denied N/V (later in am seen eating breakfast at bedside). Afebrile.     Hospital Medications:  chlorhexidine 2% Cloths 1 Application(s) Topical daily  folic acid 1 milliGRAM(s) Oral daily  lactulose Syrup 10 Gram(s) Oral three times a day  LORazepam   Injectable 2 milliGRAM(s) IV Push every 4 hours  LORazepam   Injectable 2 milliGRAM(s) IV Push every 2 hours PRN  magnesium sulfate  IVPB 2 Gram(s) IV Intermittent once  multivitamin 1 Tablet(s) Oral daily  mupirocin 2% Nasal 1 Application(s) Nasal two times a day  thiamine IVPB 500 milliGRAM(s) IV Intermittent daily      ROS:   General:  No  fevers, chills, night sweats, fatigue  Eyes:  Good vision, no reported pain  ENT:  No sore throat, pain, runny nose  CV:  No pain, palpitations  Pulm:  No dyspnea, cough  GI:  See HPI, otherwise negative  :  No  incontinence, nocturia  Muscle:  No pain, weakness  Neuro: agitated  Heme:  some ecchymosis  Skin:  No rash    PHYSICAL EXAM:   Vital Signs:  Vital Signs Last 24 Hrs  T(C): 36.8 (17 Sep 2020 05:13), Max: 37.1 (16 Sep 2020 12:59)  T(F): 98.2 (17 Sep 2020 05:13), Max: 98.7 (16 Sep 2020 12:59)  HR: 115 (17 Sep 2020 05:13) (85 - 115)  BP: 122/80 (17 Sep 2020 05:13) (119/79 - 122/80)  BP(mean): --  RR: 16 (17 Sep 2020 05:13) (16 - 17)  SpO2: 99% (17 Sep 2020 05:13) (97% - 99%)  Daily     Daily     GENERAL: no acute distress  NEURO: awake, alert, oriented to self, person, time, but very anxious, agitated; no asterixis  HEENT: icteric sclera, no conjunctival pallor appreciated  CHEST: no respiratory distress, no accessory muscle use  CARDIAC: regular rhythm, mildly tachycardic  ABDOMEN: soft, mildly distended with positive fluid wave, non tender, no rebound or guarding  EXTREMITIES: warm, well perfused, no edema  SKIN: no lesions noted, ecchymoses    LABS: reviewed                        7.3    4.95  )-----------( 50       ( 17 Sep 2020 11:02 )             20.2     09-16    132<L>  |  95<L>  |  2<L>  ----------------------------<  87  3.7   |  32<H>  |  0.51    Ca    7.2<L>      16 Sep 2020 09:32  Phos  2.6     09-16  Mg     1.4     09-16          Interval Diagnostic Studies: see sunrise for full report

## 2020-09-17 NOTE — PROGRESS NOTE ADULT - PROBLEM SELECTOR PLAN 4
Pt with hx of liver cirrhosis with mild ascites   US hepatic and pancreatic-see above  Diuretics on hold due to worsening liver fx test, pending diagnostic paracentesis-pending AM INR  F/u hep panel   Hepatologist following Pt with hx of liver cirrhosis with mild ascites   US hepatic and pancreatic-see above  Diuretics on hold due to worsening liver fx test, pending diagnostic paracentesis-pending INR this AM  F/u hep panel   Hepatologist following

## 2020-09-17 NOTE — PROGRESS NOTE ADULT - PROBLEM SELECTOR PLAN 8
Likely discharge home pending diagnostic paracentesis, and improvement in anemia/electrolytes  SW and CM following Likely discharge home pending diagnostic paracentesis when stable, and improvement in anemia/electrolytes  SW and CM following

## 2020-09-17 NOTE — PROGRESS NOTE ADULT - PROBLEM SELECTOR PLAN 1
Pt with ETOH abuse  Actively withdrawing, CIWA 12-requiring multiple doses of IV Ativan  ICU consult placed  Ativan 2 mg IV q 2 hrs PRN and Ativan 2 mg IV q6 hrs  C/w Thiamine, folic acid   CIWA protocol  Maintain seizure precautions Pt with ETOH abuse  Actively withdrawing, CIWA 12-requiring multiple doses of IV Ativan  ICU consult placed  Ativan 2 mg IV q 2 hrs PRN and Ativan 2 mg IV q4 hrs  C/w Thiamine, folic acid, and MVI  CIWA protocol  Maintain seizure precautions Pt with ETOH abuse  Actively withdrawing, CIWA 12-requiring multiple doses of IV Ativan  Evaluated by ICU   Ativan 2 mg IV q 2 hrs PRN and Ativan changed to 2 mg IV q4 hrs standing  C/w Thiamine, folic acid  MVI ordered  Ammonia level ordered  [facility] protocol  Maintain seizure precautions

## 2020-09-17 NOTE — CHART NOTE - NSCHARTNOTEFT_GEN_A_CORE
EVENT: Pt continues to be very agitated and aggressive-attempting to leave the floor. CIWA-12. ICU reconsulted.     HPI:  34 male with medical hx of Alcohol abuse and liver cirrhosis  , esophageal varices presented to Ed because he was feeling week . Patient states that he drinks every day, for past week he was having severe nausea with vomiting with blood ( only tea spoon some times ) and also reported blood in stools. Patient is feeling very weak. Patient states last drink was 2 days ago drinks 4-5 cans of beer every day.      denies any episodes of fever, chills , nausea , abdominal pain, epigastric pain, diarrhea or urinary problems.       off note patient was admitted to hospital in June 2020 and US abdomen was done showed cirrhotic liver and significant ascites    Patient went for EGD  , it  showed small esophageal varices distally, not banded as patient did not get FFP at that time. Had portal hypertensive gastropathy. Biopsy not taken due to elevated  INR. He was started on Nadolol 20mg qhs and spironolactone and Lasix . Pt admitted to medicine for alcohol withdrawal, on CIWA protocol.       SUBJECTIVE: Unable to assess     OBJECTIVE:  Vital Signs Last 24 Hrs  T(C): 36.8 (17 Sep 2020 05:13), Max: 37.1 (16 Sep 2020 12:59)  T(F): 98.2 (17 Sep 2020 05:13), Max: 98.7 (16 Sep 2020 12:59)  HR: 93 (17 Sep 2020 12:16) (85 - 115)  BP: 116/87 (17 Sep 2020 12:16) (116/87 - 122/80)  BP(mean): --  RR: 16 (17 Sep 2020 12:16) (16 - 17)  SpO2: 100% (17 Sep 2020 12:16) (97% - 100%)    LABS:                        7.3    4.95  )-----------( 50       ( 17 Sep 2020 11:02 )             20.2     09-17    131<L>  |  94<L>  |  4<L>  ----------------------------<  105<H>  3.8   |  30  |  0.72    Ca    7.7<L>      17 Sep 2020 11:03  Phos  3.6     09-17  Mg     1.3     09-17    TPro  8.2  /  Alb  1.4<L>  /  TBili  12.7<H>  /  DBili  7.2<H>  /  AST  192<H>  /  ALT  65<H>  /  AlkPhos  259<H>  09-17      ASSESSMENT: Acute confusion/alcohol withdrawal secondary to ETOH withdrawal.     PLAN:   -Pt to transfer to ICU for closer monitoring.

## 2020-09-17 NOTE — CONSULT NOTE ADULT - ASSESSMENT
33 y/o M with PMH of Alcohol abuse and liver cirrhosis  , esophageal varices presented to Ed because he was feeling week . Pt was admitted ti medicine floor for alcohol abuse, anemia, thrombocytopenia. MELD score on admission was 32. ICU was consulted for worsening alcohol withdrawal.     Assessment:    Neuro:  # Encephalopathy  - Likely hepatic encephalopathy as pt has elevated  ammonia level of 58 on admission   -Continue Lactulose  -Monitor ammonia level    # Alcohol withdrawal  -Continue Lorazepam 2 mg Q4 amd 2 Mg Q2 prn as per CIWA  - Continue iv thiamin, folic acid and multivitamin  -Constant observation    CARDIOVASCULAR  #  PULMONARY  #  GASTROINTESTINAL  #  RENAL  #    INFECTIOUS DISEASE  #  ENDOCRINE  #  HEME  #  FLUIDS/ELECTROLYTES/NUTRITION  -IVF  -Monitor, Replete to K>4 and Mg>2  -Diet  PROPHYLAXIS  -DVT  -GI    DISPO: Remains on the unit  CODE STATUS: Full code 35 y/o M with PMH of Alcohol abuse and liver cirrhosis  , esophageal varices presented to Ed because he was feeling week . Pt was admitted ti medicine floor for alcohol abuse, anemia, thrombocytopenia. MELD score on admission was 32. ICU was consulted for worsening alcohol withdrawal.     Assessment:  1. Alcohol withdrawal  2. Encephalopathy  3. Alcoholic  liver cirrhosis with ascites and esophageal varices   4. Anemia  5.Thrombocytopenia  6. Hypomagnesemia  7.Elevated INR    Neuro:  # Encephalopathy  - Likely hepatic encephalopathy as pt has elevated  ammonia level of 58 on admission   -Continue Lactulose  -Monitor ammonia level    # Alcohol withdrawal  -Continue Lorazepam 2 mg Q4 amd 2 Mg Q2 prn as per CIWA  - Continue iv thiamin, folic acid and multivitamin  -Constant observation    CARDIOVASCULAR  #  PULMONARY  #  GASTROINTESTINAL  #  RENAL  #    INFECTIOUS DISEASE  #  ENDOCRINE  #  HEME  #  FLUIDS/ELECTROLYTES/NUTRITION  -IVF  -Monitor, Replete to K>4 and Mg>2  -Diet  PROPHYLAXIS  -DVT  -GI    DISPO: Remains on the unit  CODE STATUS: Full code 35 y/o M with PMH of Alcohol abuse and liver cirrhosis  , esophageal varices presented to Ed because he was feeling week . Pt was admitted ti medicine floor for alcohol abuse, anemia, thrombocytopenia. MELD score on admission was 32. ICU was consulted for worsening alcohol withdrawal.     Assessment:  1. Alcohol withdrawal  2. Encephalopathy  3. Alcoholic  liver cirrhosis with ascites and esophageal varices   4. Anemia  5.Thrombocytopenia  6. Hypomagnesemia  7.Elevated INR  8. Cholelithiasis    Neuro:  # Encephalopathy  - Likely hepatic encephalopathy as pt has elevated  ammonia level of 58 on admission   -Continue Lactulose  -Monitor ammonia level    # Alcohol withdrawal  -Continue Lorazepam 2 mg Q4 amd 2 Mg Q2 prn as per CIWA  - Continue iv thiamin, folic acid and multivitamin  -Constant observation    CARDIOVASCULAR  # Cardiomegaly  -Chest x ray showed cardiomegaly  -Likely developing cardiomyopathy  secondary to chronic alcoholism   -No acute issues    PULMONARY  # No acute issues    GASTROINTESTINAL  # Decompensated liver cirrhosis with mild ascites and esophageal varices  -MELD score : 32. 3 month mortality 52.6%  Child-Bojorquez Score for Cirrhosis Mortality= 13 points  -Monitor CMP  -Plan for diagnostic paracentesis as per primary team    RENAL  # Cr/ BUN wnl  -No acute issues    INFECTIOUS DISEASE  # Pt is afebrile  -No WBC elevation    ENDOCRINE  # No acute issues  HBA1 C and TSH wnl    HEME  # Anemia  -Likely secondary to chronic alcoholism  -No signs of active bleeding  -Monitor CBC, PT/INR  -Monitor vitals    # Thrombocytopenia  -Likely secondary to chronic alcoholism   - Platelet: 51  -monitor platelet level    # Elevated PT/INR  -Likely secondary to alcoholic liver disease  -s/p vitamin k supplementation yesterday  -Monitor PT/INR    FLUIDS/ELECTROLYTES/NUTRITION  -IVF  # Hypomagnesemia  -Mg level noted to be 1.4  2mg ivpb mgso4 replaced.  -Monitor, Replete to K>4 and Mg>2    -Diet: Advance diet as tolerated    PROPHYLAXIS  -DVT: Not on anticoagulation because of thrombocytopenia and anemia       DISPO: Remains on the unit    CODE STATUS: Full code 35 y/o M with PMH of Alcohol abuse and liver cirrhosis  , esophageal varices presented to Ed because he was feeling week . Pt was admitted ti medicine floor for alcohol abuse, anemia, thrombocytopenia. MELD score on admission was 32. ICU was consulted for worsening alcohol withdrawal.     Assessment:  1. Alcohol withdrawal  2. Encephalopathy  3. Alcoholic  liver cirrhosis with ascites and esophageal varices   4. Anemia  5.Thrombocytopenia  6. Hypomagnesemia  7.Elevated INR  8. 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 3 month mortality 52.6%  Child-Bojorquez Score for Cirrhosis Mortality= 13 points  -Monitor CMP  -Plan for diagnostic paracentesis as per primary team    RENAL  # Cr/ BUN wnl  -No acute issues    INFECTIOUS DISEASE  # Pt is afebrile  -No WBC elevation    ENDOCRINE  # No acute issues  HBA1 C and TSH wnl    HEME  # Anemia  -Likely secondary to chronic alcoholism  -No signs of active bleeding  -Monitor CBC, PT/INR  -Monitor vitals    # Thrombocytopenia  -Likely secondary to chronic alcoholism   - Platelet: 51  -monitor platelet level    # Elevated PT/INR  -Likely secondary to alcoholic liver disease  -s/p vitamin k supplementation yesterday  -Monitor PT/INR    FLUIDS/ELECTROLYTES/NUTRITION  -IVF  # Hypomagnesemia  -Mg level noted to be 1.4  2mg ivpb mgso4 replaced.  -Monitor, Replete to K>4 and Mg>2    -Diet: Advance diet as tolerated    PROPHYLAXIS  -DVT: Not on anticoagulation because of thrombocytopenia and anemia       DISPO:  transfer to ICU    CODE STATUS: Full code

## 2020-09-18 NOTE — PROGRESS NOTE ADULT - ATTENDING COMMENTS
Assessment:  1. Alcohol withdrawal syndrome  2. Decompensated cirrhosis   3. Anemia  4. Thrombocytopenia   5. Ascites   6. Cholelithiasis     Plan  - Off precedex this AM  - Cont. Ativan prn and scheduled, titrate down ativan scheduled  - Monitor for signs of worsening delirium, at this time no evidence of hallucinations  - Cont. Lactulose  - Add rifaximin per hepatology recs  - Will transfuse 1 unit Platelets given oral mucosal bleeding  - Vitamin K supplemenation   - Hepatology recs noted  - High dose thiamine supplementation, cont. folate supplementation

## 2020-09-18 NOTE — PROGRESS NOTE ADULT - ASSESSMENT
33yo male with hx of chronic alcohol abuse (actively drinking since age 20), decompensated cirrhosis with ascites (non-compliant with diuretics, no hx of LVP), hx of small esophageal varices on last EGD (6/2020) (non-compliant with BB, never banded), with recent hospitalization (6/2020 for leg swelling, anemia) presented with malaise, generalized weakness for 1-2 weeks, found to have worsening liver function, with MELD score 32 compared to 23 on prior admission, electrolyte abnormalities, and questionable blood in stool (reported black stools) or vomitus, but without overt bleeding in hospital. 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  Acute worsening might have been caused by heavy alcohol intake (high blood alcohol on admission), SBP was r/o by paracentesis, viral and infectious workup negative so far, denied taking any medications, herbal supplements.   On 9/17 notes with changes in behaviour, most likely withdrawal, but cannot r/o GrI HE, was transferred to ICU for close monitoring. There was no evidence of LEDA so far.      Decompensated cirrhosis w ascites; small EV; MELD 34 (from 32) - today labs (INR) pending, but unfortunately still active drinker, with withdrawal now    # Acute worsening of liver tests (MELD from 23-32-34) now with possible Grade I HE vs withdrawal (see below); concern for ACLF; not met LEDA criteria yet; most likely due to heavy drinking  - c/w monitoring LFTs closely, including INR, at least q12 hrs, pending INR from today (Se bilirubin and liver enzymes improving)  - no overt GI bleeding; SBP was r/o; CXR neg for infiltrate; UA only 3-5 WBC, 0-2 RBC, trace leukocyte esterase; can repeat UA, and obtain UC, and BC  - avoid hepatotoxic meds, avoid NSAIDs (Utox was canceled on admission)  - alcohol abstinence (would not consider steroid for now given that risks possibly outweigh benefit, now downtrending liver tests, hx of black stool, portal HTN gastropathy, varices, pending complete infectious workup; NAC was shown to have beneficial effect as addition to steroid)   - viral workup in process, Hep A IgM neg, immune; HBsAg and HBcAb IgM neg; HCV ab neg, can add HCV PCR; Hep E IgM/IgG in process; can add EBV and CMV PCR       #EtOH withdrawal +/- HE (based on yesterday evaluation Gr1) - not able to assess for HE today, sedated  - continue close monitoring of mental status  - c/w lactulose to have 2-3 soft BM/day  - c/w rifaximin 550 mg bid  - avoid long acting BZD  - increased ammonia alone does not add any diagnostic, staging or prognostic value for HE in [facility] patients   - C/w CIWA protocol per ICU, c/w folic, thiamin, monitor electrolytes    #Ascites  - Would continue holding diuretics for now, due to the acute worsening liver tests and slightly up-trending Cr  - S/p diagnostic paracentesis; SBP was r/o  - SAAG =1.0 (serum albumin 1.4, ascites albumin 0.4, ascites protein 1.2),  and no splenomegaly on prior imaging in June, but small EV on EGD per prior notes (latter one still suggest CSPH)  - while SAAG > 1.1 expected in portal hypertension, in 3% of cirrhotic patients it might not be the case; also alcoholic hepatitis can cause ascites without significant PH - thus possibly additive effect and ideally can repeat dx paracentesis, but would hold off with it for now due to coagulopathy, thrombocytopenia  -F/u ascites cytology; can check amylase, lipase; TB unlikely-rest of ascites analysis not consistent; has some proteinuria, can check 24hr urine protein  - On US abdomen was no comment on portal or hepatic veins, can repeat full US abdomen pelvis with Doppler (bedside?)  - Close monitoring for bleeding (s/p pracentesis)      #Up-trending Creatinine, now improving; not met LEDA criteria  - in a cirrhotic patient already 0.3 mg/dl increase above baseline w/in 48 hrs fulfills LEDA criteria (even if Cr wnl); or > 50% increase w/in 7 days  - C/w close monitoring renal function and monitoring electrolytes including Mg and P, and replace as needed  - please monitor urine output  - on admission no LEDA, yesterday up-trending Cr (0.51-0.72) - was started on volume expansion, now improving (0.53)      #Small EV on last EGD (no report available whether there were high risk signs, but he was started on BB in June), was non compliant w BB, no overt bleeding  - discussed with GI Dr. Gerber, no endoscopy for now  - keep Hb >7, keep T/S active, 2 large bore iv lines   - c/w monitoring H/H and call GI if any bleeding or significant Hb drop  - c/w holding BB for now    #Anemia, thrombocytopenia  - possibly due to cirrhosis + EtOH caused BM suppression  - smear normal for RBC and PLT morphology   - PLT  now <50, being transfused  - no evidence of overt bleeding  - c/w monitoring H/H; Hb 7.3-6.9-8.6  - fibrinogen in process      #Etiology of cirrhosis  - while most likely ETOH cirrhosis, given the young age additional workup was sent: ceruloplasmin < 20, while it could be falsely low in end stage liver or protein-losing, would send 24hr urine copper (with Cr)  -[id number] nl;  ferritin 331 -EtOH; f/u LONNY, anti SMA, anti LKM, IgG     #HCM  - HCC screening - last US and AFP in 6/2020, no focal mass, nl AFP, next due 12/2020  - Can check vit D  - Vaccinations: Hep A immune (IgG pos), Hep B neg, would check immunity/prior exposure (HBsAB, HBcAb) and if not immune would offer vaccine; got flu vaccine; would check whether got Pneumococcal vaccine     Will continue to follow

## 2020-09-18 NOTE — PROGRESS NOTE ADULT - SUBJECTIVE AND OBJECTIVE BOX
Chief Complaint:  Patient is a 34y old  Male who presents with a chief complaint of Alcohol abuse (17 Sep 2020 11:25)      Reason for consult: Alcoholic cirrhosis    Interval Events:    Yesterday patient became agitated, most likely due to alcohol withdrawal (vs Gr1 HE), was transferred to ICU for close monitoring. Now he is sedated, arousable to voice, but cannot assess for HE. S/p diagnostic paracentesis, without evidence of SBP. He is afebrile. He noted with slight Hb drop (7.3-6.9) but repeat 8.6;  now PLT<50, being transfused. No evidence of overt bleeding.     Hospital Medications:  albumin human 25% IVPB 100 milliLiter(s) IV Intermittent every 6 hours  chlorhexidine 2% Cloths 1 Application(s) Topical daily  dexMEDEtomidine Infusion 0.5 MICROgram(s)/kG/Hr IV Continuous <Continuous>  folic acid 1 milliGRAM(s) Oral daily  influenza   Vaccine 0.5 milliLiter(s) IntraMuscular once  lactulose Syrup 20 Gram(s) Oral four times a day  LORazepam   Injectable 2 milliGRAM(s) IV Push every 4 hours  LORazepam   Injectable 2 milliGRAM(s) IV Push every 2 hours PRN  multivitamin 1 Tablet(s) Oral daily  mupirocin 2% Nasal 1 Application(s) Nasal two times a day  phytonadione  IVPB 10 milliGRAM(s) IV Intermittent daily  rifAXIMin 550 milliGRAM(s) Oral two times a day  thiamine IVPB 500 milliGRAM(s) IV Intermittent every 8 hours      ROS: cannot obtain due to patients condition    PHYSICAL EXAM:   Vital Signs:  Vital Signs Last 24 Hrs  T(C): 36.6 (18 Sep 2020 10:00), Max: 36.8 (17 Sep 2020 16:00)  T(F): 97.8 (18 Sep 2020 10:00), Max: 98.3 (17 Sep 2020 16:00)  HR: 77 (18 Sep 2020 10:00) (57 - 107)  BP: 87/56 (18 Sep 2020 10:00) (74/46 - 127/78)  BP(mean): 63 (18 Sep 2020 10:00) (51 - 90)  RR: 18 (18 Sep 2020 10:00) (11 - 23)  SpO2: 97% (18 Sep 2020 10:00) (96% - 100%)  Daily     Daily Weight in k.6 (18 Sep 2020 08:00)    GENERAL: ill appearing  NEURO: on Precedex  HEENT: icteric sclera  CHEST: no respiratory distress, no accessory muscle use  CARDIAC: regular rate, rhythm  ABDOMEN: soft, mildly distended, + fluid wave, non tender, no ecchymoses or bleeding  EXTREMITIES: warm, well perfused, no edema  SKIN: no rash    LABS: reviewed                        8.6    3.48  )-----------( 37       ( 18 Sep 2020 08:10 )             25.5         134<L>  |  101  |  5<L>  ----------------------------<  118<H>  3.9   |  30  |  0.53    Ca    7.5<L>      18 Sep 2020 06:11  Phos  3.8       Mg     1.6         TPro  6.8  /  Alb  1.2<L>  /  TBili  9.9<H>  /  DBili  x   /  AST  157<H>  /  ALT  48  /  AlkPhos  203<H>      LIVER FUNCTIONS - ( 18 Sep 2020 06:11 )  Alb: 1.2 g/dL / Pro: 6.8 g/dL / ALK PHOS: 203 U/L / ALT: 48 U/L DA / AST: 157 U/L / GGT: x             Interval Diagnostic Studies: see sunrise for full report

## 2020-09-18 NOTE — PROGRESS NOTE ADULT - SUBJECTIVE AND OBJECTIVE BOX
Patient is a 34y old  Male who presents with a chief complaint of Alcohol abuse (17 Sep 2020 11:25)      INTERVAL HPI/OVERNIGHT EVENTS:  ICU Vital Signs Last 24 Hrs  T(C): 36.6 (18 Sep 2020 10:00), Max: 36.8 (17 Sep 2020 16:00)  T(F): 97.8 (18 Sep 2020 10:00), Max: 98.3 (17 Sep 2020 16:00)  HR: 77 (18 Sep 2020 10:00) (57 - 107)  BP: 87/56 (18 Sep 2020 10:00) (74/46 - 127/78)  BP(mean): 63 (18 Sep 2020 10:00) (51 - 90)  ABP: --  ABP(mean): --  RR: 18 (18 Sep 2020 10:00) (11 - 23)  SpO2: 97% (18 Sep 2020 10:00) (96% - 100%)    I&O's Summary    17 Sep 2020 07:01  -  18 Sep 2020 07:00  --------------------------------------------------------  IN: 1152.1 mL / OUT: 1285 mL / NET: -132.9 mL          LABS:                        8.6    3.48  )-----------( 37       ( 18 Sep 2020 08:10 )             25.5     09-18    134<L>  |  101  |  5<L>  ----------------------------<  118<H>  3.9   |  30  |  0.53    Ca    7.5<L>      18 Sep 2020 06:11  Phos  3.8     09-18  Mg     1.6     09-18    TPro  6.8  /  Alb  1.2<L>  /  TBili  9.9<H>  /  DBili  x   /  AST  157<H>  /  ALT  48  /  AlkPhos  203<H>  09-18    PT/INR - ( 17 Sep 2020 11:03 )   PT: 47.1 sec;   INR: 4.32 ratio         PTT - ( 17 Sep 2020 11:03 )  PTT:57.3 sec    CAPILLARY BLOOD GLUCOSE            RADIOLOGY & ADDITIONAL TESTS:    Consultant(s) Notes Reviewed:  [x ] YES  [ ] NO    MEDICATIONS  (STANDING):  albumin human 25% IVPB 100 milliLiter(s) IV Intermittent every 6 hours  chlorhexidine 2% Cloths 1 Application(s) Topical daily  dexMEDEtomidine Infusion 0.5 MICROgram(s)/kG/Hr (10.6 mL/Hr) IV Continuous <Continuous>  folic acid 1 milliGRAM(s) Oral daily  influenza   Vaccine 0.5 milliLiter(s) IntraMuscular once  lactulose Syrup 20 Gram(s) Oral four times a day  LORazepam   Injectable 2 milliGRAM(s) IV Push every 4 hours  multivitamin 1 Tablet(s) Oral daily  mupirocin 2% Nasal 1 Application(s) Nasal two times a day  phytonadione  IVPB 10 milliGRAM(s) IV Intermittent daily  rifAXIMin 550 milliGRAM(s) Oral two times a day  thiamine IVPB 500 milliGRAM(s) IV Intermittent every 8 hours    MEDICATIONS  (PRN):  LORazepam   Injectable 2 milliGRAM(s) IV Push every 2 hours PRN CIWA >7      PHYSICAL EXAM:  GENERAL: well built, well nourished  HEAD:  Atraumatic, Normocephalic  EYES: EOMI, PERRLA, conjunctiva and sclera clear  ENT: No tonsillar erythema, exudates, or enlargement; Moist mucous membranes, Good dentition, No lesions  NECK: Supple, No JVD, Normal thyroid, no enlarged nodes  NERVOUS SYSTEM:  Alert & Oriented X3, Good concentration; Motor Strength 5/5 B/L upper and lower extremities; DTRs 2+ intact and symmetric, sensory intact  CHEST/LUNG: B/L good air entry; No rales, rhonchi, or wheezing  HEART: S1S2 normal, no S3, Regular rate and rhythm; No murmurs, rubs, or gallops  ABDOMEN: Soft, Nontender, Nondistended; Bowel sounds present  EXTREMITIES:  2+ Peripheral Pulses, No clubbing, cyanosis, or edema  LYMPH: No lymphadenopathy noted  SKIN: No rashes or lesions    Care Discussed with Consultants/Other Providers [ x] YES  [ ] NO 35 y/o M with PMH of liver cirrhosis 2/2 EtoH abuse, and esophageal varices presented to the ED with generalized weakness. He gives a history of daily EtoH intake (4-5 cans of beer) x years, severe nausea with grossly bloody vomitus (1 tsp quantity), and melena. Denied any episodes of fever, chills , nausea , abdominal pain, epigastric pain, diarrhea or urinary problems. Patient was admitted to the hospital 6/2020 and US abdomen showed cirrhotic liver and significant ascites. EGD at that time showed small esophageal varices distally (didnt get banded as patient didn't get FFP at that time), and portal hypertensive gastropathy. Biopsy not done due to elevated  INR. He was started on Nadolol 20mg qhs, spironolactone and Lasix, but was noncompliant, and doesn't take any medications currently. Admitted to [facility] for alcohol abuse, anemia, thrombocytopenia with MELD of 32, CP of 14, and elevated ammonia of 32. Pt was agitated and Code grey was called x3, and transferred to ICU for elevated CIWA and worsening alcohol withdrawal. Precedex drip, lactulose, and rifaximin was started and abdominal paracentesis was done with studies negative for SBP.     Interval history:     ICU was consulted for worsening alcohol withdrawal. Pt was seen and examined at bedside. Spoke with the pt using  [id number]. Pt is disoriented to time and place. He thinks he is at work. Pt has only mild tremor, does not seem to be agitated but trying to get out of bed for food.       INTERVAL HPI/OVERNIGHT EVENTS:  ICU Vital Signs Last 24 Hrs  T(C): 36.6 (18 Sep 2020 10:00), Max: 36.8 (17 Sep 2020 16:00)  T(F): 97.8 (18 Sep 2020 10:00), Max: 98.3 (17 Sep 2020 16:00)  HR: 77 (18 Sep 2020 10:00) (57 - 107)  BP: 87/56 (18 Sep 2020 10:00) (74/46 - 127/78)  BP(mean): 63 (18 Sep 2020 10:00) (51 - 90)  ABP: --  ABP(mean): --  RR: 18 (18 Sep 2020 10:00) (11 - 23)  SpO2: 97% (18 Sep 2020 10:00) (96% - 100%)    I&O's Summary    17 Sep 2020 07:01  -  18 Sep 2020 07:00  --------------------------------------------------------  IN: 1152.1 mL / OUT: 1285 mL / NET: -132.9 mL          LABS:                        8.6    3.48  )-----------( 37       ( 18 Sep 2020 08:10 )             25.5     09-18    134<L>  |  101  |  5<L>  ----------------------------<  118<H>  3.9   |  30  |  0.53    Ca    7.5<L>      18 Sep 2020 06:11  Phos  3.8     09-18  Mg     1.6     09-18    TPro  6.8  /  Alb  1.2<L>  /  TBili  9.9<H>  /  DBili  x   /  AST  157<H>  /  ALT  48  /  AlkPhos  203<H>  09-18    PT/INR - ( 17 Sep 2020 11:03 )   PT: 47.1 sec;   INR: 4.32 ratio         PTT - ( 17 Sep 2020 11:03 )  PTT:57.3 sec    CAPILLARY BLOOD GLUCOSE            RADIOLOGY & ADDITIONAL TESTS:    Consultant(s) Notes Reviewed:  [x ] YES  [ ] NO    MEDICATIONS  (STANDING):  albumin human 25% IVPB 100 milliLiter(s) IV Intermittent every 6 hours  chlorhexidine 2% Cloths 1 Application(s) Topical daily  dexMEDEtomidine Infusion 0.5 MICROgram(s)/kG/Hr (10.6 mL/Hr) IV Continuous <Continuous>  folic acid 1 milliGRAM(s) Oral daily  influenza   Vaccine 0.5 milliLiter(s) IntraMuscular once  lactulose Syrup 20 Gram(s) Oral four times a day  LORazepam   Injectable 2 milliGRAM(s) IV Push every 4 hours  multivitamin 1 Tablet(s) Oral daily  mupirocin 2% Nasal 1 Application(s) Nasal two times a day  phytonadione  IVPB 10 milliGRAM(s) IV Intermittent daily  rifAXIMin 550 milliGRAM(s) Oral two times a day  thiamine IVPB 500 milliGRAM(s) IV Intermittent every 8 hours    MEDICATIONS  (PRN):  LORazepam   Injectable 2 milliGRAM(s) IV Push every 2 hours PRN CIWA >7      PHYSICAL EXAM:  GENERAL: well built, well nourished  HEAD:  Atraumatic, Normocephalic  EYES: EOMI, PERRLA, conjunctiva and sclera clear  ENT: No tonsillar erythema, exudates, or enlargement; Moist mucous membranes, Good dentition, No lesions  NECK: Supple, No JVD, Normal thyroid, no enlarged nodes  NERVOUS SYSTEM:  Alert & Oriented X3, Good concentration; Motor Strength 5/5 B/L upper and lower extremities; DTRs 2+ intact and symmetric, sensory intact  CHEST/LUNG: B/L good air entry; No rales, rhonchi, or wheezing  HEART: S1S2 normal, no S3, Regular rate and rhythm; No murmurs, rubs, or gallops  ABDOMEN: Soft, Nontender, Nondistended; Bowel sounds present  EXTREMITIES:  2+ Peripheral Pulses, No clubbing, cyanosis, or edema  LYMPH: No lymphadenopathy noted  SKIN: No rashes or lesions    Care Discussed with Consultants/Other Providers [ x] YES  [ ] NO 35 y/o M with PMH of liver cirrhosis 2/2 EtoH abuse, and esophageal varices presented to the ED with generalized weakness. He gives a history of daily EtoH intake (4-5 cans of beer) x years, severe nausea with grossly bloody vomitus (1 tsp quantity), and melena. Denied any episodes of fever, chills , nausea , abdominal pain, epigastric pain, diarrhea or urinary problems. Patient was admitted to the hospital 6/2020 and US abdomen showed cirrhotic liver and significant ascites. EGD at that time showed small esophageal varices distally (didnt get banded as patient didn't get FFP at that time), and portal hypertensive gastropathy. Biopsy not done due to elevated  INR. He was started on Nadolol 20mg qhs, spironolactone and Lasix, but was noncompliant, and doesn't take any medications currently. Admitted to [facility] for alcohol abuse, anemia, thrombocytopenia with MELD of 32, CP of 14, and elevated ammonia of 32. Pt was agitated and Code grey was called x3, and transferred to ICU for elevated CIWA and worsening alcohol withdrawal. Precedex drip, lactulose, and rifaximin was started and abdominal paracentesis was done with studies negative for SBP.     INTERVAL HPI/OVERNIGHT EVENTS:  Patient was examined while he was lying in bed, Aox1-2 (place, person), responding to questions/commands, in NAD on RA. He denies headache, weakness, cough, chest pain, wheezing, abdominal pain. Bowel movement's, Nicolas catheter placed. He is tolerating ___ diet with no nausea or vomiting.      ICU Vital Signs Last 24 Hrs  T(C): 36.6 (18 Sep 2020 10:00), Max: 36.8 (17 Sep 2020 16:00)  T(F): 97.8 (18 Sep 2020 10:00), Max: 98.3 (17 Sep 2020 16:00)  HR: 77 (18 Sep 2020 10:00) (57 - 107)  BP: 87/56 (18 Sep 2020 10:00) (74/46 - 127/78)  BP(mean): 63 (18 Sep 2020 10:00) (51 - 90)  ABP: --  ABP(mean): --  RR: 18 (18 Sep 2020 10:00) (11 - 23)  SpO2: 97% (18 Sep 2020 10:00) (96% - 100%)    I&O's Summary    17 Sep 2020 07:01  -  18 Sep 2020 07:00  --------------------------------------------------------  IN: 1152.1 mL / OUT: 1285 mL / NET: -132.9 mL          LABS:                        8.6    3.48  )-----------( 37       ( 18 Sep 2020 08:10 )             25.5     09-18    134<L>  |  101  |  5<L>  ----------------------------<  118<H>  3.9   |  30  |  0.53    Ca    7.5<L>      18 Sep 2020 06:11  Phos  3.8     09-18  Mg     1.6     09-18    TPro  6.8  /  Alb  1.2<L>  /  TBili  9.9<H>  /  DBili  x   /  AST  157<H>  /  ALT  48  /  AlkPhos  203<H>  09-18    PT/INR - ( 17 Sep 2020 11:03 )   PT: 47.1 sec;   INR: 4.32 ratio         PTT - ( 17 Sep 2020 11:03 )  PTT:57.3 sec    CAPILLARY BLOOD GLUCOSE            RADIOLOGY & ADDITIONAL TESTS:    Consultant(s) Notes Reviewed:  [x ] YES  [ ] NO    MEDICATIONS  (STANDING):  albumin human 25% IVPB 100 milliLiter(s) IV Intermittent every 6 hours  chlorhexidine 2% Cloths 1 Application(s) Topical daily  dexMEDEtomidine Infusion 0.5 MICROgram(s)/kG/Hr (10.6 mL/Hr) IV Continuous <Continuous>  folic acid 1 milliGRAM(s) Oral daily  influenza   Vaccine 0.5 milliLiter(s) IntraMuscular once  lactulose Syrup 20 Gram(s) Oral four times a day  LORazepam   Injectable 2 milliGRAM(s) IV Push every 4 hours  multivitamin 1 Tablet(s) Oral daily  mupirocin 2% Nasal 1 Application(s) Nasal two times a day  phytonadione  IVPB 10 milliGRAM(s) IV Intermittent daily  rifAXIMin 550 milliGRAM(s) Oral two times a day  thiamine IVPB 500 milliGRAM(s) IV Intermittent every 8 hours    MEDICATIONS  (PRN):  LORazepam   Injectable 2 milliGRAM(s) IV Push every 2 hours PRN CIWA >7      PHYSICAL EXAM:  GENERAL: well built, well nourished  HEAD:  Atraumatic, Normocephalic  EYES: EOMI, PERRLA, conjunctiva and sclera clear  ENT: No tonsillar erythema, exudates, or enlargement; Moist mucous membranes, Good dentition, No lesions  NECK: Supple, No JVD, Normal thyroid, no enlarged nodes  NERVOUS SYSTEM:  Alert & Oriented X3, Good concentration; Motor Strength 5/5 B/L upper and lower extremities; DTRs 2+ intact and symmetric, sensory intact  CHEST/LUNG: B/L good air entry; No rales, rhonchi, or wheezing  HEART: S1S2 normal, no S3, Regular rate and rhythm; No murmurs, rubs, or gallops  ABDOMEN: Soft, Nontender, Nondistended; Bowel sounds present  EXTREMITIES:  2+ Peripheral Pulses, No clubbing, cyanosis, or edema  LYMPH: No lymphadenopathy noted  SKIN: No rashes or lesions    Care Discussed with Consultants/Other Providers [ x] YES  [ ] NO 33 y/o M with PMH of liver cirrhosis 2/2 EtoH abuse, and esophageal varices presented to the ED with generalized weakness. He gives a history of daily EtoH intake (4-5 cans of beer) x years, severe nausea with grossly bloody vomitus (1 tsp quantity), and melena. Denied any episodes of fever, chills , nausea , abdominal pain, epigastric pain, diarrhea or urinary problems. Patient was admitted to the hospital 6/2020 and US abdomen showed cirrhotic liver and significant ascites. EGD at that time showed small esophageal varices distally (didnt get banded as patient didn't get FFP at that time), and portal hypertensive gastropathy. Biopsy not done due to elevated  INR. He was started on Nadolol 20mg qhs, spironolactone and Lasix, but was noncompliant, and doesn't take any medications currently. Admitted to [facility] for alcohol abuse, anemia, thrombocytopenia with MELD of 32, CP of 14, and elevated ammonia of 32. Pt was agitated and Code grey was called x3, and transferred to ICU for elevated CIWA and worsening alcohol withdrawal. Precedex drip, lactulose, and rifaximin was started and abdominal paracentesis was done with studies negative for SBP.     INTERVAL HPI/OVERNIGHT EVENTS:  Cmmunicated via Spanisheter [id number]  Patient was examined while he was lying in bed, Aox1-2 (place, person), responding to questions/commands, in NAD on RA. He is still confused/ agitated but endorsed some nausea, and mild atypical chest pain, but denied headache, weakness, cough, wheezing, or abdominal pain. He has not had any bowel movements since admission, and montoya catheter is draining net negative -103ml. He is tolerating normal diet with no nausea or vomiting.      ICU Vital Signs Last 24 Hrs  T(C): 36.6 (18 Sep 2020 10:00), Max: 36.8 (17 Sep 2020 16:00)  T(F): 97.8 (18 Sep 2020 10:00), Max: 98.3 (17 Sep 2020 16:00)  HR: 77 (18 Sep 2020 10:00) (57 - 107)  BP: 87/56 (18 Sep 2020 10:00) (74/46 - 127/78)  BP(mean): 63 (18 Sep 2020 10:00) (51 - 90)  ABP: --  ABP(mean): --  RR: 18 (18 Sep 2020 10:00) (11 - 23)  SpO2: 97% (18 Sep 2020 10:00) (96% - 100%)    I&O's Summary    17 Sep 2020 07:01  -  18 Sep 2020 07:00  --------------------------------------------------------  IN: 1152.1 mL / OUT: 1285 mL / NET: -132.9 mL          LABS:                        8.6    3.48  )-----------( 37       ( 18 Sep 2020 08:10 )             25.5     09-18    134<L>  |  101  |  5<L>  ----------------------------<  118<H>  3.9   |  30  |  0.53    Ca    7.5<L>      18 Sep 2020 06:11  Phos  3.8     09-18  Mg     1.6     09-18    TPro  6.8  /  Alb  1.2<L>  /  TBili  9.9<H>  /  DBili  x   /  AST  157<H>  /  ALT  48  /  AlkPhos  203<H>  09-18    PT/INR - ( 17 Sep 2020 11:03 )   PT: 47.1 sec;   INR: 4.32 ratio         PTT - ( 17 Sep 2020 11:03 )  PTT:57.3 sec    CAPILLARY BLOOD GLUCOSE            RADIOLOGY & ADDITIONAL TESTS:    Consultant(s) Notes Reviewed:  [x ] YES  [ ] NO    MEDICATIONS  (STANDING):  albumin human 25% IVPB 100 milliLiter(s) IV Intermittent every 6 hours  chlorhexidine 2% Cloths 1 Application(s) Topical daily  dexMEDEtomidine Infusion 0.5 MICROgram(s)/kG/Hr (10.6 mL/Hr) IV Continuous <Continuous>  folic acid 1 milliGRAM(s) Oral daily  influenza   Vaccine 0.5 milliLiter(s) IntraMuscular once  lactulose Syrup 20 Gram(s) Oral four times a day  LORazepam   Injectable 2 milliGRAM(s) IV Push every 4 hours  multivitamin 1 Tablet(s) Oral daily  mupirocin 2% Nasal 1 Application(s) Nasal two times a day  phytonadione  IVPB 10 milliGRAM(s) IV Intermittent daily  rifAXIMin 550 milliGRAM(s) Oral two times a day  thiamine IVPB 500 milliGRAM(s) IV Intermittent every 8 hours    MEDICATIONS  (PRN):  LORazepam   Injectable 2 milliGRAM(s) IV Push every 2 hours PRN CIWA >7      PHYSICAL EXAM:  GENERAL: well built, well nourished  HEAD:  Atraumatic, Normocephalic  EYES: EOMI, PERRLA, conjunctiva and sclera clear  ENT: No tonsillar erythema, exudates, or enlargement; Moist mucous membranes, Good dentition, No lesions  NECK: Supple, No JVD, Normal thyroid, no enlarged nodes  NERVOUS SYSTEM:  Alert & Oriented X3, Good concentration; Motor Strength 5/5 B/L upper and lower extremities; DTRs 2+ intact and symmetric, sensory intact  CHEST/LUNG: B/L good air entry; No rales, rhonchi, or wheezing  HEART: S1S2 normal, no S3, Regular rate and rhythm; No murmurs, rubs, or gallops  ABDOMEN: Soft, Nontender, Nondistended; Bowel sounds present  EXTREMITIES:  2+ Peripheral Pulses, No clubbing, cyanosis, or edema  LYMPH: No lymphadenopathy noted  SKIN: No rashes or lesions    Care Discussed with Consultants/Other Providers [ x] YES  [ ] NO 35 y/o M with PMH of liver cirrhosis 2/2 EtoH abuse, and esophageal varices presented to the ED with generalized weakness. He gives a history of daily EtoH intake (4-5 cans of beer) x years, severe nausea with grossly bloody vomitus (1 tsp quantity), and melena. Denied any episodes of fever, chills , nausea , abdominal pain, epigastric pain, diarrhea or urinary problems. Patient was admitted to the hospital 6/2020 and US abdomen showed cirrhotic liver and significant ascites. EGD at that time showed small esophageal varices distally (didnt get banded as patient didn't get FFP at that time), and portal hypertensive gastropathy. Biopsy not done due to elevated  INR. He was started on Nadolol 20mg qhs, spironolactone and Lasix, but was noncompliant, and doesn't take any medications currently. Admitted to [facility] for alcohol abuse, anemia, thrombocytopenia with MELD of 32, CP of 14, and elevated ammonia of 32. Pt was agitated and Code grey was called x3, and transferred to ICU for elevated CIWA and worsening alcohol withdrawal. Precedex drip, lactulose, and rifaximin was started and abdominal paracentesis was done with studies negative for SBP.     INTERVAL HPI/OVERNIGHT EVENTS:  Cmmunicated via Spanisheter [id number]  Patient was examined while he was lying in bed, Aox1-2 (place, person), responding to questions/commands, in NAD on RA. He is still confused/ agitated but endorsed some nausea, and mild atypical chest pain, but denied headache, weakness, cough, wheezing, or abdominal pain. He has not had any bowel movements since admission, and montoya catheter is draining net negative -103ml. He is tolerating normal diet with no nausea or vomiting.      ICU Vital Signs Last 24 Hrs  T(C): 36.6 (18 Sep 2020 10:00), Max: 36.8 (17 Sep 2020 16:00)  T(F): 97.8 (18 Sep 2020 10:00), Max: 98.3 (17 Sep 2020 16:00)  HR: 77 (18 Sep 2020 10:00) (57 - 107)  BP: 87/56 (18 Sep 2020 10:00) (74/46 - 127/78)  BP(mean): 63 (18 Sep 2020 10:00) (51 - 90)  ABP: --  ABP(mean): --  RR: 18 (18 Sep 2020 10:00) (11 - 23)  SpO2: 97% (18 Sep 2020 10:00) (96% - 100%)    I&O's Summary    17 Sep 2020 07:01  -  18 Sep 2020 07:00  --------------------------------------------------------  IN: 1152.1 mL / OUT: 1285 mL / NET: -132.9 mL          LABS:                        8.6    3.48  )-----------( 37       ( 18 Sep 2020 08:10 )             25.5     09-18    134<L>  |  101  |  5<L>  ----------------------------<  118<H>  3.9   |  30  |  0.53    Ca    7.5<L>      18 Sep 2020 06:11  Phos  3.8     09-18  Mg     1.6     09-18    TPro  6.8  /  Alb  1.2<L>  /  TBili  9.9<H>  /  DBili  x   /  AST  157<H>  /  ALT  48  /  AlkPhos  203<H>  09-18    PT/INR - ( 17 Sep 2020 11:03 )   PT: 47.1 sec;   INR: 4.32 ratio         PTT - ( 17 Sep 2020 11:03 )  PTT:57.3 sec    CAPILLARY BLOOD GLUCOSE            RADIOLOGY & ADDITIONAL TESTS:    Consultant(s) Notes Reviewed:  [x ] YES  [ ] NO    MEDICATIONS  (STANDING):  albumin human 25% IVPB 100 milliLiter(s) IV Intermittent every 6 hours  chlorhexidine 2% Cloths 1 Application(s) Topical daily  dexMEDEtomidine Infusion 0.5 MICROgram(s)/kG/Hr (10.6 mL/Hr) IV Continuous <Continuous>  folic acid 1 milliGRAM(s) Oral daily  influenza   Vaccine 0.5 milliLiter(s) IntraMuscular once  lactulose Syrup 20 Gram(s) Oral four times a day  LORazepam   Injectable 2 milliGRAM(s) IV Push every 4 hours  multivitamin 1 Tablet(s) Oral daily  mupirocin 2% Nasal 1 Application(s) Nasal two times a day  phytonadione  IVPB 10 milliGRAM(s) IV Intermittent daily  rifAXIMin 550 milliGRAM(s) Oral two times a day  thiamine IVPB 500 milliGRAM(s) IV Intermittent every 8 hours    MEDICATIONS  (PRN):  LORazepam   Injectable 2 milliGRAM(s) IV Push every 2 hours PRN CIWA >7      PHYSICAL EXAM:  GENERAL: Jaundiced, well built, poorly nourished, appears unkempt   HEAD:  Atraumatic, Normocephalic  EYES: EOMI, PERRLA, conjunctiva and sclera muddy yellow   ENT: Mucosal and oral bleeding/oozing noted; normal dentition, no perioral lesions   NECK: Supple, No JVD, Normal thyroid, no enlarged nodes  NERVOUS SYSTEM:  Alert & Oriented X1-2, Drowsy, poor concentration; Motor Strength 5/5 B/L upper and lower extremities; DTRs 2+ intact and symmetric, sensory intact  CHEST/LUNG: B/L good air entry; No rales, rhonchi, or wheezing  HEART: S1S2 normal, no S3, Regular rate and rhythm; No murmurs, rubs, or gallops  ABDOMEN: Soft, Nontender, Nondistended; Bowel sounds present, S/p abdominal paracentesis   EXTREMITIES:  2+ Peripheral Pulses, No clubbing, cyanosis, or edema  LYMPH: No lymphadenopathy noted  SKIN: No rashes or lesions    Care Discussed with Consultants/Other Providers [ x] YES  [ ] NO

## 2020-09-18 NOTE — PROGRESS NOTE ADULT - ASSESSMENT
35 y/o M with PMH of Alcohol abuse and liver cirrhosis  , esophageal varices presented to Ed because he was feeling week . Pt was admitted ti medicine floor for alcohol abuse, anemia, thrombocytopenia. MELD score on admission was 32. ICU was consulted for worsening alcohol withdrawal.     Assessment:  1. Alcohol withdrawal  2. Encephalopathy  3. Alcoholic  liver cirrhosis with ascites and esophageal varices   4. Anemia  5.Thrombocytopenia  6. Hypomagnesemia  7.Elevated INR  8. 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 3 month mortality 52.6%  Child-Bojorquez Score for Cirrhosis Mortality= 13 points  -Monitor CMP  -Plan for diagnostic paracentesis as per primary team    RENAL  # Cr/ BUN wnl  -No acute issues    INFECTIOUS DISEASE  # Pt is afebrile  -No WBC elevation    ENDOCRINE  # No acute issues  HBA1 C and TSH wnl    HEME  # Anemia  -Likely secondary to chronic alcoholism  -No signs of active bleeding  -Monitor CBC, PT/INR  -Monitor vitals    # Thrombocytopenia  -Likely secondary to chronic alcoholism   - Platelet: 51  -monitor platelet level    # Elevated PT/INR  -Likely secondary to alcoholic liver disease  -s/p vitamin k supplementation yesterday  -Monitor PT/INR    FLUIDS/ELECTROLYTES/NUTRITION  -IVF  # Hypomagnesemia  -Mg level noted to be 1.4  2mg ivpb mgso4 replaced.  -Monitor, Replete to K>4 and Mg>2    -Diet: Advance diet as tolerated    PROPHYLAXIS  -DVT: Not on anticoagulation because of thrombocytopenia and anemia       DISPO:  transfer to ICU    CODE STATUS: Full code 33 y/o M with PMH of Alcohol abuse and liver cirrhosis , esophageal varices presented to ED because he was feeling week . Pt was admitted ti medicine floor for alcohol abuse, anemia, thrombocytopenia. MELD score on admission was 32. ICU was consulted for worsening alcohol withdrawal.     Assessment:  1. Alcohol withdrawal  2. Encephalopathy  3. Alcoholic  liver cirrhosis with ascites and esophageal varices   4. Anemia  5.Thrombocytopenia  6. Hypomagnesemia  7.Elevated INR  8. Cholelithiasis    Neuro:  # Encephalopathy  - Ammonia elevated 58-->100  - Continue Lactulose  - Patient Grade 1 hepatic enceph clinically, drowsy with sleep reversal   - Monitor ammonia level, neurochecks     # Alcohol withdrawal (improving)  - Pt Started on precedex drip 9/17, titrated off the same evening as CIWA dropped and pt sleeping   - Continue Lorazepam 2mg Q4 standing dose, 2 Mg Q2PRN as per CIWA. Standing doses have been held due to hypotension and low CIWA.  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 3 month mortality 52.6%  Child-Bojorquez Score for Cirrhosis Mortality= 13 points  -Monitor CMP  -Plan for diagnostic paracentesis as per primary team  - Hepatology Dr. Morales on board    RENAL  # Cr/ BUN wnl  -No acute issues    INFECTIOUS DISEASE  # Pt is afebrile  -No WBC elevation    ENDOCRINE  # No acute issues  HBA1 C and TSH wnl    HEME  # Anemia  -Likely secondary to chronic alcoholism  -No signs of active bleeding  -Monitor CBC, PT/INR  -Monitor vitals    # Thrombocytopenia  -Likely secondary to chronic alcoholism   - Platelet: 51  -monitor platelet level    # Elevated PT/INR  -Likely secondary to alcoholic liver disease  -s/p vitamin k supplementation yesterday  -Monitor PT/INR    FLUIDS/ELECTROLYTES/NUTRITION  -IVF  # Hypomagnesemia  -Mg level noted to be 1.4  2mg ivpb mgso4 replaced.  -Monitor, Replete to K>4 and Mg>2    -Diet: Advance diet as tolerated    PROPHYLAXIS  -DVT: Not on anticoagulation because of thrombocytopenia and anemia       DISPO:  transfer to ICU    CODE STATUS: Full code 35 y/o M with PMH of Alcohol abuse and liver cirrhosis , esophageal varices presented to ED because he was feeling week . Pt was admitted ti medicine floor for alcohol abuse, anemia, thrombocytopenia. MELD score on admission was 32. ICU was consulted for worsening alcohol withdrawal.     Assessment:  1. Alcohol withdrawal  2. Encephalopathy  3. Alcoholic  liver cirrhosis with ascites and esophageal varices   4. Anemia  5.Thrombocytopenia  6. Hypomagnesemia  7.Elevated INR  8. Cholelithiasis    Neuro:  # Encephalopathy  - Ammonia elevated 58-->100  - Continue Lactulose  - Patient Grade 1 hepatic enceph clinically, drowsy with sleep reversal   - Monitor ammonia level, neurochecks     # Alcohol withdrawal (improving)  - Pt Started on precedex drip 9/17, titrated off the same evening as CIWA dropped and pt sleeping   - Continue Lorazepam 2mg Q4 standing dose, 2 Mg Q2PRN as per CIWA. Standing doses have been held due to hypotension and low CIWA.  - Received 6 mg total since ICU admission   - Continue IV thiamine, folic acid and multivitamin  - Enhanced observation due to inc agitation  - Pt being transferred to ICU for monitoring of worsening alcohol withdrawal    CARDIOVASCULAR  # Cardiomegaly  - EKG, Trops   - Chest x-ray demonstrates   - May be a feature of   -   -     PULMONARY  # No acute issues  - Saturating well on RA, low clinical suspicion as of now for Aspiration PNA     GASTROINTESTINAL  # Decompensated liver cirrhosis with mild ascites and esophageal varices  - MELD score : 32. 3 month mortality 52.6%  - Child-Bojorquez Score for Cirrhosis Mortality= 13 points  - AST/ALT   - Paracentesis done: Ashley 61, RBC moderate,   - Hepatology Dr. Morales on board    RENAL  # Cr/ BUN wnl  -No acute issues    INFECTIOUS DISEASE  # Pt is afebrile  -No WBC elevation    ENDOCRINE  # No acute issues  HBA1 C and TSH wnl    HEME  # Anemia  -Likely secondary to chronic alcoholism  -No signs of active bleeding  -Monitor CBC, PT/INR  -Monitor vitals    # Thrombocytopenia  -Likely secondary to chronic alcoholism   - Platelet: 51  -monitor platelet level    # Elevated PT/INR  -Likely secondary to alcoholic liver disease  -s/p vitamin k supplementation yesterday  -Monitor PT/INR    FLUIDS/ELECTROLYTES/NUTRITION  -IVF  # Hypomagnesemia  -Mg level noted to be 1.4  2mg ivpb mgso4 replaced.  -Monitor, Replete to K>4 and Mg>2    -Diet: Advance diet as tolerated    PROPHYLAXIS  -DVT: Not on anticoagulation because of thrombocytopenia and anemia       DISPO:  transfer to ICU    CODE STATUS: Full code 35 y/o M with PMH of Alcohol abuse and liver cirrhosis , esophageal varices presented to ED because he was feeling week . Pt was admitted ti medicine floor for alcohol abuse, anemia, thrombocytopenia. MELD score on admission was 32. ICU was consulted for worsening alcohol withdrawal.     Assessment:  1. Alcohol withdrawal  2. Encephalopathy  3. Alcoholic  liver cirrhosis with ascites and esophageal varices   4. Anemia  5.Thrombocytopenia  6. Hypomagnesemia  7.Elevated INR  8. Cholelithiasis    Neuro:  # Encephalopathy  - Ammonia elevated 58-->100  - Continue Lactulose  - Patient Grade 1 hepatic enceph clinically, drowsy with sleep reversal   - Monitor ammonia level, neurochecks     # Alcohol withdrawal (improving)  - Pt Started on precedex drip 9/17, titrated off the same evening as CIWA dropped and pt sleeping   - Continue Lorazepam 2mg Q4 standing dose, 2 Mg Q2PRN as per CIWA. Standing doses have been held due to hypotension and low CIWA.  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 3 month mortality 52.6%  - Child-Bojorquez Score for Cirrhosis Mortality= 13 points, MAddreys score also high --> steroids held for now   - AST/ALT/AlkP 157/48/203, Enzymes reflect Etoh picture, TBili: 9.9  DBili: 7.2  - Paracentesis done 2.8L jorden fluid --> SBP ruled out, no clinical features at this time   - Started on Albumin 25% in 100ml IVPB Q6x3 days for post procedure hypotension   - US RUQ: Liver cirrhosis with no focal lesion, cholelithiasis with mild GB wall thickening, no BD dilatation, negative Field's sign   - Hepatitis panel -ve for a/c hepatitis  - Hepatology Dr. Morales on board    # Variceal bleed  - Unwitnessed history of upper GI bleed x2 episodes   - Prior EGD in 6/2020 demonstrated small distal EV's (not banded due to low PLTs), and portal gastropathy ([place] Class IIA)  - Observed and discharged on Spironolactone, nadalol, lasix --> pt was noncompliant with meds   - Hb 6.9 this am, repeat 8.6 g/dl, to transfuse and keep Hb 7-9    RENAL  - Cr/ BUN wnl  - Urine output net negative -103ml  - Will hold diuretics as overdiuresis may precipitate Hepatic enceph   - S/p paracentesis 9/17 - 2.8L fluids     INFECTIOUS DISEASE  # Pt is afebrile  -No WBC elevation    ENDOCRINE  # No acute issues  HBA1C 5.0 and TSH Wnl    HEME  # Anemia  - Likely secondary to chronic alcoholism  - normocytic, hypochromic. Ferritin, Fe total wnl, Fe Sat 91 (high), TIBC 121(low)   - Likely Etoh BM suppression and an element of Fe deficiency; will hold supplementation as it may exacerbate HE   - Folate, B12 wnl, supplemented FA, MVT in view of malnutrition    - No witnessed signs of active bleeding   - Monitor CBC, vitals     # Thrombocytopenia  - Likely secondary to chronic alcoholism   - Symptomatic mucosal bleeds, easy bruisability  - PS shows no schistocytes, retic count 2.5%,  (unreliable in setting of liver dysfunction  - Platelet: 51 --> 39: Adm 1 U Plts 9/18  - Monitor plts    # Elevated PT/INR  - 4. 32, likely due to C/c liver disease   - s/p vitamin k 9/18   - Monitor PT/INR, for bleeds     FLUIDS/ELECTROLYTES/NUTRITION    -Monitor, Replete to K>4 and Mg>2    Diet: Regular Diet     PROPHYLAXIS  -DVT: Not on anticoagulation because of thrombocytopenia and anemia       DISPO:  transfer to ICU    CODE STATUS: Full code 35 y/o M with PMH of Alcohol abuse and liver cirrhosis , esophageal varices presented to ED because he was feeling week . Pt was admitted ti medicine floor for alcohol abuse, anemia, thrombocytopenia. MELD score on admission was 32. ICU was consulted for worsening alcohol withdrawal.     Assessment:  1. Alcohol withdrawal  2. Encephalopathy  3. Alcoholic  liver cirrhosis with ascites and esophageal varices   4. Anemia  5.Thrombocytopenia  6. Hypomagnesemia  7.Elevated INR  8. Cholelithiasis    Neuro:  # Encephalopathy  - Ammonia elevated 58-->100  - Continue Lactulose  - Patient Grade 1 hepatic enceph clinically, drowsy with sleep reversal   - Monitor ammonia level, neurochecks     # Alcohol withdrawal (improving)  - Pt Started on precedex drip 9/17, titrated off the same evening as CIWA dropped and pt sleeping   - Continue Lorazepam 2mg Q4 standing dose, 2 Mg Q2PRN as per CIWA. Standing doses have been held due to hypotension and low CIWA.  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 3 month mortality 52.6%  - Child-Bojorquez Score for Cirrhosis Mortality= 13 points, MAddreys score also high --> steroids held for now   - AST/ALT/AlkP 157/48/203, Enzymes reflect Etoh picture, TBili: 9.9  DBili: 7.2  - Paracentesis done 2.8L jorden fluid removed: Ashley 61,  --> Low suspicion for SBP   - Started on Albumin 25% in 100ml IVPB Q6x3 days for post procedure hypotension   - US RUQ: Liver cirrhosis with no focal lesion, cholelithiasis with mild GB wall thickening, no BD dilatation, negative Field's sign   - Hepatitis panel -ve for a/c hepatitis  - Hepatology Dr. Morales on board    # Variceal bleed  - Unwitnessed history of upper GI bleed x2 episodes   - Prior EGD in 6/2020 demonstrated small distal EV's (not banded due to low PLTs), and portal gastropathy ([place] Class IIA)  - Observed and discharged on Spironolactone, nadalol, lasix --> pt was noncompliant with meds   - Hb 6.9 this am, repeat 8.6 g/dl, to transfuse and keep Hb 7-9    RENAL  - Cr/ BUN wnl  - Urine output net negative -103ml  - Will hold diuretics as overdiuresis may precipitate Hepatic enceph   - S/p paracentesis 9/17 - 2.8L fluids     INFECTIOUS DISEASE  # Pt is afebrile  -No WBC elevation    ENDOCRINE  # No acute issues  HBA1C 5.0 and TSH Wnl    HEME  # Anemia  - Likely secondary to chronic alcoholism  - normocytic, hypochromic. Ferritin, Fe total wnl, Fe Sat 91 (high), TIBC 121(low)   - Likely Etoh BM suppression and an element of Fe deficiency; will hold supplementation as it may exacerbate HE   - Folate, B12 wnl, supplemented FA, MVT in view of malnutrition    - No witnessed signs of active bleeding   - Monitor CBC, vitals     # Thrombocytopenia  - Likely secondary to chronic alcoholism   - Symptomatic mucosal bleeds, easy bruisability  - PS shows no schistocytes, retic count 2.5%,  (unreliable in setting of liver dysfunction  - Platelet: 51 --> 39: Adm 1 U Plts 9/18  - Monitor plts    # Elevated PT/INR  - 4. 32, likely due to C/c liver disease   - s/p vitamin k 9/18   - Monitor PT/INR, for bleeds     FLUIDS/ELECTROLYTES/NUTRITION    -Monitor, Replete to K>4 and Mg>2    Diet: Regular Diet     PROPHYLAXIS  -DVT: Not on anticoagulation because of thrombocytopenia and anemia       DISPO:  transfer to ICU    CODE STATUS: Full code 35 y/o M with PMH of Alcohol abuse and liver cirrhosis , esophageal varices presented to ED because he was feeling week . Pt was admitted ti medicine floor for alcohol abuse, anemia, thrombocytopenia. MELD score on admission was 32. ICU was consulted for worsening alcohol withdrawal.     Assessment:  1. Alcohol withdrawal  2. Encephalopathy  3. Alcoholic  liver cirrhosis with ascites and esophageal varices   4. Anemia  5.Thrombocytopenia  6. Hypomagnesemia  7.Elevated INR  8. Cholelithiasis    Neuro:  # Encephalopathy  - Ammonia elevated 58-->100  - Continue Lactulose  - Patient Grade 1 hepatic enceph clinically, drowsy with sleep reversal   - Monitor ammonia level, neurochecks     # Alcohol withdrawal (improving)  - Pt Started on precedex drip 9/17, titrated off the same evening as CIWA dropped and pt sleeping   - Continue Lorazepam 2mg Q4 standing dose, 2 Mg Q2PRN as per CIWA. Standing doses have been held due to hypotension and low CIWA.  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 3 month mortality 52.6%  - Child-Bojorquez Score for Cirrhosis Mortality= 13 points, MAddreys score also high --> steroids held for now   - AST/ALT/AlkP 157/48/203, Enzymes reflect Etoh picture, TBili: 9.9  DBili: 7.2  - Paracentesis done 2.8L jorden fluid removed: Ashley 61,  --> Low suspicion for SBP   - Rifaximin, Lactulose for Ppx   - Started on Albumin 25% in 100ml IVPB Q6x3 days for post procedure hypotension   - US RUQ: Liver cirrhosis with no focal lesion, cholelithiasis with mild GB wall thickening, no BD dilatation, negative Field's sign   - Hepatitis panel -ve for a/c hepatitis  - Hepatology Dr. Morales on board    # Variceal bleed  - Unwitnessed history of upper GI bleed x2 episodes   - Prior EGD in 6/2020 demonstrated small distal EV's (not banded due to low PLTs), and portal gastropathy ([place] Class IIA)  - Observed and discharged on Spironolactone, nadalol, lasix --> pt was noncompliant with meds   - Hb 6.9 this am, repeat 8.6 g/dl, to transfuse and keep Hb 7-9    RENAL  - Cr/ BUN wnl  - Urine output net negative -103ml  - Will hold diuretics as overdiuresis may precipitate Hepatic enceph   - S/p paracentesis 9/17 - 2.8L fluids     INFECTIOUS DISEASE  # Pt is afebrile  -No WBC elevation    ENDOCRINE  # No acute issues  HBA1C 5.0 and TSH Wnl    HEME  # Anemia  - Likely secondary to chronic alcoholism  - normocytic, hypochromic. Ferritin, Fe total wnl, Fe Sat 91 (high), TIBC 121(low)   - Likely Etoh BM suppression and an element of Fe deficiency; will hold supplementation as it may exacerbate HE   - Folate, B12 wnl, supplemented FA, MVT in view of malnutrition    - No witnessed signs of active bleeding   - Monitor CBC, vitals     # Thrombocytopenia  - Likely secondary to chronic alcoholism   - Symptomatic mucosal bleeds, easy bruisability  - PS shows no schistocytes, retic count 2.5%,  (unreliable in setting of liver dysfunction  - Platelet: 51 --> 39: Adm 1 U Plts 9/18  - Monitor plts    # Elevated PT/INR  - 4. 32, likely due to C/c liver disease   - s/p vitamin k 9/18   - Monitor PT/INR, for bleeds     FLUIDS/ELECTROLYTES/NUTRITION    -Monitor, Replete to K>4 and Mg>2    Diet: Regular Diet     PROPHYLAXIS  -DVT: Not on anticoagulation because of thrombocytopenia and anemia       DISPO:  transfer to ICU    CODE STATUS: Full code

## 2020-09-19 NOTE — PROGRESS NOTE ADULT - ASSESSMENT
33yo male with hx of chronic alcohol abuse (actively drinking since age 20), decompensated cirrhosis with ascites (non-compliant with diuretics, no hx of LVP), hx of small esophageal varices on last EGD (6/2020) (non-compliant with BB, never banded), with recent hospitalization (6/2020 for leg swelling, anemia) presented on 9/15/2020 with malaise, generalized weakness for 1-2 weeks, found to have worsening liver function, with MELD score 32 compared to 23 on prior admission, electrolyte abnormalities, and questionable blood in stool (reported black stools) or vomitus, but without overt bleeding in hospital. 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  Acute worsening might have been caused by heavy alcohol intake (high blood alcohol on admission), SBP was r/o by paracentesis, viral and infectious workup negative so far, denied taking any medications, herbal supplements. He was transferred to ICU on 9/17/2020 b/o change in mental status, alcohol withdrawal, became hypotensive and bradycardic on Precedex. Got 1 PLT transfusion, and 2 U PRBC transfusions (2nd in process). Now LEDA.     Decompensated cirrhosis w ascites; small EV; MELD 34 (from 32) - today labs (INR) pending, but unfortunately still active drinker, with withdrawal now    # Acute worsening of liver tests most likely due to heavy drinking (MELD from 23 in June to 32-34-32); concern for ACLF; change in mental status on 9/17  - alcohol withdrawal vs. HE; and now LEDA   - c/w monitoring LFTs closely, including INR, at least q12 hrs, pending INR from today (Se bilirubin and liver enzymes improving)  - no overt GI bleeding; SBP was r/o; CXR neg for infiltrate; UA only 3-5 WBC, 0-2 RBC, trace leukocyte esterase; please repeat UA, and obtain UC, and BC  - avoid hepatotoxic meds, avoid NSAIDs (Utox was canceled on admission)  - alcohol abstinence (would not consider steroid for now given that risks possibly outweigh benefit - slight improvement MELD 34-32l, now in LEDA, hx of black stool, portal HTN gastropathy, varices, pending complete infectious workup; NAC was shown to have beneficial effect as addition to steroid)   - viral workup in process, Hep A IgM neg, immune; HBsAg and HBcAb IgM neg; HCV ab neg  - please add HCV PCR; Hep E IgM/IgG in process; can add EBV and CMV PCR       #EtOH withdrawal +/- HE (based on 9/17 evaluation was Gr1) - now off Precedex, on Ativan - awake, lethargic, but oriented to person, place and time till year and months, no asterixis  - continue close monitoring of mental status  - c/w lactulose to have 2-3 soft BM/day  - c/w rifaximin 550 mg bid  - avoid long acting BZD  - increased ammonia alone does not add any diagnostic, staging or prognostic value for HE in [facility] patients   - C/w CIWA protocol per ICU, c/w folic, thiamin, monitor electrolytes    #Ascites  - Would continue holding diuretics for now, due to the acute worsening liver tests and LEDA  - S/p diagnostic paracentesis; SBP was r/o  - SAAG =1.0 (serum albumin 1.4, ascites albumin 0.4, ascites protein 1.2),  and no splenomegaly on prior imaging in June, but small EV on EGD per prior notes (latter one still suggest CSPH)  - while SAAG > 1.1 expected in portal hypertension, in 3% of cirrhotic patients it might not be the case; also alcoholic hepatitis can cause ascites without significant PH - thus possibly additive effect and ideally can repeat dx paracentesis, but would hold off with it for now due to coagulopathy, thrombocytopenia  -F/u ascites cytology; can check amylase, lipase; TB unlikely-rest of ascites analysis not consistent  - has some proteinuria, please check 24hr urine protein  - On US abdomen was no comment on portal or hepatic veins, please repeat full US abdomen pelvis with Doppler (bedside?)  - Close monitoring for bleeding (s/p paracentesis) - Hb drop, being transfused, consider CT a/p to r/o bleeding  - Given ascites protein < 1.5 g/dl and CPT >9 and bilirubin >3, would start SBP prophylaxis      #LEDA  - in a cirrhotic patient already 0.3 mg/dl increase above baseline w/in 48 hrs fulfills LEDA criteria (even if Cr wnl); or > 50% increase w/in 7 days or urine output < 0.5ml/kg  - C/w close monitoring renal function and monitoring electrolytes including Mg and P, and replace as needed  - please monitor urine output  - please obtain urine electrolytes,  Cr, repeat UA and obtain CPK  - keep MAP >65-70mmHg  - c/w albumin 1g/kg  (max 100g), if no response in 2 days, can start midodrine and octreotide (meantime repeat UA and measure proteinuria), if significant proteinuria, consider nephrology consult      #Small EV on last EGD (no report available whether there were high risk signs, but he was started on BB in June), was non compliant w BB, no overt bleeding  - no endoscopy was done during this admission (d/w Dr. Gerber)  - keep Hb >7, keep T/S active, 2 large bore iv lines   - c/w monitoring H/H and call GI if any bleeding or significant Hb drop  - c/w holding BB for now    #Anemia, thrombocytopenia  - possibly due to cirrhosis + EtOH caused BM suppression  - smear was normal for RBC and PLT morphology on admission  - s/p 1U PLT  - no evidence of overt bleeding, but Hb drop 7.3-..6.6), s/p 1U PRBC, 2nd U being transfused  - c/w monitoring H/H  - fibrinogen in process    #Etiology of cirrhosis  - while most likely ETOH cirrhosis, given the young age additional workup was sent: ceruloplasmin < 20, while it could be falsely low in end stage liver or protein-losing, please send 24hr urine copper (with Cr)  -[id number] nl;  ferritin 331 -EtOH; f/u LONNY, anti SMA, anti LKM, IgG     #HCM  - HCC screening - last US and AFP in 6/2020, no focal mass, nl AFP, next due 12/2020  - Can check vit D  - Vaccinations: Hep A immune (IgG pos), Hep B neg, would check immunity/prior exposure (HBsAB, HBcAb) and if not immune would offer vaccine; got flu vaccine; would check whether got Pneumococcal vaccine     Poor prognosis    Will continue to follow  D/w ICU team

## 2020-09-19 NOTE — DIETITIAN INITIAL EVALUATION ADULT. - DIET TYPE
Ensure Enlive  1can ( 240ml ) x bid ( 700 kcal, 40 g protein) if poor oral intake as medically feasible/regular/low sodium

## 2020-09-19 NOTE — DIETITIAN INITIAL EVALUATION ADULT. - PERTINENT MEDS FT
MEDICATIONS  (STANDING):  albumin human 25% IVPB 100 milliLiter(s) IV Intermittent every 6 hours  chlorhexidine 2% Cloths 1 Application(s) Topical daily  folic acid 1 milliGRAM(s) Oral daily  influenza   Vaccine 0.5 milliLiter(s) IntraMuscular once  lactulose Syrup 20 Gram(s) Oral four times a day  LORazepam   Injectable 2 milliGRAM(s) IV Push every 8 hours  magnesium sulfate  IVPB 2 Gram(s) IV Intermittent every 2 hours  multivitamin 1 Tablet(s) Oral daily  mupirocin 2% Nasal 1 Application(s) Nasal two times a day  phytonadione  IVPB 10 milliGRAM(s) IV Intermittent daily  potassium chloride   Powder 40 milliEquivalent(s) Oral once  rifAXIMin 550 milliGRAM(s) Oral two times a day  thiamine IVPB 500 milliGRAM(s) IV Intermittent every 8 hours

## 2020-09-19 NOTE — DIETITIAN INITIAL EVALUATION ADULT. - FACTORS AFF FOOD INTAKE
change in mental status/acute on chronic comorbidities including liver cirrhosis, ETOH abuse, encephalopathy, ascites

## 2020-09-19 NOTE — DIETITIAN INITIAL EVALUATION ADULT. - PROBLEM SELECTOR PLAN 3
Patients has hx of liver cirrhosis .  Patient has elevated liver enzymes with elevated biliribin of 7.8.  INR is 4 and has thrombocytopenia   MELDs score 32 . 3 month mortality 52.6%  Child-Bojorquez Score for Cirrhosis Mortality= 13 points (Bilirubin >3, Albumin<2.8, INR>2.2, Ascites= Slight, Encephalopathy= Grade 1-2), Child Class C, Life Expectancy : 1-3 years, Abdominal surgery molly-operative mortality: 82%  -MELD Score= 32 points, 52.6% Estimated 3-Month Mortality        GI consult Dr Gerber plan per MD

## 2020-09-19 NOTE — DIETITIAN INITIAL EVALUATION ADULT. - NS FNS WEIGHT USED FOR CALC
Ht=5' 6.1"   GFK=128 lb    Admission an=528 lb    BMI=30.1   Current qi=454 lb 9/18/2020   Wt changes in-house may due to fluid loss from paracentesis/ideal

## 2020-09-19 NOTE — DIETITIAN INITIAL EVALUATION ADULT. - PROBLEM SELECTOR PLAN 1
Patient has hx of Alcohol abuse .  drinks beer every day.  will continue with Ciwa protocol, thiamine and folic acid .  started him on clear liquid diet .  advance as tolerated . plan per MD

## 2020-09-19 NOTE — PROGRESS NOTE ADULT - SUBJECTIVE AND OBJECTIVE BOX
HPI: 35 y/o M with PMH of liver cirrhosis 2/2 EtoH abuse, and esophageal varices presented to the ED with generalized weakness. He gives a history of daily EtoH intake (4-5 cans of beer) x years, severe nausea with grossly bloody vomitus (1 tsp quantity), and melena. Denied any episodes of fever, chills , nausea , abdominal pain, epigastric pain, diarrhea or urinary problems. Patient was admitted to the hospital 6/2020 and US abdomen showed cirrhotic liver and significant ascites. EGD at that time showed small esophageal varices distally (didnt get banded as patient didn't get FFP at that time), and portal hypertensive gastropathy. Biopsy not done due to elevated  INR. He was started on Nadolol 20mg qhs, spironolactone and Lasix, but was noncompliant, and doesn't take any medications currently. Admitted to [facility] for alcohol abuse, anemia, thrombocytopenia with MELD of 32, CP of 14, and elevated ammonia of 32. Pt was agitated and Code grey was called x3, and transferred to ICU for elevated CIWA and worsening alcohol withdrawal. Precedex drip, lactulose, and rifaximin was started and abdominal paracentesis was done with studies negative for SBP.     INTERVAL HPI/OVERNIGHT EVENTS:  ICU Vital Signs Last 24 Hrs  T(C): 36.7 (18 Sep 2020 22:42), Max: 37.5 (18 Sep 2020 20:26)  T(F): 98 (18 Sep 2020 22:42), Max: 99.5 (18 Sep 2020 20:26)  HR: 100 (18 Sep 2020 23:00) (57 - 101)  BP: 107/62 (18 Sep 2020 23:00) (79/49 - 107/62)  BP(mean): 71 (18 Sep 2020 23:00) (50 - 74)  ABP: --  ABP(mean): --  RR: 21 (18 Sep 2020 23:00) (11 - 25)  SpO2: 98% (18 Sep 2020 23:00) (96% - 100%)    I&O's Summary    17 Sep 2020 07:01  -  18 Sep 2020 07:00  --------------------------------------------------------  IN: 1152.1 mL / OUT: 1285 mL / NET: -132.9 mL    18 Sep 2020 07:01  -  19 Sep 2020 00:45  --------------------------------------------------------  IN: 1210 mL / OUT: 320 mL / NET: 890 mL          LABS:                        7.0    6.88  )-----------( 78       ( 18 Sep 2020 23:22 )             20.4     09-18    134<L>  |  100  |  8   ----------------------------<  99  3.8   |  30  |  1.05    Ca    7.9<L>      18 Sep 2020 18:09  Phos  3.8     09-18  Mg     1.6     09-18    TPro  7.3  /  Alb  1.8<L>  /  TBili  10.3<H>  /  DBili  x   /  AST  150<H>  /  ALT  51  /  AlkPhos  201<H>  09-18    PT/INR - ( 18 Sep 2020 18:09 )   PT: 45.1 sec;   INR: 4.12 ratio         PTT - ( 18 Sep 2020 18:09 )  PTT:54.3 sec    CAPILLARY BLOOD GLUCOSE            RADIOLOGY & ADDITIONAL TESTS:    Consultant(s) Notes Reviewed:  [x ] YES  [ ] NO    MEDICATIONS  (STANDING):  albumin human 25% IVPB 100 milliLiter(s) IV Intermittent every 6 hours  chlorhexidine 2% Cloths 1 Application(s) Topical daily  folic acid 1 milliGRAM(s) Oral daily  influenza   Vaccine 0.5 milliLiter(s) IntraMuscular once  lactulose Syrup 20 Gram(s) Oral four times a day  LORazepam   Injectable 2 milliGRAM(s) IV Push every 8 hours  multivitamin 1 Tablet(s) Oral daily  mupirocin 2% Nasal 1 Application(s) Nasal two times a day  phytonadione  IVPB 10 milliGRAM(s) IV Intermittent daily  rifAXIMin 550 milliGRAM(s) Oral two times a day  thiamine IVPB 500 milliGRAM(s) IV Intermittent every 8 hours    MEDICATIONS  (PRN):  LORazepam   Injectable 2 milliGRAM(s) IV Push every 2 hours PRN CIWA >7    PHYSICAL EXAM:  GENERAL: Jaundiced, well built, poorly nourished, appears unkempt   HEAD:  Atraumatic, Normocephalic  EYES: EOMI, PERRLA, conjunctiva and sclera muddy yellow   ENT: Mucosal and oral bleeding/oozing noted; normal dentition, no perioral lesions   NECK: Supple, No JVD, Normal thyroid, no enlarged nodes  NERVOUS SYSTEM:  Alert & Oriented X1-2, Drowsy, poor concentration; Motor Strength 5/5 B/L upper and lower extremities; DTRs 2+ intact and symmetric, sensory intact  CHEST/LUNG: B/L good air entry; No rales, rhonchi, or wheezing  HEART: S1S2 normal, no S3, Regular rate and rhythm; No murmurs, rubs, or gallops  ABDOMEN: Soft, Nontender, Nondistended; Bowel sounds present, S/p abdominal paracentesis   EXTREMITIES:  2+ Peripheral Pulses, No clubbing, cyanosis, or edema  LYMPH: No lymphadenopathy noted  SKIN: No rashes or lesions      Care Discussed with Consultants/Other Providers [ x] YES  [ ] NO HPI: 33 y/o M with PMH of liver cirrhosis 2/2 EtoH abuse, and esophageal varices presented to the ED with generalized weakness. He gives a history of daily EtoH intake (4-5 cans of beer) x years, severe nausea with grossly bloody vomitus (1 tsp quantity), and melena. Denied any episodes of fever, chills , nausea , abdominal pain, epigastric pain, diarrhea or urinary problems. Patient was admitted to the hospital 6/2020 and US abdomen showed cirrhotic liver and significant ascites. EGD at that time showed small esophageal varices distally (didnt get banded as patient didn't get FFP at that time), and portal hypertensive gastropathy. Biopsy not done due to elevated  INR. He was started on Nadolol 20mg qhs, spironolactone and Lasix, but was noncompliant, and doesn't take any medications currently. Admitted to [facility] for alcohol abuse, anemia, thrombocytopenia with MELD of 32, CP of 14, and elevated ammonia of 32. Pt was agitated and Code grey was called x3, and transferred to ICU for elevated CIWA and worsening alcohol withdrawal. Precedex drip, lactulose, and rifaximin was started and abdominal paracentesis was done with studies negative for SBP.     INTERVAL HPI/OVERNIGHT EVENTS: Pt Hb noted to be 6.6 in am. Pt given 1 unit PRBC. Hb post transfusion 7.5. Goal to keep above  and avoid over transfusion in setting of varcieal bleeding       ICU Vital Signs Last 24 Hrs  T(C): 36.7 (18 Sep 2020 22:42), Max: 37.5 (18 Sep 2020 20:26)  T(F): 98 (18 Sep 2020 22:42), Max: 99.5 (18 Sep 2020 20:26)  HR: 100 (18 Sep 2020 23:00) (57 - 101)  BP: 107/62 (18 Sep 2020 23:00) (79/49 - 107/62)  BP(mean): 71 (18 Sep 2020 23:00) (50 - 74)  ABP: --  ABP(mean): --  RR: 21 (18 Sep 2020 23:00) (11 - 25)  SpO2: 98% (18 Sep 2020 23:00) (96% - 100%)    I&O's Summary    17 Sep 2020 07:01  -  18 Sep 2020 07:00  --------------------------------------------------------  IN: 1152.1 mL / OUT: 1285 mL / NET: -132.9 mL    18 Sep 2020 07:01  -  19 Sep 2020 00:45  --------------------------------------------------------  IN: 1210 mL / OUT: 320 mL / NET: 890 mL          LABS:                        7.0    6.88  )-----------( 78       ( 18 Sep 2020 23:22 )             20.4     09-18    134<L>  |  100  |  8   ----------------------------<  99  3.8   |  30  |  1.05    Ca    7.9<L>      18 Sep 2020 18:09  Phos  3.8     09-18  Mg     1.6     09-18    TPro  7.3  /  Alb  1.8<L>  /  TBili  10.3<H>  /  DBili  x   /  AST  150<H>  /  ALT  51  /  AlkPhos  201<H>  09-18    PT/INR - ( 18 Sep 2020 18:09 )   PT: 45.1 sec;   INR: 4.12 ratio         PTT - ( 18 Sep 2020 18:09 )  PTT:54.3 sec    CAPILLARY BLOOD GLUCOSE            RADIOLOGY & ADDITIONAL TESTS:    Consultant(s) Notes Reviewed:  [x ] YES  [ ] NO    MEDICATIONS  (STANDING):  albumin human 25% IVPB 100 milliLiter(s) IV Intermittent every 6 hours  chlorhexidine 2% Cloths 1 Application(s) Topical daily  folic acid 1 milliGRAM(s) Oral daily  influenza   Vaccine 0.5 milliLiter(s) IntraMuscular once  lactulose Syrup 20 Gram(s) Oral four times a day  LORazepam   Injectable 2 milliGRAM(s) IV Push every 8 hours  multivitamin 1 Tablet(s) Oral daily  mupirocin 2% Nasal 1 Application(s) Nasal two times a day  phytonadione  IVPB 10 milliGRAM(s) IV Intermittent daily  rifAXIMin 550 milliGRAM(s) Oral two times a day  thiamine IVPB 500 milliGRAM(s) IV Intermittent every 8 hours    MEDICATIONS  (PRN):  LORazepam   Injectable 2 milliGRAM(s) IV Push every 2 hours PRN CIWA >7    PHYSICAL EXAM:  GENERAL: Jaundiced, well built, poorly nourished, appears unkempt   HEAD:  Atraumatic, Normocephalic  EYES: EOMI, PERRLA, conjunctiva and sclera muddy yellow   ENT: Mucosal and oral bleeding/oozing noted; normal dentition, no perioral lesions   NECK: Supple, No JVD, Normal thyroid, no enlarged nodes  NERVOUS SYSTEM:  Alert & Oriented X1-2, Drowsy, poor concentration; Motor Strength 5/5 B/L upper and lower extremities; DTRs 2+ intact and symmetric, sensory intact  CHEST/LUNG: B/L good air entry; No rales, rhonchi, or wheezing  HEART: S1S2 normal, no S3, Regular rate and rhythm; No murmurs, rubs, or gallops  ABDOMEN: Soft, Nontender, Nondistended; Bowel sounds present, S/p abdominal paracentesis   EXTREMITIES:  2+ Peripheral Pulses, No clubbing, cyanosis, or edema  LYMPH: No lymphadenopathy noted  SKIN: No rashes or lesions      Care Discussed with Consultants/Other Providers [ x] YES  [ ] NO

## 2020-09-19 NOTE — PROGRESS NOTE ADULT - ATTENDING COMMENTS
Assessment:  1. Alcohol withdrawal syndrome  2. Decompensated cirrhosis   3. Anemia  4. Thrombocytopenia   5. Ascites   6. Cholelithiasis     Plan  - Off precedex this AM  - Cont. Ativan prn and scheduled, titrate down ativan scheduled  - Monitor for signs of worsening delirium, at this time no evidence of hallucinations  - Cont. Lactulose  - Add rifaximin per hepatology recs  - Will transfuse 1 unit Platelets given oral mucosal bleeding  - Vitamin K supplemenation   - Hepatology recs noted  - High dose thiamine supplementation, cont. folate supplementation .

## 2020-09-19 NOTE — PROGRESS NOTE ADULT - SUBJECTIVE AND OBJECTIVE BOX
Chief Complaint:  Patient is a 34y old  Male who presents with a chief complaint of Alcohol abuse (19 Sep 2020 00:44)    : Chance [id number]    Reason for consult: Alcoholic cirrhosis    Interval Events:   Patient was seen and examined at bedside. He is off Precedex, getting Ativan, more awake, opens eyes spontaneously, answers questions adequately, following commands. He was examined with , he knows that he is on hospital, he could tell his , address, and date of today till month and year, missed the date, told 9/15/2020, when he came in to the hospital. No asterixis. He denies abdominal pain, N/V, afebrile. He moved his bowels per nurse, and was no melena, no blood in it. He denies SOB, CP, headache, vision problem, see detailed ROS below.   He was noted with Hb drop last night, received 1U PRBC - Hb 6.2-7.0. This morning Hb again 6.6 w/o evidence of overt bleeding, being transfused 1U PRBC.   Now in LEDA.     Hospital Medications:  albumin human 25% IVPB 100 milliLiter(s) IV Intermittent every 6 hours  chlorhexidine 2% Cloths 1 Application(s) Topical daily  folic acid 1 milliGRAM(s) Oral daily  influenza   Vaccine 0.5 milliLiter(s) IntraMuscular once  lactulose Syrup 20 Gram(s) Oral four times a day  LORazepam   Injectable 2 milliGRAM(s) IV Push every 2 hours PRN  LORazepam   Injectable 2 milliGRAM(s) IV Push every 8 hours  magnesium sulfate  IVPB 2 Gram(s) IV Intermittent every 2 hours  multivitamin 1 Tablet(s) Oral daily  mupirocin 2% Nasal 1 Application(s) Nasal two times a day  phytonadione  IVPB 10 milliGRAM(s) IV Intermittent daily  potassium chloride   Powder 40 milliEquivalent(s) Oral once  rifAXIMin 550 milliGRAM(s) Oral two times a day  thiamine IVPB 500 milliGRAM(s) IV Intermittent every 8 hours      ROS:   General:  No  fevers, chills, night sweats, fatigue  Eyes:  denies vision problem  ENT:  No sore throat, pain, runny nose  CV:  No pain, palpitations  Pulm:  No dyspnea, cough  GI:  See HPI, otherwise negative  :  urinary catheter in place  Muscle:  No pain, but generalized weakness  Neuro/Psych: On Ativan, off Precedex, no headache   Heme:  Ecchymosis, easy bruisability  Skin:  No rash    PHYSICAL EXAM:   Vital Signs:  Vital Signs Last 24 Hrs  T(C): 37 (19 Sep 2020 05:48), Max: 37.5 (18 Sep 2020 20:26)  T(F): 98.6 (19 Sep 2020 05:48), Max: 99.5 (18 Sep 2020 20:26)  HR: 101 (19 Sep 2020 07:00) (75 - 109)  BP: 107/64 (19 Sep 2020 07:00) (80/47 - 109/56)  BP(mean): 73 (19 Sep 2020 07:00) (50 - 79)  RR: 19 (19 Sep 2020 07:00) (13 - 34)  SpO2: 98% (19 Sep 2020 07:00) (95% - 100%)  Daily         GENERAL: no acute distress  NEURO: awake, alert, oriented to person, place and to time till year and month, but lethargic (however on Ativan), no asterixis  HEENT: icteric sclera  CHEST: no respiratory distress, no accessory muscle use  CARDIAC: regular rhythm, mildly tachycardic  ABDOMEN: soft,  mildly distended, + fluid wave, non tender, no rebound or guarding  EXTREMITIES: warm, well perfused, no edema  SKIN: no lesions noted; ecchymosis    LABS: reviewed                        6.6    6.07  )-----------( 69       ( 19 Sep 2020 06:31 )             18.5     09-19    134<L>  |  102  |  10  ----------------------------<  77  3.6   |  27  |  1.01    Ca    7.8<L>      19 Sep 2020 06:31  Phos  3.3     -  Mg     1.5     -    TPro  6.9  /  Alb  1.9<L>  /  TBili  9.8<H>  /  DBili  x   /  AST  141<H>  /  ALT  44  /  AlkPhos  177<H>  09-19    LIVER FUNCTIONS - ( 19 Sep 2020 06:31 )  Alb: 1.9 g/dL / Pro: 6.9 g/dL / ALK PHOS: 177 U/L / ALT: 44 U/L DA / AST: 141 U/L / GGT: x             Interval Diagnostic Studies: see sunrise for full report

## 2020-09-19 NOTE — DIETITIAN INITIAL EVALUATION ADULT. - PROBLEM SELECTOR PLAN 2
Patients potassium was low 2.7, likely secondary to vomiting .  replaced 40 meq KCL PO X 2 and IV 10 meq KCL X 3 .  follow BMP in am plan per MD

## 2020-09-19 NOTE — DIETITIAN INITIAL EVALUATION ADULT. - OTHER INFO
Pt from home, alert, confused, agitated at times, on enhanced supervision, visited in ICU today, calm, but limited intake/wt change history data available at present; nausea/vomiting x 1wk PTA, eating better in-house, tolerating 75 to 100% of meals per flow sheet, but may varied intake depending on mental status; no GI distress reported at present; s/p Paracentesis 9/17/2020 with 3.5 L fluid removed; Discussed with RN

## 2020-09-19 NOTE — DIETITIAN INITIAL EVALUATION ADULT. - PERTINENT LABORATORY DATA
09-19 Na134 mmol/L<L> Glu 77 mg/dL K+ 3.6 mmol/L Cr  1.01 mg/dL BUN 10 mg/dL   09-19 Phos 3.3 mg/dL   09-19 Alb 1.9 g/dL<L>       09-15 Chol 66 mg/dL LDL 37 mg/dL HDL 15 mg/dL<L> Trig 68 mg/dL  09-15-20 @ 10:53 HgbA1C 5.0 [4.0 - 5.6]

## 2020-09-19 NOTE — PROGRESS NOTE ADULT - ASSESSMENT
33 y/o M with PMH of Alcohol abuse and liver cirrhosis , esophageal varices presented to ED because he was feeling week . Pt was admitted ti medicine floor for alcohol abuse, anemia, thrombocytopenia. MELD score on admission was 32. ICU was consulted for worsening alcohol withdrawal.     Assessment:  1. Alcohol withdrawal  2. Encephalopathy  3. Alcoholic  liver cirrhosis with ascites and esophageal varices   4. Anemia  5.Thrombocytopenia  6. Hypomagnesemia  7.Elevated INR  8. Cholelithiasis    Neuro:  # Encephalopathy  - Ammonia elevated 58-->100  - Continue Lactulose  - Patient Grade 1 hepatic enceph clinically, drowsy with sleep reversal   - Monitor ammonia level, neurochecks     # Alcohol withdrawal (improving)  - Pt Started on precedex drip 9/17, titrated off the same evening as CIWA dropped and pt sleeping   - Continue Lorazepam 2mg Q4 standing dose, 2 Mg Q2PRN as per CIWA. Standing doses have been held due to hypotension and low CIWA.  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 3 month mortality 52.6%  - Child-Bojorquez Score for Cirrhosis Mortality= 13 points, MAddreys score also high --> steroids held for now   - AST/ALT/AlkP 157/48/203, Enzymes reflect Etoh picture, TBili: 9.9  DBili: 7.2  - Paracentesis done 2.8L jorden fluid removed: Ashley 61,  --> Low suspicion for SBP   - Rifaximin, Lactulose for Ppx   - Started on Albumin 25% in 100ml IVPB Q6x3 days for post procedure hypotension   - US RUQ: Liver cirrhosis with no focal lesion, cholelithiasis with mild GB wall thickening, no BD dilatation, negative Field's sign   - Hepatitis panel -ve for a/c hepatitis  - Hepatology Dr. Morales on board    # Variceal bleed  - Unwitnessed history of upper GI bleed x2 episodes   - Prior EGD in 6/2020 demonstrated small distal EV's (not banded due to low PLTs), and portal gastropathy ([place] Class IIA)  - Observed and discharged on Spironolactone, nadalol, lasix --> pt was noncompliant with meds   - Hb 6.9 this am, repeat 8.6 g/dl, to transfuse and keep Hb 7-9    RENAL  - Cr/ BUN wnl  - Urine output net negative -103ml  - Will hold diuretics as overdiuresis may precipitate Hepatic enceph   - S/p paracentesis 9/17 - 2.8L fluids     INFECTIOUS DISEASE  # Pt is afebrile  -No WBC elevation    ENDOCRINE  # No acute issues  HBA1C 5.0 and TSH Wnl    HEME  # Anemia  - Likely secondary to chronic alcoholism  - normocytic, hypochromic. Ferritin, Fe total wnl, Fe Sat 91 (high), TIBC 121(low)   - Likely Etoh BM suppression and an element of Fe deficiency; will hold supplementation as it may exacerbate HE   - Folate, B12 wnl, supplemented FA, MVT in view of malnutrition    - No witnessed signs of active bleeding   - Monitor CBC, vitals     # Thrombocytopenia  - Likely secondary to chronic alcoholism   - Symptomatic mucosal bleeds, easy bruisability  - PS shows no schistocytes, retic count 2.5%,  (unreliable in setting of liver dysfunction  - Platelet: 51 --> 39: Adm 1 U Plts 9/18  - Monitor plts    # Elevated PT/INR  - 4. 32, likely due to C/c liver disease   - s/p vitamin k 9/18   - Monitor PT/INR, for bleeds     FLUIDS/ELECTROLYTES/NUTRITION    -Monitor, Replete to K>4 and Mg>2    Diet: Regular Diet     PROPHYLAXIS  -DVT: Not on anticoagulation because of thrombocytopenia and anemia       DISPO:  transfer to ICU    CODE STATUS: Full code 35 y/o M with PMH of Alcohol abuse and liver cirrhosis , esophageal varices presented to ED because he was feeling week . Pt was admitted ti medicine floor for alcohol abuse, anemia, thrombocytopenia. MELD score on admission was 32. ICU was consulted for worsening alcohol withdrawal.     Assessment:  1. Alcohol withdrawal  2. Encephalopathy  3. Alcoholic  liver cirrhosis with ascites and esophageal varices   4. Anemia  5.Thrombocytopenia  6. Hypomagnesemia  7.Elevated INR  8. Cholelithiasis    Neuro:  # Encephalopathy  - Ammonia elevated 58-->100  - Continue Lactulose  - Patient Grade 1 hepatic enceph clinically, drowsy with sleep reversal   - Monitor ammonia level, neurochecks     # Alcohol withdrawal (improving)  - Pt Started on precedex drip 9/17, titrated off the same evening as CIWA dropped and pt sleeping   - Continue Lorazepam 2mg Q4 standing dose, 2 Mg Q2PRN as per CIWA. Standing doses have been held due to hypotension and low CIWA.  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 3 month mortality 52.6%  - Child-Bojorquez Score for Cirrhosis Mortality= 13 points, MAddreys score also high --> steroids held for now   - AST/ALT/AlkP 157/48/203, Enzymes reflect Etoh picture, TBili: 9.9  DBili: 7.2  - Paracentesis done 2.8L jorden fluid removed: Ashley 61,  --> Low suspicion for SBP   - Rifaximin, Lactulose for Ppx   - Started on Albumin 25% in 100ml IVPB Q6x3 days for post procedure hypotension   - US RUQ: Liver cirrhosis with no focal lesion, cholelithiasis with mild GB wall thickening, no BD dilatation, negative Field's sign   - Hepatitis panel -ve for a/c hepatitis  - Hepatology Dr. Morales on board    # Variceal bleed  - Unwitnessed history of upper GI bleed x2 episodes   - Prior EGD in 6/2020 demonstrated small distal EV's (not banded due to low PLTs), and portal gastropathy ([place] Class IIA)  - Observed and discharged on Spironolactone, nadalol, lasix --> pt was noncompliant with meds   - Hb 6.6 this am, 1 unit of PRBC given, post PRBC transfusion- 7.5     RENAL  - Cr/ BUN wnl  - Urine output net negative -103ml  - Will hold diuretics as overdiuresis may precipitate Hepatic enceph   - S/p paracentesis 9/17 - 2.8L fluids     INFECTIOUS DISEASE  # Pt is afebrile  -No WBC elevation    ENDOCRINE  # No acute issues  HBA1C 5.0 and TSH Wnl    HEME  # Anemia  - Likely secondary to chronic alcoholism  - normocytic, hypochromic. Ferritin, Fe total wnl, Fe Sat 91 (high), TIBC 121(low)   - Likely Etoh BM suppression and an element of Fe deficiency; will hold supplementation as it may exacerbate HE   - Folate, B12 wnl, supplemented FA, MVT in view of malnutrition    - No witnessed signs of active bleeding   - Monitor CBC, vitals     # Thrombocytopenia  - Likely secondary to chronic alcoholism   - Symptomatic mucosal bleeds, easy bruisability  - PS shows no schistocytes, retic count 2.5%,  (unreliable in setting of liver dysfunction  - Platelet: 51 --> 39: Adm 1 U Plts 9/18  - Monitor plts, transfuse if PC < 10K     # Elevated PT/INR  - 4. 32, likely due to C/c liver disease   - s/p vitamin k 9/18   - Monitor PT/INR, for bleeds     FLUIDS/ELECTROLYTES/NUTRITION    -Monitor, Replete to K>4 and Mg>2    Diet: Regular Diet     PROPHYLAXIS  -DVT: Not on anticoagulation because of thrombocytopenia and anemia       DISPO:  transfer to ICU    CODE STATUS: Full code

## 2020-09-19 NOTE — DIETITIAN INITIAL EVALUATION ADULT. - PROBLEM SELECTOR PLAN 4
Patients Platelets are low . 61K .  no need for transfusion. likely in the setting of liver cirrhosis. plan per MD
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
## 2020-09-20 NOTE — CHART NOTE - NSCHARTNOTEFT_GEN_A_CORE
35 y/o M with PMH of liver cirrhosis 2/2 EtoH abuse, and esophageal varices presented to the ED with generalized weakness. He gives a history of daily EtoH intake (4-5 cans of beer) x years, severe nausea with grossly bloody vomitus (1 tsp quantity), and melena. Denied any episodes of fever, chills , nausea , abdominal pain, epigastric pain, diarrhea or urinary problems. Patient was admitted to the hospital 6/2020 and US abdomen showed cirrhotic liver and significant ascites. EGD at that time showed small esophageal varices distally (didnt get banded as patient didn't get FFP at that time), and portal hypertensive gastropathy. Biopsy not done due to elevated  INR. He was started on Nadolol 20mg qhs, spironolactone and Lasix, but was noncompliant, and doesn't take any medications currently. Admitted to [facility] for alcohol abuse, anemia, thrombocytopenia with MELD of 32, CP of 14, and elevated ammonia of 32. Pt was agitated and Code grey was called x3, and transferred to ICU for elevated CIWA and worsening alcohol withdrawal. Precedex drip, lactulose, and rifaximin was started and abdominal paracentesis was done with studies negative for SBP.   Signout given to floor resident  and attending physician     .    To follow: 34M with PMHx of liver cirrhosis 2/2 EtoH abuse, and esophageal varices presented to the ED with generalized weakness. He gives a history of daily EtoH intake (4-5 cans of beer) x years, severe nausea with grossly bloody vomitus (1 tsp quantity), and a few episodes of melena. He denied any episodes of fever, chills, abdominal pain, epigastric pain, diarrhea or urinary problems. Patient was admitted to the hospital 6/2020 and US abdomen showed cirrhotic liver and significant ascites. EGD at that time showed small esophageal varices distally (was not banded), and portal hypertensive gastropathy. Biopsy not done due to elevated INR. He was started on Nadolol 20mg qhs, spironolactone and Lasix, but was noncompliant, and doesn't take any medications currently. Admitted to [facility] for alcohol abuse, anemia, thrombocytopenia with MELD of 32, Child-Bojorquez of 14, and elevated ammonia of 32 (last drink 9/16). Pt was agitated and Code grey was called x3, and transferred to ICU for elevated CIWA and worsening alcohol withdrawal. Precedex drip, lactulose, and rifaximin was started and abdominal paracentesis was done with studies negative for SBP. Patient's withdrawal symptoms improved, the precedex drip was tapered off, and he was started on librium 50mg Q6 as his ativan need decreased. He continued to be anemic and thrombocytopenic s/p 2U RBCs, 1U PLT in the ICU, most likely 2/2 to cirrhosis. Continued to be followed by Hepatology.   Signout given to floor resident Dr. Brandon and attending physician Dr. Krishnan.    To follow:  D/c standing librium 50mg Q6h (to transition off ativan in setting of cirrhosis) as clinically indicated   Repeat CBC @1pm for Hb --> transfuse if <7g/dl  W/f mental status, signs of hepatic encephalopathy  Signs of spontaneous bleeding/oozing, restart DVT prophylaxis as plts improve.   Autoimmune hepatology w/u

## 2020-09-20 NOTE — PROGRESS NOTE ADULT - ASSESSMENT
35yo male with hx of chronic alcohol abuse (actively drinking since age 20), decompensated cirrhosis with ascites (non-compliant with diuretics, no hx of LVP), hx of small esophageal varices on last EGD (6/2020) (non-compliant with BB, never banded), with recent hospitalization (6/2020 for leg swelling, anemia) presented on 9/15/2020 with malaise, generalized weakness for 1-2 weeks, found to have worsening liver function, with MELD score 32 compared to 23 on prior admission, electrolyte abnormalities, and questionable blood in stool (reported black stools) or vomitus, but without overt bleeding in hospital. 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  Acute worsening might have been caused by heavy alcohol intake (high blood alcohol on admission), SBP was r/o by paracentesis, viral and infectious workup negative so far (some pending), denied taking any medications, herbal supplements. He was transferred to ICU on 9/17/2020 b/o change in mental status, alcohol withdrawal, became hypotensive and bradycardic on Precedex.  Developed LEDA, but improved and now resolved with volume expansion. Anemic, thrombocytopenic, now s/p 1 U PLT, and 2U PRBC, no evidence of bleeding. Patient also noted proteinuria, and unexpectedly ascites analysis showed SAAG < 1.1, with low total protein.     Decompensated cirrhosis w ascites; small EV; MELD 32 (but INR is from 9/18)  -  unfortunately still active drinker, being treated for  EtOH withdrawal    # Acute worsening of liver tests most likely due to heavy drinking (MELD from 23 in June to 32-34-32-32); ACLF -had LEDA and concern for HE (vs. EtOH withdrawal caused behaviour changes)   - c/w monitoring LFTs closely, including INR, at least q12 hrs (bilirubin, liver enzymes with slight improvement)  - please obtain INR from today   - avoid hepatotoxic meds, avoid NSAIDs (Utox was canceled on admission)  - no overt GI bleeding; SBP was r/o; CXR neg for infiltrate; UA only 3-5 WBC, 0-2 RBC, trace leukocyte esterase; please repeat UA, and obtain UC, and BC  - alcohol abstinence (would not consider steroid for now given that risks possibly outweigh benefit - slight improvement MELD 34-32, resolving LEDA, hx of black stool, portal HTN gastropathy, varices, pending complete infectious workup; NAC was shown to have beneficial effect as addition to steroid)   - viral workup in process, Hep A IgM neg, immune; HBsAg and HBcAb IgM neg; HCV ab neg; Hep E IgG neg  - please add HCV PCR; Hep E IgM in process; can add EBV and CMV PCR     #EtOH withdrawal +/- HE (based on 9/17 evaluation was Gr1) - now off Precedex, on Ativan - awake, lethargic, but oriented to person, place and time till year and months, no asterixis  - continue close monitoring of mental status  - c/w lactulose to have 2-3 soft BM/day  - c/w rifaximin 550 mg bid  - avoid long acting BZD  - increased ammonia alone does not add any diagnostic, staging or prognostic value for HE in CLD patients   - C/w CIWA protocol per ICU, c/w folic, thiamin, monitor electrolytes    #Ascites - no SBP  - Would continue holding diuretics for now  - SAAG =1.0 (<1.1) and no splenomegaly on prior imaging in June, but small EV on EGD per prior notes (latter one still suggest CSPH)  - while SAAG > 1.1 expected in portal hypertension, in 3% of cirrhotic patients it might not be the case; also alcoholic hepatitis can cause ascites without significant PH - thus possibly additive effect and ideally can repeat dx paracentesis, but would hold off with it for now due to coagulopathy, thrombocytopenia  - however given proteinuria noted on UA, would check 24 urine protein to evaluate whether nephrotic range proteinuria  - f/u ascites cytology; TB unlikely-rest of ascites analysis not consistent; no symptoms of pancreatitis and no imaging finding  - On US abdomen was no comment on portal or hepatic veins, please repeat full US abdomen pelvis with Doppler (bedside?)  - Close monitoring for bleeding (s/p paracentesis) - consider CT a/p to r/o bleeding if further Hb drop or concern for bleeding  - Given ascites protein < 1.5 g/dl and CPT >9 and bilirubin >3, was started on SBP prophylaxis      #LEDA - now improving 1.0-0.61 (Estevan was < 10, cannot calculate FeNa, no UCr available)  - in a cirrhotic patient already 0.3 mg/dl increase above baseline w/in 48 hrs fulfills LEDA criteria (even if Cr wnl); or > 50% increase w/in 7 days or urine output < 0.5ml/kg  - C/w close monitoring renal function and monitoring electrolytes including Mg and P, and replace as needed  - please monitor urine output  - keep MAP >65-70mmHg  - responded to volume expansion w albumin 1g/kg    - would repeat UA and measure 24 hr urine protein and if significant proteinuria, consider nephrology consult (notable cirrhosis, including alcoholic cirrhosis can be a/w IgA nephropathy, but nephrotic range proteinuria not common)    #Small EV on last EGD (no report available whether there were high risk signs, but he was started on BB in June), was non compliant w BB, no overt bleeding  - no endoscopy was done during this admission (d/w Dr. Gerber on admission)  - keep Hb >7, keep T/S active, 2 large bore iv lines   - c/w monitoring H/H and call GI if any bleeding or significant Hb drop  - c/w holding BB for now    #Anemia, thrombocytopenia  - possibly due to cirrhosis + EtOH caused BM suppression - s/p 1U PLT and 2U PRBC, no evidence of overt bleeding  - smear was normal for RBC and PLT morphology on admission  - c/w monitoring H/H  - fibrinogen in process    #Etiology of cirrhosis  - while most likely ETOH cirrhosis, given the young age additional workup was sent  - ceruloplasmin < 20, while it could be falsely low in end stage liver or protein-losing, please send / follow up 24hr urine copper (with Cr)  -[id number] nl;  ferritin 331 -EtOH  - LONNY neg, but total IgG elevated 4018, please make sure anti-SMA, anti-LKM, and anti-SLA sent      #HCM  - HCC screening - last US and AFP in 6/2020, no focal mass, nl AFP, next due 12/2020  - please check vit D  - Vaccinations: Hep A immune (IgG pos), Hep B neg, would check immunity/prior exposure (HBsAB, HBcAb) and if not immune would offer vaccine; got flu vaccine; would check whether got Pneumococcal vaccine     Discussed with ICU team  Will continue to follow

## 2020-09-20 NOTE — PROGRESS NOTE ADULT - ASSESSMENT
33 y/o M with PMH of Alcohol abuse and liver cirrhosis , esophageal varices presented to ED because he was feeling week . Pt was admitted ti medicine floor for alcohol abuse, anemia, thrombocytopenia. MELD score on admission was 32. ICU was consulted for worsening alcohol withdrawal.     Assessment:  1. Alcohol withdrawal  2. Encephalopathy  3. Alcoholic  liver cirrhosis with ascites and esophageal varices   4. Anemia  5.Thrombocytopenia  6. Hypomagnesemia  7.Elevated INR  8. Cholelithiasis    Neuro:  # Encephalopathy  - Ammonia elevated 58-->100  - Continue Lactulose  - Patient Grade 1 hepatic enceph clinically, drowsy with sleep reversal   - Monitor ammonia level, neurochecks     # Alcohol withdrawal (improving)  - Pt Started on precedex drip 9/17, titrated off the same evening as CIWA dropped and pt sleeping   - Continue Lorazepam 2mg Q4 standing dose, 2 Mg Q2PRN as per CIWA. Standing doses have been held due to hypotension and low CIWA.  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 3 month mortality 52.6%  - Child-Bojorquez Score for Cirrhosis Mortality= 13 points, MAddreys score also high --> steroids held for now   - AST/ALT/AlkP 157/48/203, Enzymes reflect Etoh picture, TBili: 9.9  DBili: 7.2  - Paracentesis done 2.8L jorden fluid removed: Ashley 61,  --> Low suspicion for SBP   - Rifaximin, Lactulose for Ppx   - Started on Albumin 25% in 100ml IVPB Q6x3 days for post procedure hypotension   - US RUQ: Liver cirrhosis with no focal lesion, cholelithiasis with mild GB wall thickening, no BD dilatation, negative Field's sign   - Hepatitis panel -ve for a/c hepatitis  - Hepatology Dr. Morales on board    # Variceal bleed  - Unwitnessed history of upper GI bleed x2 episodes   - Prior EGD in 6/2020 demonstrated small distal EV's (not banded due to low PLTs), and portal gastropathy ([place] Class IIA)  - Observed and discharged on Spironolactone, nadalol, lasix --> pt was noncompliant with meds   - Hb 6.6 this am, 1 unit of PRBC given, post PRBC transfusion- 7.5     RENAL  - Cr/ BUN wnl  - Urine output net negative -103ml  - Will hold diuretics as overdiuresis may precipitate Hepatic enceph   - S/p paracentesis 9/17 - 2.8L fluids     INFECTIOUS DISEASE  # Pt is afebrile  -No WBC elevation    ENDOCRINE  # No acute issues  HBA1C 5.0 and TSH Wnl    HEME  # Anemia  - Likely secondary to chronic alcoholism  - normocytic, hypochromic. Ferritin, Fe total wnl, Fe Sat 91 (high), TIBC 121(low)   - Likely Etoh BM suppression and an element of Fe deficiency; will hold supplementation as it may exacerbate HE   - Folate, B12 wnl, supplemented FA, MVT in view of malnutrition    - No witnessed signs of active bleeding   - Monitor CBC, vitals     # Thrombocytopenia  - Likely secondary to chronic alcoholism   - Symptomatic mucosal bleeds, easy bruisability  - PS shows no schistocytes, retic count 2.5%,  (unreliable in setting of liver dysfunction  - Platelet: 51 --> 39: Adm 1 U Plts 9/18  - Monitor plts, transfuse if PC < 10K     # Elevated PT/INR  - 4. 32, likely due to C/c liver disease   - s/p vitamin k 9/18   - Monitor PT/INR, for bleeds     FLUIDS/ELECTROLYTES/NUTRITION    -Monitor, Replete to K>4 and Mg>2    Diet: Regular Diet     PROPHYLAXIS  -DVT: Not on anticoagulation because of thrombocytopenia and anemia       DISPO:  transfer to ICU    CODE STATUS: Full code

## 2020-09-20 NOTE — PROGRESS NOTE ADULT - SUBJECTIVE AND OBJECTIVE BOX
Chief Complaint:  Patient is a 34y old  Male who presents with a chief complaint of Alcohol abuse (20 Sep 2020 01:02)    : [id number]    Reason for consult: Alcoholic cirrhosis    Interval Events:   Patient was seen and examined at bedside, s/p Ativan. He is lethargic (s/p Ativan), but easily arousable to verbal stimuli and oriented x3. No acute events, now normotensive, remains afebrile, and now with resolved LEDA. Small drop in Hb.  Denies abdominal pain, N/V, no evidence of overt bleeding, no blood in stool.       Hospital Medications:  cefTRIAXone   IVPB 1000 milliGRAM(s) IV Intermittent every 24 hours  chlordiazePOXIDE 50 milliGRAM(s) Oral every 8 hours  chlorhexidine 2% Cloths 1 Application(s) Topical daily  folic acid 1 milliGRAM(s) Oral daily  influenza   Vaccine 0.5 milliLiter(s) IntraMuscular once  lactulose Syrup 20 Gram(s) Oral four times a day  multivitamin 1 Tablet(s) Oral daily  mupirocin 2% Nasal 1 Application(s) Nasal two times a day  rifAXIMin 550 milliGRAM(s) Oral two times a day  thiamine IVPB 500 milliGRAM(s) IV Intermittent every 8 hours      ROS:   General:  No  fevers, chills, night sweats, fatigue  Eyes:  Good vision, no reported pain  ENT:  No sore throat, pain, runny nose  CV:  No pain, palpitations  Pulm:  No dyspnea, cough  GI:  No abdominal pain or tenderness.  :  catheter  Muscle:  No pain, weakness  Neuro:  lethargic (s/p Ativan), but oriented x3, no asterixis  Psych:  No insomnia, mood problems, depression  Endocrine:  No polyuria, polydipsia, cold/heat intolerance  Heme:  Ecchymosis, easy bruisability  Skin:  No rash    PHYSICAL EXAM:   Vital Signs:  Vital Signs Last 24 Hrs  T(C): 36.2 (20 Sep 2020 11:00), Max: 37.2 (20 Sep 2020 00:00)  T(F): 97.2 (20 Sep 2020 11:00), Max: 98.9 (20 Sep 2020 00:00)  HR: 101 (20 Sep 2020 11:00) (97 - 113)  BP: 124/80 (20 Sep 2020 11:00) (100/50 - 132/78)  BP(mean): 88 (20 Sep 2020 11:00) (47 - 92)  RR: 20 (20 Sep 2020 11:00) (15 - 28)  SpO2: 99% (20 Sep 2020 11:00) (84% - 100%)  Daily     Daily Weight in k.2 (20 Sep 2020 07:00)    GENERAL: no acute distress  NEURO: somnolent (s/p Ativan), but easily arousable to verbal stimuli, and answers adequately, follows commands and oriented x3, no asterixis  HEENT: icteric sclera, no conjunctival pallor appreciated  CHEST: no respiratory distress, no accessory muscle use  CARDIAC: regular rate, rhythm  ABDOMEN: soft, non-tender, mildly distended with = fluid wave, no rebound or guarding  EXTREMITIES: warm, well perfused, no edema  SKIN: no rash    LABS: reviewed                        7.0    6.51  )-----------( 69       ( 20 Sep 2020 06:43 )             20.0     09-20    134<L>  |  100  |  8   ----------------------------<  91  3.7   |  27  |  0.61    Ca    8.0<L>      20 Sep 2020 06:43  Phos  3.4     09-20  Mg     1.8     -20    TPro  6.8  /  Alb  2.7<L>  /  TBili  9.8<H>  /  DBili  x   /  AST  100<H>  /  ALT  34  /  AlkPhos  159<H>  09-20    LIVER FUNCTIONS - ( 20 Sep 2020 06:43 )  Alb: 2.7 g/dL / Pro: 6.8 g/dL / ALK PHOS: 159 U/L / ALT: 34 U/L DA / AST: 100 U/L / GGT: x             Interval Diagnostic Studies: see sunrise for full report

## 2020-09-20 NOTE — PROGRESS NOTE ADULT - SUBJECTIVE AND OBJECTIVE BOX
Patient is a 34y old  Male who presents with a chief complaint of Alcohol abuse (19 Sep 2020 09:26)      INTERVAL HPI/OVERNIGHT EVENTS:  Remains agitated overnight, requiring prn pushes of Ativan    ICU Vital Signs Last 24 Hrs  T(C): 37.1 (19 Sep 2020 20:00), Max: 37.1 (19 Sep 2020 20:00)  T(F): 98.8 (19 Sep 2020 20:00), Max: 98.8 (19 Sep 2020 20:00)  HR: 102 (19 Sep 2020 23:00) (90 - 118)  BP: 100/50 (19 Sep 2020 23:00) (92/55 - 124/76)  BP(mean): 59 (19 Sep 2020 23:00) (47 - 86)  ABP: --  ABP(mean): --  RR: 21 (19 Sep 2020 23:00) (15 - 35)  SpO2: 98% (19 Sep 2020 23:00) (66% - 100%)    I&O's Summary    18 Sep 2020 07:01  -  19 Sep 2020 07:00  --------------------------------------------------------  IN: 1500 mL / OUT: 565 mL / NET: 935 mL    19 Sep 2020 07:01  -  20 Sep 2020 01:03  --------------------------------------------------------  IN: 1520 mL / OUT: 685 mL / NET: 835 mL          LABS:                        7.5    7.28  )-----------( 72       ( 19 Sep 2020 12:42 )             21.3     09-19    133<L>  |  97  |  10  ----------------------------<  107<H>  3.7   |  28  |  0.93    Ca    7.8<L>      19 Sep 2020 12:42  Phos  3.2     09-19  Mg     2.5     09-19    TPro  7.3  /  Alb  2.3<L>  /  TBili  11.3<H>  /  DBili  x   /  AST  137<H>  /  ALT  46  /  AlkPhos  190<H>  09-19    PT/INR - ( 18 Sep 2020 18:09 )   PT: 45.1 sec;   INR: 4.12 ratio         PTT - ( 18 Sep 2020 18:09 )  PTT:54.3 sec    CAPILLARY BLOOD GLUCOSE            RADIOLOGY & ADDITIONAL TESTS:    Consultant(s) Notes Reviewed:  [x ] YES  [ ] NO    MEDICATIONS  (STANDING):  albumin human 25% IVPB 100 milliLiter(s) IV Intermittent every 6 hours  cefTRIAXone   IVPB 1000 milliGRAM(s) IV Intermittent every 24 hours  chlorhexidine 2% Cloths 1 Application(s) Topical daily  folic acid 1 milliGRAM(s) Oral daily  influenza   Vaccine 0.5 milliLiter(s) IntraMuscular once  lactulose Syrup 20 Gram(s) Oral four times a day  LORazepam   Injectable 2 milliGRAM(s) IV Push every 8 hours  multivitamin 1 Tablet(s) Oral daily  mupirocin 2% Nasal 1 Application(s) Nasal two times a day  phytonadione  IVPB 10 milliGRAM(s) IV Intermittent daily  rifAXIMin 550 milliGRAM(s) Oral two times a day  thiamine IVPB 500 milliGRAM(s) IV Intermittent every 8 hours    MEDICATIONS  (PRN):  LORazepam   Injectable 2 milliGRAM(s) IV Push every 2 hours PRN CIWA >7      PHYSICAL EXAM:  GENERAL: Jaundiced, well built, poorly nourished, appears unkempt   HEAD:  Atraumatic, Normocephalic  EYES: EOMI, PERRLA, conjunctiva and sclera muddy yellow   ENT: Mucosal and oral bleeding/oozing noted; normal dentition, no perioral lesions   NECK: Supple, No JVD, Normal thyroid, no enlarged nodes  NERVOUS SYSTEM:  Alert & Oriented X1-2, Drowsy, poor concentration; Motor Strength 5/5 B/L upper and lower extremities; DTRs 2+ intact and symmetric, sensory intact  CHEST/LUNG: B/L good air entry; No rales, rhonchi, or wheezing  HEART: S1S2 normal, no S3, Regular rate and rhythm; No murmurs, rubs, or gallops  ABDOMEN: Soft, Nontender, Nondistended; Bowel sounds present, S/p abdominal paracentesis   EXTREMITIES:  2+ Peripheral Pulses, No clubbing, cyanosis, or edema  LYMPH: No lymphadenopathy noted  SKIN: No rashes or lesions  Care Discussed with Consultants/Other Providers [ x] YES  [ ] NO

## 2020-09-20 NOTE — PROGRESS NOTE ADULT - ATTENDING COMMENTS
Assessment:  1. Alcohol withdrawal syndrome  2. Decompensated cirrhosis   3. Anemia  4. Thrombocytopenia   5. Ascites   6. Cholelithiasis     Plan  - start librium  - Cont. Ativan prn and scheduled, titrate down ativan scheduled  - Monitor for signs of worsening delirium, at this time no evidence of hallucinations  - Cont. Lactulose  - Add rifaximin per hepatology recs  - Hepatology recs noted  - High dose thiamine supplementation, cont. folate supplementation .

## 2020-09-21 NOTE — CONSULT NOTE ADULT - ASSESSMENT
35yo male with hx of chronic alcohol abuse (actively drinking since age 20), decompensated cirrhosis with ascites (non-compliant with diuretics, no hx of LVP), hx of small esophageal varices on last EGD (6/2020) (non-compliant with BB, never banded), with recent hospitalization (6/2020 for leg swelling, anemia) presented on 9/15/2020 with malaise, generalized weakness for 1-2 weeks, found to have worsening liver function, with MELD score 32 compared to 23 on prior admission. Patient is currently being followed by Hepatologist, Dr. Mendes. GI consulted for Anemia. 35yo male with hx of chronic alcohol abuse (actively drinking since age 20), decompensated cirrhosis with ascites (non-compliant with diuretics, no hx of LVP), hx of small esophageal varices on last EGD (6/2020) (non-compliant with BB, never banded), with recent hospitalization (6/2020 for leg swelling, anemia) presented on 9/15/2020 with malaise, generalized weakness for 1-2 weeks, found to have worsening liver function, with MELD score 32 compared to 23 on prior admission. Patient is currently being followed by Hepatologist, Dr. Mendes. GI consulted for Anemia. Patient known to Dr. Gerber and is s/p EGD 6/2020 showing small EV varices and portal hypertensive  gastropathy.

## 2020-09-21 NOTE — PROGRESS NOTE ADULT - PROBLEM SELECTOR PLAN 4
- h/o alcoholic jaundice w/ ascites  - jaundiced   - MELD 32; 3mo mortality 52.6%  - Child-Bojorquez 13  - Maddreys high; steroids held for now   - hepatitis panel negative  - US RUQ liver cirrhosis with no focal lesion, cholelithiasis with mild GB wall thickening, no BD dilatation, negative Field's sign   - s/p paracentesis 2.8L jorden fluid drained, low suspicion for SBP   - c/w ppx ceftriaxone, rifaximin and lactulose     - Hepatology, Dr. Morales, onboard  - GI, Dr. Gerber, onboard

## 2020-09-21 NOTE — PROGRESS NOTE ADULT - PROBLEM SELECTOR PROBLEM 4
Alcohol withdrawal syndrome with complication Cirrhosis of liver with ascites, unspecified hepatic cirrhosis type

## 2020-09-21 NOTE — PROGRESS NOTE ADULT - ASSESSMENT
a 34yoM, with PMH of Alcohol abuse, liver cirrhosis, and esophageal varices, p/w generalized weakness. Admitted for alcohol withdrawal (MELD score of 32 on admission), symptomatic anemia, and thrombocytopenia. ICU was consulted for worsening alcohol withdrawal.     Assessment:  1. Alcohol withdrawal  2. Encephalopathy  3. Alcoholic  liver cirrhosis with ascites and esophageal varices   4. Anemia  5.Thrombocytopenia  6. Hypomagnesemia  7.Elevated INR  8. Cholelithiasis    Neuro:  # Encephalopathy  - Ammonia elevated 58-->100  - Continue Lactulose  - Patient Grade 1 hepatic enceph clinically, drowsy with sleep reversal   - Monitor ammonia level, neurochecks     # Alcohol withdrawal (improving)  - Pt Started on precedex drip 9/17, titrated off the same evening as CIWA dropped and pt sleeping   - Continue Lorazepam 2mg Q4 standing dose, 2 Mg Q2PRN as per CIWA. Standing doses have been held due to hypotension and low CIWA.  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 3 month mortality 52.6%  - Child-Bojorquez Score for Cirrhosis Mortality= 13 points, MAddreys score also high --> steroids held for now   - AST/ALT/AlkP 157/48/203, Enzymes reflect Etoh picture, TBili: 9.9  DBili: 7.2  - Paracentesis done 2.8L jorden fluid removed: Ashley 61,  --> Low suspicion for SBP   - Rifaximin, Lactulose for Ppx   - Started on Albumin 25% in 100ml IVPB Q6x3 days for post procedure hypotension   - US RUQ: Liver cirrhosis with no focal lesion, cholelithiasis with mild GB wall thickening, no BD dilatation, negative Field's sign   - Hepatitis panel -ve for a/c hepatitis  - Hepatology Dr. Morales on board    # Variceal bleed  - Unwitnessed history of upper GI bleed x2 episodes   - Prior EGD in 6/2020 demonstrated small distal EV's (not banded due to low PLTs), and portal gastropathy (Cable Class IIA)  - Observed and discharged on Spironolactone, nadalol, lasix --> pt was noncompliant with meds   - Hb 6.6 this am, 1 unit of PRBC given, post PRBC transfusion- 7.5     RENAL  - Cr/ BUN wnl  - Urine output net negative -103ml  - Will hold diuretics as overdiuresis may precipitate Hepatic enceph   - S/p paracentesis 9/17 - 2.8L fluids     INFECTIOUS DISEASE  # Pt is afebrile  -No WBC elevation    ENDOCRINE  # No acute issues  HBA1C 5.0 and TSH Wnl    HEME  # Anemia  # Thrombocytopenia      # Elevated PT/INR  - 4. 32, likely due to C/c liver disease   - s/p vitamin k 9/18   - Monitor PT/INR, for bleeds     FLUIDS/ELECTROLYTES/NUTRITION    -Monitor, Replete to K>4 and Mg>2    Diet: Regular Diet     PROPHYLAXIS  -DVT: Not on anticoagulation because of thrombocytopenia and anemia       DISPO:  transfer to ICU    CODE STATUS: Full code a 34yoM, with PMH of Alcohol abuse, liver cirrhosis, and esophageal varices, p/w generalized weakness. Admitted for alcohol withdrawal (MELD score of 32 on admission), symptomatic anemia, and thrombocytopenia. ICU was consulted for worsening alcohol withdrawal.     Assessment:  1. Alcohol withdrawal  2. Encephalopathy  3. Alcoholic  liver cirrhosis with ascites and esophageal varices   4. Anemia  5.Thrombocytopenia  6. Hypomagnesemia  7.Elevated INR  8. Cholelithiasis    Neuro:  # Encephalopathy       # Alcohol withdrawal (improving)      CARDIOVASCULAR  # Cardiomegaly      PULMONARY  # No acute issues  - Saturating well on RA, low clinical suspicion as of now for Aspiration PNA     GASTROINTESTINAL  # Decompensated liver cirrhosis with mild ascites and esophageal varices 2/2 EToh  -    # Variceal bleed      RENAL  - Cr/ BUN wnl  - Urine output net negative -103ml  - Will hold diuretics as overdiuresis may precipitate Hepatic enceph   - S/p paracentesis 9/17 - 2.8L fluids     INFECTIOUS DISEASE  # Pt is afebrile  -No WBC elevation    ENDOCRINE  # No acute issues  HBA1C 5.0 and TSH Wnl    HEME  # Anemia  # Thrombocytopenia      # Elevated PT/INR  -    FLUIDS/ELECTROLYTES/NUTRITION    -Monitor, Replete to K>4 and Mg>2    Diet: Regular Diet     PROPHYLAXIS  -DVT: Not on anticoagulation because of thrombocytopenia and anemia       DISPO:  transfer to ICU    CODE STATUS: Full code

## 2020-09-21 NOTE — PROGRESS NOTE ADULT - SUBJECTIVE AND OBJECTIVE BOX
Chief Complaint:  Patient is a 34y old  Male who presents with a chief complaint of Alcohol abuse (20 Sep 2020 11:42)      Reason for consult: Cirrhosis, alcohol abuse    Interval Events:   Patient was transferred out from ICU to 6th floor. He was seen and examined at bedside. He is sleeping, and opens eyes only to loud verbal or tactile stimuli, but sleeps back immediately. He was started on Librium yesterday at ICU, received 50 mg dose x4 since yesterday and received additional 2-2 mg Ativan this morning 3 and 5 am. Unable to asses for HE or obtain ROS.     Hospital Medications:  cefTRIAXone   IVPB 1000 milliGRAM(s) IV Intermittent every 24 hours  chlordiazePOXIDE 50 milliGRAM(s) Oral every 8 hours  folic acid 1 milliGRAM(s) Oral daily  influenza   Vaccine 0.5 milliLiter(s) IntraMuscular once  lactulose Syrup 20 Gram(s) Oral four times a day  LORazepam   Injectable 2 milliGRAM(s) IV Push every 2 hours PRN  magnesium sulfate  IVPB 2 Gram(s) IV Intermittent once  multivitamin 1 Tablet(s) Oral daily  mupirocin 2% Nasal 1 Application(s) Nasal two times a day  potassium chloride  10 mEq/100 mL IVPB 10 milliEquivalent(s) IV Intermittent every 1 hour  rifAXIMin 550 milliGRAM(s) Oral two times a day  thiamine IVPB 500 milliGRAM(s) IV Intermittent every 8 hours      ROS: Unable to obtain due to the patient condition.     PHYSICAL EXAM:   Vital Signs:  Vital Signs Last 24 Hrs  T(C): 36.8 (21 Sep 2020 09:00), Max: 37.2 (20 Sep 2020 20:43)  T(F): 98.2 (21 Sep 2020 09:00), Max: 99 (20 Sep 2020 20:43)  HR: 111 (21 Sep 2020 09:00) (106 - 119)  BP: 132/83 (21 Sep 2020 09:00) (124/74 - 146/82)  BP(mean): --  RR: 15 (21 Sep 2020 09:00) (15 - 18)  SpO2: 100% (21 Sep 2020 09:00) (96% - 100%)  Daily     Daily Weight in k.2 (21 Sep 2020 05:02)    GENERAL: somnolent - sedated  NEURO:   HEENT: icteric sclera  CHEST: no respiratory distress, no accessory muscle use  CARDIAC: regular rate, rhythm  ABDOMEN: soft, non-tender, mildly distended, + fluid wave, no rebound or guarding  EXTREMITIES: warm, well perfused, no edema  SKIN: no rash; ecchymoses    LABS: reviewed                        7.5    7.14  )-----------( 68       ( 21 Sep 2020 06:35 )             21.0     09-    133<L>  |  101  |  9   ----------------------------<  99  3.2<L>   |  27  |  0.64    Ca    8.7      21 Sep 2020 06:35  Phos  3.0       Mg     1.6         TPro  7.3  /  Alb  2.7<L>  /  TBili  11.6<H>  /  DBili  x   /  AST  109<H>  /  ALT  38  /  AlkPhos  170<H>      LIVER FUNCTIONS - ( 21 Sep 2020 06:35 )  Alb: 2.7 g/dL / Pro: 7.3 g/dL / ALK PHOS: 170 U/L / ALT: 38 U/L DA / AST: 109 U/L / GGT: x             Interval Diagnostic Studies: see sunrise for full report

## 2020-09-21 NOTE — CONSULT NOTE ADULT - PROBLEM SELECTOR RECOMMENDATION 9
possibly due to cirrhosis  s/p 3 Units PRBC's this admission  Hgb 7.5 this am  no obvious bleeding on rectal exam  recommend CT A/P  monitor CBC

## 2020-09-21 NOTE — CONSULT NOTE ADULT - SUBJECTIVE AND OBJECTIVE BOX
[facility] GI CONSULTATION    Patient is a 34y old  Male who presents with a chief complaint of Alcohol withdrawal (21 Sep 2020 13:24)    HPI:  34 male with medical hx of Alcohol abuse and liver cirrhosis  , esophageal varices presented to Ed because he was feeling week . Patient states that he drinks every day, for past week he was having severe nausea with vomiting with blood ( only tea spoon some times ) and also reported blood in stools. Patient is feeling very weak. Patient states last drink was 2 days ago drinks 4-5 cans of beer every day.      denies any episodes of fever, chills , nausea , abdominal pain, epigastric pain, diarrhea or urinary problems.       off note patient was admitted to hospital in June 2020 and US abdomen was done showed cirrhotic liver and significant ascites    Patient went for EGD  , it  showed small esophageal varices distally, not banded as patient did not get FFP at that time. Had portal hypertensive gastropathy. Biopsy not taken due to elevated  INR. He was started on Nadolol 20mg qhs and spironolactone and Lasix .   Patient doesn't take any medications currently.   (15 Sep 2020 03:05)    PMH/PSH:  PAST MEDICAL & SURGICAL HISTORY:  Liver cirrhosis    Anemia    No significant past surgical history      FH:  FAMILY HISTORY:      MEDS:  MEDICATIONS  (STANDING):  cefTRIAXone   IVPB 1000 milliGRAM(s) IV Intermittent every 24 hours  chlordiazePOXIDE 50 milliGRAM(s) Oral every 8 hours  folic acid 1 milliGRAM(s) Oral daily  influenza   Vaccine 0.5 milliLiter(s) IntraMuscular once  lactulose Syrup 20 Gram(s) Oral four times a day  multivitamin 1 Tablet(s) Oral daily  mupirocin 2% Nasal 1 Application(s) Nasal two times a day  rifAXIMin 550 milliGRAM(s) Oral two times a day  thiamine IVPB 500 milliGRAM(s) IV Intermittent every 8 hours    MEDICATIONS  (PRN):  LORazepam   Injectable 2 milliGRAM(s) IV Push every 2 hours PRN Agitation/CIWA > 7    Allergies    No Known Allergies    Intolerances            CONSTITUTIONAL:  No weight loss, fever, chills, weakness or fatigue.  HEENT:  Eyes:  No visual loss, blurred vision, double vision or yellow sclerae. Ears, Nose, Throat:  No hearing loss, sneezing, congestion, runny nose or sore throat.  SKIN:  No rash or itching.  CARDIOVASCULAR:  No chest pain, chest pressure or chest discomfort. No palpitations or edema.  RESPIRATORY:  No shortness of breath, cough or sputum.  GASTROINTESTINAL:  SEE HPI  GENITOURINARY:  No dysuria, hematuria, urinary frequency  NEUROLOGICAL:  No headache, dizziness, syncope, paralysis, ataxia, numbness or tingling in the extremities. No change in bowel or bladder control.  MUSCULOSKELETAL:  No muscle, back pain, joint pain or stiffness.  HEMATOLOGIC:  No anemia, bleeding or bruising.  LYMPHATICS:  No enlarged nodes. No history of splenectomy.  PSYCHIATRIC:  No history of depression or anxiety.  ENDOCRINOLOGIC:  No reports of sweating, cold or heat intolerance. No polyuria or polydipsia.      ______________________________________________________________________  PHYSICAL EXAM:  T(C): 36.8 (09-21-20 @ 14:25), Max: 37.2 (09-20-20 @ 20:43)  HR: 106 (09-21-20 @ 14:25)  BP: 126/74 (09-21-20 @ 14:25)  RR: 16 (09-21-20 @ 14:25)  SpO2: 100% (09-21-20 @ 14:25)  Wt(kg): --    09-20 - 09-21  --------------------------------------------------------  IN:    IV PiggyBack: 100 mL    Oral Fluid: 650 mL  Total IN: 750 mL    OUT:    Indwelling Catheter - Urethral (mL): 120 mL  Total OUT: 120 mL    Total NET: 630 mL          GEN: NAD, normocephalic  CVS: S1S2+  CHEST: clear to auscultation  ABD: soft , nontender, nondistended, bowel sounds present  EXTR: no cyanosis, no clubbing, no edema  NEURO: Awake and alert; oriented .....  SKIN:  warm;  non icteric    ______________________________________________________________________  LABS:                        7.5    7.14  )-----------( 68       ( 21 Sep 2020 06:35 )             21.0     09-21    133<L>  |  101  |  9   ----------------------------<  99  3.2<L>   |  27  |  0.64    Ca    8.7      21 Sep 2020 06:35  Phos  3.0     09-21  Mg     1.6     09-21    TPro  7.3  /  Alb  2.7<L>  /  TBili  11.6<H>  /  DBili  x   /  AST  109<H>  /  ALT  38  /  AlkPhos  170<H>  09-21    LIVER FUNCTIONS - ( 21 Sep 2020 06:35 )  Alb: 2.7 g/dL / Pro: 7.3 g/dL / ALK PHOS: 170 U/L / ALT: 38 U/L DA / AST: 109 U/L / GGT: x                       [facility] GI CONSULTATION    Patient is a 34y old  Male who presents with a chief complaint of Alcohol withdrawal (21 Sep 2020 13:24)    HPI:  34 male with medical hx of Alcohol abuse and liver cirrhosis  , esophageal varices presented to Ed because he was feeling week . Patient states that he drinks every day, for past week he was having severe nausea with vomiting with blood ( only tea spoon some times ) and also reported blood in stools. Patient is feeling very weak. Patient states last drink was 2 days ago drinks 4-5 cans of beer every day.      denies any episodes of fever, chills , nausea , abdominal pain, epigastric pain, diarrhea or urinary problems.       off note patient was admitted to hospital in June 2020 and US abdomen was done showed cirrhotic liver and significant ascites    Patient went for EGD  , it  showed small esophageal varices distally, not banded as patient did not get FFP at that time. Had portal hypertensive gastropathy. Biopsy not taken due to elevated  INR. He was started on Nadolol 20mg qhs and spironolactone and Lasix .   Patient doesn't take any medications currently.   (15 Sep 2020 03:05)    PMH/PSH:  PAST MEDICAL & SURGICAL HISTORY:  Liver cirrhosis    Anemia    No significant past surgical history      FH:  FAMILY HISTORY:      MEDS:  MEDICATIONS  (STANDING):  cefTRIAXone   IVPB 1000 milliGRAM(s) IV Intermittent every 24 hours  chlordiazePOXIDE 50 milliGRAM(s) Oral every 8 hours  folic acid 1 milliGRAM(s) Oral daily  influenza   Vaccine 0.5 milliLiter(s) IntraMuscular once  lactulose Syrup 20 Gram(s) Oral four times a day  multivitamin 1 Tablet(s) Oral daily  mupirocin 2% Nasal 1 Application(s) Nasal two times a day  rifAXIMin 550 milliGRAM(s) Oral two times a day  thiamine IVPB 500 milliGRAM(s) IV Intermittent every 8 hours    MEDICATIONS  (PRN):  LORazepam   Injectable 2 milliGRAM(s) IV Push every 2 hours PRN Agitation/CIWA > 7    Allergies    No Known Allergies    Intolerances            CONSTITUTIONAL:  No weight loss, fever, chills, weakness or fatigue.  HEENT:  Eyes:  No visual loss, blurred vision, double vision or yellow sclerae. Ears, Nose, Throat:  No hearing loss, sneezing, congestion, runny nose or sore throat.  SKIN:  No rash or itching.  CARDIOVASCULAR:  No chest pain, chest pressure or chest discomfort. No palpitations or edema.  RESPIRATORY:  No shortness of breath, cough or sputum.  GASTROINTESTINAL:  SEE HPI  GENITOURINARY:  No dysuria, hematuria, urinary frequency  NEUROLOGICAL:  No headache, dizziness, syncope, paralysis, ataxia, numbness or tingling in the extremities. No change in bowel or bladder control.  MUSCULOSKELETAL:  No muscle, back pain, joint pain or stiffness.  HEMATOLOGIC:  No anemia, bleeding or bruising.  LYMPHATICS:  No enlarged nodes. No history of splenectomy.  PSYCHIATRIC:  No history of depression or anxiety.  ENDOCRINOLOGIC:  No reports of sweating, cold or heat intolerance. No polyuria or polydipsia.      ______________________________________________________________________  PHYSICAL EXAM:  T(C): 36.8 (09-21-20 @ 14:25), Max: 37.2 (09-20-20 @ 20:43)  HR: 106 (09-21-20 @ 14:25)  BP: 126/74 (09-21-20 @ 14:25)  RR: 16 (09-21-20 @ 14:25)  SpO2: 100% (09-21-20 @ 14:25)  Wt(kg): --    09-20 - 09-21  --------------------------------------------------------  IN:    IV PiggyBack: 100 mL    Oral Fluid: 650 mL  Total IN: 750 mL    OUT:    Indwelling Catheter - Urethral (mL): 120 mL  Total OUT: 120 mL    Total NET: 630 mL          GEN: sleeping   CVS: S1S2+  CHEST: clear to auscultation  ABD: soft , nontender, mildly distended, bowel sounds present  EXTR: no cyanosis, no clubbing, no edema  NEURO: sleeping   SKIN:  warm;  +icteric sclera, multiple ecchymotic areas on extremities    ______________________________________________________________________  LABS:                        7.5    7.14  )-----------( 68       ( 21 Sep 2020 06:35 )             21.0     09-21    133<L>  |  101  |  9   ----------------------------<  99  3.2<L>   |  27  |  0.64    Ca    8.7      21 Sep 2020 06:35  Phos  3.0     09-21  Mg     1.6     09-21    TPro  7.3  /  Alb  2.7<L>  /  TBili  11.6<H>  /  DBili  x   /  AST  109<H>  /  ALT  38  /  AlkPhos  170<H>  09-21    LIVER FUNCTIONS - ( 21 Sep 2020 06:35 )  Alb: 2.7 g/dL / Pro: 7.3 g/dL / ALK PHOS: 170 U/L / ALT: 38 U/L DA / AST: 109 U/L / GGT: x

## 2020-09-21 NOTE — PROGRESS NOTE ADULT - PROBLEM SELECTOR PLAN 6
- INR noted to be 4.32  - likely 2/2 liver disease   - s/p vitK on 9/18   - monitor PT/INR, for bleeds

## 2020-09-21 NOTE — CONSULT NOTE ADULT - PROBLEM SELECTOR RECOMMENDATION 4
likely 2/2 ETOH  hepatology on board  c/w rocephin and Rifaximin  f/u SMA, SLA, Hep B core and surface ab, Hep C PCR  monitor LFT's likely 2/2 ETOH  hepatology on board  s/p BSD paracentesis - 2.8L removed  c/w rocephin and Rifaximin  f/u SMA, SLA, , LKM, Hep B core,  and surface ab, Hep C PCR  monitor LFT's

## 2020-09-21 NOTE — PROGRESS NOTE ADULT - PROBLEM SELECTOR PLAN 8
- noted to have cardiomegaly on CXR  - likely 2/2 EtOH  - EKG NSR  - Echo EF 55%, chambers and valves wnl

## 2020-09-21 NOTE — PROGRESS NOTE ADULT - PROBLEM SELECTOR PLAN 3
- no signs of active bleeding  - likely 2/2 complication of cirrhosis vs chronic EtOH use  - normocytic, hypochromic. Ferritin, Fe total wnl, Fe Sat 91 (high), TIBC 121(low)    - Folate, B12 wnl  - s/p 3u pRBC  - c/w thiamine, multivitamin  - Monitor CBC, vitals   - will hold Fe supplementation as it may exacerbate HE     - GI, Dr. Gerber, onboard

## 2020-09-21 NOTE — PROGRESS NOTE ADULT - PROBLEM SELECTOR PLAN 5
- symptomatic mucosal bleeds and easy bruisability  - likely secondary to chronic alcoholism   - peripheral smear shows no schistocytes, retic count 2.5%,  (unreliable in setting of liver dysfunction  - Platelet 68 (9/21); trending up  - s/p 1u Plts on 9/18  - monitor plts, transfuse if PC <10K

## 2020-09-21 NOTE — PROGRESS NOTE ADULT - ASSESSMENT
35yo male with hx of chronic alcohol abuse (actively drinking since age 20), decompensated cirrhosis with ascites (non-compliant with diuretics, no hx of LVP), hx of small esophageal varices on last EGD (6/2020) (non-compliant with BB, never banded), with recent hospitalization (6/2020 for leg swelling, anemia) presented on 9/15/2020 with malaise, generalized weakness for 1-2 weeks, found to have worsening liver function, with MELD score 32 compared to 23 on prior admission, electrolyte abnormalities, and questionable blood in stool (reported black stools) or vomitus, but without overt bleeding in hospital. 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  Acute worsening might have been caused by heavy alcohol intake (high blood alcohol on admission), SBP was r/o by paracentesis, viral and infectious workup negative so far (some pending), denied taking any medications or herbal supplements - drinks protein shake. He was transferred to ICU on 9/17/2020 b/o change in mental status, alcohol withdrawal, became hypotensive and bradycardic on Precedex.  Developed LEDA, but improved and now resolved with volume expansion. Anemic, thrombocytopenic, now s/p 1 U PLT, and 3U PRBC (last 9/20), no evidence of overt GI bleeding. Patient also noted proteinuria, and unexpectedly ascites analysis showed SAAG < 1.1, with low total protein.     Decompensated cirrhosis w ascites; small EV; MELD 32 (but INR is from 9/18)  -  unfortunately still active drinker, being treated for  EtOH withdrawal    #ACLF - cirrhosis with development of LEDA during this hospitalization along with mental status change (EtOH withdrawal, sedation +/- HE)  - please obtain INR today  - c/w monitoring LFTs closely, including INR, at least q12 hrs (liver enzymes with slight improvement)   35yo male with hx of chronic alcohol abuse (actively drinking since age 20), decompensated cirrhosis with ascites (non-compliant with diuretics, no hx of LVP), hx of small esophageal varices on last EGD (6/2020) (non-compliant with BB, never banded), with recent hospitalization (6/2020 for leg swelling, anemia) presented on 9/15/2020 with malaise, generalized weakness for 1-2 weeks, found to have worsening liver function, with MELD score 32 compared to 23 on prior admission, electrolyte abnormalities, and questionable blood in stool (reported black stools) or vomitus, but without overt bleeding in hospital. 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  Acute worsening might have been caused by heavy alcohol intake (high blood alcohol on admission), SBP was r/o by paracentesis, viral and infectious workup negative so far (some pending), denied taking any medications or herbal supplements - drinks protein shake. He was transferred to ICU on 9/17/2020 b/o change in mental status, alcohol withdrawal, became hypotensive and bradycardic on Precedex.  Developed LEDA, but improved and now resolved with volume expansion. Anemic, thrombocytopenic, now s/p 1 U PLT, and 3U PRBC (last 9/20), no evidence of overt GI bleeding. Patient also noted proteinuria, and unexpectedly ascites analysis showed SAAG < 1.1, with low total protein.     Decompensated cirrhosis w ascites; small EV; MELD 33 (but INR is from 9/18)  -  unfortunately still active drinker, being treated for  EtOH withdrawal    #ACLF - cirrhosis with development of LEDA during this hospitalization along with mental status change (EtOH withdrawal, sedation +/- HE)  - please obtain INR today  - c/w monitoring LFTs closely, including INR, at least q12 hrs (liver enzymes with slight improvement)  - avoid hepatotoxic meds, avoid NSAIDs   - in terms of etiology: possibly heavy ETOH intake (elevated blood alcohol on admission; was not started on steroid considering all circumstances, as risk possibly outweigh benefit)  - SBP was r/o, afebrile, without leukocytosis, infectious workup was neg so far (CXR no infiltrate, UA 3-5 WBC, 0-2 RBC, trace leukocyte esterase) - would repeat UA, and can send BC, UC  - will need counseling on alcohol abstinence - family involvement might be helpful  -  to r/o other additional etiology of ACLF, viral and autoimmune workup was sent, in process: Hep A IgM neg, immune; HBsAg and HBcAb IgM neg; HCV ab neg; Hep E IgG neg (please send HCV PCR, Hep E IgM in process; can send EBV, CMV PCR; also send HBcAb total and HBsAb to see in immune or exposed)      #EtOH withdrawal +/- HE (based on 9/17 evaluation was Gr1) - now sedated (got 4x 50 mg Librium since last afternoon, and 2-2mg Ativan), was agitated, combative in the morning as d/w primary team; on my exam opens eyes to loud verbal and tactile stimuli, but not able to assess mental status; equal pupils, reacting to light  - continue close monitoring of mental status  - c/w lactulose to have 2-3 soft BM/day (today so far there is 1 recorded BM)  - c/w rifaximin 550 mg bid  - avoid long acting BZD  - increased ammonia alone does not add any diagnostic, staging or prognostic value for HE in CLD patients   - C/w CIWA protocol per primary team, c/w folic, thiamin, monitor electrolytes      #Ascites - no SBP  - Would continue holding diuretics for now  - SAAG =1.0 (<1.1) and no splenomegaly on prior imaging in June, but small EV on EGD per prior notes (latter one still suggest CSPH)  - while SAAG > 1.1 expected in portal hypertension, in 3% of cirrhotic patients it might not be the case; also alcoholic hepatitis can cause ascites without significant PH - thus possibly additive effect and ideally can repeat dx paracentesis, but would hold off with it for now due to coagulopathy, thrombocytopenia  - also noted proteinuria on UA (nephrotic ascites would present with SAAG <1.1) , f/u urine protein/Cr ratio, would check 24 urine protein and consider nephrology consult  - f/u ascites cytology; had no signs/symptoms of pancreatitis; no hx of TB, and denied suggestive symptoms; furthermore while SAAG < 1.1, total protein is also low (and would be expected > 2.5 in peritoneal carcinomatosis, pancreatitis, or TB)  - On US abdomen was no comment on portal or hepatic veins, please repeat full US abdomen pelvis with Doppler (bedside?)  - Close monitoring for bleeding (s/p paracentesis) - consider CT a/p to r/o bleeding if further Hb drop or concern for bleeding  - Given ascites protein < 1.5 g/dl and CPT >9 and bilirubin >3, was started on SBP prophylaxis      #LEDA - now improving 1.0-0.61-0.64 (Estevan was < 10, cannot calculate FeNa, no UCr available)  - in a cirrhotic patient already 0.3 mg/dl increase above baseline w/in 48 hrs fulfills LEDA criteria (even if Cr wnl); or > 50% increase w/in 7 days or urine output < 0.5ml/kg  - C/w close monitoring renal function and monitoring electrolytes including Mg and P, and replace as needed  - please monitor urine output  - keep MAP >65-70mmHg  - responded to volume expansion w albumin 1g/kg    - would repeat UA and measure 24 hr urine protein and consider nephrology consult (notable cirrhosis, including alcoholic cirrhosis can be a/w IgA nephropathy, but nephrotic range proteinuria not common)    #Small EV on last EGD (no report available whether there were high risk signs, but he was started on BB in June), was non compliant w BB, no overt bleeding  - no endoscopy was done during this admission (d/w Dr. Gerber on admission)  - keep Hb >7, keep T/S active, 2 large bore iv lines   - c/w monitoring H/H and call GI if any bleeding or significant Hb drop  - c/w holding BB for now      #Anemia, thrombocytopenia  - possibly due to cirrhosis + EtOH caused BM suppression - s/p 1U PLT and 2U PRBC, no evidence of overt bleeding  - smear was normal for RBC and PLT morphology on admission  - c/w monitoring H/H  - fibrinogen in process    #Etiology of cirrhosis  - while most likely ETOH cirrhosis, given the young age additional workup was sent  - ceruloplasmin < 20, while it could be falsely low in end stage liver or protein-losing, please send / follow up 24hr urine copper (with Cr)  -[id number] nl;  ferritin 331 -EtOH  - LONNY neg, but total IgG elevated 4018, please make sure anti-SMA, anti-LKM, and anti-SLA sent    #HCM  - HCC screening - last US and AFP in 6/2020, no focal mass, nl AFP, next due 12/2020  - please check vit D  - Vaccinations: Hep A immune (IgG pos), Hep B neg, would check immunity/prior exposure (HBsAB, HBcAb) and if not immune would offer vaccine; got flu vaccine; would check whether got Pneumococcal vaccine     Discussed with primary team  Will continue to follow

## 2020-09-21 NOTE — PROGRESS NOTE ADULT - SUBJECTIVE AND OBJECTIVE BOX
PGY-1 Progress Note discussed with attending    PAGER #: [[phone]] TILL 5:00 PM  PLEASE CONTACT ON CALL TEAM:  - On Call Team (Please refer to Alison) FROM 5:00 PM - 8:30PM  - Nightfloat Team FROM 8:30 -7:30 AM    INTERVAL HPI/OVERNIGHT EVENTS:   - Patient is resting comfortably in bed. He denies any pain or discomfort. His is tolerating his diet. He denies N/V/abdominal pain/chest pain/SOB.     REVIEW OF SYSTEMS:  CONSTITUTIONAL: No fever, weight loss, or fatigue  RESPIRATORY: No cough, wheezing, chills or hemoptysis; No shortness of breath  CARDIOVASCULAR: No chest pain, palpitations, dizziness, or leg swelling  GASTROINTESTINAL: No abdominal pain. No nausea, vomiting, or hematemesis; No diarrhea or constipation. No melena or hematochezia.  GENITOURINARY: No dysuria or hematuria, urinary frequency  NEUROLOGICAL: No headaches, memory loss, loss of strength, numbness, or tremors    MEDICATIONS  (STANDING):  cefTRIAXone   IVPB 1000 milliGRAM(s) IV Intermittent every 24 hours  chlordiazePOXIDE 50 milliGRAM(s) Oral every 8 hours  folic acid 1 milliGRAM(s) Oral daily  influenza   Vaccine 0.5 milliLiter(s) IntraMuscular once  lactulose Syrup 20 Gram(s) Oral four times a day  magnesium sulfate  IVPB 2 Gram(s) IV Intermittent once  multivitamin 1 Tablet(s) Oral daily  mupirocin 2% Nasal 1 Application(s) Nasal two times a day  potassium chloride  10 mEq/100 mL IVPB 10 milliEquivalent(s) IV Intermittent every 1 hour  rifAXIMin 550 milliGRAM(s) Oral two times a day  thiamine IVPB 500 milliGRAM(s) IV Intermittent every 8 hours    MEDICATIONS  (PRN):  LORazepam   Injectable 2 milliGRAM(s) IV Push every 2 hours PRN Agitation/CIWA > 7      Vital Signs Last 24 Hrs  T(C): 36.7 (21 Sep 2020 10:00), Max: 37.2 (20 Sep 2020 20:43)  T(F): 98 (21 Sep 2020 10:00), Max: 99 (20 Sep 2020 20:43)  HR: 108 (21 Sep 2020 10:00) (106 - 119)  BP: 134/77 (21 Sep 2020 10:00) (124/74 - 146/82)  BP(mean): --  RR: 16 (21 Sep 2020 10:00) (15 - 18)  SpO2: 100% (21 Sep 2020 10:00) (96% - 100%)    PHYSICAL EXAMINATION:  GENERAL: NAD, AAOx3  HEAD: AT/NC  EYES: scleral icterus noted  NECK: supple, No JVD noted, Normal thyroid  CHEST/LUNG: CTABL; no rales, rhonchi, wheezing, or rubs  HEART: regular rate and rhythm; no murmurs, rubs, or gallops  ABDOMEN: mod distended and hard, but nontender; Bowel sounds present  EXTREMITIES:  2+ Peripheral Pulses, No clubbing, cyanosis, or edema                          7.5    7.14  )-----------( 68       ( 21 Sep 2020 06:35 )             21.0     09-21    133<L>  |  101  |  9   ----------------------------<  99  3.2<L>   |  27  |  0.64    Ca    8.7      21 Sep 2020 06:35  Phos  3.0     09-21  Mg     1.6     09-21    TPro  7.3  /  Alb  2.7<L>  /  TBili  11.6<H>  /  DBili  x   /  AST  109<H>  /  ALT  38  /  AlkPhos  170<H>  09-21    LIVER FUNCTIONS - ( 21 Sep 2020 06:35 )  Alb: 2.7 g/dL / Pro: 7.3 g/dL / ALK PHOS: 170 U/L / ALT: 38 U/L DA / AST: 109 U/L / GGT: x                 COVID-19 PCR: NotDetec (15 Sep 2020 00:23)      CAPILLARY BLOOD GLUCOSE          RADIOLOGY & ADDITIONAL TESTS:

## 2020-09-21 NOTE — PROGRESS NOTE ADULT - PROBLEM SELECTOR PLAN 1
- no signs of active bleeding  - likely 2/2 complication of cirrhosis vs chronic EtOH use  - normocytic, hypochromic. Ferritin, Fe total wnl, Fe Sat 91 (high), TIBC 121(low)    - Folate, B12 wnl  - s/p 3u pRBC  - c/w thiamine, multivitamin  - Monitor CBC, vitals   - will hold Fe supplementation as it may exacerbate HE     - GI, Dr. Collins, onboard - unwitnessed h/o UGIB x2; h/o EtOH cirrhosis   - prior EGD in 6/2020 demonstrated small distal EV's (not banded due to low PLTs), and portal gastropathy ([place] Class IIA); d/c w/ spironolactone, nadalol, lasix (noncomfliant)  - s/p 3u pRBC; lastest transfusion on 9/20  - no BB for now  - monitor CBC    - GI, Dr. Gerber, onboard

## 2020-09-21 NOTE — PROGRESS NOTE ADULT - PROBLEM SELECTOR PLAN 2
- symptomatic mucosal bleeds and easy bruisability  - likely secondary to chronic alcoholism   - peripheral smear shows no schistocytes, retic count 2.5%,  (unreliable in setting of liver dysfunction  - Platelet: 51 --> 39  - Adm 1 U Plts 9/18  - Monitor plts, transfuse if PC < 10K - ICU downgrade  - p/w ADOLFO; h/o AA, cirrhosis, and esophageal varices  - CIWA 8 (9/21)  - c/w librium q8h standing, ativan q2h prn, IV thiamine, folic acid and multivitamin  - enhanced observation & restraint 2/2 agitation

## 2020-09-21 NOTE — PROGRESS NOTE ADULT - ATTENDING COMMENTS
Patient is a 34y old  Male who presents with acute alcohol withdrawal and advanced cirrhosis    Patient was seen and examined at bedside  Arousable with tactile stimulation but did not follow commands due to sedation from Librium  CIWA was 8 this am, but improved to 3 after the Librium as per RN  Vitals tachycardia    REVIEW OF SYSTEMS: not obtained     PHYSICAL EXAM:  GENERAL: RAAS 2, icteric   HEAD:  Atraumatic, Normocephalic  NERVOUS SYSTEM:  Alert & Oriented X0, unable to follow commands but was noted to move all 4 extremities when aroused, neuro exam limited due to effect of Librium   CHEST/LUNG: Clear to auscultation anterolaterally  HEART: Regular rate and rhythm; No murmurs, rubs, or gallops  ABDOMEN: Nontender, distended with marked ascites; Bowel sounds present  EXTREMITIES:  2+ Peripheral Pulses, No clubbing, cyanosis, or edema  SKIN: No rashes or lesions  LABS:                        6.8    6.95  )-----------( 68       ( 21 Sep 2020 15:44 )             19.7     133<L>  |  101  |  9   ----------------------------<  99  3.2<L>   |  27  |  0.64    TPro  7.3  /  Alb  2.7<L>  /  TBili  11.6<H>  /  DBili  x   /  AST  109<H>  /  ALT  38  /  AlkPhos  170<H>  09-21    PT/INR - ( 21 Sep 2020 15:44 )   PT: 53.7 sec;   INR: 4.95 ratio      A/P:   Alcohol withdrawal   Acute decompensation of Cirrhosis of liver with ascites and esophageal varices   Anemia  Thrombocytopenia  Elevated INR  Electrolyte imbalance   Proteinuria     Plan:   Cont Librium and Ativan PRN, patient went to DT, will do slow taper   Monitor CIWA  Hb dropped again, will transfuse PRBC, FFP, give Vit K, Protonix IV and Octreotide drip, Dr Gerber on board  Will defer Propanolol as per Hepatology rec ( patient had small varices during last admission)  CT abd and pelvis   Monitor CBC, LFT  Cont Lactulose rifaximin, ensure 2-3 BM every day  Aspiration, fall and seizure precautions   Nephrology Dr Vigil consulted for Proteinuria   Full code   Monitor vitals closely
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
## 2020-09-22 NOTE — PROGRESS NOTE ADULT - SUBJECTIVE AND OBJECTIVE BOX
GI PROGRESS NOTE    Patient is a 34y old  Male who presents with a chief complaint of Alcohol abuse (21 Sep 2020 15:23)      HPI:  34 male with medical hx of Alcohol abuse and liver cirrhosis  , esophageal varices presented to Ed because he was feeling week . Patient states that he drinks every day, for past week he was having severe nausea with vomiting with blood ( only tea spoon some times ) and also reported blood in stools. Patient is feeling very weak. Patient states last drink was 2 days ago drinks 4-5 cans of beer every day.      denies any episodes of fever, chills , nausea , abdominal pain, epigastric pain, diarrhea or urinary problems.       off note patient was admitted to hospital in June 2020 and US abdomen was done showed cirrhotic liver and significant ascites    Patient went for EGD  , it  showed small esophageal varices distally, not banded as patient did not get FFP at that time. Had portal hypertensive gastropathy. Biopsy not taken due to elevated  INR. He was started on Nadolol 20mg qhs and spironolactone and Lasix .   Patient doesn't take any medications currently.   (15 Sep 2020 03:05)          ______________________________________________________________________  PMH/PSH:  PAST MEDICAL & SURGICAL HISTORY:  Liver cirrhosis    Anemia    No significant past surgical history      ______________________________________________________________________  MEDS:  MEDICATIONS  (STANDING):  cefTRIAXone   IVPB 1000 milliGRAM(s) IV Intermittent every 24 hours  chlordiazePOXIDE 25 milliGRAM(s) Oral every 8 hours  folic acid 1 milliGRAM(s) Oral daily  influenza   Vaccine 0.5 milliLiter(s) IntraMuscular once  lactulose Syrup 20 Gram(s) Oral four times a day  multivitamin 1 Tablet(s) Oral daily  mupirocin 2% Nasal 1 Application(s) Nasal two times a day  octreotide  Infusion 50 MICROgram(s)/Hr (10 mL/Hr) IV Continuous <Continuous>  pantoprazole  Injectable 40 milliGRAM(s) IV Push two times a day  phytonadione  IVPB 10 milliGRAM(s) IV Intermittent once  rifAXIMin 550 milliGRAM(s) Oral two times a day  thiamine IVPB 500 milliGRAM(s) IV Intermittent every 8 hours    MEDICATIONS  (PRN):  LORazepam   Injectable 2 milliGRAM(s) IV Push every 2 hours PRN Agitation/CIWA > 7    ______________________________________________________________________    PHYSICAL EXAM:  T(C): 36.8 (09-22-20 @ 05:23), Max: 37.3 (09-21-20 @ 18:00)  HR: 90 (09-22-20 @ 05:23)  BP: 119/71 (09-22-20 @ 05:23)  RR: 16 (09-22-20 @ 05:23)  SpO2: 100% (09-22-20 @ 05:23)  Wt(kg): --    09-21 - 09-22  --------------------------------------------------------  IN:    IV PiggyBack: 350 mL    IV PiggyBack: 100 mL    Octreotide: 30 mL    PRBCs (Packed Red Blood Cells): 110 mL  Total IN: 590 mL    OUT:  Total OUT: 0 mL    Total NET: 590 mL          GEN: NAD   CVS- S1 S2  ABD: soft nontender, non distended, bowel sounds+  LUNGS: clear to auscultation  NEURO: patient sleeping; eyes open to voice   Extremities: no cyanosis, no calf tenderness, no edema, no clubbing      ______________________________________________________________________  LABS:                        7.4    5.25  )-----------( 58       ( 22 Sep 2020 06:44 )             20.6     09-22    133<L>  |  102  |  9   ----------------------------<  106<H>  4.0   |  26  |  0.62    Ca    8.4      22 Sep 2020 06:44  Phos  3.8     09-22  Mg     1.8     09-22    TPro  7.0  /  Alb  2.4<L>  /  TBili  11.0<H>  /  DBili  5.9<H>  /  AST  95<H>  /  ALT  35  /  AlkPhos  150<H>  09-22    LIVER FUNCTIONS - ( 22 Sep 2020 06:44 )  Alb: 2.4 g/dL / Pro: 7.0 g/dL / ALK PHOS: 150 U/L / ALT: 35 U/L DA / AST: 95 U/L / GGT: x           ______________________________________________________________________  IMAGING:    CT A/P with IV showing - Cirrhosis with sequela of portal hypertension. No evidence for active GI bleeding.             GI PROGRESS NOTE    Patient is a 34y old  Male who presents with a chief complaint of Alcohol abuse (21 Sep 2020 15:23)      HPI:  34 male with medical hx of Alcohol abuse and liver cirrhosis  , esophageal varices presented to Ed because he was feeling week . Patient states that he drinks every day, for past week he was having severe nausea with vomiting with blood ( only tea spoon some times ) and also reported blood in stools. Patient is feeling very weak. Patient states last drink was 2 days ago drinks 4-5 cans of beer every day.      denies any episodes of fever, chills , nausea , abdominal pain, epigastric pain, diarrhea or urinary problems.       off note patient was admitted to hospital in June 2020 and US abdomen was done showed cirrhotic liver and significant ascites    Patient went for EGD  , it  showed small esophageal varices distally, not banded as patient did not get FFP at that time. Had portal hypertensive gastropathy. Biopsy not taken due to elevated  INR. He was started on Nadolol 20mg qhs and spironolactone and Lasix .   Patient doesn't take any medications currently.   (15 Sep 2020 03:05)          ______________________________________________________________________  PMH/PSH:  PAST MEDICAL & SURGICAL HISTORY:  Liver cirrhosis    Anemia    No significant past surgical history      ______________________________________________________________________  MEDS:  MEDICATIONS  (STANDING):  cefTRIAXone   IVPB 1000 milliGRAM(s) IV Intermittent every 24 hours  chlordiazePOXIDE 25 milliGRAM(s) Oral every 8 hours  folic acid 1 milliGRAM(s) Oral daily  influenza   Vaccine 0.5 milliLiter(s) IntraMuscular once  lactulose Syrup 20 Gram(s) Oral four times a day  multivitamin 1 Tablet(s) Oral daily  mupirocin 2% Nasal 1 Application(s) Nasal two times a day  octreotide  Infusion 50 MICROgram(s)/Hr (10 mL/Hr) IV Continuous <Continuous>  pantoprazole  Injectable 40 milliGRAM(s) IV Push two times a day  phytonadione  IVPB 10 milliGRAM(s) IV Intermittent once  rifAXIMin 550 milliGRAM(s) Oral two times a day  thiamine IVPB 500 milliGRAM(s) IV Intermittent every 8 hours    MEDICATIONS  (PRN):  LORazepam   Injectable 2 milliGRAM(s) IV Push every 2 hours PRN Agitation/CIWA > 7    ______________________________________________________________________    PHYSICAL EXAM:  T(C): 36.8 (09-22-20 @ 05:23), Max: 37.3 (09-21-20 @ 18:00)  HR: 90 (09-22-20 @ 05:23)  BP: 119/71 (09-22-20 @ 05:23)  RR: 16 (09-22-20 @ 05:23)  SpO2: 100% (09-22-20 @ 05:23)  Wt(kg): --    09-21 - 09-22  --------------------------------------------------------  IN:    IV PiggyBack: 350 mL    IV PiggyBack: 100 mL    Octreotide: 30 mL    PRBCs (Packed Red Blood Cells): 110 mL  Total IN: 590 mL    OUT:  Total OUT: 0 mL    Total NET: 590 mL          GEN: NAD, sleeping  CVS- S1 S2  ABD: soft nontender, + distended, bowel sounds+  LUNGS: clear to auscultation  NEURO: patient sleeping; eyes open to voice   Extremities: no cyanosis, no calf tenderness, no edema, no clubbing      ______________________________________________________________________  LABS:                        7.4    5.25  )-----------( 58       ( 22 Sep 2020 06:44 )             20.6     09-22    133<L>  |  102  |  9   ----------------------------<  106<H>  4.0   |  26  |  0.62    Ca    8.4      22 Sep 2020 06:44  Phos  3.8     09-22  Mg     1.8     09-22    TPro  7.0  /  Alb  2.4<L>  /  TBili  11.0<H>  /  DBili  5.9<H>  /  AST  95<H>  /  ALT  35  /  AlkPhos  150<H>  09-22    LIVER FUNCTIONS - ( 22 Sep 2020 06:44 )  Alb: 2.4 g/dL / Pro: 7.0 g/dL / ALK PHOS: 150 U/L / ALT: 35 U/L DA / AST: 95 U/L / GGT: x           ______________________________________________________________________  IMAGING:    CT A/P with IV showing - Cirrhosis with sequela of portal hypertension. No evidence for active GI bleeding.             GI PROGRESS NOTE    Patient is a 34y old  Male who presents with a chief complaint of Alcohol abuse (21 Sep 2020 15:23)      HPI:  34 male with medical hx of Alcohol abuse and liver cirrhosis  , esophageal varices presented to Ed because he was feeling week . Patient states that he drinks every day, for past week he was having severe nausea with vomiting with blood ( only tea spoon some times ) and also reported blood in stools. Patient is feeling very weak. Patient states last drink was 2 days ago drinks 4-5 cans of beer every day.      denies any episodes of fever, chills , nausea , abdominal pain, epigastric pain, diarrhea or urinary problems.       off note patient was admitted to hospital in June 2020 and US abdomen was done showed cirrhotic liver and significant ascites    Patient went for EGD  , it  showed small esophageal varices distally, not banded as patient did not get FFP at that time. Had portal hypertensive gastropathy. Biopsy not taken due to elevated  INR. He was started on Nadolol 20mg qhs and spironolactone and Lasix .   Patient doesn't take any medications currently.   (15 Sep 2020 03:05)          ______________________________________________________________________  PMH/PSH:  PAST MEDICAL & SURGICAL HISTORY:  Liver cirrhosis    Anemia    No significant past surgical history      ______________________________________________________________________  MEDS:  MEDICATIONS  (STANDING):  cefTRIAXone   IVPB 1000 milliGRAM(s) IV Intermittent every 24 hours  chlordiazePOXIDE 25 milliGRAM(s) Oral every 8 hours  folic acid 1 milliGRAM(s) Oral daily  influenza   Vaccine 0.5 milliLiter(s) IntraMuscular once  lactulose Syrup 20 Gram(s) Oral four times a day  multivitamin 1 Tablet(s) Oral daily  mupirocin 2% Nasal 1 Application(s) Nasal two times a day  octreotide  Infusion 50 MICROgram(s)/Hr (10 mL/Hr) IV Continuous <Continuous>  pantoprazole  Injectable 40 milliGRAM(s) IV Push two times a day  phytonadione  IVPB 10 milliGRAM(s) IV Intermittent once  rifAXIMin 550 milliGRAM(s) Oral two times a day  thiamine IVPB 500 milliGRAM(s) IV Intermittent every 8 hours    MEDICATIONS  (PRN):  LORazepam   Injectable 2 milliGRAM(s) IV Push every 2 hours PRN Agitation/CIWA > 7    ______________________________________________________________________    PHYSICAL EXAM:  T(C): 36.8 (09-22-20 @ 05:23), Max: 37.3 (09-21-20 @ 18:00)  HR: 90 (09-22-20 @ 05:23)  BP: 119/71 (09-22-20 @ 05:23)  RR: 16 (09-22-20 @ 05:23)  SpO2: 100% (09-22-20 @ 05:23)  Wt(kg): --    09-21 - 09-22  --------------------------------------------------------  IN:    IV PiggyBack: 350 mL    IV PiggyBack: 100 mL    Octreotide: 30 mL    PRBCs (Packed Red Blood Cells): 110 mL  Total IN: 590 mL    OUT:  Total OUT: 0 mL    Total NET: 590 mL          GEN: NAD, sleeping  CVS- S1 S2  HEENT: +icteric sclera  ABD: soft nontender, + distended, bowel sounds+  LUNGS: clear to auscultation  NEURO: patient sleeping; eyes open to voice   Extremities: no cyanosis, no calf tenderness, no edema, no clubbing      ______________________________________________________________________  LABS:                        7.4    5.25  )-----------( 58       ( 22 Sep 2020 06:44 )             20.6     09-22    133<L>  |  102  |  9   ----------------------------<  106<H>  4.0   |  26  |  0.62    Ca    8.4      22 Sep 2020 06:44  Phos  3.8     09-22  Mg     1.8     09-22    TPro  7.0  /  Alb  2.4<L>  /  TBili  11.0<H>  /  DBili  5.9<H>  /  AST  95<H>  /  ALT  35  /  AlkPhos  150<H>  09-22    LIVER FUNCTIONS - ( 22 Sep 2020 06:44 )  Alb: 2.4 g/dL / Pro: 7.0 g/dL / ALK PHOS: 150 U/L / ALT: 35 U/L DA / AST: 95 U/L / GGT: x           ______________________________________________________________________  IMAGING:    CT A/P with IV showing - Cirrhosis with sequela of portal hypertension. No evidence for active GI bleeding.

## 2020-09-22 NOTE — PROGRESS NOTE ADULT - ATTENDING COMMENTS
Patient seen and examined earlier this afternoon with PGY1 and MS3/4; Agree with PGY1 A/P above with editing as needed. My independent assessment, findings on exam, diagnosis and plan of care as listed below. Discussed with Dr. Rose      Patient is a 34y old  Male who presents with acute alcohol withdrawal and advanced cirrhosis    Patient appears jaundiced, lethargic but easily arousable.     REVIEW OF SYSTEMS: limited; No nausea or vomit; decreased urine output as per PCA. Not eaten since morning as per PCA on 1:1 observation.     PHYSICAL EXAM:  GENERAL: grossly icteric NAD  NERVOUS SYSTEM: lethargic but arousable,  Oriented X1-2, limited exam;   CHEST/LUNG: Clear to auscultation anterolaterally  HEART: Regular rate and rhythm; No murmurs, rubs, or gallops  ABDOMEN: Nontender, distended with marked ascites; Bowel sounds present  EXTREMITIES:  2+ Peripheral Pulses, No clubbing, cyanosis, or edema  SKIN: No rashes or lesions    LABS:                        7.2    5.46  )-----------( 63       ( 22 Sep 2020 16:17 )             20.5   09-22    133<L>  |  102  |  9   ----------------------------<  106<H>  4.0   |  26  |  0.62    Ca    8.4      22 Sep 2020 06:44  Phos  3.8     09-22  Mg     1.8     09-22    TPro  7.0  /  Alb  2.4<L>  /  TBili  11.0<H>  /  DBili  5.9<H>  /  AST  95<H>  /  ALT  35  /  AlkPhos  150<H>  09-22    A/P:   Alcohol withdrawal with suspected Alcoholic Hepatitis  Acute decompensation of Cirrhosis of liver with ascites and esophageal varices   Anemia  Thrombocytopenia  Elevated INR  Electrolyte imbalance   Proteinuria     Plan:   Cont Librium and Ativan PRN, patient went to DT, will do slow taper   Monitor CIWA  Hb dropped again, will transfuse PRBC, FFP, give Vit K, Protonix IV and Octreotide drip,   Dr Gerber on board  Will defer Propanolol as per Hepatology rec ( patient had small varices during last admission)  CT abd and pelvis   Monitor CBC, LFT  Cont Lactulose rifaximin, ensure 2-3 BM every day  Aspiration, fall and seizure precautions   Nephrology Dr Vigil consulted for Proteinuria   Full code   Monitor vitals closely

## 2020-09-22 NOTE — CONSULT NOTE ADULT - ASSESSMENT
Patient is a 35yo Male with Liver Cirrhosis, ETOH abuse, esophageal varices p/w weakness with nausea, hematemesis with blood in stool. Pt admitted with acute decompensated cirrhosis and ETOH withdrawal with anemia/ thrombocytopenia s/p brief ICU stay for worsening ETOH withdrawal.  s/p 3.5L paracentesis on 9/17. Neg for SBP. Pt had LEDA which resolved with albumin gtt. Pt found to have proteinuria and SAAG <1.1. Nephrology consulted for proteinuria.       1. Proteinuria- with low SAAG r/o nephrotic syndrome. Pt with no overt edema; signs of nephrotic syndrome. Cirrhotics usually have no or little proteinuria unless associated with kidney disease; ?secondary IgA nephropathy 2/2 ETOH abuse is a possibility, however pt had no microscopic hematuria on UA. UA with 30 protein an no blood. spot UPr 249 but no Urine Cr; unable to calculate ratio. Repeat UA and spot UPr/Cr.   Recc to repeat SAAG ?error. Will f/u results. Pt would be a poor candidate for biopsy with anemia and thrombocytopenia; high risk of bleeding.     2. Decompensated Cirrhosis- Pt on Rifaximin/ Lactulose. Plan as per Hepatology/ GI  3. Anemia/ Thrombocytopenia- due to liver cirrhosis/ ETOH abuse s/p 4 units prbc. Plan as per primary team  4. Vitamin D deficiency- 25 OH vit D 8 c/w weekly Ergo

## 2020-09-22 NOTE — CONSULT NOTE ADULT - SUBJECTIVE AND OBJECTIVE BOX
PATIENT SEEN AND EXAMINED; FULL NOTE TO FOLLOW [facility] NEPHROLOGY- CONSULTATION NOTE    Patient is a 33yo Male with Liver Cirrhosis, ETOH abuse, esophageal varices p/w weakness with nausea, hematemesis with blood in stool. Pt admitted with acute decompensated cirrhosis and ETOH withdrawal with anemia/ thrombocytpopenia.     As per H& P: patient was admitted to hospital in 2020 and US abdomen was done showed cirrhotic liver and significant ascites    Patient went for EGD  , it  showed small esophageal varices distally, not banded as patient did not get FFP at that time. Had portal hypertensive gastropathy. Biopsy not taken due to elevated  INR. He was started on Nadolol 20mg qhs and spironolactone and Lasix. Pt was non compliant with medications.     Hospital course: transferred to ICU for worsening ETOH withdrawal. s/p 3.5L paracentesis on . Neg for SBP. Pt had LEDA which resolved with albumin gtt. Pt found to have proteinuria and SAAG <1.1. Nephrology consulted for proteinuria.   Unable to obtain history from patient.          PAST MEDICAL & SURGICAL HISTORY:  Liver cirrhosis    Anemia    No significant past surgical history      No Known Allergies    Home Medications Reviewed  Hospital Medications:   MEDICATIONS  (STANDING):  cefTRIAXone   IVPB 1000 milliGRAM(s) IV Intermittent every 24 hours  chlordiazePOXIDE 25 milliGRAM(s) Oral every 8 hours  dextrose 5% + sodium chloride 0.9%. 1000 milliLiter(s) (60 mL/Hr) IV Continuous <Continuous>  ergocalciferol 01294 Unit(s) Oral <User Schedule>  folic acid 1 milliGRAM(s) Oral daily  influenza   Vaccine 0.5 milliLiter(s) IntraMuscular once  lactulose Syrup 20 Gram(s) Oral four times a day  multivitamin 1 Tablet(s) Oral daily  mupirocin 2% Nasal 1 Application(s) Nasal two times a day  octreotide  Infusion 50 MICROgram(s)/Hr (10 mL/Hr) IV Continuous <Continuous>  pantoprazole  Injectable 40 milliGRAM(s) IV Push two times a day  rifAXIMin 550 milliGRAM(s) Oral two times a day  thiamine IVPB 500 milliGRAM(s) IV Intermittent every 8 hours    SOCIAL HISTORY:  +drinks daily  FAMILY HISTORY:      REVIEW OF SYSTEMS: Limited ROS; Denies any SOB , chest pain, n/v/d or abd pain    VITALS:  T(F): 98.2 (20 @ 20:12), Max: 98.3 (20 @ 05:23)  HR: 89 (20 @ 20:12)  BP: 116/62 (20 @ 20:12)  RR: 18 (20 @ 20:12)  SpO2: 98% (20 @ 20:12)  Wt(kg): --     @ 07:01  -   @ 07:00  --------------------------------------------------------  IN: 590 mL / OUT: 0 mL / NET: 590 mL        PHYSICAL EXAM:  Gen Lethargic, with deep sternal rub woke up and started answering questions  HEENT: +icteric  Neck: no JVD  Cards: RRR, +S1/S2,  Resp: CTA ant  GI: soft, NT mild distention  : no  montoya  Extremities: no LE edema B/L  Derm: no rashes      LABS:      133<L>  |  102  |  9   ----------------------------<  106<H>  4.0   |  26  |  0.62    Ca    8.4      22 Sep 2020 06:44  Phos  3.8       Mg     1.8         TPro  7.0  /  Alb  2.4<L>  /  TBili  11.0<H>  /  DBili  5.9<H>  /  AST  95<H>  /  ALT  35  /  AlkPhos  150<H>      Creatinine Trend: 0.62 <--, 0.64 <--, 0.61 <--, 0.93 <--, 1.01 <--, 1.05 <--, 0.53 <--, 0.72 <--, 0.51 <--, 0.60 <--                        7.2    5.46  )-----------( 63       ( 22 Sep 2020 16:17 )             20.5     Urine Studies:  Urinalysis Basic - ( 16 Sep 2020 06:33 )    Color: Brown / Appearance: Clear / S.010 / pH:   Gluc:  / Ketone: Negative  / Bili: Moderate / Urobili: 12   Blood:  / Protein: 30 mg/dL / Nitrite: Negative   Leuk Esterase: Trace / RBC: 0-2 /HPF / WBC 3-5 /HPF   Sq Epi:  / Non Sq Epi: Few /HPF / Bacteria: Moderate /HPF      Sodium, Random Urine: 6 mmol/L ( @ 12:43)  Osmolality, Random Urine: 524 mos/kg ( @ 12:43)  Potassium, Random Urine: 39 mmol/L ( @ 12:43)  Chloride, Random Urine: 10 mmol/L ( @ 12:43)    RADIOLOGY & ADDITIONAL STUDIES:        < from: CT Angio Abdomen and Pelvis w/ IV Cont (20 @ 22:46) >  EXAM:  CT ANGIO ABD PELV (W)AW IC                            PROCEDURE DATE:  2020      < end of copied text >    < from: CT Angio Abdomen and Pelvis w/ IV Cont (20 @ 22:46) >  KIDNEYS/URETERS: Within normal limits.    < end of copied text >    < from: CT Angio Abdomen and Pelvis w/ IV Cont (20 @ 22:46) >  IMPRESSION:  Cirrhosis with sequela of portal hypertension.    No evidence for active GI bleeding.    < end of copied text >

## 2020-09-22 NOTE — PROGRESS NOTE ADULT - PROBLEM SELECTOR PLAN 2
Small EV on last EGD  INR remains elevated at 3.4 Small EV on last EGD  will keep NPO in case re-scope but INR will need to be reversed  INR remains elevated at 3.4 portal hypertension gastropathy noted on EGD in 6/2020  BB and diuretics on hold for now  probably EGD  when INR 1.5 portal hypertension gastropathy noted on EGD in 6/2020  BB and diuretics on hold for now  probably EGD  when INR down portal hypertension gastropathy and small EV noted on EGD in 6/2020  BB and diuretics on hold for now

## 2020-09-22 NOTE — PROGRESS NOTE ADULT - PROBLEM SELECTOR PLAN 1
patient received 1 unit PRBC yesterday and Ocreotide gtt initiated  CTA A/p not showing evidence of bleeding  FOBT pending patient received 1 unit PRBC yesterday and Ocreotide gtt initiated  Total PRBC's this admission =4 and CTA A/p not showing evidence of bleeding  FOBT pending

## 2020-09-22 NOTE — PROGRESS NOTE ADULT - ASSESSMENT
GI following this 34M with liver cirrhosis. He is s/p EGD 6/2020 showing small EV varices and portal hypertensive gastropathy.

## 2020-09-22 NOTE — PROGRESS NOTE ADULT - PROBLEM SELECTOR PLAN 5
- symptomatic mucosal bleeds and easy bruisability  - likely secondary to chronic alcoholism   - peripheral smear shows no schistocytes, retic count 2.5%,  (unreliable in setting of liver dysfunction  - Platelet 68 (9/21) -> 58  - s/p 1u Plts on 9/18  - monitor plts, transfuse if PC <10K

## 2020-09-22 NOTE — PROGRESS NOTE ADULT - SUBJECTIVE AND OBJECTIVE BOX
PGY-1 Progress Note discussed with attending    PAGER #: [[phone]] TILL 5:00 PM  PLEASE CONTACT ON CALL TEAM:  - On Call Team (Please refer to Alison) FROM 5:00 PM - 8:30PM  - Nightfloat Team FROM 8:30 -7:30 AM    INTERVAL HPI/OVERNIGHT EVENTS:   - Patient reports no complaints or pain. He denies N/V/diziness/HA/tremor/abdominal pain.    REVIEW OF SYSTEMS:  CONSTITUTIONAL: No fever, weight loss, or fatigue  RESPIRATORY: No cough, wheezing, chills or hemoptysis; No shortness of breath  CARDIOVASCULAR: No chest pain, palpitations, dizziness, or leg swelling  GASTROINTESTINAL: No abdominal pain. No nausea, vomiting, or hematemesis; No diarrhea or constipation. No melena or hematochezia.  GENITOURINARY: No dysuria or hematuria, urinary frequency  NEUROLOGICAL: No headaches, memory loss, loss of strength, numbness, or tremors    MEDICATIONS  (STANDING):  cefTRIAXone   IVPB 1000 milliGRAM(s) IV Intermittent every 24 hours  chlordiazePOXIDE 25 milliGRAM(s) Oral every 8 hours  folic acid 1 milliGRAM(s) Oral daily  influenza   Vaccine 0.5 milliLiter(s) IntraMuscular once  lactulose Syrup 20 Gram(s) Oral four times a day  multivitamin 1 Tablet(s) Oral daily  mupirocin 2% Nasal 1 Application(s) Nasal two times a day  octreotide  Infusion 50 MICROgram(s)/Hr (10 mL/Hr) IV Continuous <Continuous>  pantoprazole  Injectable 40 milliGRAM(s) IV Push two times a day  rifAXIMin 550 milliGRAM(s) Oral two times a day  thiamine IVPB 500 milliGRAM(s) IV Intermittent every 8 hours    MEDICATIONS  (PRN):  LORazepam   Injectable 2 milliGRAM(s) IV Push every 2 hours PRN Agitation/CIWA > 7      Vital Signs Last 24 Hrs  T(C): 36.8 (22 Sep 2020 05:23), Max: 37.3 (21 Sep 2020 18:00)  T(F): 98.3 (22 Sep 2020 05:23), Max: 99.1 (21 Sep 2020 18:00)  HR: 90 (22 Sep 2020 05:23) (90 - 109)  BP: 119/71 (22 Sep 2020 05:23) (112/71 - 126/74)  BP(mean): --  RR: 16 (22 Sep 2020 05:23) (15 - 18)  SpO2: 100% (22 Sep 2020 05:23) (97% - 100%)    PHYSICAL EXAMINATION:  GENERAL: NAD, AAOx2  HEAD: AT/NC  EYES: slceral icterus noted  NECK: supple, No JVD noted, Normal thyroid  CHEST/LUNG: CTABL; no rales, rhonchi, wheezing, or rubs  HEART: regular rate and rhythm; no murmurs, rubs, or gallops  ABDOMEN: moderately distended and hard, but nontender; Bowel sounds present  EXTREMITIES:  2+ Peripheral Pulses, No clubbing, cyanosis, or edema                          7.4    5.25  )-----------( 58       ( 22 Sep 2020 06:44 )             20.6     09-22    133<L>  |  102  |  9   ----------------------------<  106<H>  4.0   |  26  |  0.62    Ca    8.4      22 Sep 2020 06:44  Phos  3.8     09-22  Mg     1.8     09-22    TPro  7.0  /  Alb  2.4<L>  /  TBili  11.0<H>  /  DBili  5.9<H>  /  AST  95<H>  /  ALT  35  /  AlkPhos  150<H>  09-22    LIVER FUNCTIONS - ( 22 Sep 2020 06:44 )  Alb: 2.4 g/dL / Pro: 7.0 g/dL / ALK PHOS: 150 U/L / ALT: 35 U/L DA / AST: 95 U/L / GGT: x               PT/INR - ( 22 Sep 2020 06:44 )   PT: 37.5 sec;   INR: 3.40 ratio           COVID-19 PCR: NotDetec (15 Sep 2020 00:23)      CAPILLARY BLOOD GLUCOSE          RADIOLOGY & ADDITIONAL TESTS:

## 2020-09-22 NOTE — PROGRESS NOTE ADULT - PROBLEM SELECTOR PLAN 1
- unwitnessed h/o UGIB x2; h/o EtOH cirrhosis   - prior EGD in 6/2020 demonstrated small distal EV's (not banded due to low PLTs), and portal gastropathy ([place] Class IIA); d/c w/ spironolactone, nadalol, lasix (noncomfliant)  - s/p 3u pRBC; lastest transfusion on 9/20  - c/w octreotide drip, protonix  - no BB for now  - monitor CBC  - f/u FOBT    - GI, Dr. Gerber, onboard - unwitnessed h/o UGIB x2; h/o EtOH cirrhosis   - prior EGD in 6/2020 demonstrated small distal EV's (not banded due to low PLTs), and portal gastropathy ([place] Class IIA); d/c w/ spironolactone, nadalol, lasix (noncomfliant)  - s/p 4u pRBC; last transfusion on 9/21  - c/w octreotide drip, protonix  - no BB for now  - monitor CBC  - f/u FOBT    - GI, Dr. Gerber, onboard

## 2020-09-22 NOTE — PROGRESS NOTE ADULT - PROBLEM SELECTOR PLAN 2
- ICU downgrade  - p/w ADOLFO; h/o AA, cirrhosis, and esophageal varices  - CIWA 3 (9/22)  - c/w librium 25mg q8h standing, ativan q2h prn, IV thiamine, folic acid and multivitamin  - enhanced observation 2/2 agitation  - monitor CIWA

## 2020-09-22 NOTE — PROGRESS NOTE ADULT - PROBLEM SELECTOR PLAN 3
- no signs of active bleeding  - likely 2/2 complication of cirrhosis vs chronic EtOH use  - normocytic, hypochromic. Ferritin, Fe total wnl, Fe Sat 91 (high), TIBC 121(low)    - Folate, B12 wnl  - s/p 3u pRBC  - c/w thiamine, multivitamin  - Monitor CBC, vitals   - will hold Fe supplementation as it may exacerbate HE   - f/u FOBT    - GI, Dr. Gerber, onboard - no signs of active bleeding  - likely 2/2 complication of cirrhosis vs chronic EtOH use  - normocytic, hypochromic. Ferritin, Fe total wnl, Fe Sat 91 (high), TIBC 121(low)    - Folate, B12 wnl  - s/p 4u pRBC, last on 9/21  - c/w thiamine, multivitamin  - Monitor CBC, vitals   - will hold Fe supplementation as it may exacerbate HE   - f/u FOBT    - GI, Dr. Gerber, onboard

## 2020-09-22 NOTE — PROGRESS NOTE ADULT - PROBLEM SELECTOR PLAN 6
- INR noted to be 4.32  - likely 2/2 liver disease   - INR 4.95 (9/21) -> 3.4  - s/p vitK on 9/18 & 9/22  - monitor PT/INR, for bleeds

## 2020-09-22 NOTE — CONSULT NOTE ADULT - ATTENDING COMMENTS
[facility] NEPHROLOGY  Rui Shore M.D.  Cal Montejo D.O.  Cristiana Vigil M.D.  Carie Mclean, MSN, ANP-C  [phone]    [address] [place] [address]

## 2020-09-22 NOTE — PROGRESS NOTE ADULT - PROBLEM SELECTOR PLAN 4
c/w JAKY protocol c/w CIWA protocol  monitor LFTs   monitor PT/INR  avoid hepatotoxic meds, avoid NSAIDs

## 2020-09-22 NOTE — PROGRESS NOTE ADULT - PROBLEM SELECTOR PLAN 5
2/2 ETOH  hepatology on board  s/p BSD paracentesis - 2.8L removed  c/w rocephin and Rifaximin 2/2 ETOH  hepatology on board  s/p BSD paracentesis - 2.8L removed  c/w rocephin, lactulose and Rifaximin

## 2020-09-22 NOTE — PROGRESS NOTE ADULT - PROBLEM SELECTOR PLAN 6
improving  s/p CTA with IV  monitor BMP  adequate hydration  diuretics remain on hold improving  s/p CTA with IV  monitor BMP  adequate hydration  nephrology on board  diuretics remain on hold

## 2020-09-22 NOTE — PROGRESS NOTE ADULT - ASSESSMENT
35yo male with hx of chronic alcohol abuse (actively drinking since age 20), decompensated cirrhosis with ascites (non-compliant with diuretics, no hx of LVP), hx of small esophageal varices and portal hypertensive gastropathy (last EGD 6/2020), (non-compliant with BB, never banded), with recent hospitalization (6/2020 for leg swelling, anemia) presented on 9/15/2020 with malaise, generalized weakness for 1-2 weeks, found to have worsening liver function, with MELD score 32 compared to 23 on prior admission, electrolyte abnormalities, and questionable blood in stool (reported black stools) and/or vomitus, but without any signs of overt bleeding in hospital. On admission had no signs of HE and no LEDA. Denied family hx of liver disease, he is working in a restaurant, denied recent travel.  Acute worsening might have been caused by heavy alcohol intake (high blood alcohol on admission), SBP was r/o by paracentesis, viral and infectious workup negative so far (some pending), denied taking any medications or herbal supplements.  He became agitated and was transferred to ICU on 9/17/2020 and being treated for alcohol withdrawal. He became hypotensive and bradycardic on Precedex.  Developed LEDA, but improved with volume expansion. Anemic, thrombocytopenic, now s/p 1 U PLT, and 4U PRBC (9/18/19/20/21), without evidence of overt GI bleeding and with no evidence of bleeding on CT abd/pelvis. Patient also noted to have proteinuria, and unexpectedly ascites analysis showed SAAG < 1.1, with low total protein despite that the patient has evidence of portal hypertension (EV, portal HTN gastropathy, recanalization of umbilical vein). Nephrology is on board.     Decompensated cirrhosis w ascites; small EV; MELD 30 (but INR is s/p FFP w/in < 8 hrs)  -  would need transplant referral, but unfortunately still active drinker, and being treated for  EtOH withdrawal    #ACLF - cirrhosis with development of LEDA (now improved) during this hospitalization along with mental status change (EtOH withdrawal, sedation +/- HE)  - c/w monitoring LFTs closely, including INR, at least q12 hrs   - avoid hepatotoxic meds, avoid NSAIDs  - in terms of etiology: possibly heavy ETOH intake (elevated blood alcohol on admission; AH can be co-existent with cirrhosis)  - was not started on steroid yet considering all circumstances, as risk possibly outweigh benefit - initially was awaiting to r/o SBP, then developed LEDA ("LEDA has been exclusion criteria in most AH clinical trials, so the evidence of corticosteroids in patients with LEDA is lacking. If LEDA can be resolved, corticosteroid treatment should be reconsidered." - AASLD guidelines 2019; his lowest Cr on this admission 0.51, not back to baseline yet) , and now concern for active bleeding (s/p 4 U PRBC since 9/18); notably, addition of NAC to steroid might improve 30 days survival    - will need counseling on alcohol abstinence - family involvement might be helpful  - SBP was r/o; infectious workup was neg so far (CXR no infiltrate, UA WBC 3-5, RBC 0-2, trace leuk esterase) - would repeat UA, and UC, BC  - acute viral hep panel was neg so far for acute Hep A (IgG pos, immune), acute Hep B (HBsAb, HBcAb in process), HCV ab (HCV RNA in process), Hep E IgG neg (Hep E IgM in process), EBV, CMV in process       #EtOH withdrawal +/- HE   - continue close monitoring of mental status and neurological exam   - c/w lactulose to have 2-3 soft BM/day   - c/w rifaximin 550 mg bid  - avoid long acting BZD  - increased ammonia alone does not add any diagnostic, staging or prognostic value for HE in CLD patients   - C/w CIWA protocol per primary team, c/w folic, thiamin, monitor electrolytes    #Ascites- no SBP; despite signs of CSPH (EV, portal  hypertensive gastropathy on EGD, recanalization of umbilical vein), unexpectedly  SAAG was < 1.1 (1.0)  - while SAAG > 1.1 expected in portal hypertension, in 3% of cirrhotic patients it might not be the case; also alcoholic hepatitis can cause ascites without significant PH - thus possibly additive effect and ideally can repeat dx paracentesis, but would hold off with it for now due to coagulopathy, thrombocytopenia  - Evaluation for proteinuria (noted on UA) in process (nephrotic ascites would present with SAAG <1.1);  f/u urine protein/Cr ratio, would check 24 urine protein and f/u nephrology (notably cirrhosis, including alcoholic cirrhosis can be a/w IgA nephropathy, but nephrotic range proteinuria not common)  - Would continue holding diuretics for now (recovering from LEDA, concern of HE, acute decompensation  - Given ascites protein < 1.5 g/dl and CPT >9 and bilirubin >3, will need to continue with long term SBP prophylaxis  - f/u ascites cytology; had no signs/symptoms of pancreatitis; no hx of TB, and denied suggestive symptoms; furthermore while SAAG < 1.1, total protein is also low (and would be expected > 2.5 in peritoneal carcinomatosis, pancreatitis, or TB)      #LEDA (>0.3 mg /dl above his baseline w/in 48 hrs) - responded to volume expansion (1.05-0.61-0.64-0.62) (Estevan was < 10, cannot calculate FeNa, no UCr available)  - C/w close monitoring renal function and monitoring electrolytes including Mg and P, and replace as needed  - please monitor urine output  - keep MAP >65-70mmHg      #Small EV and portal hypertensive gastropathy on last EGD (no report available whether there were high risk signs, but he was started on BB in June), was non compliant w BB, no overt bleeding, however dropping Hb, required 4 U PRBC since 9/15  - no endoscopy was done during this admission   - keep Hb >7, keep T/S active, 2 large bore iv lines   - c/w monitoring H/H and call GI if any bleeding or significant Hb drop (GI is on board)  - pt is already being treated as UGIB w octreotide, PPI and ceftriaxone   - c/w holding BB for now      #Anemia, thrombocytopenia, coagulopathy  - possibly due to cirrhosis + EtOH caused BM suppression - however now recurrent slow drop in Hb after transfusions and required total 4 U PRBC since 9/18 to keep Hb > 7  without any evidence of overt bleeding so far  - LDH, indirect bili elevated, although can be due to the liver disease; similarly low haptoglobin would not be informative either (could be due to cirrhosis)  - smear was normal for RBC and PLT morphology on admission, no schistocytes were reported  - c/w monitoring H/H  - given the lack of evidence for bleeding, and the not adequate response for transfusions, would consider hematology consult    #Etiology of cirrhosis  - while most likely ETOH cirrhosis, given the young age additional workup was sent  - ceruloplasmin < 20, while it could be falsely low in end stage liver or protein-losing, please send / follow up 24hr urine copper (with Cr); bedside opthalmology exam?  [id number] nl;  ferritin 331 -EtOH  - LONNY neg, but total IgG elevated 4018, f/u anti-SMA, anti-LKM, and anti-SLA     #HCM  - HCC screening - last US and AFP in 6/2020, no focal mass, nl AFP, next due 12/2020  - vit D low - need supplement  - Vaccinations: Hep A immune (IgG pos), Hep B neg, would check immunity/prior exposure (HBsAB, HBcAb) and if not immune would offer vaccine; got flu vaccine; would check whether got Pneumococcal vaccine   -Pt was asking about his family, wanted to talk to them - but could not provide #, contact?    Poor prognosis  Discussed with primary team  Will continue to follow

## 2020-09-22 NOTE — PROGRESS NOTE ADULT - ASSESSMENT
a 34yoM, with PMH of Alcohol abuse, liver cirrhosis, and esophageal varices, p/w generalized weakness. Admitted for alcohol withdrawal (MELD score of 32 on admission), symptomatic anemia, and thrombocytopenia. ICU was consulted for worsening alcohol withdrawal.

## 2020-09-23 NOTE — CONSULT NOTE ADULT - ATTENDING COMMENTS
I have reviewed patient's data and participated in the management of the patient along with [facility] as well as hematology/med oncology faculty during the daily heme/onc case review. I reviewed pertinent clinical information, PE,  labs as well as A/P as outline above, in agreement and edited as appropriate. We will continue to follow.

## 2020-09-23 NOTE — PROGRESS NOTE ADULT - ATTENDING COMMENTS
Patient seen and examined earlier this afternoon with PGY1 and MS3/4; Agree with PGY1 A/P above with editing as needed. My independent assessment, findings on exam, diagnosis and plan of care as listed below. Discussed with Dr. Rose      Patient is a 34y old  Male who presents with acute alcohol withdrawal and advanced cirrhosis found to have liver failure    Patient appears jaundiced, lethargic. He is some what more agitated today needing Ativan multiple times.     REVIEW OF SYSTEMS: limited; No nausea or vomit; decreased urine output in montoya catheter placed last night.     PHYSICAL EXAM:  GENERAL: grossly icteric NAD  NERVOUS SYSTEM: lethargic but arousable but confused,  not oriented, limited exam;   CHEST/LUNG: Clear to auscultation anterolaterally  HEART: Regular rate and rhythm; No murmurs, rubs, or gallops  ABDOMEN: Nontender, distended with marked ascites slightly worsened today; Bowel sounds present  EXTREMITIES:  , No clubbing, cyanosis, or edema  SKIN: No rashes or lesions    LABS:                        7.4    5.30  )-----------( 73       ( 23 Sep 2020 15:34 )             21.0   09-23    134<L>  |  104  |  13  ----------------------------<  103<H>  3.8   |  26  |  0.89    Ca    8.1<L>      23 Sep 2020 08:04  Phos  4.1     09-23  Mg     1.9     09-23    TPro  x   /  Alb  x   /  TBili  x   /  DBili  5.8<H>  /  AST  x   /  ALT  x   /  AlkPhos  x   09-23        A/P:   Alcohol withdrawal with suspected Alcoholic Hepatitis  Acute decompensation of Cirrhosis of liver with ascites and esophageal varices   Anemia  Thrombocytopenia  Elevated INR  Electrolyte imbalance   Proteinuria     Plan:   Cont Librium and Ativan PRN, patient went to DT, will do slow taper   Monitor CIWA  Hb dropped again, will transfuse PRBC, FFP, give Vit K, Protonix IV and Octreotide drip,   Dr Gerber on board  Will defer Propanolol as per Hepatology rec ( patient had small varices during last admission)  CT abd and pelvis   Monitor CBC, LFT  Cont Lactulose rifaximin, ensure 2-3 BM every day  Aspiration, fall and seizure precautions   Nephrology Dr Vigil consulted for Proteinuria   Full code   Monitor vitals closely Patient seen and examined earlier this afternoon with PGY1 and MS3/4; Agree with PGY1 A/P above with editing as needed. My independent assessment, findings on exam, diagnosis and plan of care as listed below. Discussed with Dr. Rose      Patient is a 34y old  Male who presents with acute alcohol withdrawal and advanced cirrhosis found to have liver failure hospital course complicated by DTs.     Patient appears jaundiced, lethargic. He is some what more agitated today needing Ativan multiple times.     REVIEW OF SYSTEMS: limited; No nausea or vomit; decreased urine output in montoya catheter placed last night.     PHYSICAL EXAM:  GENERAL: grossly icteric NAD  NERVOUS SYSTEM: lethargic but arousable but confused,  not oriented, limited exam;   CHEST/LUNG: Clear to auscultation anterolaterally  HEART: Regular rate and rhythm; No murmurs, rubs, or gallops  ABDOMEN: Nontender, distended with marked ascites slightly worsened today; Bowel sounds present  EXTREMITIES:  , No clubbing, cyanosis, or edema  SKIN: No rashes or lesions    LABS:                        7.4    5.30  )-----------( 73       ( 23 Sep 2020 15:34 )             21.0   09-23    134<L>  |  104  |  13  ----------------------------<  103<H>  3.8   |  26  |  0.89    Ca    8.1<L>      23 Sep 2020 08:04  Phos  4.1     09-23  Mg     1.9     09-23    TPro  x   /  Alb  x   /  TBili  x   /  DBili  5.8<H>  /  AST  x   /  ALT  x   /  AlkPhos  x   09-23        A/P:   Alcohol withdrawal with suspected Alcoholic Hepatitis  Acute decompensation of Cirrhosis of liver with ascites and esophageal varices   Anemia  Thrombocytopenia  Elevated INR due to Liver dysfunction  Proteinuria     Plan:   Cont Librium and Ativan PRN, slightly will do slow taper; Monitor CIWA  Hb dropped again, will transfuse PRBC, FFP, give Vit K, Protonix IV and Octreotide drip,   Discussed with GI Dr. Gerber, no indication for EGD at this time as no active bleed as well as negative FOBT yesterday especially given patient needed 4 units there would be evidence in GI tract.   Spoke with Hepatology Dr. Garvey; Option of steroid was weighed in; Absolute CI of SBP has been ruled out; LEDA may be a relative Contraindication; Patient did not had a true LEDA during this admission, did had some mild elevation in Creatinine  Final decision made with Hepatology to give a trial of Prednisolone at 40 mg daily for 7 days and observe was made.   If no clinical improvement will discontinue  Additionally if there is any  component of hemolysis Steroids should help.   Hematology consult appreciated; d/w NP Aurora and Attending   Will defer Propanolol as per Hepatology rec ( patient had small varices during last admission)  Monitor CBC, LFT and Total and Direct Bilirubin closely  Cont Lactulose rifaximin, ensure 2-3 BM every day  Aspiration, fall and seizure precautions   No chemical DVT ppx due to anemia, suspected bleeding diathesis and thrombocytopenia    Patient has guarded prognosis and high risk for mortality  Housestaff discussed with family this afternoon    Discussed with PGY1 Dr. Rose and PGY 3 Dr. Grande Patient seen and examined earlier this afternoon with PGY1 and MS3/4; Agree with PGY1 A/P above with editing as needed. My independent assessment, findings on exam, diagnosis and plan of care as listed below. Discussed with Dr. Rose      Patient is a 34y old  Male who presents with acute alcohol withdrawal and advanced cirrhosis found to have liver failure hospital course complicated by DTs.     Patient appears jaundiced, lethargic. He is some what more agitated today needing Ativan multiple times.     REVIEW OF SYSTEMS: limited; No nausea or vomit; decreased urine output in montoya catheter placed last night.     PHYSICAL EXAM:  GENERAL: grossly icteric NAD  NERVOUS SYSTEM: lethargic but arousable but confused,  not oriented, limited exam;   CHEST/LUNG: Clear to auscultation anterolaterally  HEART: Regular rate and rhythm; No murmurs, rubs, or gallops  ABDOMEN: Nontender, distended with marked ascites slightly worsened today; Bowel sounds present  EXTREMITIES:  , No clubbing, cyanosis, or edema  SKIN: No rashes or lesions    LABS:                        7.4    5.30  )-----------( 73       ( 23 Sep 2020 15:34 )             21.0   09-23    134<L>  |  104  |  13  ----------------------------<  103<H>  3.8   |  26  |  0.89    Ca    8.1<L>      23 Sep 2020 08:04  Phos  4.1     09-23  Mg     1.9     09-23    TPro  x   /  Alb  x   /  TBili  x   /  DBili  5.8<H>  /  AST  x   /  ALT  x   /  AlkPhos  x   09-23        A/P:   Alcohol withdrawal with suspected Alcoholic Hepatitis and Metabolic encephalopathy  Acute decompensation of Cirrhosis of liver with ascites and esophageal varices   ??Wernicke Encephalopathy  Anemia  Thrombocytopenia  Elevated INR due to Liver dysfunction  Proteinuria     Plan:   Cont Librium and Ativan PRN, srequring more Ativan today; Monitor CIWA  Hb dropped again, will transfuse PRBC, FFP, give Vit K, Protonix IV and Octreotide drip,   Discussed with GI Dr. Gerber, no indication for EGD at this time as no active bleed as well as negative FOBT yesterday especially given patient needed 4 units there would be evidence in GI tract.   Spoke with Hepatology Dr. Garvey; Option of steroid was weighed in; Absolute CI of SBP has been ruled out; LEDA may be a relative Contraindication; Patient did not had a true LEDA during this admission, did had some mild elevation in Creatinine  Hepatology also recommended Albumin x 2 days for volume expansion  Final decision made with Hepatology to give a trial of Prednisolone at 40 mg daily for 7 days and observe was made.   If no clinical improvement will discontinue  Additionally if there is any  component of hemolysis Steroids should help.   Hematology consult appreciated; d/w NP Aurora and Attending no clear evidence of hemolysis   Will defer Propanolol as per Hepatology rec ( patient had small varices during last admission)  Monitor CBC, LFT and Total and Direct Bilirubin closely  Cont Lactulose rifaximin, ensure 2-3 BM every day  Aspiration, fall and seizure precautions   No chemical DVT ppx due to anemia, suspected bleeding diathesis and thrombocytopenia  Will resume Thiamine     Patient has guarded prognosis and high risk for mortality  Housestaff discussed with family this afternoon    Discussed with PGY1 Dr. Rose and PGY 3 Dr. Grande

## 2020-09-23 NOTE — PROGRESS NOTE ADULT - ASSESSMENT
35yo male with hx of chronic alcohol abuse (actively drinking since age 20), decompensated cirrhosis with ascites (non-compliant with diuretics, no hx of LVP), hx of small esophageal varices and portal hypertensive gastropathy (last EGD 6/2020), (non-compliant with BB, never banded), with recent hospitalization (6/2020 for leg swelling, anemia) presented on 9/15/2020 with malaise, generalized weakness for 1-2 weeks, found to have worsening liver function, with MELD score 32 compared to 23 on prior admission, electrolyte abnormalities, and questionable blood in stool (reported black stools) and/or vomitus, but without any signs of overt bleeding in hospital. On admission had no signs of HE and no LEDA. Acute worsening might have been caused by heavy alcohol intake (high blood alcohol on admission), SBP was r/o by paracentesis, viral and infectious workup negative so far (some pending), denied taking any medications or herbal supplements.  He became agitated and was transferred to ICU on 9/17/2020 and being treated for alcohol withdrawal. He became hypotensive and bradycardic on Precedex.  Developed LEDA, but improved with volume expansion. Anemic, thrombocytopenic, now s/p 1 U PLT, and 4U PRBC (9/18/19/20/21), without evidence of overt GI bleeding (FOBT neg too) and with no evidence of bleeding on CT abd/pelvis. Patient also noted to have proteinuria, and unexpectedly ascites analysis showed SAAG < 1.1, with low total protein despite that the patient has evidence of portal hypertension (EV, portal HTN gastropathy, recanalization of umbilical vein).     # Decompensated cirrhosis w ascites; small EV; MELD 33   -  would need transplant referral, but unfortunately still active drinker, and being treated for  EtOH withdrawal    #ACLF - cirrhosis with development of LEDA during this hospitalization( 0.53-1.05) along with mental status change (EtOH withdrawal, sedation +/- HE); etiology possibly heavy ETOH intake (elevated blood alcohol on admission; AH can be co-existent with cirrhosis)  - slight improvement in INR and bilirubin  - c/w monitoring LFTs closely, including INR  - avoid hepatotoxic meds, avoid NSAIDs  - pt was started on NAC (addition of NAC to steroid was reported to improve 30 days survival)  - while prednisolone was reported to improve 28 days mortality in severe AH, it has been held so far - see yesterday note; (SBP was r/o, CXR w/o infiltrate, no cellulitis, no diarrhea, UA 3-5 WBC, 0-2 RBC, trace leuk esterase, asymptomatic; ideally could repeat UA and cultures;   LEDA patients has been excluded from AH clinical trials, thus evidence lacking;  no evidence of active GI bleeding)  -if pt started on steroid, will need to assess response at day7 (Lille score) and if no improvement, no benefit continuing it      #EtOH withdrawal +/- HE   - continue close monitoring of mental status and neurological exam   - c/w lactulose to have 2-3 soft BM/day   - c/w rifaximin 550 mg bid  - avoid long acting BZD   - C/w CIWA protocol per primary team, c/w folic, thiamin, monitor electrolytes    #Ascites- no SBP; despite signs of CSPH (EV, portal  hypertensive gastropathy on EGD, recanalization of umbilical vein), unexpectedly  SAAG was < 1.1 (1.0)  - while SAAG > 1.1 expected in portal hypertension, in 3% of cirrhotic patients it might be "missed"; also alcoholic hepatitis can cause ascites without significant PH - thus possibly additive effect and ideally can repeat dx paracentesis, but would hold off with it for now due to coagulopathy, thrombocytopenia  - Evaluation for proteinuria (noted on UA) in process (nephrotic ascites would present with SAAG <1.1, but no other signs);  f/u urine protein/Cr ratio, 24 urine protein and f/u nephrology  - f/u ascites cytology  - Would continue holding diuretics for now (recovering from LEDA, concern of HE, acute decompensation); not tense ascites, no need for paracentesis for now  - Will need to continue with long term SBP prophylaxis (ascites protein < 1.5 g/dl and CPT >9 and bilirubin >3)      #LEDA (>0.3 mg /dl above his baseline w/in 48 hrs; /0.53-1.05 on 9/18)- initially responded to volume expansion (1.05-0.61), but now up-trending again (today Cr 0.89, his ~ 0.5 baseline)   (Estevan was < 10, could calculate FeNa, no UCr available)  - C/w close monitoring renal function, electrolytes including Mg and P, and urine output  - keep MAP >65-70mmHg  - continue holding diuretics  - would resume volume expansion with albumin as Cr now 0.89    #Small EV and portal hypertensive gastropathy on last EGD (no report available whether there were high risk signs, but he was started on BB in June), was non compliant w BB  - endoscopy was deferred during this admission (was no sign of overt active GI bleeding, no hematemesis, melena or hematochezia, FOBT neg), d/w Dr. Gerber (GI is on board)  - keep Hb >7, keep T/S active, 2 large bore iv lines   - c/w monitoring H/H and call GI if any bleeding   - c/w holding BB for now, will need to resume prior discharge  -(pt was started by primary team on octreotide, PPI as if it was UGIB, and being on SBP ppx anyway)    #Anemia, thrombocytopenia, coagulopathy  - possibly due to cirrhosis + EtOH caused BM suppression - however now recurrent slow drop in Hb after transfusions and required total 4 U PRBC since 9/18 to keep Hb > 7  without any evidence of overt bleeding   - smear was normal for RBC and PLT morphology on admission, no schistocytes were reported  - elevated LDH (809-401), indirect bilirubin  (on admission total 7.4, direct 5.2, now total 11, direct 5.9); (haptoglobin was not checked) - all could be related to / affected by the liver disease  - however, given the lack of evidence for bleeding, and the not adequate or persistent response for transfusion, hematology was called, pending consult      #Etiology of cirrhosis  - while most likely ETOH cirrhosis, given the young age additional workup was sent  - ceruloplasmin < 20, while it could be falsely low in end stage liver or protein-losing, please send / follow up 24hr urine copper (with Cr); bedside opthalmology exam?  -[id number] nl;  ferritin 331 -EtOH  - LONNY neg, but total IgG elevated 4018, f/u anti-SMA, anti-LKM, and anti-SLA       #HCM  - HCC screening - last US and AFP in 6/2020, no focal mass, nl AFP, next due 12/2020  - vit D low - was started on vit D, c/w supplement   - Vaccinations:         Hep A immune        Hep B immune, not exposed         Flu vaccine         Would check whether got Pneumococcal vaccine     Explained to patient the poor prognosis if he does not stop drinking. Family involvement, discussion could be helpful.    Discussed with primary team  Will continue to follow

## 2020-09-23 NOTE — PROGRESS NOTE ADULT - PROBLEM SELECTOR PLAN 5
- symptomatic mucosal bleeds and easy bruisability  - likely secondary to chronic alcoholism   - peripheral smear shows no schistocytes, retic count 2.5%,  (unreliable in setting of liver dysfunction  - Platelet 68 (9/21) -> 58 -> 63 -> 74  - s/p 1u Plts on 9/18  - monitor plts, transfuse if PC <10K - symptomatic mucosal bleeds and easy bruisability  - likely secondary to chronic alcoholism   - peripheral smear shows no schistocytes, retic count 2.5%,  (unreliable in setting of liver dysfunction  - Platelet 68 (9/21) -> 58 -> 63 -> 74  - s/p 1u Plts on 9/18  - monitor plts, transfuse if PC <20K, in liver disease pts

## 2020-09-23 NOTE — PROGRESS NOTE ADULT - ATTENDING COMMENTS
[facility] NEPHROLOGY  Rui Shore M.D.  Cal Montejo D.O.  Cristiana Vigil M.D.  Carie Mclean, MSN, ANP-C  [phone]    [address] [place] [address]

## 2020-09-23 NOTE — CONSULT NOTE ADULT - ASSESSMENT
Assessment and Plan:   · Assessment	  a 34yoM, with PMH of Alcohol abuse, liver cirrhosis, and esophageal varices, p/w generalized weakness. Admitted for alcohol withdrawal (MELD score of 32 on admission), symptomatic anemia, and thrombocytopenia.     #Normocytic hypochromic Anemia  Hgb=7.2  etiology of cirrhosis and chronic EtOH use  Ferritin 331, trans sat pending    Folate, B12 wnl  FOBT neg  s/p 4u pRBC, last on 9/21  on thiamine, multivitamin  daily CBC  - will hold Fe supplementation as it may exacerbate HE   transfuse PRBC if Hgb <7.0 or symptomatic    #Thrombocytopenia  Plt=74  transfuse SDP if plt <20k  symptomatic mucosal bleeds and easy bruisability  - likely secondary to chronic alcoholism   - peripheral smear shows no schistocytes, retic count 2.5%,  (unreliable in setting of liver dysfunction  - Platelet 68 (9/21) -> 58 -> 63 -> 74  - s/p 1u Plts on 9/18  - monitor plts, transfuse if PC <10K.     #Esophageal varices- h/o UGIB x2; h/o EtOH cirrhosis , alcohol withdrawal, jaundice  FOBT neg  prior EGD in 6/2020 demonstrated small distal EV's (not banded due to low PLTs), and portal gastropathy  s/p 4u pRBC; last transfusion on 9/21  on octreotide drip, protonix  GI, Dr. Gerber, onboard  - Child-Bojorquez 13  - Maddreys high; steroids held for now   - hepatitis panel negative  - US RUQ liver cirrhosis with no focal lesion, cholelithiasis with mild GB wall thickening, no BD dilatation, negative Field's sign   - s/p paracentesis 2.8L jorden fluid drained, low suspicion for SBP   - c/w ppx ceftriaxone, rifaximin and lactulose, NAC  elevated INR      VTE Prophylaxis: SCD     Assessment and Plan:   · Assessment	  a 34yoM, with PMH of Alcohol abuse, liver cirrhosis, and esophageal varices, p/w generalized weakness. Admitted for alcohol withdrawal (MELD score of 32 on admission), symptomatic anemia, and thrombocytopenia.     #Normocytic hypochromic Anemia  Hgb=7.2  etiology likely cirrhosis and chronic EtOH use  Ferritin 331, trans sat 91%, no JOAQUIN   Folate, B12, TSH, Cr wnl  FOBT (-)  Retic now increased to 3.4%, JER=281 and diana +  peripheral smear-check for schistocytes  s/p 4u pRBC, on 9/21  Bili=11.1, D.Bili=5.2  on thiamine, multivitamin  daily CBC   transfuse PRBC if Hgb <7.0 or symptomatic    Rec's:  Repeat Retic count, haptoglobin, LDH    #Thrombocytopenia  Plt=74 improved from admit  on admit=51k  easy bruisability, denies mucosal bleeds   Hepatitis (-)  etiology likely cirrhosis/ETOH/medications  peripheral smear- no schistocytes, retic count 2.5% increased to 3.4%  s/p 1u Plts on 9/18  monitor plts  transfuse SDP if <20k in liver disease     #Esophageal varices- h/o UGIB x2; h/o EtOH cirrhosis , alcohol withdrawal, jaundice  FOBT neg  prior EGD in 6/2020 demonstrated small distal EV's (not banded due to low PLTs), and portal gastropathy  s/p 4u pRBC; last   on octreotide drip, protonix  GI, Dr. Gerber, onboard  Child-Bojorquez 13, steroids held for now, hepatitis panel negative  US RUQ liver cirrhosis with no focal lesion, cholelithiasis with mild GB wall thickening, no BD dilatation, negative Field's sign   s/p paracentesis 2.8L jorden fluid drained, low suspicion for SBP   elevated INR, but improved to 3.4, s/p vitamin K, can consider FFP if continues to rise    #VTE Prophylaxis  SCD's    Thank you for the referral. Will continue to monitor the patient.  Any questions please call [phone]  Above reviewed with Attending Dr. Alegre Assessment and Plan:   · Assessment	  a 34yoM, with PMH of Alcohol abuse, liver cirrhosis, and esophageal varices, p/w generalized weakness. Admitted for alcohol withdrawal (MELD score of 32 on admission), symptomatic anemia, and thrombocytopenia.     #Normocytic hypochromic Anemia  Hgb=7.2  etiology likely cirrhosis and chronic EtOH use  Ferritin 331, trans sat 91%, no JOAQUIN   Folate, B12, TSH, Cr wnl  FOBT (-)  Retic now increased to 3.4%, DRX=803 and diana +  peripheral smear-check for schistocytes  s/p 4u pRBC, on 9/21  Bili=11.1, D.Bili=5.2  on thiamine, multivitamin  daily CBC   transfuse PRBC if Hgb <7.0 or symptomatic    Rec's:  Repeat Retic count, haptoglobin, LDH    #Thrombocytopenia  Plt=74 improved from admit  on admit=51k  easy bruisability, denies mucosal bleeds   Hepatitis (-)  etiology likely cirrhosis/ETOH/medications  peripheral smear- no schistocytes, retic count 2.5% increased to 3.4%  s/p 1u Plts on 9/18  monitor plts  transfuse SDP if <20k in liver disease   avoid nonessential meds    #Esophageal varices- h/o UGIB x2; h/o EtOH cirrhosis , alcohol withdrawal, jaundice  FOBT neg  prior EGD in 6/2020 demonstrated small distal EV's (not banded due to low PLTs), and portal gastropathy  s/p 4u pRBC; last   on octreotide drip, protonix  GI, Dr. Gerber, onboard  Child-Bojorquez 13, steroids held for now, hepatitis panel negative  US RUQ liver cirrhosis with no focal lesion, cholelithiasis with mild GB wall thickening, no BD dilatation, negative Field's sign   s/p paracentesis 2.8L jorden fluid drained, low suspicion for SBP   elevated INR, but improved to 3.4, s/p vitamin K, can consider FFP if continues to rise    #VTE Prophylaxis  SCD's    Thank you for the referral. Will continue to monitor the patient.  Any questions please call [phone]  Above reviewed with Attending Dr. Alegre

## 2020-09-23 NOTE — PROVIDER CONTACT NOTE (OTHER) - SITUATION
Received Pt. at change of shift responsive and sleeping. Pt. appeared to be slightly drowsy since change of shift upon assessing for PM meds. Dr. Guzman made aware of Pt. current state.

## 2020-09-23 NOTE — PROGRESS NOTE ADULT - SUBJECTIVE AND OBJECTIVE BOX
[facility] NEPHROLOGY- PROGRESS NOTE    Patient is a 35yo Male with Liver Cirrhosis, ETOH abuse, esophageal varices p/w weakness with nausea, hematemesis with blood in stool. Pt admitted with acute decompensated cirrhosis and ETOH withdrawal with anemia/ thrombocytopenia s/p brief ICU stay for worsening ETOH withdrawal.  s/p 3.5L paracentesis on 9/17. Neg for SBP. Pt had LEDA which resolved with albumin gtt. Pt found to have proteinuria and SAAG <1.1. Nephrology consulted for proteinuria.     Hospital Medications: Medications reviewed.  REVIEW OF SYSTEMS: Limited due to lethargy; +mild SOB. Denies any pain, n/v/d    VITALS:  T(F): 98 (09-23-20 @ 05:20), Max: 98.2 (09-22-20 @ 20:12)  HR: 92 (09-23-20 @ 05:20)  BP: 116/70 (09-23-20 @ 05:20)  RR: 17 (09-23-20 @ 05:20)  SpO2: 100% (09-23-20 @ 05:20)  Wt(kg): --  Height (cm): 168 (09-16 @ 00:55)  Weight (kg): 85 (09-16 @ 00:55)  BMI (kg/m2): 30.1 (09-16 @ 00:55)  BSA (m2): 1.95 (09-16 @ 00:55)    09-22 @ 07:01  -  09-23 @ 07:00  --------------------------------------------------------  IN: 0 mL / OUT: 380 mL / NET: -380 mL        PHYSICAL EXAM:  Gen Lethargic  HEENT: +icteric  Neck: no JVD  Cards: RRR, +S1/S2,  Resp: CTA ant  GI: soft, NT mod distention  : +montoya  Extremities: no LE edema B/L    LABS:  09-23    134<L>  |  104  |  13  ----------------------------<  103<H>  3.8   |  26  |  0.89    Ca    8.1<L>      23 Sep 2020 08:04  Phos  4.1     09-23  Mg     1.9     09-23    TPro      /  Alb      /  TBili      /  DBili  5.8<H>  /  AST      /  ALT      /  AlkPhos      09-23    Creatinine Trend: 0.89 <--, 0.62 <--, 0.64 <--, 0.61 <--, 0.93 <--, 1.01 <--, 1.05 <--, 0.53 <--, 0.72 <--                        7.2    5.01  )-----------( 74       ( 23 Sep 2020 08:04 )             20.6     Urine Studies:    Creatinine, Random Urine: 284 mg/dL (09-23 @ 14:00)  Osmolality, Random Urine: 574 mos/kg (09-23 @ 14:00)  Sodium, Random Urine: <5 mmol/L (09-23 @ 14:00)  Sodium, Random Urine: 6 mmol/L (09-19 @ 12:43)  Osmolality, Random Urine: 524 mos/kg (09-19 @ 12:43)  Potassium, Random Urine: 39 mmol/L (09-19 @ 12:43)  Chloride, Random Urine: 10 mmol/L (09-19 @ 12:43)    RADIOLOGY & ADDITIONAL STUDIES:

## 2020-09-23 NOTE — PROGRESS NOTE ADULT - PROBLEM SELECTOR PLAN 2
- ICU downgrade  - p/w ADOLFO; h/o AA, cirrhosis, and esophageal varices  - CIWA 3 (9/22)  - c/w librium 25mg q12h standing, ativan q2h prn, IV thiamine, folic acid and multivitamin  - enhanced observation 2/2 agitation  - monitor CIWA

## 2020-09-23 NOTE — PROGRESS NOTE ADULT - SUBJECTIVE AND OBJECTIVE BOX
GI PROGRESS NOTE    Patient is a 34y old  Male who presents with a chief complaint of Alcohol abuse (23 Sep 2020 10:29)      HPI:  34 male with medical hx of Alcohol abuse and liver cirrhosis  , esophageal varices presented to Ed because he was feeling week . Patient states that he drinks every day, for past week he was having severe nausea with vomiting with blood ( only tea spoon some times ) and also reported blood in stools. Patient is feeling very weak. Patient states last drink was 2 days ago drinks 4-5 cans of beer every day.      denies any episodes of fever, chills , nausea , abdominal pain, epigastric pain, diarrhea or urinary problems.       off note patient was admitted to hospital in June 2020 and US abdomen was done showed cirrhotic liver and significant ascites    Patient went for EGD  , it  showed small esophageal varices distally, not banded as patient did not get FFP at that time. Had portal hypertensive gastropathy. Biopsy not taken due to elevated  INR. He was started on Nadolol 20mg qhs and spironolactone and Lasix .   Patient doesn't take any medications currently.   (15 Sep 2020 03:05)          ______________________________________________________________________  PMH/PSH:  PAST MEDICAL & SURGICAL HISTORY:  Liver cirrhosis    Anemia    No significant past surgical history      ______________________________________________________________________  MEDS:  MEDICATIONS  (STANDING):  acetylcysteine IVPB 9 Gram(s) IV Intermittent once  acetylcysteine IVPB 4.3 Gram(s) IV Intermittent once  cefTRIAXone   IVPB 1000 milliGRAM(s) IV Intermittent every 24 hours  chlordiazePOXIDE 25 milliGRAM(s) Oral every 12 hours  dextrose 5% + sodium chloride 0.9%. 1000 milliLiter(s) (60 mL/Hr) IV Continuous <Continuous>  ergocalciferol 88981 Unit(s) Oral <User Schedule>  folic acid 1 milliGRAM(s) Oral daily  influenza   Vaccine 0.5 milliLiter(s) IntraMuscular once  lactulose Syrup 20 Gram(s) Oral four times a day  multivitamin 1 Tablet(s) Oral daily  mupirocin 2% Nasal 1 Application(s) Nasal two times a day  octreotide  Infusion 50 MICROgram(s)/Hr (10 mL/Hr) IV Continuous <Continuous>  pantoprazole  Injectable 40 milliGRAM(s) IV Push two times a day    MEDICATIONS  (PRN):  LORazepam   Injectable 2 milliGRAM(s) IV Push every 2 hours PRN Agitation/CIWA > 7    ______________________________________________________________________  ALL:   Allergies    No Known Allergies    Intolerances      ______________________________________________________________________  SH: Social History:  Patient lives at home drinks Alcohol every day , denies tobacco or use of illict drugs. (15 Sep 2020 03:05)    ______________________________________________________________________    PHYSICAL EXAM:  T(C): 36.7 (09-23-20 @ 05:20), Max: 36.8 (09-22-20 @ 13:56)  HR: 92 (09-23-20 @ 05:20)  BP: 116/70 (09-23-20 @ 05:20)  RR: 17 (09-23-20 @ 05:20)  SpO2: 100% (09-23-20 @ 05:20)  Wt(kg): --    09-22  -  09-23  --------------------------------------------------------  IN:  Total IN: 0 mL    OUT:    Indwelling Catheter - Urethral (mL): 380 mL  Total OUT: 380 mL    Total NET: -380 mL          GEN: NAD, mildly icteric   CVS- S1 S2  ABD: soft nontender, mildly distended, bowel sounds+  LUNGS: clear to auscultation  NEURO: non focal neuro exam; awake, alert, A&Ox3  Extremities: no cyanosis, no calf tenderness, no edema, no clubbing, no asterixis      ______________________________________________________________________  LABS:                        7.2    5.01  )-----------( 74       ( 23 Sep 2020 08:04 )             20.6     09-23    134<L>  |  104  |  13  ----------------------------<  103<H>  3.8   |  26  |  0.89    Ca    8.1<L>      23 Sep 2020 08:04  Phos  4.1     09-23  Mg     1.9     09-23    TPro  7.3  /  Alb  2.4<L>  /  TBili  11.1<H>  /  DBili  x   /  AST  92<H>  /  ALT  37  /  AlkPhos  153<H>  09-23    LIVER FUNCTIONS - ( 23 Sep 2020 08:04 )  Alb: 2.4 g/dL / Pro: 7.3 g/dL / ALK PHOS: 153 U/L / ALT: 37 U/L DA / AST: 92 U/L / GGT: x                    GI PROGRESS NOTE    Patient is a 34y old  Male who presents with a chief complaint of Alcohol abuse (23 Sep 2020 10:29)      HPI:  34 male with medical hx of Alcohol abuse and liver cirrhosis  , esophageal varices presented to Ed because he was feeling week . Patient states that he drinks every day, for past week he was having severe nausea with vomiting with blood ( only tea spoon some times ) and also reported blood in stools. Patient is feeling very weak. Patient states last drink was 2 days ago drinks 4-5 cans of beer every day.      denies any episodes of fever, chills , nausea , abdominal pain, epigastric pain, diarrhea or urinary problems.       off note patient was admitted to hospital in June 2020 and US abdomen was done showed cirrhotic liver and significant ascites    Patient went for EGD  , it  showed small esophageal varices distally, not banded as patient did not get FFP at that time. Had portal hypertensive gastropathy. Biopsy not taken due to elevated  INR. He was started on Nadolol 20mg qhs and spironolactone and Lasix .   Patient doesn't take any medications currently.   (15 Sep 2020 03:05)          ______________________________________________________________________  PMH/PSH:  PAST MEDICAL & SURGICAL HISTORY:  Liver cirrhosis    Anemia    No significant past surgical history      ______________________________________________________________________  MEDS:  MEDICATIONS  (STANDING):  acetylcysteine IVPB 9 Gram(s) IV Intermittent once  acetylcysteine IVPB 4.3 Gram(s) IV Intermittent once  cefTRIAXone   IVPB 1000 milliGRAM(s) IV Intermittent every 24 hours  chlordiazePOXIDE 25 milliGRAM(s) Oral every 12 hours  dextrose 5% + sodium chloride 0.9%. 1000 milliLiter(s) (60 mL/Hr) IV Continuous <Continuous>  ergocalciferol 56921 Unit(s) Oral <User Schedule>  folic acid 1 milliGRAM(s) Oral daily  influenza   Vaccine 0.5 milliLiter(s) IntraMuscular once  lactulose Syrup 20 Gram(s) Oral four times a day  multivitamin 1 Tablet(s) Oral daily  mupirocin 2% Nasal 1 Application(s) Nasal two times a day  octreotide  Infusion 50 MICROgram(s)/Hr (10 mL/Hr) IV Continuous <Continuous>  pantoprazole  Injectable 40 milliGRAM(s) IV Push two times a day    MEDICATIONS  (PRN):  LORazepam   Injectable 2 milliGRAM(s) IV Push every 2 hours PRN Agitation/CIWA > 7    ______________________________________________________________________  ALL:   Allergies    No Known Allergies    Intolerances      ______________________________________________________________________  SH: Social History:  Patient lives at home drinks Alcohol every day , denies tobacco or use of illict drugs. (15 Sep 2020 03:05)    ______________________________________________________________________    PHYSICAL EXAM:  T(C): 36.7 (09-23-20 @ 05:20), Max: 36.8 (09-22-20 @ 13:56)  HR: 92 (09-23-20 @ 05:20)  BP: 116/70 (09-23-20 @ 05:20)  RR: 17 (09-23-20 @ 05:20)  SpO2: 100% (09-23-20 @ 05:20)  Wt(kg): --    09-22  -  09-23  --------------------------------------------------------  IN:  Total IN: 0 mL    OUT:    Indwelling Catheter - Urethral (mL): 380 mL  Total OUT: 380 mL    Total NET: -380 mL          GEN: NAD,+icteric   CVS- S1 S2  ABD: soft nontender, mildly distended, bowel sounds+  LUNGS: clear to auscultation  NEURO: drowsy, follows simple commands      Extremities: no cyanosis, no calf tenderness, no edema, no clubbing, no asterixis      ______________________________________________________________________  LABS:                        7.2    5.01  )-----------( 74       ( 23 Sep 2020 08:04 )             20.6     09-23    134<L>  |  104  |  13  ----------------------------<  103<H>  3.8   |  26  |  0.89    Ca    8.1<L>      23 Sep 2020 08:04  Phos  4.1     09-23  Mg     1.9     09-23    TPro  7.3  /  Alb  2.4<L>  /  TBili  11.1<H>  /  DBili  x   /  AST  92<H>  /  ALT  37  /  AlkPhos  153<H>  09-23    LIVER FUNCTIONS - ( 23 Sep 2020 08:04 )  Alb: 2.4 g/dL / Pro: 7.3 g/dL / ALK PHOS: 153 U/L / ALT: 37 U/L DA / AST: 92 U/L / GGT: x

## 2020-09-23 NOTE — PROGRESS NOTE ADULT - ASSESSMENT
Patient is a 35yo Male with Liver Cirrhosis, ETOH abuse, esophageal varices p/w weakness with nausea, hematemesis with blood in stool. Pt admitted with acute decompensated cirrhosis and ETOH withdrawal with anemia/ thrombocytopenia s/p brief ICU stay for worsening ETOH withdrawal.  s/p 3.5L paracentesis on 9/17. Neg for SBP. Pt had LEDA which resolved with albumin gtt. Pt found to have proteinuria and SAAG <1.1. Nephrology consulted for proteinuria.       1. Proteinuria- with low SAAG r/o nephrotic syndrome. Pt with no overt edema; signs of nephrotic syndrome. Cirrhotics usually have no or little proteinuria unless associated with kidney disease; ?secondary IgA nephropathy 2/2 ETOH abuse is a possibility, however pt had no microscopic hematuria on UA. UA with 30 protein an no blood. spot UPr 249 but no Urine Cr; unable to calculate ratio. Repeat UA and spot UPr/Cr.   Recc to repeat SAAG ?error. Will f/u results. Pt would be a poor candidate for biopsy with anemia and thrombocytopenia; high risk of bleeding.     2. Decompensated Cirrhosis- Pt on Rifaximin/ Lactulose. Plan as per Hepatology/ GI  3. Anemia/ Thrombocytopenia- due to liver cirrhosis/ ETOH abuse s/p 4 units prbc. Plan as per primary team  4. Vitamin D deficiency- 25 OH vit D 8 c/w weekly Ergo Patient is a 33yo Male with Liver Cirrhosis, ETOH abuse, esophageal varices p/w weakness with nausea, hematemesis with blood in stool. Pt admitted with acute decompensated cirrhosis and ETOH withdrawal with anemia/ thrombocytopenia s/p brief ICU stay for worsening ETOH withdrawal.  s/p 3.5L paracentesis on 9/17. Neg for SBP. Pt had LEDA which resolved with albumin gtt. Pt found to have proteinuria and SAAG <1.1. Nephrology consulted for proteinuria.       1. Proteinuria- with low SAAG r/o nephrotic syndrome. Pt with no overt edema; signs of nephrotic syndrome. Cirrhotics usually have no or little proteinuria unless associated with kidney disease; ?secondary IgA nephropathy 2/2 ETOH abuse is a possibility, however pt had no microscopic hematuria on UA. UA with 30 protein an no blood. Spot UPr/ Cr 0.7; non nephrotic range.    Recc to repeat SAAG ?error.  Pt would be a poor candidate for biopsy with anemia and thrombocytopenia; high risk of bleeding.     2. Decompensated Cirrhosis- Pt on Rifaximin/ Lactulose. Plan as per Hepatology/ GI  3. Anemia/ Thrombocytopenia- due to liver cirrhosis/ ETOH abuse s/p 4 units prbc. Plan as per primary team  4. Vitamin D deficiency- 25 OH vit D 8 c/w weekly Ergo

## 2020-09-23 NOTE — PROGRESS NOTE ADULT - PROBLEM SELECTOR PLAN 2
portal hypertension gastropathy and small EV noted on EGD in 6/2020  BB and diuretics on hold for now.

## 2020-09-23 NOTE — PROGRESS NOTE ADULT - PROBLEM SELECTOR PLAN 6
- INR noted to be 4.32  - likely 2/2 liver disease   - INR 4.95 (9/21) -> 3.4 -? 3.42  - s/p vitK on 9/18 & 9/22  - monitor PT/INR, for bleeds

## 2020-09-23 NOTE — PROGRESS NOTE ADULT - PROBLEM SELECTOR PLAN 1
no signs of active bleeding on CTA  likely 2/2 to ETOH abuse/cirrhosis  FOBT negative  s/p 4u pRBC, last on 9/21  c/w thiamine, multivitamin   Monitor CBC

## 2020-09-23 NOTE — PROGRESS NOTE ADULT - PROBLEM SELECTOR PLAN 4
- h/o alcoholic jaundice w/ ascites  - jaundiced   - MELD 32; 3mo mortality 52.6%  - Child-Bojorquez 13  - Maddreys high; steroids held for now   - hepatitis panel negative  - US RUQ liver cirrhosis with no focal lesion, cholelithiasis with mild GB wall thickening, no BD dilatation, negative Field's sign   - s/p paracentesis 2.8L jorden fluid drained, low suspicion for SBP   - c/w ppx ceftriaxone, rifaximin and lactulose, NAC    - Hepatology, Dr. Morales, onboard  - GI, Dr. Gerber, onboard

## 2020-09-23 NOTE — PROVIDER CONTACT NOTE (OTHER) - BACKGROUND
Dr. Guzman came to assess Pt. at bedside. Pt. at risk of aspiration due to drowsiness; PM dose of librium held as ordered. Pt. placed on enhanced supervision. Aspiration precaution in place. CIWA:3.

## 2020-09-23 NOTE — PROGRESS NOTE ADULT - PROBLEM SELECTOR PLAN 3
- no signs of active bleeding  - likely 2/2 complication of cirrhosis vs chronic EtOH use  - normocytic, hypochromic. Ferritin, Fe total wnl, Fe Sat 91 (high), TIBC 121(low)    - Folate, B12 wnl  - FOBT neg  - s/p 4u pRBC, last on 9/21  - c/w thiamine, multivitamin  - Monitor CBC, vitals   - will hold Fe supplementation as it may exacerbate HE     - GI, Dr. Gerber, onboard

## 2020-09-23 NOTE — PROGRESS NOTE ADULT - SUBJECTIVE AND OBJECTIVE BOX
Chief Complaint:  Patient is a 34y old  Male who presents with a chief complaint of Alcohol abuse (22 Sep 2020 14:05)    : [id number]    Reason for consult: Alcoholic cirrhosis    Interval Events:   Patient was seen and examined at bedside. He is awake, alert and oriented to person, time, place, although slow. His Librium was titrated down to 25mg q 12 hrs. No asterixis. He denies fever, chills, abdominal pain, N/V. He had recent BM, greenish-brown color, no blood noted in it.  He was noted to have urinary retention last night, now with Nicolas.   See detailed ROS below.    Hospital Medications:  acetylcysteine IVPB 13 Gram(s) IV Intermittent once  acetylcysteine IVPB 4.3 Gram(s) IV Intermittent once  acetylcysteine IVPB 9 Gram(s) IV Intermittent once  cefTRIAXone   IVPB 1000 milliGRAM(s) IV Intermittent every 24 hours  chlordiazePOXIDE 25 milliGRAM(s) Oral every 12 hours  dextrose 5% + sodium chloride 0.9%. 1000 milliLiter(s) IV Continuous <Continuous>  ergocalciferol 49431 Unit(s) Oral <User Schedule>  folic acid 1 milliGRAM(s) Oral daily  influenza   Vaccine 0.5 milliLiter(s) IntraMuscular once  lactulose Syrup 20 Gram(s) Oral four times a day  LORazepam   Injectable 2 milliGRAM(s) IV Push every 2 hours PRN  multivitamin 1 Tablet(s) Oral daily  mupirocin 2% Nasal 1 Application(s) Nasal two times a day  octreotide  Infusion 50 MICROgram(s)/Hr IV Continuous <Continuous>  pantoprazole  Injectable 40 milliGRAM(s) IV Push two times a day      ROS:   General:  No  fevers, chills, night sweats, fatigue  Eyes:  Good vision, no reported pain  ENT:  No sore throat, pain, runny nose  CV:  No pain, palpitations  Pulm:  No dyspnea, cough  GI:  See HPI, otherwise negative  :  No  dysuria, now with Nicolas  Muscle:  No pain, but generalized weakness  Neuro:  AAOx3, but slow speech  Psych:  No insomnia, mood problems, depression  Endocrine:  No polyuria, polydipsia, cold/heat intolerance  Heme:  Ecchymosis, easy bruisability  Skin:  No rash    PHYSICAL EXAM:   Vital Signs:  Vital Signs Last 24 Hrs  T(C): 36.7 (23 Sep 2020 05:20), Max: 36.8 (22 Sep 2020 13:56)  T(F): 98 (23 Sep 2020 05:20), Max: 98.2 (22 Sep 2020 13:56)  HR: 92 (23 Sep 2020 05:20) (89 - 92)  BP: 116/70 (23 Sep 2020 05:20) (108/64 - 116/70)  BP(mean): --  RR: 17 (23 Sep 2020 05:20) (16 - 18)  SpO2: 100% (23 Sep 2020 05:20) (97% - 100%)  Daily     Daily Weight in k.3 (23 Sep 2020 05:20)    GENERAL: no acute distress  NEURO: alert, oriented to person, time, place, no asterixis, slow speech  HEENT: icteric sclera, no conjunctival pallor appreciated  CHEST: no respiratory distress, no accessory muscle use  CARDIAC: regular rate, rhythm  ABDOMEN: soft, non-tender,  mildly distended, + fluid wave, no rebound or guarding  EXTREMITIES: warm, well perfused, no edema  SKIN: Ecchymoses, bruising    LABS: reviewed                        7.2    5.01  )-----------( 74       ( 23 Sep 2020 08:04 )             20.6     09-23    134<L>  |  104  |  13  ----------------------------<  103<H>  3.8   |  26  |  0.89    Ca    8.1<L>      23 Sep 2020 08:04  Phos  4.1       Mg     1.9         TPro  7.3  /  Alb  2.4<L>  /  TBili  11.1<H>  /  DBili  x   /  AST  92<H>  /  ALT  37  /  AlkPhos  153<H>  23    LIVER FUNCTIONS - ( 23 Sep 2020 08:04 )  Alb: 2.4 g/dL / Pro: 7.3 g/dL / ALK PHOS: 153 U/L / ALT: 37 U/L DA / AST: 92 U/L / GGT: x             Interval Diagnostic Studies: see sunrise for full report

## 2020-09-23 NOTE — CONSULT NOTE ADULT - SUBJECTIVE AND OBJECTIVE BOX
Complete note to follow **NOTE NOT COMPLETE**    History of Present Illness:  Reason for Admission: Alcohol abuse  History of Present Illness:   34 male with medical hx of Alcohol abuse and liver cirrhosis  , esophageal varices presented to Ed because he was feeling week . Patient states that he drinks every day, for past week he was having severe nausea with vomiting with blood ( only tea spoon some times ) and also reported blood in stools. Patient is feeling very weak. Patient states last drink was 2 days ago drinks 4-5 cans of beer every day.   denies any episodes of fever, chills , nausea , abdominal pain, epigastric pain, diarrhea or urinary problems.     off note patient was admitted to hospital in June 2020 and US abdomen was done showed cirrhotic liver and significant ascites    Patient went for EGD  , it  showed small esophageal varices distally, not banded as patient did not get FFP at that time. Had portal hypertensive gastropathy. Biopsy not taken due to elevated  INR. He was started on Nadolol 20mg qhs and spironolactone and Lasix .   Patient doesn't take any medications currently.      Allergies and Intolerances:        Allergies:  	No Known Allergies:     Home Medications:   * Patient Currently Takes Medications as of 12-Jun-2020 11:47 documented in Structured Notes  · 	cyanocobalamin 1000 mcg oral tablet: 1 tab(s) orally once a day  · 	folic acid 1 mg oral tablet: 1 tab(s) orally once a day  · 	pantoprazole 40 mg oral delayed release tablet: 1 tab(s) orally once a day (before a meal)  · 	lactulose 10 g/15 mL oral syrup: 22.5 milliliter(s) orally 2 times a day  · 	spironolactone 25 mg oral tablet: 2 tab(s) orally once a day  · 	furosemide 20 mg oral tablet: 1 tab(s) orally once a day  · 	nadolol 20 mg oral tablet: 1 tab(s) orally once a day (at bedtime)      MEDICATIONS  (STANDING):  acetylcysteine IVPB 13 Gram(s) IV Intermittent once  acetylcysteine IVPB 4.3 Gram(s) IV Intermittent once  acetylcysteine IVPB 9 Gram(s) IV Intermittent once  cefTRIAXone   IVPB 1000 milliGRAM(s) IV Intermittent every 24 hours  chlordiazePOXIDE 25 milliGRAM(s) Oral every 12 hours  dextrose 5% + sodium chloride 0.9%. 1000 milliLiter(s) (60 mL/Hr) IV Continuous <Continuous>  ergocalciferol 12527 Unit(s) Oral <User Schedule>  folic acid 1 milliGRAM(s) Oral daily  influenza   Vaccine 0.5 milliLiter(s) IntraMuscular once  lactulose Syrup 20 Gram(s) Oral four times a day  multivitamin 1 Tablet(s) Oral daily  mupirocin 2% Nasal 1 Application(s) Nasal two times a day  octreotide  Infusion 50 MICROgram(s)/Hr (10 mL/Hr) IV Continuous <Continuous>  pantoprazole  Injectable 40 milliGRAM(s) IV Push two times a day    MEDICATIONS  (PRN):  LORazepam   Injectable 2 milliGRAM(s) IV Push every 2 hours PRN Agitation/CIWA > 7  Patient History:    Past Medical, Past Surgical, and Family History:  PAST MEDICAL HISTORY:  Anemia     Liver cirrhosis.     PAST SURGICAL HISTORY:  No significant past surgical history.     Social History:  Social History (marital status, living situation, occupation, tobacco use, alcohol and drug use, and sexual history): Patient lives at home drinks Alcohol every day , denies tobacco or use of illict drugs.     Tobacco Screening:  · Core Measure Site	Yes  · Has the patient used tobacco in the past 30 days?	No    Risk Assessment:    Present on Admission:  Deep Venous Thrombosis	no  Pulmonary Embolus	no     Heart Failure:  Does this patient have a history of or has been diagnosed with heart failure? unknown.    HIV Screen (per [facility] Department of Health, HIV screening must be offered to every individual between ages 13 and 64)	Offered and patient declined      Review of Systems:  REVIEW OF SYSTEMS:  CONSTITUTIONAL: No fever, weight loss, or fatigue  RESPIRATORY: No cough, wheezing, chills or hemoptysis; No shortness of breath  CARDIOVASCULAR: No chest pain, palpitations, dizziness, or leg swelling  GASTROINTESTINAL: No abdominal pain. No nausea, vomiting, or hematemesis; No diarrhea or constipation. No melena or hematochezia.  GENITOURINARY: No dysuria or hematuria, urinary frequency  NEUROLOGICAL: complains of recent memory loss, likely 2/2 intoxication/sedation; No headaches, loss of strength, numbness, or tremors    Vitals:  Vital Signs Last 24 Hrs  T(C): 36.7 (23 Sep 2020 05:20), Max: 36.8 (22 Sep 2020 13:56)  T(F): 98 (23 Sep 2020 05:20), Max: 98.2 (22 Sep 2020 13:56)  HR: 92 (23 Sep 2020 05:20) (89 - 92)  BP: 116/70 (23 Sep 2020 05:20) (108/64 - 116/70)  BP(mean): --  ABP: --  ABP(mean): --  RR: 17 (23 Sep 2020 05:20) (16 - 18)  SpO2: 100% (23 Sep 2020 05:20) (97% - 100%)      Physical Exam:   GENERAL: NAD, speaks in full sentences, no signs of respiratory distress, lethargic  EYES: EOMI, scleral icterus +ve   NECK: Supple, No JVD  LUNG: CTA B/L No wheeze; No crackles; No accessory muscles used  HEART: Regular rate and rhythm; No murmurs;   ABDOMEN: Distended and firm , hypoactive BS  EXTREMITIES:  B/L UE ecchymotic lesions, Right upper arm large ecchymosis, Left knee ecchymosis, no petechiae or mucosal bleeding  NEUROLOGY: non-focal  SKIN: ecchymosis, noted as above. No rashes or lesions   History of Present Illness:  Reason for Admission: Alcohol abuse  History of Present Illness:   34 male with medical hx of Alcohol abuse and liver cirrhosis  , esophageal varices presented to Ed because he was feeling week . Patient states that he drinks every day, for past week he was having severe nausea with vomiting with blood ( only tea spoon some times ) and also reported blood in stools. Patient is feeling very weak. Patient states last drink was 2 days ago drinks 4-5 cans of beer every day.   denies any episodes of fever, chills , nausea , abdominal pain, epigastric pain, diarrhea or urinary problems.     off note patient was admitted to hospital in June 2020 and US abdomen was done showed cirrhotic liver and significant ascites    Patient went for EGD  , it  showed small esophageal varices distally, not banded as patient did not get FFP at that time. Had portal hypertensive gastropathy. Biopsy not taken due to elevated  INR. He was started on Nadolol 20mg qhs and spironolactone and Lasix .   Patient doesn't take any medications currently.    [place] [facility] [place] [id number]    Allergies and Intolerances:        Allergies:  	No Known Allergies:     Home Medications:   * Patient Currently Takes Medications as of 12-Jun-2020 11:47 documented in Structured Notes  · 	cyanocobalamin 1000 mcg oral tablet: 1 tab(s) orally once a day  · 	folic acid 1 mg oral tablet: 1 tab(s) orally once a day  · 	pantoprazole 40 mg oral delayed release tablet: 1 tab(s) orally once a day (before a meal)  · 	lactulose 10 g/15 mL oral syrup: 22.5 milliliter(s) orally 2 times a day  · 	spironolactone 25 mg oral tablet: 2 tab(s) orally once a day  · 	furosemide 20 mg oral tablet: 1 tab(s) orally once a day  · 	nadolol 20 mg oral tablet: 1 tab(s) orally once a day (at bedtime)      MEDICATIONS  (STANDING):  acetylcysteine IVPB 13 Gram(s) IV Intermittent once  acetylcysteine IVPB 4.3 Gram(s) IV Intermittent once  acetylcysteine IVPB 9 Gram(s) IV Intermittent once  cefTRIAXone   IVPB 1000 milliGRAM(s) IV Intermittent every 24 hours  chlordiazePOXIDE 25 milliGRAM(s) Oral every 12 hours  dextrose 5% + sodium chloride 0.9%. 1000 milliLiter(s) (60 mL/Hr) IV Continuous <Continuous>  ergocalciferol 93331 Unit(s) Oral <User Schedule>  folic acid 1 milliGRAM(s) Oral daily  influenza   Vaccine 0.5 milliLiter(s) IntraMuscular once  lactulose Syrup 20 Gram(s) Oral four times a day  multivitamin 1 Tablet(s) Oral daily  mupirocin 2% Nasal 1 Application(s) Nasal two times a day  octreotide  Infusion 50 MICROgram(s)/Hr (10 mL/Hr) IV Continuous <Continuous>  pantoprazole  Injectable 40 milliGRAM(s) IV Push two times a day    MEDICATIONS  (PRN):  LORazepam   Injectable 2 milliGRAM(s) IV Push every 2 hours PRN Agitation/CIWA > 7      PAST MEDICAL HISTORY:  Anemia   Liver cirrhosis.     PAST SURGICAL HISTORY:  No significant past surgical history.     Social History:  Social History (marital status, living situation, occupation, tobacco use, alcohol and drug use, and sexual history): Patient lives at home drinks Alcohol every day , denies tobacco or use of illict drugs.     Tobacco Screening:  · Core Measure Site	Yes  · Has the patient used tobacco in the past 30 days?	No    Risk Assessment:    Present on Admission:  Deep Venous Thrombosis	no  Pulmonary Embolus	no     Heart Failure:  Does this patient have a history of or has been diagnosed with heart failure? unknown.    HIV Screen (per [facility] Department of Health, HIV screening must be offered to every individual between ages 13 and 64)	Offered and patient declined      Review of Systems:  REVIEW OF SYSTEMS:  CONSTITUTIONAL: No fever, weight loss, or fatigue  RESPIRATORY: No cough, wheezing, chills or hemoptysis; No shortness of breath  CARDIOVASCULAR: No chest pain, palpitations, dizziness, or leg swelling  GASTROINTESTINAL: No abdominal pain. No nausea, vomiting, or hematemesis; No diarrhea or constipation. No melena or hematochezia.  GENITOURINARY: No dysuria or hematuria, urinary frequency  NEUROLOGICAL: No headaches, loss of strength, numbness, or tremors      Vitals:  Vital Signs Last 24 Hrs  T(C): 36.7 (23 Sep 2020 05:20), Max: 36.8 (22 Sep 2020 13:56)  T(F): 98 (23 Sep 2020 05:20), Max: 98.2 (22 Sep 2020 13:56)  HR: 92 (23 Sep 2020 05:20) (89 - 92)  BP: 116/70 (23 Sep 2020 05:20) (108/64 - 116/70)  BP(mean): --  ABP: --  ABP(mean): --  RR: 17 (23 Sep 2020 05:20) (16 - 18)  SpO2: 100% (23 Sep 2020 05:20) (97% - 100%)      Physical Exam:   GENERAL: NAD, speaks in full sentences, no signs of respiratory distress, lethargic  EYES: EOMI, +scleral icterus  NECK: Supple, No JVD  LUNG: CTA B/L No wheeze; No crackles; No accessory muscles used  HEART: Regular rate and rhythm; No murmurs;   ABDOMEN: Distended and firm , non-tender, hypoactive BS  EXTREMITIES:  B/L UE ecchymotic lesions, Right upper arm large ecchymosis, Left knee ecchymosis, no petechiae or mucosal bleeding  NEUROLOGY: non-focal  SKIN: ecchymosis, noted as above. No rashes or lesions    Labs:  CBC Full  -  ( 23 Sep 2020 08:04 )  WBC Count : 5.01 K/uL  RBC Count : 2.18 M/uL  Hemoglobin : 7.2 g/dL  Hematocrit : 20.6 %  Platelet Count - Automated : 74 K/uL  Mean Cell Volume : 94.5 fl  Mean Cell Hemoglobin : 33.0 pg  Mean Cell Hemoglobin Concentration : 35.0 gm/dL  Auto Neutrophil # : 2.91 K/uL  Auto Lymphocyte # : 1.20 K/uL  Auto Monocyte # : 0.85 K/uL  Auto Eosinophil # : 0.05 K/uL  Auto Basophil # : 0.00 K/uL  Auto Neutrophil % : 58.0 %  Auto Lymphocyte % : 24.0 %  Auto Monocyte % : 17.0 %  Auto Eosinophil % : 1.0 %  Auto Basophil % : 0.0 %    09-23    134<L>  |  104  |  13  ----------------------------<  103<H>  3.8   |  26  |  0.89    Ca    8.1<L>      23 Sep 2020 08:04  Phos  4.1     09-23  Mg     1.9     09-23    TPro  x   /  Alb  x   /  TBili  x   /  DBili  5.8<H>  /  AST  x   /  ALT  x   /  AlkPhos  x   09-23    Radiology:  < from: US Hepatic & Pancreatic (09.15.20 @ 11:41) >  EXAM:  US LIVER AND PANCREAS                            PROCEDURE DATE:  09/15/2020          INTERPRETATION:  CLINICAL INFORMATION: Alcohol abuse    COMPARISON: 6/10/2020    TECHNIQUE: Sonography of the right upper quadrant.    FINDINGS:    Liver: Cirrhosis. No focal lesion.  Bile ducts: Normal caliber. Common bile duct measures 3.7 mm.  Gallbladder: Cholelithiasis. Mild wall thickening. Negative sonographic Field's sign.  Pancreas: Poorly visualized.  Right kidney: 11.6 cm. No hydronephrosis.  Ascites: Mild.  IVC: Visualized portions are within normal limits.    IMPRESSION:    Cirrhosis.  Cholelithiasis.  No biliary ductal dilatation.  Mild ascites.            RICH PARKER M.D., ATTENDING RADIOLOGIST  This document has been electronicallysigned. Sep 15 2020 11:46AM    < end of copied text >  < from: CT Angio Abdomen and Pelvis w/ IV Cont (09.21.20 @ 22:46) >  EXAM:  CT ANGIO ABD PELV (W)AW IC                            PROCEDURE DATE:  09/21/2020          INTERPRETATION:  CLINICAL INFORMATION: GI bleed    COMPARISON: None    PROCEDURE:  CT of the Abdomen and Pelvis was performed with and without intravenous contrast.  Precontrast, Arterial and Delayed phases were performed.  Intravenous contrast: 90 ml Omnipaque 350. 10 ml discarded.  Oral contrast: None.  Sagittal and coronal reformats were performed.    FINDINGS:  LOWER CHEST: Bibasilar dependent atelectasis.    LIVER: Cirrhosis.  BILE DUCTS: Normal caliber.  GALLBLADDER: Distended gallbladder. Cholelithiasis.  SPLEEN: Within normal limits.  PANCREAS: Within normal limits.  ADRENALS: Within normal limits.  KIDNEYS/URETERS: Within normal limits.    BLADDER: Small amount of air in the bladder lumen; correlate for recent instrumentation..  REPRODUCTIVE ORGANS: Prostate within normal limits.    BOWEL: No evidence for active GI bleeding. Fluid-filled colon. No bowel obstruction. Appendix normal.  PERITONEUM: Moderate to large volume ascites.  VESSELS: Minimal atherosclerotic changes. Recanalized para umbilical vein.  RETROPERITONEUM/LYMPH NODES: No lymphadenopathy.  ABDOMINAL WALL: Within normal limits.  BONES: Old healed left L2 and L3 transverse process fractures. Degenerative changes of the thoracic spine..    IMPRESSION:  Cirrhosis with sequela of portal hypertension.    No evidence for active GI bleeding.              NANCY ANDERSON M.D., ATTENDING RADIOLOGIST  This document has been electronically signed. Sep 22 2020 12:17AM    < end of copied text >

## 2020-09-23 NOTE — PROGRESS NOTE ADULT - PROBLEM SELECTOR PLAN 3
hepatitis panel negative  MELD 30  steroids on high for now  hepatology on board hepatitis panel negative  MELD 30  hepatology on board

## 2020-09-23 NOTE — PROGRESS NOTE ADULT - ASSESSMENT
a 34yoM, with PMH of Alcohol abuse, liver cirrhosis, and esophageal varices, p/w generalized weakness. Admitted for alcohol withdrawal (MELD score of 32 on admission), symptomatic anemia, and thrombocytopenia.

## 2020-09-23 NOTE — CHART NOTE - NSCHARTNOTEFT_GEN_A_CORE
Assessment:   34yMalePatient is a 34y old  Male who presents with a chief complaint of Alcohol abuse (23 Sep 2020 14:33)      Factors impacting intake: [ ] none [ ] nausea  [ ] vomiting [ ] diarrhea [ ] constipation  [ ]chewing problems [ ] swallowing issues  [x ] other: hypokalemia, change in mental status, liver cirrhosis, ETOH abuse, esophageal varices, weakness, encephalopathy, ascites, anemia, hematemesis.     Diet Prescription: Diet, Full Liquid (09-22-20 @ 14:39)    Intake: Pt visited. Pt exhibited weakness and lethargy, unable to communicate efficiently and family was present. Pt cooks by himself and usually eats 3x day as per family member. No weight loss reported. Pt has poor PO intake, last meal was 3 spoons at dinner 9/21 as per PCA, and PO intake is being monitored. As per RN patient is full liquid per possible endoscopy due to GI bleeding, but was discontinued. No vomiting or nausea per PCA. PCA reports pt has had watery stools and fecal incontinence noted per flow sheet 9/22. Discussed with RN.     Current Weight: stable   % Weight Change    Pertinent Medications: MEDICATIONS  (STANDING):  acetylcysteine IVPB 9 Gram(s) IV Intermittent once  cefTRIAXone   IVPB 1000 milliGRAM(s) IV Intermittent every 24 hours  chlordiazePOXIDE 25 milliGRAM(s) Oral every 12 hours  dextrose 5% + sodium chloride 0.9%. 1000 milliLiter(s) (60 mL/Hr) IV Continuous <Continuous>  ergocalciferol 70910 Unit(s) Oral <User Schedule>  folic acid 1 milliGRAM(s) Oral daily  influenza   Vaccine 0.5 milliLiter(s) IntraMuscular once  lactulose Syrup 20 Gram(s) Oral four times a day  multivitamin 1 Tablet(s) Oral daily  mupirocin 2% Nasal 1 Application(s) Nasal two times a day  octreotide  Infusion 50 MICROgram(s)/Hr (10 mL/Hr) IV Continuous <Continuous>  pantoprazole  Injectable 40 milliGRAM(s) IV Push two times a day  thiamine 100 milliGRAM(s) Oral daily    MEDICATIONS  (PRN):  LORazepam   Injectable 2 milliGRAM(s) IV Push every 2 hours PRN Agitation/CIWA > 7    Pertinent Labs: 09-23 Na134 mmol/L<L> Glu 103 mg/dL<H> K+ 3.8 mmol/L Cr  0.89 mg/dL BUN 13 mg/dL 09-23 Phos 4.1 mg/dL 09-23 Alb 2.4 g/dL<L> 09-15 Chol 66 mg/dL LDL 37 mg/dL HDL 15 mg/dL<L> Trig 68 mg/dL     CAPILLARY BLOOD GLUCOSE        Skin: WDL except bruised (ecchymotic), right elbow skin tear.    Estimated Needs:   [x ] no change since previous assessment  [ ] recalculated:     Previous Nutrition Diagnosis:   [ ] Inadequate Energy Intake [ x]Inadequate Oral Intake [ ] Excessive Energy Intake   [ ] Underweight [ ] Increased Nutrient Needs [ ] Overweight/Obesity   [ ] Altered GI Function [ ] Unintended Weight Loss [ ] Food & Nutrition Related Knowledge Deficit [ ] Malnutrition     Nutrition Diagnosis is [ x] ongoing  [ ] resolved [ ] not applicable     New Nutrition Diagnosis: [x ] not applicable       Interventions:   Recommend  [ ] Change Diet To:  [x ] Nutrition Supplement- continue MVI/ minerals, Folic Acid, Thiamin as medically feasible.   [ ] Nutrition Support  [ ] Other:     Monitoring and Evaluation:   [x ] PO intake [ x ] Tolerance to diet prescription [ x ] weights [ x ] labs[ x ] follow up per protocol  [ ] other: Assessment:   34yMalePatient is a 34y old  Male who presents with a chief complaint of Alcohol abuse (23 Sep 2020 14:33)      Factors impacting intake: [ ] none [ ] nausea  [ ] vomiting [ ] diarrhea [ ] constipation  [ ]chewing problems [ ] swallowing issues  [x ] other: hypokalemia, change in mental status, liver cirrhosis, ETOH abuse, esophageal varices, weakness, encephalopathy, ascites, anemia, hematemesis.     Diet Prescription: Diet, Full Liquid (09-22-20 @ 14:39)    Intake: Pt visited. Pt exhibited weakness and lethargy, unable to communicate efficiently and family was present. Pt cooks by himself and usually eats 3x day as per family member. Pt has poor PO intake, last meal was 3 spoons at dinner 9/21 as per PCA, and PO intake is being monitored. As per RN patient is full liquid per possible endoscopy due to GI bleeding, but was discontinued. PCA reports pt has had watery stools and fecal incontinence noted & per flow sheet 9/22. Discussed with RN.     Current Weight: 205.6 Lbs on 09/23/20    Pertinent Medications: MEDICATIONS  (STANDING):  acetylcysteine IVPB 9 Gram(s) IV Intermittent once  cefTRIAXone   IVPB 1000 milliGRAM(s) IV Intermittent every 24 hours  chlordiazePOXIDE 25 milliGRAM(s) Oral every 12 hours  dextrose 5% + sodium chloride 0.9%. 1000 milliLiter(s) (60 mL/Hr) IV Continuous <Continuous>  ergocalciferol 86588 Unit(s) Oral <User Schedule>  folic acid 1 milliGRAM(s) Oral daily  influenza   Vaccine 0.5 milliLiter(s) IntraMuscular once  lactulose Syrup 20 Gram(s) Oral four times a day  multivitamin 1 Tablet(s) Oral daily  mupirocin 2% Nasal 1 Application(s) Nasal two times a day  octreotide  Infusion 50 MICROgram(s)/Hr (10 mL/Hr) IV Continuous <Continuous>  pantoprazole  Injectable 40 milliGRAM(s) IV Push two times a day  thiamine 100 milliGRAM(s) Oral daily    MEDICATIONS  (PRN):  LORazepam   Injectable 2 milliGRAM(s) IV Push every 2 hours PRN Agitation/CIWA > 7    Pertinent Labs: 09-23 Na134 mmol/L<L> Glu 103 mg/dL<H> K+ 3.8 mmol/L Cr  0.89 mg/dL BUN 13 mg/dL 09-23 Phos 4.1 mg/dL 09-23 Alb 2.4 g/dL<L> 09-15 Chol 66 mg/dL LDL 37 mg/dL HDL 15 mg/dL<L> Trig 68 mg/dL     CAPILLARY BLOOD GLUCOSE        Skin: WDL except bruised (ecchymotic), right elbow skin tear.    Estimated Needs:   [x ] no change since previous assessment  [ ] recalculated:     Previous Nutrition Diagnosis:   [ ] Inadequate Energy Intake [ x]Inadequate Oral Intake [ ] Excessive Energy Intake   [ ] Underweight [ ] Increased Nutrient Needs [ ] Overweight/Obesity   [ ] Altered GI Function [ ] Unintended Weight Loss [ ] Food & Nutrition Related Knowledge Deficit [ ] Malnutrition     Nutrition Diagnosis is [ x] ongoing  [ ] resolved [ ] not applicable     New Nutrition Diagnosis: [x ] not applicable       Interventions: Advance diet with nutrition supplement, or may consider alternate nutrition support if on prolonged full liquid as medically feasible  Recommend  [ ] Change Diet To:  [x ] Nutrition Supplement- continue MVI/ minerals, Folic Acid, Thiamin & add Ensure Enlive 1 can TID as medically feasible.    [ ] Nutrition Support  [ ] Other:     Monitoring and Evaluation:   [x ] PO intake [ x ] Tolerance to diet prescription [ x ] weights [ x ] labs[ x ] follow up per protocol  [ ] other:

## 2020-09-23 NOTE — PROGRESS NOTE ADULT - SUBJECTIVE AND OBJECTIVE BOX
PGY-1 Progress Note discussed with attending    PAGER #: [phone]] TILL 5:00 PM  PLEASE CONTACT ON CALL TEAM:  - On Call Team (Please refer to Al[place]) FROM 5:00 PM - 8:30PM  - Nightfloat Team FROM 8:30 -7:30 AM    INTERVAL HPI/OVERNIGHT EVENTS:   - patient's mentation improved, however, he states he doesn't recall many of events happened during the hospital stay. He denies any pain or discomfort. He is having >3 bowel movement w/ use of lactulose.    REVIEW OF SYSTEMS:  CONSTITUTIONAL: No fever, weight loss, or fatigue  RESPIRATORY: No cough, wheezing, chills or hemoptysis; No shortness of breath  CARDIOVASCULAR: No chest pain, palpitations, dizziness, or leg swelling  GASTROINTESTINAL: No abdominal pain. No nausea, vomiting, or hematemesis; No diarrhea or constipation. No melena or hematochezia.  GENITOURINARY: No dysuria or hematuria, urinary frequency  NEUROLOGICAL: complains of recent memory loss, likely 2/2 intoxication/sedation; No headaches, loss of strength, numbness, or tremors      MEDICATIONS  (STANDING):  acetylcysteine IVPB 13 Gram(s) IV Intermittent once  acetylcysteine IVPB 4.3 Gram(s) IV Intermittent once  acetylcysteine IVPB 9 Gram(s) IV Intermittent once  cefTRIAXone   IVPB 1000 milliGRAM(s) IV Intermittent every 24 hours  chlordiazePOXIDE 25 milliGRAM(s) Oral every 12 hours  dextrose 5% + sodium chloride 0.9%. 1000 milliLiter(s) (60 mL/Hr) IV Continuous <Continuous>  ergocalciferol 59350 Unit(s) Oral <User Schedule>  folic acid 1 milliGRAM(s) Oral daily  influenza   Vaccine 0.5 milliLiter(s) IntraMuscular once  lactulose Syrup 20 Gram(s) Oral four times a day  multivitamin 1 Tablet(s) Oral daily  mupirocin 2% Nasal 1 Application(s) Nasal two times a day  octreotide  Infusion 50 MICROgram(s)/Hr (10 mL/Hr) IV Continuous <Continuous>  pantoprazole  Injectable 40 milliGRAM(s) IV Push two times a day    MEDICATIONS  (PRN):  LORazepam   Injectable 2 milliGRAM(s) IV Push every 2 hours PRN Agitation/CIWA > 7      Vital Signs Last 24 Hrs  T(C): 36.7 (23 Sep 2020 05:20), Max: 36.8 (22 Sep 2020 13:56)  T(F): 98 (23 Sep 2020 05:20), Max: 98.2 (22 Sep 2020 13:56)  HR: 92 (23 Sep 2020 05:20) (89 - 92)  BP: 116/70 (23 Sep 2020 05:20) (108/64 - 116/70)  BP(mean): --  RR: 17 (23 Sep 2020 05:20) (16 - 18)  SpO2: 100% (23 Sep 2020 05:20) (97% - 100%)    PHYSICAL EXAMINATION:  GENERAL: NAD, AAOx3   HEAD: AT/NC  EYES: scleral icterus noted  NECK: supple, No JVD noted, Normal thyroid  CHEST/LUNG: CTABL; no rales, rhonchi, wheezing, or rubs  HEART: regular rate and rhythm; no murmurs, rubs, or gallops  ABDOMEN: moderately distended and hard, but tender; Bowel sounds present  EXTREMITIES:  2+ Peripheral Pulses, No clubbing, cyanosis, or edema  SKIN: no spider angioma noted                          7.2    5.01  )-----------( 74       ( 23 Sep 2020 08:04 )             20.6     09-23    134<L>  |  104  |  13  ----------------------------<  103<H>  3.8   |  26  |  0.89    Ca    8.1<L>      23 Sep 2020 08:04  Phos  4.1     09-23  Mg     1.9     09-23    TPro  7.3  /  Alb  2.4<L>  /  TBili  11.1<H>  /  DBili  x   /  AST  92<H>  /  ALT  37  /  AlkPhos  153<H>  09-23    LIVER FUNCTIONS - ( 23 Sep 2020 08:04 )  Alb: 2.4 g/dL / Pro: 7.3 g/dL / ALK PHOS: 153 U/L / ALT: 37 U/L DA / AST: 92 U/L / GGT: x               PT/INR - ( 23 Sep 2020 08:04 )   PT: 37.7 sec;   INR: 3.42 ratio           COVID-19 PCR: NotDetec (15 Sep 2020 00:23)      CAPILLARY BLOOD GLUCOSE          RADIOLOGY & ADDITIONAL TESTS:

## 2020-09-23 NOTE — CHART NOTE - NSCHARTNOTEFT_GEN_A_CORE
Code gray was called around 8 pm. Pt assessed at bedside , tried to get out of the bed and hit PCA. Chart reviewed pt came with alcohol withdrawal and is on ativan 2mg q2hr PRN. 2 mg Ativan PRN has been given. Will restraint the Pt for 24 hrs.

## 2020-09-24 NOTE — PROGRESS NOTE ADULT - SUBJECTIVE AND OBJECTIVE BOX
GI PROGRESS NOTE    Patient is a 34y old  Male who presents with a chief complaint of Alcohol abuse (24 Sep 2020 11:02)      HPI:  34 male with medical hx of Alcohol abuse and liver cirrhosis  , esophageal varices presented to Ed because he was feeling week . Patient states that he drinks every day, for past week he was having severe nausea with vomiting with blood ( only tea spoon some times ) and also reported blood in stools. Patient is feeling very weak. Patient states last drink was 2 days ago drinks 4-5 cans of beer every day.      denies any episodes of fever, chills , nausea , abdominal pain, epigastric pain, diarrhea or urinary problems.       off note patient was admitted to hospital in June 2020 and US abdomen was done showed cirrhotic liver and significant ascites    Patient went for EGD  , it  showed small esophageal varices distally, not banded as patient did not get FFP at that time. Had portal hypertensive gastropathy. Biopsy not taken due to elevated  INR. He was started on Nadolol 20mg qhs and spironolactone and Lasix .   Patient doesn't take any medications currently.   (15 Sep 2020 03:05)          ______________________________________________________________________  PMH/PSH:  PAST MEDICAL & SURGICAL HISTORY:  Liver cirrhosis    Anemia    No significant past surgical history      ______________________________________________________________________  MEDS:  MEDICATIONS  (STANDING):  acetylcysteine IVPB 9 Gram(s) IV Intermittent <User Schedule>  cefTRIAXone   IVPB 1000 milliGRAM(s) IV Intermittent every 24 hours  chlordiazePOXIDE 25 milliGRAM(s) Oral every 12 hours  dextrose 5% + sodium chloride 0.9%. 1000 milliLiter(s) (60 mL/Hr) IV Continuous <Continuous>  ergocalciferol 61575 Unit(s) Oral <User Schedule>  folic acid 1 milliGRAM(s) Oral daily  influenza   Vaccine 0.5 milliLiter(s) IntraMuscular once  lactulose Syrup 20 Gram(s) Oral four times a day  multivitamin 1 Tablet(s) Oral daily  mupirocin 2% Nasal 1 Application(s) Nasal two times a day  octreotide  Infusion 50 MICROgram(s)/Hr (10 mL/Hr) IV Continuous <Continuous>  pantoprazole  Injectable 40 milliGRAM(s) IV Push two times a day  prednisoLONE    3 mG/mL Solution (ORAPRED) 40 milliGRAM(s) Oral daily  thiamine Injectable 100 milliGRAM(s) IntraMuscular daily    MEDICATIONS  (PRN):  LORazepam   Injectable 2 milliGRAM(s) IV Push every 2 hours PRN Agitation/CIWA > 7    ______________________________________________________________________  ALL:   Allergies    No Known Allergies    Intolerances      ______________________________________________________________________  SH: Social History:  Patient lives at home drinks Alcohol every day , denies tobacco or use of illict drugs. (15 Sep 2020 03:05)      ______________________________________________________________________  PHYSICAL EXAM:  T(C): 36.8 (09-24-20 @ 06:00), Max: 36.8 (09-24-20 @ 06:00)  HR: 96 (09-24-20 @ 06:00)  BP: 129/76 (09-24-20 @ 06:00)  RR: 18 (09-24-20 @ 06:00)  SpO2: 98% (09-24-20 @ 06:00)  Wt(kg): --    09-23  -  09-24  --------------------------------------------------------  IN:  Total IN: 0 mL    OUT:    Indwelling Catheter - Urethral (mL): 950 mL  Total OUT: 950 mL    Total NET: -950 mL          GEN: NAD   HEENT: Icteric sclera   CVS- S1 S2  ABD: Firm nontender, +distended, bowel sounds+ no rebound, no guarding  LUNGS: clear to auscultation  NEURO: Awake, alert, disoriented to place and time  Extremities: no cyanosis, no calf tenderness, no edema, no clubbing      ______________________________________________________________________  LABS:                        7.6    3.49  )-----------( 76       ( 24 Sep 2020 07:27 )             23.0     09-24    134<L>  |  102  |  8   ----------------------------<  144<H>  3.5   |  27  |  0.65    Ca    8.5      24 Sep 2020 07:27  Phos  3.8     09-24  Mg     1.6     09-24    TPro  7.6  /  Alb  2.3<L>  /  TBili  11.0<H>  /  DBili  5.8<H>  /  AST  84<H>  /  ALT  38  /  AlkPhos  144<H>  09-24    LIVER FUNCTIONS - ( 24 Sep 2020 07:27 )  Alb: 2.3 g/dL / Pro: 7.6 g/dL / ALK PHOS: 144 U/L / ALT: 38 U/L DA / AST: 84 U/L / GGT: x           ______________________________________________________________________

## 2020-09-24 NOTE — PROGRESS NOTE ADULT - SUBJECTIVE AND OBJECTIVE BOX
complete note to follow · Subjective and Objective:   History of Present Illness:  Reason for Admission: Alcohol abuse  History of Present Illness:   34 male with medical hx of Alcohol abuse and liver cirrhosis  , esophageal varices presented to Ed because he was feeling week . Patient states that he drinks every day, for past week he was having severe nausea with vomiting with blood ( only tea spoon some times ) and also reported blood in stools. Patient is feeling very weak. Patient states last drink was 2 days ago drinks 4-5 cans of beer every day.   denies any episodes of fever, chills , nausea , abdominal pain, epigastric pain, diarrhea or urinary problems.     off note patient was admitted to hospital in June 2020 and US abdomen was done showed cirrhotic liver and significant ascites    Patient went for EGD  , it  showed small esophageal varices distally, not banded as patient did not get FFP at that time. Had portal hypertensive gastropathy. Biopsy not taken due to elevated  INR. He was started on Nadolol 20mg qhs and spironolactone and Lasix .   Patient doesn't take any medications currently.      Allergies and Intolerances:        Allergies:  	No Known Allergies:     Home Medications:   * Patient Currently Takes Medications as of 12-Jun-2020 11:47 documented in Structured Notes  · 	cyanocobalamin 1000 mcg oral tablet: 1 tab(s) orally once a day  · 	folic acid 1 mg oral tablet: 1 tab(s) orally once a day  · 	pantoprazole 40 mg oral delayed release tablet: 1 tab(s) orally once a day (before a meal)  · 	lactulose 10 g/15 mL oral syrup: 22.5 milliliter(s) orally 2 times a day  · 	spironolactone 25 mg oral tablet: 2 tab(s) orally once a day  · 	furosemide 20 mg oral tablet: 1 tab(s) orally once a day  · 	nadolol 20 mg oral tablet: 1 tab(s) orally once a day (at bedtime)      MEDICATIONS  (STANDING):  acetylcysteine IVPB 13 Gram(s) IV Intermittent once  acetylcysteine IVPB 4.3 Gram(s) IV Intermittent once  acetylcysteine IVPB 9 Gram(s) IV Intermittent once  cefTRIAXone   IVPB 1000 milliGRAM(s) IV Intermittent every 24 hours  chlordiazePOXIDE 25 milliGRAM(s) Oral every 12 hours  dextrose 5% + sodium chloride 0.9%. 1000 milliLiter(s) (60 mL/Hr) IV Continuous <Continuous>  ergocalciferol 13063 Unit(s) Oral <User Schedule>  folic acid 1 milliGRAM(s) Oral daily  influenza   Vaccine 0.5 milliLiter(s) IntraMuscular once  lactulose Syrup 20 Gram(s) Oral four times a day  multivitamin 1 Tablet(s) Oral daily  mupirocin 2% Nasal 1 Application(s) Nasal two times a day  octreotide  Infusion 50 MICROgram(s)/Hr (10 mL/Hr) IV Continuous <Continuous>  pantoprazole  Injectable 40 milliGRAM(s) IV Push two times a day    MEDICATIONS  (PRN):  LORazepam   Injectable 2 milliGRAM(s) IV Push every 2 hours PRN Agitation/CIWA > 7      PAST MEDICAL HISTORY:  Anemia   Liver cirrhosis.     PAST SURGICAL HISTORY:  No significant past surgical history.     Social History:  Social History (marital status, living situation, occupation, tobacco use, alcohol and drug use, and sexual history): Patient lives at home drinks Alcohol every day , denies tobacco or use of illict drugs.     Tobacco Screening:  · Core Measure Site	Yes  · Has the patient used tobacco in the past 30 days?	No    Risk Assessment:    Present on Admission:  Deep Venous Thrombosis	no  Pulmonary Embolus	no     Heart Failure:  Does this patient have a history of or has been diagnosed with heart failure? unknown.    HIV Screen (per [facility] Department of Health, HIV screening must be offered to every individual between ages 13 and 64)	Offered and patient declined      Review of Systems:  REVIEW OF SYSTEMS:  CONSTITUTIONAL: No fever, weight loss, or fatigue  RESPIRATORY: No cough, wheezing, chills or hemoptysis; No shortness of breath  CARDIOVASCULAR: No chest pain, palpitations, dizziness, or leg swelling  GASTROINTESTINAL: No abdominal pain. No nausea, vomiting, or hematemesis; No diarrhea or constipation. No melena or hematochezia.  GENITOURINARY: No dysuria or hematuria, urinary frequency  NEUROLOGICAL: No headaches, loss of strength, numbness, or tremors      Vitals:  Vital Signs Last 24 Hrs  T(C): 36.4 (24 Sep 2020 13:30), Max: 36.8 (24 Sep 2020 06:00)  T(F): 97.5 (24 Sep 2020 13:30), Max: 98.2 (24 Sep 2020 06:00)  HR: 83 (24 Sep 2020 13:30) (83 - 96)  BP: 127/82 (24 Sep 2020 13:30) (127/82 - 134/74)  BP(mean): --  ABP: --  ABP(mean): --  RR: 18 (24 Sep 2020 13:30) (18 - 18)  SpO2: 97% (24 Sep 2020 13:30) (96% - 98%)        Physical Exam:   GENERAL: NAD, speaks in full sentences, no signs of respiratory distress, lethargic  EYES: EOMI, +scleral icterus  NECK: Supple, No JVD  LUNG: CTA B/L No wheeze; No crackles; No accessory muscles used  HEART: Regular rate and rhythm; No murmurs;   ABDOMEN: Distended and firm , non-tender, hypoactive BS  EXTREMITIES:  B/L UE ecchymotic lesions, Right upper arm large ecchymosis, Left knee ecchymosis, no petechiae or mucosal bleeding  NEUROLOGY: non-focal  SKIN: ecchymosis, noted as above. No rashes or lesions    Labs:  CBC Full  -  ( 24 Sep 2020 07:27 )  WBC Count : 3.49 K/uL  RBC Count : 2.39 M/uL  Hemoglobin : 7.6 g/dL  Hematocrit : 23.0 %  Platelet Count - Automated : 76 K/uL  Mean Cell Volume : 96.2 fl  Mean Cell Hemoglobin : 31.8 pg  Mean Cell Hemoglobin Concentration : 33.0 gm/dL  Auto Neutrophil # : 2.84 K/uL  Auto Lymphocyte # : 0.42 K/uL  Auto Monocyte # : 0.19 K/uL  Auto Eosinophil # : 0.01 K/uL  Auto Basophil # : 0.01 K/uL  Auto Neutrophil % : 81.4 %  Auto Lymphocyte % : 12.0 %  Auto Monocyte % : 5.4 %  Auto Eosinophil % : 0.3 %  Auto Basophil % : 0.3 %    09-24    134<L>  |  102  |  8   ----------------------------<  144<H>  3.5   |  27  |  0.65    Ca    8.5      24 Sep 2020 07:27  Phos  3.8     09-24  Mg     1.6     09-24    TPro  7.6  /  Alb  2.3<L>  /  TBili  11.0<H>  /  DBili  5.8<H>  /  AST  84<H>  /  ALT  38  /  AlkPhos  144<H>  09-24    Radiology:  < from: US Hepatic & Pancreatic (09.15.20 @ 11:41) >  EXAM:  US LIVER AND PANCREAS                            PROCEDURE DATE:  09/15/2020          INTERPRETATION:  CLINICAL INFORMATION: Alcohol abuse    COMPARISON: 6/10/2020    TECHNIQUE: Sonography of the right upper quadrant.    FINDINGS:    Liver: Cirrhosis. No focal lesion.  Bile ducts: Normal caliber. Common bile duct measures 3.7 mm.  Gallbladder: Cholelithiasis. Mild wall thickening. Negative sonographic Field's sign.  Pancreas: Poorly visualized.  Right kidney: 11.6 cm. No hydronephrosis.  Ascites: Mild.  IVC: Visualized portions are within normal limits.    IMPRESSION:    Cirrhosis.  Cholelithiasis.  No biliary ductal dilatation.  Mild ascites.            RICH PARKER M.D., ATTENDING RADIOLOGIST  This document has been electronicallysigned. Sep 15 2020 11:46AM    < end of copied text >  < from: CT Angio Abdomen and Pelvis w/ IV Cont (09.21.20 @ 22:46) >  EXAM:  CT ANGIO ABD PELV (W)AW IC                            PROCEDURE DATE:  09/21/2020          INTERPRETATION:  CLINICAL INFORMATION: GI bleed    COMPARISON: None    PROCEDURE:  CT of the Abdomen and Pelvis was performed with and without intravenous contrast.  Precontrast, Arterial and Delayed phases were performed.  Intravenous contrast: 90 ml Omnipaque 350. 10 ml discarded.  Oral contrast: None.  Sagittal and coronal reformats were performed.    FINDINGS:  LOWER CHEST: Bibasilar dependent atelectasis.    LIVER: Cirrhosis.  BILE DUCTS: Normal caliber.  GALLBLADDER: Distended gallbladder. Cholelithiasis.  SPLEEN: Within normal limits.  PANCREAS: Within normal limits.  ADRENALS: Within normal limits.  KIDNEYS/URETERS: Within normal limits.    BLADDER: Small amount of air in the bladder lumen; correlate for recent instrumentation..  REPRODUCTIVE ORGANS: Prostate within normal limits.    BOWEL: No evidence for active GI bleeding. Fluid-filled colon. No bowel obstruction. Appendix normal.  PERITONEUM: Moderate to large volume ascites.  VESSELS: Minimal atherosclerotic changes. Recanalized para umbilical vein.  RETROPERITONEUM/LYMPH NODES: No lymphadenopathy.  ABDOMINAL WALL: Within normal limits.  BONES: Old healed left L2 and L3 transverse process fractures. Degenerative changes of the thoracic spine..    IMPRESSION:  Cirrhosis with sequela of portal hypertension.    No evidence for active GI bleeding.              NANCY ANDERSON M.D., ATTENDING RADIOLOGIST  This document has been electronically signed. Sep 22 2020 12:17AM    < end of copied text >

## 2020-09-24 NOTE — PROGRESS NOTE ADULT - PROBLEM SELECTOR PLAN 2
- grade 1; clinically drowsy with sleep reversal  - Ammonia 58 ->100  - CT head neg for acute pathology  - c/w lactulose, rifaximin, thiamin  - frequent neurochecks

## 2020-09-24 NOTE — PROGRESS NOTE ADULT - SUBJECTIVE AND OBJECTIVE BOX
[facility] NEPHROLOGY- PROGRESS NOTE    Patient is a 35yo Male with Liver Cirrhosis, ETOH abuse, esophageal varices p/w weakness with nausea, hematemesis with blood in stool. Pt admitted with acute decompensated cirrhosis and ETOH withdrawal with anemia/ thrombocytopenia s/p brief ICU stay for worsening ETOH withdrawal.  s/p 3.5L paracentesis on 9/17. Neg for SBP. Pt had LEDA which resolved with albumin gtt. Pt found to have proteinuria and SAAG <1.1. Nephrology consulted for proteinuria.     Hospital Medications: Medications reviewed.  REVIEW OF SYSTEMS: Limited due to lethargy;  Denies any SOB, pain, n/v/d    VITALS:  T(F): 98.2 (09-24-20 @ 06:00), Max: 98.2 (09-24-20 @ 06:00)  HR: 96 (09-24-20 @ 06:00)  BP: 129/76 (09-24-20 @ 06:00)  RR: 18 (09-24-20 @ 06:00)  SpO2: 98% (09-24-20 @ 06:00)  Wt(kg): --    09-23 @ 07:01  -  09-24 @ 07:00  --------------------------------------------------------  IN: 0 mL / OUT: 950 mL / NET: -950 mL      PHYSICAL EXAM:  Gen Lethargic  HEENT: +icteric  Neck: no JVD  Cards: RRR, +S1/S2,  Resp: CTA ant  GI: soft, NT mod distention  : +montoya  Extremities: no LE edema B/L    LABS:  09-24    134<L>  |  102  |  8   ----------------------------<  144<H>  3.5   |  27  |  0.65    Ca    8.5      24 Sep 2020 07:27  Phos  3.8     09-24  Mg     1.6     09-24    TPro  7.6  /  Alb  2.3<L>  /  TBili  11.0<H>  /  DBili  5.8<H>  /  AST  84<H>  /  ALT  38  /  AlkPhos  144<H>  09-24    Creatinine Trend: 0.65 <--, 0.89 <--, 0.62 <--, 0.64 <--, 0.61 <--, 0.93 <--, 1.01 <--, 1.05 <--, 0.53 <--                        7.6    3.49  )-----------( 76       ( 24 Sep 2020 07:27 )             23.0     Urine Studies:    Creatinine, Random Urine: 284 mg/dL (09-23 @ 14:00)  Osmolality, Random Urine: 574 mos/kg (09-23 @ 14:00)  Sodium, Random Urine: <5 mmol/L (09-23 @ 14:00)  Sodium, Random Urine: 6 mmol/L (09-19 @ 12:43)  Osmolality, Random Urine: 524 mos/kg (09-19 @ 12:43)  Potassium, Random Urine: 39 mmol/L (09-19 @ 12:43)  Chloride, Random Urine: 10 mmol/L (09-19 @ 12:43)

## 2020-09-24 NOTE — PROGRESS NOTE ADULT - PROBLEM SELECTOR PLAN 4
hepatitis panel negative  c/w monitoring LFTs and INR   c/w lactulose  trial of Prednisolone for 7 days   c/w rifaximin  hepatology on board

## 2020-09-24 NOTE — PROGRESS NOTE ADULT - PROBLEM SELECTOR PLAN 5
- no signs of active bleeding  - likely 2/2 complication of cirrhosis vs chronic EtOH use  - normocytic, hypochromic. Ferritin, Fe total wnl, Fe Sat 91 (high), TIBC 121(low)    - Folate, B12 wnl  - FOBT neg  - retic 4.5, , haptoglobin <20  - s/p 4u pRBC, last on 9/21  - c/w thiamine, multivitamin  - Monitor CBC, vitals   - will hold Fe supplementation as it may exacerbate HE     - GI, Dr. Gerber, onboard

## 2020-09-24 NOTE — PROGRESS NOTE ADULT - PROBLEM SELECTOR PLAN 3
- unwitnessed h/o UGIB x2; h/o EtOH cirrhosis   - FOBT neg  - prior EGD in 6/2020 demonstrated small distal EV's (not banded due to low PLTs), and portal gastropathy ([place] Class IIA); d/c w/ spironolactone, nadalol, lasix (noncompliant)  - s/p 4u pRBC; last transfusion on 9/21  - c/w octreotide drip, protonix  - no BB for now  - monitor CBC    - GI, Dr. Gerber, onboard  - no EGD per GI

## 2020-09-24 NOTE — PROGRESS NOTE ADULT - PROBLEM SELECTOR PLAN 1
- h/o alcoholic jaundice w/ ascites  - jaundiced, scleral icterus   - MELD 32; 3mo mortality 52.6%  - Child-Bojorquez 13  - Maddreys high   - hepatitis panel negative  - US RUQ liver cirrhosis with no focal lesion, cholelithiasis with mild GB wall thickening, no BD dilatation, negative Field's sign   - s/p paracentesis 2.8L jorden fluid drained, low suspicion for SBP   - c/w ppx ceftriaxone, rifaximin and lactulose, NAC, prednisolone 40mg    - Hepatology, Dr. Morales, onboard  - GI, Dr. Gerber, onboard

## 2020-09-24 NOTE — PROGRESS NOTE ADULT - ATTENDING COMMENTS
[facility] NEPHROLOGY  Rui Shore M.D.  Cal Montejo D.O.  Cristiana Vigil M.D.  Carie Mclean, MSN, ANP-C  [phone]    [address] [place] [address]

## 2020-09-24 NOTE — PROGRESS NOTE ADULT - SUBJECTIVE AND OBJECTIVE BOX
Patient was seen and examined at bedside. Full note to follow. Chief Complaint:  Patient is a 34y old  Male who presents with a chief complaint of Alcohol abuse (24 Sep 2020 13:00)    : [id number]    Reason for consult: Alcoholic cirrhosis    Interval Events: Patient was seen and examined at bedside. Last night he was combative, remained on restraints. No other acute events overnight. Patient was noted disoriented to time (kept repeating his ) and place (kept talking about a party he is going to have). He followed commands and otherwise answered adequately. He denied any abdominal pain, nausea, vomiting, chills, headache, had no BM yet till early afternoon, has urinary catheter.     Hospital Medications:  acetylcysteine IVPB 9 Gram(s) IV Intermittent <User Schedule>  cefTRIAXone   IVPB 1000 milliGRAM(s) IV Intermittent every 24 hours  chlordiazePOXIDE 25 milliGRAM(s) Oral every 12 hours  dextrose 5% + sodium chloride 0.9%. 1000 milliLiter(s) IV Continuous <Continuous>  ergocalciferol 42075 Unit(s) Oral <User Schedule>  folic acid 1 milliGRAM(s) Oral daily  influenza   Vaccine 0.5 milliLiter(s) IntraMuscular once  lactulose Syrup 20 Gram(s) Oral four times a day  LORazepam   Injectable 2 milliGRAM(s) IV Push every 4 hours PRN  multivitamin 1 Tablet(s) Oral daily  mupirocin 2% Nasal 1 Application(s) Nasal two times a day  octreotide  Infusion 50 MICROgram(s)/Hr IV Continuous <Continuous>  pantoprazole  Injectable 40 milliGRAM(s) IV Push two times a day  prednisoLONE    3 mG/mL Solution (ORAPRED) 40 milliGRAM(s) Oral daily  thiamine Injectable 100 milliGRAM(s) IntraMuscular daily      ROS:   General:  No  fevers, chills, night sweats, fatigue  Eyes:  No reported pain or vision change  ENT:  No sore throat, pain, runny nose  CV:  No pain, palpitations  Pulm:  No dyspnea, cough  GI:  See above, otherwise negative  :  No dysuria  Muscle:  No pain, but generalized weakness  Neuro:  While yesterday was AAOX3, today awake, alert, but not oriented to time (kept mixing his  and the date of today) and place (he kept talking about a party he is going to have)  Endocrine:  No polyuria, polydipsia, cold/heat intolerance  Heme: Ecchymosis, easy bruisability  Skin:  No rash    PHYSICAL EXAM:   Vital Signs:  Vital Signs Last 24 Hrs  T(C): 36.4 (24 Sep 2020 13:30), Max: 36.8 (24 Sep 2020 06:00)  T(F): 97.5 (24 Sep 2020 13:30), Max: 98.2 (24 Sep 2020 06:00)  HR: 83 (24 Sep 2020 13:30) (83 - 106)  BP: 127/82 (24 Sep 2020 13:30) (127/82 - 146/82)  BP(mean): --  RR: 18 (24 Sep 2020 13:30) (18 - 18)  SpO2: 97% (24 Sep 2020 13:30) (96% - 98%)  Daily     Daily     GENERAL: no acute distress, but ill appearing  NEURO: awake, alert, no asterixis, but today first time (except when was sedated) not oriented to time/place   HEENT: icteric sclera  CHEST: no respiratory distress, no accessory muscle use  CARDIAC: regular rate, rhythm  ABDOMEN: soft, non-tender, non-distended, no rebound or guarding  EXTREMITIES: warm, well perfused, no edema  SKIN: Ecchymoses    LABS: reviewed                        7.6    3.49  )-----------( 76       ( 24 Sep 2020 07:27 )             23.0     09-24    134<L>  |  102  |  8   ----------------------------<  144<H>  3.5   |  27  |  0.65    Ca    8.5      24 Sep 2020 07:27  Phos  3.8     -24  Mg     1.6     -24    TPro  7.6  /  Alb  2.3<L>  /  TBili  11.0<H>  /  DBili  5.8<H>  /  AST  84<H>  /  ALT  38  /  AlkPhos  144<H>  09-24    LIVER FUNCTIONS - ( 24 Sep 2020 07:27 )  Alb: 2.3 g/dL / Pro: 7.6 g/dL / ALK PHOS: 144 U/L / ALT: 38 U/L DA / AST: 84 U/L / GGT: x             Interval Diagnostic Studies: see sunrise for full report  EBV, CMV PCRs, HSV ab neg, Hep A, B, C, E neg

## 2020-09-24 NOTE — PROGRESS NOTE ADULT - ASSESSMENT
Patient is a 35yo Male with Liver Cirrhosis, ETOH abuse, esophageal varices p/w weakness with nausea, hematemesis with blood in stool. Pt admitted with acute decompensated cirrhosis and ETOH withdrawal with anemia/ thrombocytopenia s/p brief ICU stay for worsening ETOH withdrawal.  s/p 3.5L paracentesis on 9/17. Neg for SBP. Pt had LEDA which resolved with albumin gtt. Pt found to have proteinuria and SAAG <1.1. Nephrology consulted for proteinuria.       1. Proteinuria- with low SAAG r/o nephrotic syndrome. Pt with no overt edema; signs of nephrotic syndrome. Cirrhotics usually have no or little proteinuria unless associated with kidney disease; ?secondary IgA nephropathy 2/2 ETOH abuse is a possibility, however pt had no microscopic hematuria on UA. UA with 30 protein an no blood. Spot UPr/ Cr 0.7 & 24 hr proteinuria 0.97; non nephrotic range.    Recc to repeat SAAG ?error.  Pt would be a poor candidate for biopsy with anemia and thrombocytopenia; high risk of bleeding. Recc to start ARB or ACEi on d/c if renal function is stable.     2. Decompensated Cirrhosis- Pt on Acetylcysteine/ Lactulose. Plan as per Hepatology/ GI  3. Anemia/ Thrombocytopenia- due to liver cirrhosis/ ETOH abuse s/p 4 units prbc. Plan as per primary team  4. Vitamin D deficiency- 25 OH vit D 8 c/w weekly Ergo 50 k units

## 2020-09-24 NOTE — PROGRESS NOTE ADULT - ASSESSMENT
Hospitalization history:  33yo male with hx of chronic alcohol abuse (actively drinking since age 20), decompensated cirrhosis with ascites (non-compliant with diuretics, no hx of LVP), hx of small esophageal varices and portal hypertensive gastropathy (last EGD 6/2020), (non-compliant with BB, never banded), with recent hospitalization (6/2020 for leg swelling, anemia) presented on 9/15/2020 with malaise, generalized weakness for 1-2 weeks, found to have worsening liver function, with MELD score 32 compared to 23 on prior admission, electrolyte abnormalities, and questionable blood in stool (reported black stools) and/or vomitus, but without any signs of overt bleeding in hospital. On admission had no signs of HE and no LEDA. Acute worsening might have been caused by heavy alcohol intake (high blood alcohol on admission), SBP was r/o by paracentesis, viral and infectious workup negative so far (some pending), denied taking any medications or herbal supplements.  He became agitated and was transferred to ICU on 9/17/2020 and being treated for alcohol withdrawal. He became hypotensive and bradycardic on Precedex.  Developed LEDA, but improved with volume expansion with albumin. Anemic, thrombocytopenic, now s/p 1 U PLT, and 4U PRBC (9/18/19/20/21), without evidence of overt GI bleeding (FOBT neg too) and with no evidence of bleeding on CT abd/pelvis. Patient also noted to have proteinuria, and unexpectedly ascites analysis showed SAAG < 1.1, with low total protein despite that the patient has evidence of portal hypertension (EV, portal HTN gastropathy, recanalization of umbilical vein), but urine protein/Cr ration, 24 hr protein did not reach nephrotic level. He was started on NAC and after was also started on prednisolone.       Assessment and plan:  # Decompensated cirrhosis w ascites; small EV; MELD 33   -  would need transplant referral, but unfortunately still active drinker, and being treated for  EtOH withdrawal (d/w [facility] transplant hepatology service attending)    #ACLF - cirrhosis with development of LEDA during this hospitalization( 0.53-1.05) along with mental status change (EtOH withdrawal, sedation +/- HE); etiology possibly heavy ETOH intake (elevated blood alcohol on admission; AH can be co-existent with cirrhosis)  - slight improvement in INR and bilirubin  - c/w monitoring LFTs closely, including INR  - avoid hepatotoxic meds, avoid NSAIDs  - pt was started on NAC on 9/23 (addition of NAC to steroid was reported to improve 30 days survival)  - while prednisolone was reported to improve 28 days mortality in severe AH, it has been held initially as per guidelines pending dx paracentesis to r/o SBP, then LEDA and then concern of GI bleeding (SBP was r/o; CXR w/o infiltrate, no cellulitis, no diarrhea, UA 3-5 WBC, 0-2 RBC, trace leuk esterase, asymptomatic; LEDA improved; no evidence of GI bleed and Hb stabilized), thus prednisolone 40 mg was started and will assess at day 7 by Lille score (if no improvement no benefit to continue)  - would still repeat UA, UC    #EtOH withdrawal +/- HE   - continue close monitoring of mental status and neurological exam, today seems more disoriented, but CT head neg for bleeding   - c/w lactulose and please make sure to titrate to have 2-3 soft BM/day   - c/w rifaximin 550 mg bid  - avoid long acting BZD   - C/w CIWA protocol per primary team, c/w folic, thiamin, monitor electrolytes        #Ascites- no SBP; despite signs of CSPH (EV, portal  hypertensive gastropathy on EGD, recanalization of umbilical vein), unexpectedly  SAAG was < 1.1 (1.0)  - while SAAG > 1.1 expected in portal hypertension, in 3% of cirrhotic patients it might be "missed"; also alcoholic hepatitis can cause ascites without significant PH - thus possibly additive effect and ideally can repeat dx paracentesis, but would hold off with it for now due to coagulopathy, thrombocytopenia  - Nephrology consult appreciated for proteinuria  - Would continue holding diuretics for now (recovering from LEDA, concern of HE, acute decompensation); not tense ascites, no need for paracentesis for now, will assess daily  - Will need to continue with long term SBP prophylaxis (ascites protein < 1.5 g/dl and CPT >9 and bilirubin >3)      #LEDA (>0.3 mg /dl above his baseline w/in 48 hrs; /0.53-1.05 on 9/18)- responded to volume expansion (1.05-0.61), but was noted up-trending again yesterday (0.61-0.89, his baseline~0.5), thus albumin was given again with improvement (today Cr 0.65)    (Estevan was < 10, could calculate FeNa, no UCr was available)  - C/w close monitoring renal function, electrolytes including Mg and P, and urine output  - keep MAP >65-70mmHg  - continue holding diuretics    #Small EV and portal hypertensive gastropathy on last EGD (no report available whether there were high risk signs, but he was started on BB in June), was non compliant w BB  - endoscopy was deferred during this admission (was no sign of overt active GI bleeding, no hematemesis, melena or hematochezia, FOBT neg), d/w Dr. Gerber (GI is on board)  - keep Hb >7, keep T/S active, 2 large bore iv lines, c/w monitoring H/H and call GI if any bleeding   - c/w holding BB for now, will need to resume prior discharge  - pt has been on octreotide and PPI, but as no overt GI bleeding was noted, would f/u w GI    #Anemia, thrombocytopenia, coagulopathy  - possibly due to cirrhosis + EtOH caused BM suppression - however given the recurrent slow drop in Hb after transfusions and requirement of  total 4 U PRBC since 9/18 without any evidence of overt bleeding, hematology was consulted   - smear was normal for RBC and PLT morphology on admission, no schistocytes were reported  - elevated LDH (809-401), indirect bilirubin  (on admission total 7.4, direct 5.2, now total 11, direct 5.9); (haptoglobin was not checked) - all could be related to / affected by the liver disease  - hematology consult appreciated  - notably, some data suggest that NAC might slightly increase INR, thus if INR still up-trending would consider to stop it (pt has received 3 doses)    #Etiology of cirrhosis  - while most likely ETOH cirrhosis, given the young age additional workup was sent  - ceruloplasmin < 20, while it could be falsely low in end stage liver or protein-losing, 24 hr urine copper wnl;  bedside opthalmology exam?  -[id number] nl;  ferritin 331 -EtOH  - total IgG elevated 4018, but LONNY neg, antiSMA neg, anti LKM neg, anti SLA neg    #HCM  - HCC screening - last US and AFP in 6/2020, no focal mass, nl AFP, next due 12/2020  - vit D low - was started on vit D, c/w supplement   - Vaccinations:         Hep A immune        Hep B immune, not exposed         Flu vaccine         Would check whether got Pneumococcal vaccine     During this hospitalization, explained to patient the poor prognosis if he does not stop drinking. Family involvement, discussion could be helpful.    Discussed with primary team  Will continue to follow

## 2020-09-24 NOTE — PROGRESS NOTE ADULT - ATTENDING COMMENTS
INCOMPLETE NOTE: FULL NOTE TO FOLLOW    Patient seen and examined earlier this afternoon with PGY1 and MS3/4; Agree with PGY1 A/P above with editing as needed. My independent assessment, findings on exam, diagnosis and plan of care as listed below. Discussed with Dr. Rose      Patient is a 34y old  Male who presents with acute alcohol withdrawal and advanced cirrhosis found to have liver failure hospital course complicated by DTs.     Patient appears jaundiced although slightly less icteric, lethargic. He is littler btter today but confused, trying to gert out of bed; was on restraint overnight    Vital Signs Last 24 Hrs  T(C): 36.4 (24 Sep 2020 13:30), Max: 36.8 (24 Sep 2020 06:00)  T(F): 97.5 (24 Sep 2020 13:30), Max: 98.2 (24 Sep 2020 06:00)  HR: 83 (24 Sep 2020 13:30) (83 - 106)  BP: 127/82 (24 Sep 2020 13:30) (127/82 - 146/82)  RR: 18 (24 Sep 2020 13:30) (18 - 18)  SpO2: 97% (24 Sep 2020 13:30) (96% - 98%)    REVIEW OF SYSTEMS: limited; No nausea or vomit; decreased urine output in montoya catheter placed last night.     PHYSICAL EXAM:  GENERAL: grossly icteric NAD  NERVOUS SYSTEM: lethargic but arousable but confused,  not oriented, limited exam;   CHEST/LUNG: Clear to auscultation anterolaterally  HEART: Regular rate and rhythm; No murmurs, rubs, or gallops  ABDOMEN: Nontender, distended with marked ascites slightly worsened today; Bowel sounds present  EXTREMITIES:  , No clubbing, cyanosis, or edema  SKIN: No rashes or lesions    LABS:                        7.4    5.30  )-----------( 73       ( 23 Sep 2020 15:34 )             21.0   09-23    134<L>  |  104  |  13  ----------------------------<  103<H>  3.8   |  26  |  0.89    Ca    8.1<L>      23 Sep 2020 08:04  Phos  4.1     09-23  Mg     1.9     09-23    TPro  x   /  Alb  x   /  TBili  x   /  DBili  5.8<H>  /  AST  x   /  ALT  x   /  AlkPhos  x   09-23        A/P:   Alcohol withdrawal with suspected Alcoholic Hepatitis and Metabolic encephalopathy  Acute decompensation of Cirrhosis of liver with ascites and esophageal varices   ??Wernicke Encephalopathy  Anemia  Thrombocytopenia  Elevated INR due to Liver dysfunction  Proteinuria     Plan:   Cont Librium and Ativan PRN, srequring more Ativan today; Monitor CIWA  Hb dropped again, will transfuse PRBC, FFP, give Vit K, Protonix IV and Octreotide drip,   Discussed with GI Dr. Gerber, no indication for EGD at this time as no active bleed as well as negative FOBT yesterday especially given patient needed 4 units there would be evidence in GI tract.   Spoke with Hepatology Dr. Garvey; Prednisolone trial   Hepatology also recommended Albumin x 2 days for volume expansion  Final decision made with Hepatology to give a trial of Prednisolone at 40 mg daily for 7 days and observe was made.   If no clinical improvement will discontinue  Additionally if there is any  component of hemolysis Steroids should help.   Hematology consult appreciated; d/w NP Aurora and Attending no clear evidence of hemolysis   Monitor CBC, LFT and Total and Direct Bilirubin closely  Cont Lactulose rifaximin, ensure 2-3 BM every day  Aspiration, fall and seizure precautions   No chemical DVT ppx due to anemia, suspected bleeding diathesis and thrombocytopenia  Will resume Thiamine     Patient has guarded prognosis and high risk for mortality  Housestaff discussed with family this afternoon    Discussed with PGY1 Dr. Rose and PGY 3 Dr. Grande Patient seen and examined earlier this morning with PGY1 and MS3/4; Agree with PGY1 A/P above with editing as needed. My independent assessment, findings on exam, diagnosis and plan of care as listed below. Discussed with Dr. Rose.    Patient is a 34y old  Male who presents with acute alcohol withdrawal and advanced cirrhosis found to have liver failure hospital course complicated by DTs needing ICU admission, downgraded to floor     Patient still jaundiced although slightly less icteric, lethargic. He is little better today but confused, trying to get out of bed wanted to go to Bathroom; was on restraint overnight; Along with Nurse manager, tried to make him stand up but very unsteady and high risk of fall. Bedside commode provided. He is more talkative today but confused.     Vital Signs Last 24 Hrs  T(C): 36.4 (24 Sep 2020 13:30), Max: 36.8 (24 Sep 2020 06:00)  T(F): 97.5 (24 Sep 2020 13:30), Max: 98.2 (24 Sep 2020 06:00)  HR: 83 (24 Sep 2020 13:30) (83 - 106)  BP: 127/82 (24 Sep 2020 13:30) (127/82 - 146/82)  RR: 18 (24 Sep 2020 13:30) (18 - 18)  SpO2: 97% (24 Sep 2020 13:30) (96% - 98%)    REVIEW OF SYSTEMS: limited; No nausea or vomit;     PHYSICAL EXAM:  GENERAL: grossly icteric NAD  NERVOUS SYSTEM: lethargic but arousable but confused, not oriented, limited exam;   CHEST/LUNG: Clear to auscultation anterolaterally  HEART: Regular rate and rhythm; No murmurs, rubs, or gallops  ABDOMEN: Nontender, distended with marked ascites; Bowel sounds present  EXTREMITIES:  , No clubbing, cyanosis, or edema  SKIN: No rashes or lesions    LABS:                        7.6    3.49  )-----------( 76       ( 24 Sep 2020 07:27 )             23.0   09-24    134<L>  |  102  |  8   ----------------------------<  144<H>  3.5   |  27  |  0.65    Ca    8.5      24 Sep 2020 07:27  Phos  3.8     09-24  Mg     1.6     09-24    TPro  7.6  /  Alb  2.3<L>  /  TBili  11.0<H>  /  DBili  5.8<H>  /  AST  84<H>  /  ALT  38  /  AlkPhos  144<H>  09-24      A/P:   Alcohol withdrawal with suspected Alcoholic Hepatitis and Metabolic encephalopathy with suspected ??Wernicke Encephalopathy  Acute decompensation of Cirrhosis of liver with ascites and esophageal varices   Anemia etiology unclear : s/p possible upper GI loss doubt active bleed at this time  Thrombocytopenia due to liver failure  Elevated INR due to Liver dysfunction  Proteinuria     Plan:   Cont Librium and Ativan PRN, requiring more Ativan t Monitor CIWA  Hbremain stable;   Continue, Protonix IV and Octreotide drip,   As per GI Dr. Gerber, holding off EGD given no active bleed and stable H/H  d/w with Hepatology Dr. Garvey; Prednisolone trial with NAC in progress  Hepatology also recommended Albumin x 2 days for volume expansion  Trial of Prednisolone at 40 mg daily for 7 days ; did not receive last night but was given this morning d/w RN  If no clinical improvement will discontinue steroids  As d/w Dr. Bradley if acutely decompensate may need transfer to [place] [facility]plant team  Additionally if there is any  component of hemolysis Steroids should help.   Hematology consult appreciated; d/w NP Aurora and Attending no clear evidence of hemolysis   Monitor CBC, LFT and Total and Direct Bilirubin closely  Cont Lactulose rifaximin, Titrate Lactulose for 3 BMs daily  Aspiration, fall and seizure precautions; If significant agitation will need restraints but will avoid as much as possible  Continue enhanced supervision  No chemical DVT ppx due to anemia, suspected bleeding diathesis and thrombocytopenia  Continue Thiamine IM for potential Wernicke's    Patient has guarded prognosis and high risk for mortality    Discussed with PGY1 Dr. Rsoe and PGY 3 Dr. Grande

## 2020-09-24 NOTE — PROGRESS NOTE ADULT - ASSESSMENT
GI following this 34yoM, with PMH of Alcohol abuse, liver cirrhosis, esophageal varices and portal HTN admitted for alcohol withdrawal (MELD score of 32 on admission),  anemia, and thrombocytopenia.

## 2020-09-24 NOTE — PROGRESS NOTE ADULT - SUBJECTIVE AND OBJECTIVE BOX
PGY-1 Progress Note discussed with attending    PAGER #: [[phone]] TILL 5:00 PM  PLEASE CONTACT ON CALL TEAM:  - On Call Team (Please refer to Alison) FROM 5:00 PM - 8:30PM  - Nightfloat Team FROM 8:30 -7:30 AM    INTERVAL HPI/OVERNIGHT EVENTS:   - patient received ativan x3 overnight for agitation. Patient is cooperative this morning. He denies any pain or discomfort.     REVIEW OF SYSTEMS:  CONSTITUTIONAL: No fever, weight loss, or fatigue  RESPIRATORY: No cough, wheezing, chills or hemoptysis; No shortness of breath  CARDIOVASCULAR: No chest pain, palpitations, dizziness, or leg swelling  GASTROINTESTINAL: No abdominal pain. No nausea, vomiting, or hematemesis; No diarrhea or constipation. No melena or hematochezia.  GENITOURINARY: No dysuria or hematuria, urinary frequency  NEUROLOGICAL: No headaches, memory loss, loss of strength, numbness, or tremors    MEDICATIONS  (STANDING):  acetylcysteine IVPB 9 Gram(s) IV Intermittent <User Schedule>  cefTRIAXone   IVPB 1000 milliGRAM(s) IV Intermittent every 24 hours  chlordiazePOXIDE 25 milliGRAM(s) Oral every 12 hours  dextrose 5% + sodium chloride 0.9%. 1000 milliLiter(s) (60 mL/Hr) IV Continuous <Continuous>  ergocalciferol 34029 Unit(s) Oral <User Schedule>  folic acid 1 milliGRAM(s) Oral daily  influenza   Vaccine 0.5 milliLiter(s) IntraMuscular once  lactulose Syrup 20 Gram(s) Oral four times a day  multivitamin 1 Tablet(s) Oral daily  mupirocin 2% Nasal 1 Application(s) Nasal two times a day  octreotide  Infusion 50 MICROgram(s)/Hr (10 mL/Hr) IV Continuous <Continuous>  pantoprazole  Injectable 40 milliGRAM(s) IV Push two times a day  prednisoLONE    3 mG/mL Solution (ORAPRED) 40 milliGRAM(s) Oral daily  thiamine Injectable 100 milliGRAM(s) IntraMuscular daily    MEDICATIONS  (PRN):  LORazepam   Injectable 2 milliGRAM(s) IV Push every 2 hours PRN Agitation/CIWA > 7      Vital Signs Last 24 Hrs  T(C): 36.8 (24 Sep 2020 06:00), Max: 36.8 (24 Sep 2020 06:00)  T(F): 98.2 (24 Sep 2020 06:00), Max: 98.2 (24 Sep 2020 06:00)  HR: 96 (24 Sep 2020 06:00) (92 - 106)  BP: 129/76 (24 Sep 2020 06:00) (117/73 - 146/82)  BP(mean): --  RR: 18 (24 Sep 2020 06:00) (16 - 18)  SpO2: 98% (24 Sep 2020 06:00) (95% - 98%)    PHYSICAL EXAMINATION:  GENERAL: NAD, AAOx2  HEAD: AT/NC  EYES: scleral icterus noted  NECK: supple, No JVD noted, Normal thyroid  CHEST/LUNG: CTABL; no rales, rhonchi, wheezing, or rubs  HEART: regular rate and rhythm; no murmurs, rubs, or gallops  ABDOMEN: moderately distended and hard, but nontender; Bowel sounds present  EXTREMITIES:  2+ Peripheral Pulses, No clubbing, cyanosis, or edema                          7.6    3.49  )-----------( 76       ( 24 Sep 2020 07:27 )             23.0     09-24    134<L>  |  102  |  8   ----------------------------<  144<H>  3.5   |  27  |  0.65    Ca    8.5      24 Sep 2020 07:27  Phos  3.8     09-24  Mg     1.6     09-24    TPro  7.6  /  Alb  2.3<L>  /  TBili  11.0<H>  /  DBili  5.8<H>  /  AST  84<H>  /  ALT  38  /  AlkPhos  144<H>  09-24    LIVER FUNCTIONS - ( 24 Sep 2020 07:27 )  Alb: 2.3 g/dL / Pro: 7.6 g/dL / ALK PHOS: 144 U/L / ALT: 38 U/L DA / AST: 84 U/L / GGT: x               PT/INR - ( 24 Sep 2020 07:27 )   PT: 47.7 sec;   INR: 4.37 ratio           COVID-19 PCR: NotDetec (15 Sep 2020 00:23)      CAPILLARY BLOOD GLUCOSE          RADIOLOGY & ADDITIONAL TESTS:

## 2020-09-24 NOTE — PROGRESS NOTE ADULT - PROBLEM SELECTOR PLAN 4
- ICU downgrade  - p/w ADOLFO; h/o AA, cirrhosis, and esophageal varices  - CIWA 3 (9/24)  - c/w librium 25mg q12h standing, ativan q4h prn, IV thiamine, folic acid and multivitamin  - enhanced observation 2/2 agitation  - monitor CIWA

## 2020-09-24 NOTE — PROGRESS NOTE ADULT - PROBLEM SELECTOR PLAN 6
- symptomatic mucosal bleeds and easy bruisability  - likely secondary to chronic alcoholism   - peripheral smear shows no schistocytes, retic count 2.5%,  (unreliable in setting of liver dysfunction  - Platelet 68 (9/21) -> 58 -> 63 -> 73 -> 76  - s/p 1u Plts on 9/18  - monitor plts, transfuse if PC <20K, in liver disease pts

## 2020-09-24 NOTE — PROGRESS NOTE ADULT - PROBLEM SELECTOR PLAN 2
portal hypertension gastropathy and small EV noted on EGD in 6/2020  BB and diuretics on hold for now secondary to decompensation plan to resume upon discharge once this acute phase has resolved

## 2020-09-24 NOTE — PROGRESS NOTE ADULT - ASSESSMENT
Assessment and Recommendation:   · Assessment	  Assessment and Plan:   · Assessment	  a 34yoM, with PMH of Alcohol abuse, liver cirrhosis, and esophageal varices, p/w generalized weakness. Admitted for alcohol withdrawal (MELD score of 32 on admission), symptomatic anemia, and thrombocytopenia.     #Normocytic hypochromic Anemia  stable  etiology likely cirrhosis and chronic EtOH use  Ferritin 331, trans sat 91%, no JOAQUIN   Folate, B12, TSH, Cr wnl  FOBT (-)  Retic now increased to 3.4%, TRH=793 and diana +  peripheral smear-no schistocytes  s/p 4u pRBC, on 9/21  Bili=11.1, D.Bili=5.2  on thiamine, multivitamin  daily CBC   transfuse PRBC if Hgb <7.0 or symptomatic  Retic count=4.5, haptoglobin-pending, FHM=818    #Thrombocytopenia  stable  easy bruisability, denies mucosal bleeds   Hepatitis (-)  etiology likely cirrhosis/ETOH/medications  peripheral smear- no schistocytes, retic count 2.5% increased to 3.4% and now 4.5% (related to liver disease)  s/p 1u Plts on 9/18  monitor plts  transfuse SDP if <20k in liver disease   avoid nonessential meds    #Esophageal varices- h/o UGIB x2; h/o EtOH cirrhosis , alcohol withdrawal, jaundice  FOBT neg  prior EGD in 6/2020 demonstrated small distal EV's (not banded due to low PLTs), and portal gastropathy  s/p 4u pRBC; last   on octreotide drip, protonix  GI, Dr. Gerber, onboard  Child-[facility] 13, steroids held for now, hepatitis panel negative  US RUQ liver cirrhosis with no focal lesion, cholelithiasis with mild GB wall thickening, no BD dilatation, negative Field's sign   s/p paracentesis 2.8L jorden fluid drained, low suspicion for SBP   elevated INR, but improved to 3.4, s/p vitamin K, can consider FFP if continues to rise  check AFP  Pt states he is on a transplant list, will reach out to sister who has "paperwork"    #VTE Prophylaxis  SCD's    Thank you for the referral. Will continue to monitor the patient.  Any questions please call [phone]  Above reviewed with Attending Dr. Anton

## 2020-09-24 NOTE — PROGRESS NOTE ADULT - PROBLEM SELECTOR PLAN 1
likely 2/2 to ETOH abuse/cirrhosis   no signs of active bleeding on CTA  FOBT negative  hgb 7.6 this am slight trend up was 7.4 yesterday  monitor CBC  endoscopy not done this admission as patient w/o signs of overt active GI bleeding and FOBT neg  c/w octreotide and PPI fo rnow

## 2020-09-25 NOTE — PROGRESS NOTE ADULT - PROBLEM SELECTOR PLAN 1
- no signs of active bleeding  - likely 2/2 complication of cirrhosis vs chronic EtOH use  - normocytic, hypochromic. Ferritin, Fe total wnl, Fe Sat 91 (high), TIBC 121(low)    - Folate, B12 wnl  - FOBT neg  - retic 4.5, , haptoglobin <20  - s/p 4u pRBC, last on 9/21  - c/w thiamine, multivitamin, venofer   - Monitor CBC, vitals     - GI, Dr. Gerber, onboard

## 2020-09-25 NOTE — PROGRESS NOTE ADULT - PROBLEM SELECTOR PLAN 3
- improved    - ICU downgrade  - p/w ADOLFO; h/o AA, cirrhosis, and esophageal varices  - CIWA 2 (9/25)  - c/w librium 25mg q12h standing, ativan q4h prn, IV thiamine, folic acid and multivitamin  - enhanced observation 2/2 agitation  - monitor CIWA  - will reduce librium to q24 on Sun

## 2020-09-25 NOTE — PROGRESS NOTE ADULT - ATTENDING COMMENTS
Patient seen and examined earlier this morning with PGY1 and MS3/4; Agree with PGY1 A/P above with editing as needed. My independent assessment, findings on exam, diagnosis and plan of care as listed below. Discussed with Dr. Rose.    Patient is a 34y old  Male who presents with acute alcohol withdrawal and advanced cirrhosis found to have liver failure hospital course complicated by DTs needing ICU admission, downgraded to floor    Patient was clinically doing poorly last few days but significant improvement from yesterday. Off Restraints, Not needing Ativan overnight. was resumed on Thiamine and added Prednisolone yesterday.     Patient still jaundiced but less icteric, lethargic. Nicolas was discontinued for TOV. He was noted to be eating lunch by himself after diet was advanced this morning as no active bleeding noted.     Vital Signs Last 24 Hrs  T(C): 36.5 (25 Sep 2020 14:01), Max: 36.7 (24 Sep 2020 20:57)  T(F): 97.7 (25 Sep 2020 14:01), Max: 98 (24 Sep 2020 20:57)  HR: 83 (25 Sep 2020 14:01) (83 - 86)  BP: 120/69 (25 Sep 2020 14:01) (120/69 - 124/76)  RR: 16 (25 Sep 2020 14:01) (16 - 18)  SpO2: 96% (25 Sep 2020 14:01) (92% - 98%)    REVIEW OF SYSTEMS: limited; No nausea or vomit; no abdominal pain; distended abdomen;     PHYSICAL EXAM:  GENERAL: grossly icteric NAD  NERVOUS SYSTEM:c more alert and awake, less confused; followed commands,  CHEST/LUNG: Clear to auscultation anterolaterally  HEART: Regular rate and rhythm; No murmurs, rubs, or gallops  ABDOMEN: Nontender, distended with marked ascites; Bowel sounds present  EXTREMITIES:  , No clubbing, cyanosis, or edema  SKIN: No rashes or lesions    LABS:                        7.5    6.22  )-----------( 61       ( 25 Sep 2020 18:18 )             21.4   09-25    134<L>  |  101  |  8   ----------------------------<  153<H>  3.4<L>   |  27  |  0.75    Ca    8.3<L>      25 Sep 2020 07:21  Phos  3.4     09-25  Mg     1.6     09-25    TPro  7.4  /  Alb  2.1<L>  /  TBili  8.8<H>  /  DBili  4.9<H>  /  AST  74<H>  /  ALT  40  /  AlkPhos  141<H>  09-25      A/P:   Alcohol withdrawal with suspected Alcoholic Hepatitis and Metabolic encephalopathy with suspected ??Wernicke Encephalopathy resolving well   Acute decompensation of Cirrhosis of liver with ascites and esophageal varices   Anemia etiology unclear : s/p possible upper GI loss doubt active bleed at this time  Thrombocytopenia due to liver failure improving slowly  Elevated INR due to Liver dysfunction  Proteinuria     Plan:   Continue Librium q 12 hrs today; if continue to do well will discontinue after AM dose;   Ativan PRN but avoid as much as feasible  H/H remain stable;   Continue, Protonix IV; D/C Octreotide drip,   Continue NAC and Prednisolone  Trial of Prednisolone at 40 mg daily for 7 days ;   If no clinical improvement will discontinue steroids  As d/w Dr. Bradley if acutely decompensate may need transfer to [facility] team.   Monitor CBC, LFT and Total and Direct Bilirubin closely  Cont Lactulose rifaximin, Titrate Lactulose for 3 BMs daily  Aspiration, fall and seizure precautions; If significant agitation will need restraints but will avoid as much as possible  Continue enhanced supervision  No chemical DVT ppx due to anemia, suspected bleeding diathesis and thrombocytopenia  Continue Thiamine IM for potential Wernicke's    Patient has guarded prognosis and high risk for mortality    Discussed with PGY1 Dr. Rose and PGY 3 Dr. Grande and RN Patient seen and examined earlier this morning with PGY1 and MS3/4; Agree with PGY1 A/P above with editing as needed. My independent assessment, findings on exam, diagnosis and plan of care as listed below. Discussed with Dr. Rose.    Patient is a 34y old  Male who presents with acute alcohol withdrawal and advanced cirrhosis found to have liver failure hospital course complicated by DTs needing ICU admission, downgraded to floor    Patient was clinically doing poorly last few days but significant improvement from yesterday. Off Restraints, Not needing Ativan overnight. was resumed on Thiamine and added Prednisolone yesterday.     Patient still jaundiced but less icteric, lethargic. Nicolas was discontinued for TOV. He was noted to be eating lunch by himself after diet was advanced this morning as no active bleeding noted.     Vital Signs Last 24 Hrs  T(C): 36.5 (25 Sep 2020 14:01), Max: 36.7 (24 Sep 2020 20:57)  T(F): 97.7 (25 Sep 2020 14:01), Max: 98 (24 Sep 2020 20:57)  HR: 83 (25 Sep 2020 14:01) (83 - 86)  BP: 120/69 (25 Sep 2020 14:01) (120/69 - 124/76)  RR: 16 (25 Sep 2020 14:01) (16 - 18)  SpO2: 96% (25 Sep 2020 14:01) (92% - 98%)    REVIEW OF SYSTEMS: limited; No nausea or vomit; no abdominal pain; distended abdomen;     PHYSICAL EXAM:  GENERAL: grossly icteric NAD  NERVOUS SYSTEM:c more alert and awake, less confused; followed commands,  CHEST/LUNG: Clear to auscultation anterolaterally  HEART: Regular rate and rhythm; No murmurs, rubs, or gallops  ABDOMEN: Nontender, distended with marked ascites; Bowel sounds present  EXTREMITIES:  , No clubbing, cyanosis, or edema  SKIN: No rashes or lesions    LABS:                        7.5    6.22  )-----------( 61       ( 25 Sep 2020 18:18 )             21.4   09-25    134<L>  |  101  |  8   ----------------------------<  153<H>  3.4<L>   |  27  |  0.75    Ca    8.3<L>      25 Sep 2020 07:21  Phos  3.4     09-25  Mg     1.6     09-25    TPro  7.4  /  Alb  2.1<L>  /  TBili  8.8<H>  /  DBili  4.9<H>  /  AST  74<H>  /  ALT  40  /  AlkPhos  141<H>  09-25      A/P:   Alcohol withdrawal with suspected Alcoholic Hepatitis and Metabolic encephalopathy with suspected ??Wernicke Encephalopathy resolving well   Acute decompensation of Cirrhosis of liver with ascites and esophageal varices   Anemia etiology unclear : s/p possible upper GI loss doubt active bleed at this time  Thrombocytopenia due to liver failure improving slowly  Elevated INR due to Liver dysfunction  Vitamin D deficiency  Proteinuria     Plan:   Continue Librium q 12 hrs today; if continue to do well will discontinue after AM dose;   Ativan PRN but avoid as much as feasible  H/H remain stable;   Continue, Protonix IV; D/C Octreotide drip,   Continue NAC and Prednisolone  Trial of Prednisolone at 40 mg daily for 7 days ;   If no clinical improvement will discontinue steroids  Resume Rifaximin; inadvertently fell off medication orders.   Holding off BB for now  As d/w Dr. Bradley if acutely decompensate may need transfer to [facility] team.   Monitor CBC, LFT and Total and Direct Bilirubin closely  Cont Lactulose rifaximin, Titrate Lactulose for 3 BMs daily  Bilirubin trending down for first time in few days with clinical improvement today  If clinically remain stable will switch PPI to oral in AM  Aspiration, fall and seizure precautions; If significant agitation will need restraints but will avoid as much as possible  Continue enhanced supervision  No chemical DVT ppx due to anemia, suspected bleeding diathesis and thrombocytopenia  Continue Thiamine IM for potential Wernicke's  Patient with failed TOV; Bladder scan showed 900cc; Informed by PGY1 on call Straight Cath done got only 100cc likely bladder scan showing ascitic fluid. Will hold off Nicolas unless absolutely necessary    Patient has guarded prognosis and high risk for mortality    Discussed with PGY1 Dr. Rose and PGY 3 Dr. Grande and RN

## 2020-09-25 NOTE — PROGRESS NOTE ADULT - ASSESSMENT
Patient is a 35yo Male with Liver Cirrhosis, ETOH abuse, esophageal varices p/w weakness with nausea, hematemesis with blood in stool. Pt admitted with acute decompensated cirrhosis and ETOH withdrawal with anemia/ thrombocytopenia s/p brief ICU stay for worsening ETOH withdrawal.  s/p 3.5L paracentesis on 9/17. Neg for SBP. Pt had LEDA which resolved with albumin gtt. Pt found to have proteinuria and SAAG <1.1. Nephrology consulted for proteinuria.       1. Proteinuria- with low SAAG r/o nephrotic syndrome. Pt with no overt edema; signs of nephrotic syndrome. Cirrhotics usually have no or little proteinuria unless associated with kidney disease; ?secondary IgA nephropathy 2/2 ETOH abuse is a possibility, however pt had no microscopic hematuria on UA. UA with 30 protein an no blood. Spot UPr/ Cr 0.7 & 24 hr proteinuria 0.97; non nephrotic range.    Recc to repeat SAAG ?error.  Pt would be a poor candidate for biopsy with anemia and thrombocytopenia; high risk of bleeding. Recc to start ARB or ACEi on discharge; if renal function is stable.     2. Decompensated Cirrhosis- Pt on Acetylcysteine/ Lactulose. Plan as per Hepatology/ GI  3. Anemia/ Thrombocytopenia- due to liver cirrhosis/ ETOH abuse s/p 5 units prbc (total during hospitalization). Plan as per primary team  4. Vitamin D deficiency- 25 OH vit D 8 c/w weekly Ergo 50 k units Patient is a 35yo Male with Liver Cirrhosis, ETOH abuse, esophageal varices p/w weakness with nausea, hematemesis with blood in stool. Pt admitted with acute decompensated cirrhosis and ETOH withdrawal with anemia/ thrombocytopenia s/p brief ICU stay for worsening ETOH withdrawal.  s/p 3.5L paracentesis on 9/17. Neg for SBP. Pt had LEDA which resolved with albumin gtt. Pt found to have proteinuria and SAAG <1.1. Nephrology consulted for proteinuria.       1. Proteinuria- with low SAAG r/o nephrotic syndrome. Pt with no overt edema; signs of nephrotic syndrome. Cirrhotics usually have no or little proteinuria unless associated with kidney disease; ?secondary IgA nephropathy 2/2 ETOH abuse is a possibility, however pt had no microscopic hematuria on UA. UA with 30 protein an no blood. Spot UPr/ Cr 0.7 & 24 hr proteinuria 0.97; non nephrotic range.    Recc to repeat SAAG ?error.  Pt would be a poor candidate for biopsy with anemia and thrombocytopenia; high risk of bleeding. Recc to start ARB or ACEi on discharge; if renal function is stable.     2. Decompensated Cirrhosis- Pt on Acetylcysteine/ Lactulose. Plan as per Hepatology/ GI  3. Anemia/ Thrombocytopenia- due to liver cirrhosis/ ETOH abuse s/p 5 units prbc (total during hospitalization). Plan as per primary team  4. Vitamin D deficiency- 25 OH vit D 8 c/w weekly Ergo 50 k units  Potassium repleted; recc Magnesium repletion.

## 2020-09-25 NOTE — PROGRESS NOTE ADULT - PROBLEM SELECTOR PLAN 2
- h/o alcoholic jaundice w/ ascites  - jaundiced, scleral icterus   - MELD 32; 3mo mortality 52.6%  - Child-Bojorquez 13  - Maddreys 178.1 (poor prognosis)   - hepatitis panel negative  - US RUQ liver cirrhosis with no focal lesion, cholelithiasis with mild GB wall thickening, no BD dilatation, negative Field's sign   - s/p paracentesis 2.8L jorden fluid drained, low suspicion for SBP   - c/w ppx ceftriaxone, rifaximin and lactulose, NAC, prednisolone 40mg    - Hepatology, Dr. Morales, onboard  - GI, Dr. Gerber, onboard

## 2020-09-25 NOTE — PROGRESS NOTE ADULT - PROBLEM SELECTOR PLAN 4
- unwitnessed h/o UGIB x2; h/o EtOH cirrhosis   - FOBT neg  - prior EGD in 6/2020 demonstrated small distal EV's (not banded due to low PLTs), and portal gastropathy (Latimer Class IIA); d/c w/ spironolactone, nadalol, lasix (noncompliant)  - s/p 4u pRBC; last transfusion on 9/21  - c/w octreotide drip, protonix  - no BB for now  - monitor CBC    - GI, Dr. Gerber, onboard  - no EGD per GI

## 2020-09-25 NOTE — PROGRESS NOTE ADULT - PROBLEM SELECTOR PLAN 5
- improved    - grade 1; clinically drowsy with sleep reversal  - Ammonia 58 ->100  - CT head neg for acute pathology  - c/w lactulose, rifaximin, thiamin  - frequent neurochecks

## 2020-09-25 NOTE — PROGRESS NOTE ADULT - SUBJECTIVE AND OBJECTIVE BOX
PGY-1 Progress Note discussed with attending    PAGER #: [[phone]] TILL 5:00 PM  PLEASE CONTACT ON CALL TEAM:  - On Call Team (Please refer to Alison) FROM 5:00 PM - 8:30PM  - Nightfloat Team FROM 8:30 -7:30 AM    INTERVAL HPI/OVERNIGHT EVENTS:   - patient reports improved status. He report feeling much better. He denies any pain or discomfort.    REVIEW OF SYSTEMS:  CONSTITUTIONAL: No fever, weight loss, or fatigue  RESPIRATORY: No cough, wheezing, chills or hemoptysis; No shortness of breath  CARDIOVASCULAR: No chest pain, palpitations, dizziness, or leg swelling  GASTROINTESTINAL: No abdominal pain. No nausea, vomiting, or hematemesis; No diarrhea or constipation. No melena or hematochezia.  GENITOURINARY: No dysuria or hematuria, urinary frequency  NEUROLOGICAL: No headaches, memory loss, loss of strength, numbness, or tremors    MEDICATIONS  (STANDING):  chlordiazePOXIDE 25 milliGRAM(s) Oral every 12 hours  dextrose 5% + sodium chloride 0.9%. 1000 milliLiter(s) (60 mL/Hr) IV Continuous <Continuous>  ergocalciferol 37101 Unit(s) Oral <User Schedule>  folic acid 1 milliGRAM(s) Oral daily  influenza   Vaccine 0.5 milliLiter(s) IntraMuscular once  lactulose Syrup 20 Gram(s) Oral four times a day  magnesium sulfate  IVPB 1 Gram(s) IV Intermittent once  multivitamin 1 Tablet(s) Oral daily  mupirocin 2% Nasal 1 Application(s) Nasal two times a day  pantoprazole  Injectable 40 milliGRAM(s) IV Push two times a day  prednisoLONE    3 mG/mL Solution (ORAPRED) 40 milliGRAM(s) Oral daily  thiamine Injectable 100 milliGRAM(s) IntraMuscular daily    MEDICATIONS  (PRN):  LORazepam   Injectable 2 milliGRAM(s) IV Push every 4 hours PRN Agitation      Vital Signs Last 24 Hrs  T(C): 36.5 (25 Sep 2020 14:01), Max: 36.7 (24 Sep 2020 20:57)  T(F): 97.7 (25 Sep 2020 14:01), Max: 98 (24 Sep 2020 20:57)  HR: 83 (25 Sep 2020 14:01) (83 - 86)  BP: 120/69 (25 Sep 2020 14:01) (120/69 - 124/76)  BP(mean): --  RR: 16 (25 Sep 2020 14:01) (16 - 18)  SpO2: 96% (25 Sep 2020 14:01) (92% - 98%)    PHYSICAL EXAMINATION:  GENERAL: NAD, AAOx3  HEAD: AT/NC  EYES: scleral icterus noted, but improving  NECK: supple, No JVD noted, Normal thyroid  CHEST/LUNG: CTABL; no rales, rhonchi, wheezing, or rubs  HEART: regular rate and rhythm; no murmurs, rubs, or gallops  ABDOMEN: moderately distended and hard, but nontender; Bowel sounds present  EXTREMITIES:  2+ Peripheral Pulses, No clubbing, cyanosis, or edema  NEURO: no tremors noted                          7.3    5.96  )-----------( 73       ( 25 Sep 2020 07:21 )             22.2     09-25    134<L>  |  101  |  8   ----------------------------<  153<H>  3.4<L>   |  27  |  0.75    Ca    8.3<L>      25 Sep 2020 07:21  Phos  3.4     09-25  Mg     1.6     09-25    TPro  7.4  /  Alb  2.1<L>  /  TBili  8.8<H>  /  DBili  4.9<H>  /  AST  74<H>  /  ALT  40  /  AlkPhos  141<H>  09-25    LIVER FUNCTIONS - ( 25 Sep 2020 07:21 )  Alb: 2.1 g/dL / Pro: 7.4 g/dL / ALK PHOS: 141 U/L / ALT: 40 U/L DA / AST: 74 U/L / GGT: x               PT/INR - ( 25 Sep 2020 07:21 )   PT: 50.4 sec;   INR: 4.63 ratio           COVID-19 PCR: NotDetec (15 Sep 2020 00:23)      CAPILLARY BLOOD GLUCOSE          RADIOLOGY & ADDITIONAL TESTS:

## 2020-09-25 NOTE — CHART NOTE - NSCHARTNOTEFT_GEN_A_CORE
Assessment:   34yMalePatient is a 34y old  Male who presents with a chief complaint of Alcohol abuse (25 Sep 2020 14:27)      Factors impacting intake: [ ] none [ ] nausea  [ ] vomiting [ ] diarrhea [ ] constipation  [ ]chewing problems [ ] swallowing issues  [x ] other: hypokalemia, cirrhosis of liver with ascites, hepatic encephalopathy, anemia, esophageal varices.     Diet Presciption: Diet, Soft (09-25-20 @ 10:40)    Intake: Pt visited, was sleeping. Intake noted 80% per 9/25. PCA stated that appetite has been improving, ate last night and had breakfast and lunch this morning 9/25. Pt has no current GI issues per PCA. Pt may benefit from MVI/minerals and thiamin as medically feasible.     Current Weight:   % Weight Change    Pertinent Medications: MEDICATIONS  (STANDING):  chlordiazePOXIDE 25 milliGRAM(s) Oral every 12 hours  ergocalciferol 70641 Unit(s) Oral <User Schedule>  folic acid 1 milliGRAM(s) Oral daily  influenza   Vaccine 0.5 milliLiter(s) IntraMuscular once  lactulose Syrup 20 Gram(s) Oral four times a day  magnesium sulfate  IVPB 1 Gram(s) IV Intermittent once  multivitamin 1 Tablet(s) Oral daily  mupirocin 2% Nasal 1 Application(s) Nasal two times a day  pantoprazole  Injectable 40 milliGRAM(s) IV Push two times a day  prednisoLONE    3 mG/mL Solution (ORAPRED) 40 milliGRAM(s) Oral daily  rifAXIMin 550 milliGRAM(s) Oral two times a day  thiamine Injectable 100 milliGRAM(s) IntraMuscular daily    MEDICATIONS  (PRN):  LORazepam   Injectable 2 milliGRAM(s) IV Push every 4 hours PRN Agitation    Pertinent Labs: 09-25 Na134 mmol/L<L> Glu 153 mg/dL<H> K+ 3.4 mmol/L<L> Cr  0.75 mg/dL BUN 8 mg/dL 09-25 Phos 3.4 mg/dL 09-25 Alb 2.1 g/dL<L> 09-15 Chol 66 mg/dL LDL 37 mg/dL HDL 15 mg/dL<L> Trig 68 mg/dL     CAPILLARY BLOOD GLUCOSE        Skin: WDL except ecchymosis, lon score 15.     Estimated Needs:   [x ] no change since previous assessment  [ ] recalculated:     Previous Nutrition Diagnosis:   [ ] Inadequate Energy Intake [ x]Inadequate Oral Intake [ ] Excessive Energy Intake   [ ] Underweight [ ] Increased Nutrient Needs [ ] Overweight/Obesity   [ ] Altered GI Function [ ] Unintended Weight Loss [ ] Food & Nutrition Related Knowledge Deficit [ ] Malnutrition     Nutrition Diagnosis is [ x] ongoing  [ ] resolved [ ] not applicable     New Nutrition Diagnosis: [x ] not applicable       Interventions:   Recommend  [ ] Change Diet To:  [x ] Nutrition Supplement-Pt may benefit from MVI/minerals and thiamin as medically feasible.   [ ] Nutrition Support  [ ] Other:     Monitoring and Evaluation:   [x ] PO intake [ x ] Tolerance to diet prescription [ x ] weights [ x ] labs[ x ] follow up per protocol  [ ] other:

## 2020-09-25 NOTE — PROGRESS NOTE ADULT - ATTENDING COMMENTS
[facility] NEPHROLOGY  Rui Shore M.D.  Cal Montejo D.O.  Cristiana Vigil M.D.  Carie Mclean, MSN, ANP-C  [phone]    [address] [place] [address]

## 2020-09-25 NOTE — PROGRESS NOTE ADULT - SUBJECTIVE AND OBJECTIVE BOX
Chief Complaint:  Patient is a 34y old  Male who presents with a chief complaint of Alcohol abuse (25 Sep 2020 11:46)      Reason for consult:    Interval Events:     Hospital Medications:  acetylcysteine IVPB 9 Gram(s) IV Intermittent <User Schedule>  chlordiazePOXIDE 25 milliGRAM(s) Oral every 12 hours  dextrose 5% + sodium chloride 0.9%. 1000 milliLiter(s) IV Continuous <Continuous>  ergocalciferol 99180 Unit(s) Oral <User Schedule>  folic acid 1 milliGRAM(s) Oral daily  influenza   Vaccine 0.5 milliLiter(s) IntraMuscular once  iron sucrose IVPB 100 milliGRAM(s) IV Intermittent once  lactulose Syrup 20 Gram(s) Oral four times a day  LORazepam   Injectable 2 milliGRAM(s) IV Push every 4 hours PRN  multivitamin 1 Tablet(s) Oral daily  mupirocin 2% Nasal 1 Application(s) Nasal two times a day  pantoprazole  Injectable 40 milliGRAM(s) IV Push two times a day  prednisoLONE    3 mG/mL Solution (ORAPRED) 40 milliGRAM(s) Oral daily  thiamine Injectable 100 milliGRAM(s) IntraMuscular daily      ROS:   General:  No  fevers, chills, night sweats, fatigue  Eyes:  Good vision, no reported pain  ENT:  No sore throat, pain, runny nose  CV:  No pain, palpitations  Pulm:  No dyspnea, cough  GI:  See HPI, otherwise negative  :  No  incontinence, nocturia  Muscle:  No pain, weakness  Neuro:  No memory problems  Psych:  No insomnia, mood problems, depression  Endocrine:  No polyuria, polydipsia, cold/heat intolerance  Heme:  No petechiae, ecchymosis, easy bruisability  Skin:  No rash    PHYSICAL EXAM:   Vital Signs:  Vital Signs Last 24 Hrs  T(C): 36.7 (25 Sep 2020 05:58), Max: 36.7 (24 Sep 2020 20:57)  T(F): 98 (25 Sep 2020 05:58), Max: 98 (24 Sep 2020 20:57)  HR: 86 (25 Sep 2020 05:58) (83 - 86)  BP: 123/67 (25 Sep 2020 05:58) (123/67 - 127/82)  BP(mean): --  RR: 17 (25 Sep 2020 05:58) (17 - 18)  SpO2: 92% (25 Sep 2020 05:58) (92% - 98%)  Daily     Daily     GENERAL: no acute distress  NEURO: alert, no asterixis  HEENT: anicteric sclera, no conjunctival pallor appreciated  CHEST: no respiratory distress, no accessory muscle use  CARDIAC: regular rate, rhythm  ABDOMEN: soft, non-tender, non-distended, no rebound or guarding  EXTREMITIES: warm, well perfused, no edema  SKIN: no lesions noted    LABS: reviewed                        7.3    5.96  )-----------( 73       ( 25 Sep 2020 07:21 )             22.2     09-25    134<L>  |  101  |  8   ----------------------------<  153<H>  3.4<L>   |  27  |  0.75    Ca    8.3<L>      25 Sep 2020 07:21  Phos  3.4     09-25  Mg     1.6     09-25    TPro  7.4  /  Alb  2.1<L>  /  TBili  8.8<H>  /  DBili  4.9<H>  /  AST  74<H>  /  ALT  40  /  AlkPhos  141<H>  09-25    LIVER FUNCTIONS - ( 25 Sep 2020 07:21 )  Alb: 2.1 g/dL / Pro: 7.4 g/dL / ALK PHOS: 141 U/L / ALT: 40 U/L DA / AST: 74 U/L / GGT: x             Interval Diagnostic Studies: see sunrise for full report   Chief Complaint:  Patient is a 34y old  Male who presents with a chief complaint of Alcohol abuse (25 Sep 2020 11:46)      Reason for consult: Alcoholic cirrhosis    Interval Events: Patient was seen and examined at bedside. He is more awake, alert, and now oriented x3 again, no asterixis. He c/o back pain, generalized weakness, but denies abdominal pain, N/V. Getting lactulose, had BM in am prior exam, no blood or melena. Still with urinary catheter, urine noted bloody. Denies any other complaints, see below. States that sister will come to visit in the afternoon.     Hospital Medications:  acetylcysteine IVPB 9 Gram(s) IV Intermittent <User Schedule>  chlordiazePOXIDE 25 milliGRAM(s) Oral every 12 hours  dextrose 5% + sodium chloride 0.9%. 1000 milliLiter(s) IV Continuous <Continuous>  ergocalciferol 29370 Unit(s) Oral <User Schedule>  folic acid 1 milliGRAM(s) Oral daily  influenza   Vaccine 0.5 milliLiter(s) IntraMuscular once  iron sucrose IVPB 100 milliGRAM(s) IV Intermittent once  lactulose Syrup 20 Gram(s) Oral four times a day  LORazepam   Injectable 2 milliGRAM(s) IV Push every 4 hours PRN  multivitamin 1 Tablet(s) Oral daily  mupirocin 2% Nasal 1 Application(s) Nasal two times a day  pantoprazole  Injectable 40 milliGRAM(s) IV Push two times a day  prednisoLONE    3 mG/mL Solution (ORAPRED) 40 milliGRAM(s) Oral daily  thiamine Injectable 100 milliGRAM(s) IntraMuscular daily      ROS:   General:  No  fevers, chills, night sweats, fatigue  Eyes:  Good vision, no reported pain  ENT:  No sore throat, pain, runny nose  CV:  No pain, palpitations  Pulm:  No dyspnea, cough  GI:  See HPI, otherwise negative  :  w catheter  Muscle:  No pain, has generalized weakness  Neuro:  No memory problems  Psych:  No insomnia, mood problems, depression  Endocrine:  No polyuria, polydipsia, cold/heat intolerance  Heme:  Ecchymosis, easy bruisability  Skin:  small kin tear on arm    PHYSICAL EXAM:   Vital Signs:  Vital Signs Last 24 Hrs  T(C): 36.7 (25 Sep 2020 05:58), Max: 36.7 (24 Sep 2020 20:57)  T(F): 98 (25 Sep 2020 05:58), Max: 98 (24 Sep 2020 20:57)  HR: 86 (25 Sep 2020 05:58) (83 - 86)  BP: 123/67 (25 Sep 2020 05:58) (123/67 - 127/82)  BP(mean): --  RR: 17 (25 Sep 2020 05:58) (17 - 18)  SpO2: 92% (25 Sep 2020 05:58) (92% - 98%)  Daily     Daily     GENERAL: no acute distress  NEURO: alert, oriented x3, no asterixis  HEENT: icteric sclera, no conjunctival pallor appreciated  CHEST: no respiratory distress, no accessory muscle use  CARDIAC: regular rate, rhythm  ABDOMEN: soft, non-tender,  moderately distended w + fluid wave, not tense ascites yet on today exam,  no rebound or guarding  EXTREMITIES: warm, well perfused, no edema  SKIN: Ecchymoses, bruising    LABS: reviewed                        7.3    5.96  )-----------( 73       ( 25 Sep 2020 07:21 )             22.2     09-25    134<L>  |  101  |  8   ----------------------------<  153<H>  3.4<L>   |  27  |  0.75    Ca    8.3<L>      25 Sep 2020 07:21  Phos  3.4     09-25  Mg     1.6     09-25    TPro  7.4  /  Alb  2.1<L>  /  TBili  8.8<H>  /  DBili  4.9<H>  /  AST  74<H>  /  ALT  40  /  AlkPhos  141<H>  09-25    LIVER FUNCTIONS - ( 25 Sep 2020 07:21 )  Alb: 2.1 g/dL / Pro: 7.4 g/dL / ALK PHOS: 141 U/L / ALT: 40 U/L DA / AST: 74 U/L / GGT: x             Interval Diagnostic Studies: see sunrise for full report

## 2020-09-25 NOTE — PROGRESS NOTE ADULT - PROBLEM SELECTOR PLAN 6
- symptomatic mucosal bleeds and easy bruisability  - likely secondary to chronic alcoholism   - peripheral smear shows no schistocytes, retic count 2.5%,  (unreliable in setting of liver dysfunction  - Platelet 68 (9/21) -> 58 -> 63 -> 73 -> 76 -> 73  - s/p 1u Plts on 9/18  - monitor plts, transfuse if PC <20K, in liver disease pts

## 2020-09-25 NOTE — PROGRESS NOTE ADULT - SUBJECTIVE AND OBJECTIVE BOX
[facility] NEPHROLOGY- PROGRESS NOTE    Patient is a 35yo Male with Liver Cirrhosis, ETOH abuse, esophageal varices p/w weakness with nausea, hematemesis with blood in stool. Pt admitted with acute decompensated cirrhosis and ETOH withdrawal with anemia/ thrombocytopenia s/p brief ICU stay for worsening ETOH withdrawal.  s/p 3.5L paracentesis on 9/17. Neg for SBP. Pt had LEDA which resolved with albumin gtt. Pt found to have proteinuria and SAAG <1.1. Nephrology consulted for proteinuria.     Hospital Medications: Medications reviewed.  REVIEW OF SYSTEMS: +generalized weakness +lower back pain  Denies any SOB, pain, n/v/d or abd pain.     VITALS:  T(F): 98 (09-25-20 @ 05:58), Max: 98 (09-24-20 @ 20:57)  HR: 86 (09-25-20 @ 05:58)  BP: 123/67 (09-25-20 @ 05:58)  RR: 17 (09-25-20 @ 05:58)  SpO2: 92% (09-25-20 @ 05:58)  Wt(kg): --    09-24 @ 07:01  -  09-25 @ 07:00  --------------------------------------------------------  IN: 0 mL / OUT: 775 mL / NET: -775 mL      PHYSICAL EXAM:  Gen More awake  HEENT: +icteric  Neck: no JVD  Cards: RRR, +S1/S2,  Resp: CTA ant  GI: soft, NT mod distention  : +montoya  Extremities: no LE edema B/L    LABS:  09-25    134<L>  |  101  |  8   ----------------------------<  153<H>  3.4<L>   |  27  |  0.75    Ca    8.3<L>      25 Sep 2020 07:21  Phos  3.4     09-25  Mg     1.6     09-25    TPro  7.4  /  Alb  2.1<L>  /  TBili  8.8<H>  /  DBili  4.9<H>  /  AST  74<H>  /  ALT  40  /  AlkPhos  141<H>  09-25    Creatinine Trend: 0.75 <--, 0.65 <--, 0.89 <--, 0.62 <--, 0.64 <--, 0.61 <--, 0.93 <--, 1.01 <--, 1.05 <--                        7.3    5.96  )-----------( 73       ( 25 Sep 2020 07:21 )             22.2     Urine Studies:    Creatinine, Random Urine: 284 mg/dL (09-23 @ 14:00)  Osmolality, Random Urine: 574 mos/kg (09-23 @ 14:00)  Sodium, Random Urine: <5 mmol/L (09-23 @ 14:00)  Sodium, Random Urine: 6 mmol/L (09-19 @ 12:43)  Osmolality, Random Urine: 524 mos/kg (09-19 @ 12:43)  Potassium, Random Urine: 39 mmol/L (09-19 @ 12:43)  Chloride, Random Urine: 10 mmol/L (09-19 @ 12:43)

## 2020-09-25 NOTE — PROGRESS NOTE ADULT - ASSESSMENT
Patient was seen and examined at bedside. Full note to follow. Hospital course:  33yo male with hx of chronic alcohol abuse (actively drinking since age 20), decompensated cirrhosis with ascites (non-compliant with diuretics, no hx of LVP), hx of small esophageal varices and portal hypertensive gastropathy (last EGD 6/2020), (non-compliant with BB, never banded), with recent hospitalization (6/2020 for leg swelling, anemia) presented on 9/15/2020 with malaise, generalized weakness for 1-2 weeks, found to have worsening liver function, with MELD score 32 compared to 23 on prior admission, electrolyte abnormalities, and questionable blood in stool (reported black stools) and/or vomitus, but without any signs of overt bleeding in hospital. On admission had no signs of HE and no LEDA. Acute worsening might have been caused by heavy alcohol intake (high blood alcohol on admission), SBP was r/o by paracentesis, viral and infectious workup negative so far (some pending), denied taking any medications or herbal supplements.  He became agitated and was transferred to ICU on 9/17/2020 and being treated for alcohol withdrawal. He became hypotensive and bradycardic on Precedex.  Developed LEDA, but improved with volume expansion with albumin. Anemic, thrombocytopenic, now s/p 1 U PLT, and 4U PRBC (9/18/19/20/21), without evidence of overt GI bleeding (FOBT neg too) and with no evidence of bleeding on CT abd/pelvis and now stable Hb. Patient also noted to have proteinuria, and unexpectedly ascites analysis showed SAAG < 1.1, with low total protein despite that the patient has evidence of portal hypertension (EV, portal HTN gastropathy, recanalization of umbilical vein), but urine protein/Cr ration, 24 hr protein did not reach nephrotic level, nephrology consult appreciated. Patient is s/p NAC and on prednisolone (started on 9/24 after relative contraindications r/o / resolved). CT head neg for bleeding.     A/P:  # Decompensated cirrhosis w ascites; small EV; MELD 33   -  would need transplant referral, but unfortunately still active drinker, and being treated for  EtOH withdrawal (was d/w [facility] transplant hepatology service attending)    #ACLF - cirrhosis with development of LEDA during this hospitalization( 0.53-1.05) along with mental status change (EtOH withdrawal, sedation +/- HE); etiology possibly heavy ETOH intake (elevated blood alcohol on admission; AH can be co-existent with cirrhosis)  - no change in MELD, but some improvement in bilirubin  - c/w monitoring LFTs closely, including INR  - avoid hepatotoxic meds, avoid NSAIDs  - will assess response for prednisolone at day#7 (Lille score), if no improvement, no benefit to continue    #EtOH withdrawal + HE   - continue close monitoring of mental status and neurological exam  - c/w lactulose and please make sure to titrate to have 2-3 soft BM/day   - c/w rifaximin 550 mg bid  - avoid long acting BZD   - C/w CIWA protocol per primary team, c/w folic, thiamin, monitor electrolytes    #Ascites- no SBP; despite signs of CSPH (EV, portal  hypertensive gastropathy on EGD, recanalization of umbilical vein), unexpectedly  SAAG was < 1.1 (1.0), nephrology consult appreaciated  - Would continue holding diuretics for now (recovering from LEDA, concern of HE, acute decompensation); not tense ascites, no need for paracentesis for now, will assess daily  - Will need to continue with long term SBP prophylaxis (ascites protein < 1.5 g/dl and CPT >9 and bilirubin >3)    #LEDA (>0.3 mg /dl above his baseline w/in 48 hrs; /0.53-1.05 on 9/18)- responded to volume expansion (1.05-0.61), but not back to baseline yet (today 0.75)   (Estevan was < 10, could calculate FeNa, no UCr was available)  - C/w close monitoring renal function, electrolytes including Mg and P, and urine output;   - keep MAP >65-70mmHg  - continue holding diuretics  - would c/w volume expansion    #Small EV and portal hypertensive gastropathy on last EGD (no report available whether there were high risk signs, but he was started on BB in June), was non compliant w BB  - endoscopy was deferred during this admission (was no sign of overt active GI bleeding, no hematemesis, melena or hematochezia, FOBT neg), d/w Dr. Gerber (GI is on board)  - keep Hb >7, keep T/S active, 2 large bore iv lines   - c/w monitoring H/H and call GI if any bleeding   - c/w holding BB for now, will need to resume prior discharge  - remains on PPI (as being on steroid)    #Anemia, thrombocytopenia, coagulopathy  - possibly due to cirrhosis + EtOH caused BM suppression - however b/o recurrent slow drop in Hb after transfusions and required total 4 U PRBC 9/18-9/21 without any evidence of overt bleeding, hematology was consulted     #Etiology of cirrhosis  - while most likely ETOH cirrhosis, given the young age additional workup was sent  - ceruloplasmin < 20, while it could be falsely low in end stage liver or protein-losing, please send; 24hr urine copper wnl;  bedside opthalmology exam?  -[id number] nl;  ferritin 331 -EtOH  - LONNY neg, but total IgG elevated 4018, f/u anti-SMA, anti-LKM, and anti-SLA     #HCM - see prior notes    Explained to patient the poor prognosis if he does not stop drinking. Family involvement, discussion could be helpful.      Will continue to follow  D/w primary team

## 2020-09-26 NOTE — PROGRESS NOTE ADULT - SUBJECTIVE AND OBJECTIVE BOX
INCOMPLETE NOTE    MEDICAL ATTENDING NOTE  Patient is a 34y old  Male who presents with a chief complaint of Alcohol abuse (25 Sep 2020 14:27)      Patient is a 34y old  Male who presents with acute alcohol withdrawal and advanced cirrhosis found to have liver failure hospital course complicated by DTs needing ICU admission, downgraded to floor  Patient was clinically doing poorly last few days but significant improvement from yesterday. Off Restraints, Not needing Ativan overnight. was resumed on Thiamine and added Prednisolone yesterday.     Patient still jaundiced but less icteric, lethargic. Nicolas was discontinued for TOV. He was noted to be eating lunch by himself after diet was advanced this morning as no active bleeding noted.     INTERVAL HPI/OVERNIGHT EVENTS:     MEDICATIONS  (STANDING):  chlordiazePOXIDE 25 milliGRAM(s) Oral every 12 hours  ergocalciferol 33706 Unit(s) Oral <User Schedule>  folic acid 1 milliGRAM(s) Oral daily  influenza   Vaccine 0.5 milliLiter(s) IntraMuscular once  lactulose Syrup 20 Gram(s) Oral four times a day  multivitamin 1 Tablet(s) Oral daily  mupirocin 2% Nasal 1 Application(s) Nasal two times a day  pantoprazole  Injectable 40 milliGRAM(s) IV Push two times a day  prednisoLONE    3 mG/mL Solution (ORAPRED) 40 milliGRAM(s) Oral daily  rifAXIMin 550 milliGRAM(s) Oral two times a day  thiamine Injectable 100 milliGRAM(s) IntraMuscular daily    MEDICATIONS  (PRN):  LORazepam   Injectable 2 milliGRAM(s) IV Push every 4 hours PRN Agitation      __________________________________________________  REVIEW OF SYSTEMS:    CONSTITUTIONAL: No fever,   EYES: no acute visual disturbances  NECK: No pain or stiffness  RESPIRATORY: No cough; No shortness of breath  CARDIOVASCULAR: No chest pain, no palpitations  GASTROINTESTINAL: No pain. No nausea or vomiting; No diarrhea   NEUROLOGICAL: No headache or numbness, no tremors  MUSCULOSKELETAL: No joint pain, no muscle pain  GENITOURINARY: no dysuria, no frequency, no hesitancy  PSYCHIATRY: no depression , no anxiety  ALL OTHER  ROS negative        Vital Signs Last 24 Hrs  T(C): 36.6 (26 Sep 2020 05:04), Max: 36.6 (25 Sep 2020 20:50)  T(F): 97.8 (26 Sep 2020 05:04), Max: 97.8 (25 Sep 2020 20:50)  HR: 82 (26 Sep 2020 05:04) (79 - 83)  BP: 129/75 (26 Sep 2020 05:04) (120/69 - 129/75)  BP(mean): --  RR: 16 (26 Sep 2020 05:04) (16 - 18)  SpO2: 95% (26 Sep 2020 05:04) (95% - 96%)    ________________________________________________  PHYSICAL EXAM:  GENERAL: grossly icteric NAD  NERVOUS SYSTEM:c more alert and awake, less confused; followed commands,  CHEST/LUNG: Clear to auscultation anterolaterally  HEART: Regular rate and rhythm; No murmurs, rubs, or gallops  ABDOMEN: Nontender, distended with marked ascites; Bowel sounds present  EXTREMITIES:  , No clubbing, cyanosis, or edema  SKIN: No rashes or lesions    _________________________________________________  LABS:                        7.8    6.33  )-----------( 60       ( 26 Sep 2020 07:00 )             23.4     09-26    135  |  103  |  8   ----------------------------<  119<H>  3.4<L>   |  28  |  0.68    Ca    8.1<L>      26 Sep 2020 07:00  Phos  2.7     09-26  Mg     1.6     09-26    TPro  7.1  /  Alb  2.0<L>  /  TBili  7.8<H>  /  DBili  x   /  AST  63<H>  /  ALT  38  /  AlkPhos  131<H>  09-26    PT/INR - ( 25 Sep 2020 07:21 )   PT: 50.4 sec;   INR: 4.63 ratio             CAPILLARY BLOOD GLUCOSE            RADIOLOGY & ADDITIONAL TESTS:    Imaging Personally Reviewed:  YES/NO    Consultant(s) Notes Reviewed:   YES/ No    Care Discussed with Consultants :     Plan of care was discussed with patient and /or primary care giver; all questions and concerns were addressed and care was aligned with patient's wishes.     INCOMPLETE NOTE    MEDICAL ATTENDING NOTE  Patient is a 34y old  Male who presents with a chief complaint of Alcohol abuse (25 Sep 2020 14:27)      Patient is a 34y old  Male who presents with acute alcohol withdrawal and advanced cirrhosis found to have liver failure hospital course complicated by DTs needing ICU admission, downgraded to floor  Patient was clinically doing poorly last few days but significant improvement from yesterday. Off Restraints, Not needing Ativan. was resumed on Thiamine and added Prednisolone.    Patient still jaundiced but less icteric, easily arousable urinating but decreased output. no active bleeding noted.     MEDICATIONS  (STANDING):  chlordiazePOXIDE 25 milliGRAM(s) Oral every 12 hours  ergocalciferol 21719 Unit(s) Oral <User Schedule>  folic acid 1 milliGRAM(s) Oral daily  influenza   Vaccine 0.5 milliLiter(s) IntraMuscular once  lactulose Syrup 20 Gram(s) Oral four times a day  multivitamin 1 Tablet(s) Oral daily  mupirocin 2% Nasal 1 Application(s) Nasal two times a day  pantoprazole  Injectable 40 milliGRAM(s) IV Push two times a day  prednisoLONE    3 mG/mL Solution (ORAPRED) 40 milliGRAM(s) Oral daily  rifAXIMin 550 milliGRAM(s) Oral two times a day  thiamine Injectable 100 milliGRAM(s) IntraMuscular daily    MEDICATIONS  (PRN):  LORazepam   Injectable 2 milliGRAM(s) IV Push every 4 hours PRN Agitation      __________________________________________________  REVIEW OF SYSTEMS:    CONSTITUTIONAL: No fever,   NECK: No pain or stiffness  RESPIRATORY: No cough; No shortness of breath  CARDIOVASCULAR: No chest pain, no palpitations  GASTROINTESTINAL: No pain. No nausea or vomiting; No diarrhea   NEUROLOGICAL:  no tremors noted  MUSCULOSKELETAL: No joint pain, no muscle pain  ALL OTHER  ROS negative        Vital Signs Last 24 Hrs  T(C): 36.6 (26 Sep 2020 05:04), Max: 36.6 (25 Sep 2020 20:50)  T(F): 97.8 (26 Sep 2020 05:04), Max: 97.8 (25 Sep 2020 20:50)  HR: 82 (26 Sep 2020 05:04) (79 - 83)  BP: 129/75 (26 Sep 2020 05:04) (120/69 - 129/75)  RR: 16 (26 Sep 2020 05:04) (16 - 18)  SpO2: 95% (26 Sep 2020 05:04) (95% - 96%)    ________________________________________________  PHYSICAL EXAM:  GENERAL: grossly icteric NAD  NERVOUS SYSTEM: more alert and awake, less confused; followed commands,  CHEST/LUNG: Clear to auscultation anterolaterally  HEART: Regular rate and rhythm; No murmurs, rubs, or gallops  ABDOMEN: Nontender, distended with marked ascites; Bowel sounds present  EXTREMITIES:  , No clubbing, cyanosis, or edema  SKIN: No rashes or lesions    _________________________________________________  LABS:                        7.8    6.33  )-----------( 60       ( 26 Sep 2020 07:00 )             23.4     09-26    135  |  103  |  8   ----------------------------<  119<H>  3.4<L>   |  28  |  0.68    Ca    8.1<L>      26 Sep 2020 07:00  Phos  2.7     09-26  Mg     1.6     09-26    TPro  7.1  /  Alb  2.0<L>  /  TBili  7.8<H>  /  DBili  x   /  AST  63<H>  /  ALT  38  /  AlkPhos  131<H>  09-26      RADIOLOGY & ADDITIONAL TESTS:    Consultant(s) Notes Reviewed:   YES    Plan of care was discussed with patient and /or primary care giver; all questions and concerns were addressed and care was aligned with patient's wishes.

## 2020-09-26 NOTE — PROGRESS NOTE ADULT - SUBJECTIVE AND OBJECTIVE BOX
INTERVAL HPI: Patient seen and examined at bedside; Events noted; Patient c/o     PMH/PSH as above    FmHx/Sox Hx NC    ROS as above; pt is not able to provide detailed review of systems  General: Noncontributory;	Skin/Breast: NC;Ophthalmologic:NC; ENMT: NC; Respiratory and Thorax: NC; Cardiovascular: NC; 	  Gastrointestinal: NC; Genitourinary:NC; 	Musculoskeletal:NC; Neurological: NC; Psychiatric: NC; Hematology/Lymphatics: NC; Endocrine: NC; Allergic/Immunologic: NC    MEDICATIONS  (STANDING):  chlordiazePOXIDE 25 milliGRAM(s) Oral every 12 hours  ergocalciferol 61034 Unit(s) Oral <User Schedule>  folic acid 1 milliGRAM(s) Oral daily  influenza   Vaccine 0.5 milliLiter(s) IntraMuscular once  lactulose Syrup 20 Gram(s) Oral four times a day  multivitamin 1 Tablet(s) Oral daily  mupirocin 2% Nasal 1 Application(s) Nasal two times a day  pantoprazole  Injectable 40 milliGRAM(s) IV Push two times a day  prednisoLONE    3 mG/mL Solution (ORAPRED) 40 milliGRAM(s) Oral daily  rifAXIMin 550 milliGRAM(s) Oral two times a day  thiamine Injectable 100 milliGRAM(s) IntraMuscular daily    MEDICATIONS  (PRN):  LORazepam   Injectable 2 milliGRAM(s) IV Push every 4 hours PRN Agitation      Vital Signs Last 24 Hrs  T(C): 36.6 (26 Sep 2020 05:04), Max: 36.6 (25 Sep 2020 20:50)  T(F): 97.8 (26 Sep 2020 05:04), Max: 97.8 (25 Sep 2020 20:50)  HR: 82 (26 Sep 2020 05:04) (79 - 83)  BP: 129/75 (26 Sep 2020 05:04) (120/69 - 129/75)  BP(mean): --  RR: 16 (26 Sep 2020 05:04) (16 - 18)  SpO2: 95% (26 Sep 2020 05:04) (95% - 96%)  _________________  PHYSICAL EXAM:  ---------------------------  GEN: NAD; NC/AT; A and O x   LUNGS: no wheezing; decreased bilateral air entry; no use of accessory muscles for breathing  HEART: Nl S1 S2; no M   ABDOMEN: Soft, Nontender, non distended  EXTREMITIES: no cyanosis; no edema; warm and dry  NERVOUS SYSTEM:  Awake and alert; no focal neuro  deficits    _________________________________________________  LABS:                        7.8    6.33  )-----------( 60       ( 26 Sep 2020 07:00 )             23.4     09-26    135  |  103  |  8   ----------------------------<  119<H>  3.4<L>   |  28  |  0.68    Ca    8.1<L>      26 Sep 2020 07:00  Phos  2.7     09-26  Mg     1.6     09-26    TPro  7.1  /  Alb  2.0<L>  /  TBili  7.8<H>  /  DBili  x   /  AST  63<H>  /  ALT  38  /  AlkPhos  131<H>  09-26    PT/INR - ( 25 Sep 2020 07:21 )   PT: 50.4 sec;   INR: 4.63 ratio           CAPILLARY BLOOD GLUCOSE                     INTERVAL HPI: Patient seen and examined at bedside; Events noted; Patient w/o complaint    PMH/PSH as above    FmHx/Soc Hx NC    ROS as above; pt is not able to provide detailed review of systems  General: Noncontributory; Skin/Breast: NC;Ophthalmologic:NC; ENMT: NC; Respiratory and Thorax: NC; Cardiovascular: NC; 	  Gastrointestinal: NC; Genitourinary:NC; 	Musculoskeletal:NC; Neurological: NC; Psychiatric: NC; Hematology/Lymphatics: NC; Endocrine: NC; Allergic/Immunologic: NC    MEDICATIONS  (STANDING):  chlordiazePOXIDE 25 milliGRAM(s) Oral every 12 hours  ergocalciferol 50820 Unit(s) Oral <User Schedule>  folic acid 1 milliGRAM(s) Oral daily  influenza   Vaccine 0.5 milliLiter(s) IntraMuscular once  lactulose Syrup 20 Gram(s) Oral four times a day  multivitamin 1 Tablet(s) Oral daily  mupirocin 2% Nasal 1 Application(s) Nasal two times a day  pantoprazole  Injectable 40 milliGRAM(s) IV Push two times a day  prednisoLONE    3 mG/mL Solution (ORAPRED) 40 milliGRAM(s) Oral daily  rifAXIMin 550 milliGRAM(s) Oral two times a day  thiamine Injectable 100 milliGRAM(s) IntraMuscular daily    MEDICATIONS  (PRN):  LORazepam   Injectable 2 milliGRAM(s) IV Push every 4 hours PRN Agitation      Vital Signs Last 24 Hrs  T(C): 36.6 (26 Sep 2020 05:04), Max: 36.6 (25 Sep 2020 20:50)  T(F): 97.8 (26 Sep 2020 05:04), Max: 97.8 (25 Sep 2020 20:50)  HR: 82 (26 Sep 2020 05:04) (79 - 83)  BP: 129/75 (26 Sep 2020 05:04) (120/69 - 129/75)  BP(mean): --  RR: 16 (26 Sep 2020 05:04) (16 - 18)  SpO2: 95% (26 Sep 2020 05:04) (95% - 96%)  _________________  PHYSICAL EXAM:  ---------------------------  GEN: NAD; NC/AT; A and O x 3  LUNGS: no wheezing; decreased bilateral air entry; no use of accessory muscles for breathing  HEART: Nl S1 S2; no M   ABDOMEN: Soft, Nontender, non distended  EXTREMITIES: no cyanosis; no edema; warm and dry  NERVOUS SYSTEM:  Awake and alert; no focal neuro  deficits    _________________________________________________  LABS:                        7.8    6.33  )-----------( 60       ( 26 Sep 2020 07:00 )             23.4     09-26    135  |  103  |  8   ----------------------------<  119<H>  3.4<L>   |  28  |  0.68    Ca    8.1<L>      26 Sep 2020 07:00  Phos  2.7     09-26  Mg     1.6     09-26    TPro  7.1  /  Alb  2.0<L>  /  TBili  7.8<H>  /  DBili  x   /  AST  63<H>  /  ALT  38  /  AlkPhos  131<H>  09-26    PT/INR - ( 25 Sep 2020 07:21 )   PT: 50.4 sec;   INR: 4.63 ratio      Dir Antiglob IgG Interpretation: POS (09.22.20 @ 07:46) Dir Antiglob Polyspecific Interpretation: POS (09.22.20 @ 07:46)    Lactate Dehydrogenase, Serum in AM (09.24.20 @ 07:27)   Lactate Dehydrogenase, Serum: 405 U/L   Haptoglobin, Serum in AM (09.24.20 @ 10:28)   Haptoglobin, Serum: <20: Test Repeated mg/dL     CAPILLARY BLOOD GLUCOSEDir Antiglob IgG Interpretation: POS (09.22.20 @ 07:46)                      INTERVAL HPI: Patient seen and examined at bedside; Events noted; Patient w/o complaint    PMH/PSH as above    FmHx/Soc Hx NC    ROS as above; pt is not able to provide detailed review of systems  General: Noncontributory; Skin/Breast: NC;Ophthalmologic:NC; ENMT: NC; Respiratory and Thorax: NC; Cardiovascular: NC; 	  Gastrointestinal: NC; Genitourinary:NC; 	Musculoskeletal:NC; Neurological: NC; Psychiatric: NC; Hematology/Lymphatics: NC; Endocrine: NC; Allergic/Immunologic: NC    MEDICATIONS  (STANDING):  chlordiazePOXIDE 25 milliGRAM(s) Oral every 12 hours  ergocalciferol 89127 Unit(s) Oral <User Schedule>  folic acid 1 milliGRAM(s) Oral daily  influenza   Vaccine 0.5 milliLiter(s) IntraMuscular once  lactulose Syrup 20 Gram(s) Oral four times a day  multivitamin 1 Tablet(s) Oral daily  mupirocin 2% Nasal 1 Application(s) Nasal two times a day  pantoprazole  Injectable 40 milliGRAM(s) IV Push two times a day  prednisoLONE    3 mG/mL Solution (ORAPRED) 40 milliGRAM(s) Oral daily  rifAXIMin 550 milliGRAM(s) Oral two times a day  thiamine Injectable 100 milliGRAM(s) IntraMuscular daily    MEDICATIONS  (PRN):  LORazepam   Injectable 2 milliGRAM(s) IV Push every 4 hours PRN Agitation      Vital Signs Last 24 Hrs  T(C): 36.6 (26 Sep 2020 05:04), Max: 36.6 (25 Sep 2020 20:50)  T(F): 97.8 (26 Sep 2020 05:04), Max: 97.8 (25 Sep 2020 20:50)  HR: 82 (26 Sep 2020 05:04) (79 - 83)  BP: 129/75 (26 Sep 2020 05:04) (120/69 - 129/75)  BP(mean): --  RR: 16 (26 Sep 2020 05:04) (16 - 18)  SpO2: 95% (26 Sep 2020 05:04) (95% - 96%)  _________________  PHYSICAL EXAM:  ---------------------------  GEN: NAD; NC/AT; A and O x 3; jaundiced  LUNGS: no wheezing; decreased bilateral air entry; no use of accessory muscles for breathing  HEART: Nl S1 S2; no M   ABDOMEN: Soft, Nontender, non distended  EXTREMITIES: no cyanosis; no edema; warm and dry  NERVOUS SYSTEM:  Awake and alert; no focal neuro  deficits    _________________________________________________  LABS:                        7.8    6.33  )-----------( 60       ( 26 Sep 2020 07:00 )             23.4     09-26    135  |  103  |  8   ----------------------------<  119<H>  3.4<L>   |  28  |  0.68    Ca    8.1<L>      26 Sep 2020 07:00  Phos  2.7     09-26  Mg     1.6     09-26    TPro  7.1  /  Alb  2.0<L>  /  TBili  7.8<H>  /  DBili  x   /  AST  63<H>  /  ALT  38  /  AlkPhos  131<H>  09-26    PT/INR - ( 25 Sep 2020 07:21 )   PT: 50.4 sec;   INR: 4.63 ratio      Dir Antiglob IgG Interpretation: POS (09.22.20 @ 07:46) Dir Antiglob Polyspecific Interpretation: POS (09.22.20 @ 07:46)    Lactate Dehydrogenase, Serum in AM (09.24.20 @ 07:27)   Lactate Dehydrogenase, Serum: 405 U/L   Haptoglobin, Serum in AM (09.24.20 @ 10:28)   Haptoglobin, Serum: <20: Test Repeated mg/dL     CAPILLARY BLOOD GLUCOSEDir Antiglob IgG Interpretation: POS (09.22.20 @ 07:46)

## 2020-09-26 NOTE — PROGRESS NOTE ADULT - ASSESSMENT
Patient is a 35yo Male with Liver Cirrhosis, ETOH abuse, esophageal varices p/w weakness with nausea, hematemesis with blood in stool. Pt admitted with acute decompensated cirrhosis and ETOH withdrawal with anemia/ thrombocytopenia s/p brief ICU stay for worsening ETOH withdrawal.  s/p 3.5L paracentesis on 9/17. Neg for SBP. Pt had LEDA which resolved with albumin gtt. Pt found to have proteinuria and SAAG <1.1. Nephrology consulted for proteinuria.       1. Proteinuria- with low SAAG r/o nephrotic syndrome. Pt with no overt edema; signs of nephrotic syndrome. Cirrhotics usually have no or little proteinuria unless associated with kidney disease; ?secondary IgA nephropathy 2/2 ETOH abuse is a possibility, however pt had no microscopic hematuria on UA. UA with 30 protein an no blood. Spot UPr/ Cr 0.7 & 24 hr proteinuria 0.97; non nephrotic range.    Recc to repeat SAAG ?error.  Pt would be a poor candidate for biopsy with anemia and thrombocytopenia; high risk of bleeding. Recc to start ARB or ACEi on discharge; if renal function is stable.     2. Decompensated Cirrhosis- Plan as per Hepatology/ GI  3. Anemia/ Thrombocytopenia- due to liver cirrhosis/ ETOH abuse s/p 5 units prbc (total during hospitalization). Plan as per primary team  4. Vitamin D deficiency- 25 OH vit D 8 c/w weekly Ergo 50 k units  Potassium repleted

## 2020-09-26 NOTE — PROGRESS NOTE ADULT - ASSESSMENT
A/P:   Alcohol withdrawal with suspected Alcoholic Hepatitis and Metabolic encephalopathy with suspected ??Wernicke Encephalopathy resolving well   Acute decompensation of Cirrhosis of liver with ascites and esophageal varices   Anemia etiology unclear : s/p possible upper GI loss doubt active bleed at this time  Thrombocytopenia due to liver failure improving slowly  Elevated INR due to Liver dysfunction  Vitamin D deficiency  Proteinuria     Plan:   D/C Librium; Ativan PRN but avoid as much as feasible  H/H remain stable;   Continue, Protonix switch to oral; off Octreotide drip,   Continue Prednisolone; Trial of Prednisolone at 40 mg daily for 7 days ;   If no clinical improvement will discontinue steroids currently improving clinically and lab wise  Holding off BB for now  As d/w Dr. Bradley if acutely decompensate may need transfer to [place] [facility] team.   Monitor CBC, LFT and Total and Direct Bilirubin closely  Cont Lactulose rifaximin, Titrate Lactulose for 3 BMs daily  Bilirubin trending down for first time in few days with clinical improvement today  If clinically remain stable will switch PPI to oral in AM  Aspiration, fall and seizure precautions; If significant agitation will need restraints but will avoid as much as possible  Continue enhanced supervision  No chemical DVT ppx due to anemia, suspected bleeding diathesis and thrombocytopenia  Continue Thiamine IM for potential Wernicke's  Patient is urinating; Bladder does not appear distended; hold off montoya  INR trending up; No active bleed; will give Vitamin K 10 mg x 2 days; may help to correct nutritional deficiency.     Patient has guarded prognosis and high risk for mortality

## 2020-09-26 NOTE — PROGRESS NOTE ADULT - ASSESSMENT
Problem #1 Anemia - normocytic, hypochromic; stable post PRBC; no clinical evidence of active bleeding  -QUEENIE positive w/ elevated LDH and low haptoglobin; albumin is low so low haptoglobin maybe reflection of poor hepatic synthetic function  -given  likelihood of presence of AIHA, start trial of prednisone 1 mg/kg po as long as there is no contraindication from active infection or other co-morbidities    Problem #2 Thrombocytopenia - no schistocytosis  -etiology likely cirrhosis/ETOH/medications  -s/p 1u Plts on 9/18  -transfuse SDP if <20k in liver disease   avoid nonessential meds    Problem #3 Alcoholic cirrhosis w/ esophageal varices- h/o UGIB x2  -significant hyperbilirubinemia and coagulopathy  -no need for FFP unless pt has clinically significant bleeding    Overall prognosis poor; will continue to follow; call with questions [phone]   Problem #1 Anemia - chronic and multifactorial (including GIB in the past), normocytic, hypochromic; stable Hg post PRBC; no clinical evidence of active bleeding at this time   -QUEENIE positive (9/22) w/ elevated LDH and low haptoglobin; albumin is low so low haptoglobin maybe reflection of poor hepatic synthetic function; LDH elevation may also be due to severe liver dysfunction  -given  likelihood of presence of AIHA, start trial of prednisone 1 mg/kg po as long as there is no contraindication from active infection or other co-morbidities  -it is not clear that there is significant hemolysis contributing to current anemia but prednisone trial with daily LDH and Hg monitoring may elucidate role of AIHA    Problem #2 Thrombocytopenia - no schistocytosis  -etiology likely cirrhosis/ETOH/medications  -s/p 1u Plts on 9/18  -transfuse SDP if <20k in liver disease   avoid nonessential meds    Problem #3 Alcoholic cirrhosis w/ esophageal varices- h/o UGIB x2  -significant hyperbilirubinemia and coagulopathy  -no need for FFP unless pt has clinically significant bleeding    Overall prognosis poor; will continue to follow; call with questions [phone]   Problem #1 Anemia - chronic and multifactorial (including GIB in the past), normocytic, hypochromic; stable Hg post PRBC; no clinical evidence of active bleeding at this time   -QUEENIE positive (9/22) w/ elevated LDH and low haptoglobin; albumin is low so low haptoglobin maybe reflection of poor hepatic synthetic function; LDH elevation may also be due to severe liver dysfunction  -given  likelihood of presence of AIHA, continue trial of prednisone 1 mg/kg po as long as there is no contraindication from active infection or other co-morbidities  -it is not clear that there is significant hemolysis contributing to current anemia but prednisone trial with daily LDH and Hg monitoring may elucidate role of AIHA    Problem #2 Thrombocytopenia - no schistocytosis  -etiology likely cirrhosis/ETOH/medications  -s/p 1u Plts on 9/18  -transfuse SDP if <20k in liver disease   avoid nonessential meds    Problem #3 Alcoholic cirrhosis w/ esophageal varices- h/o UGIB x2  -significant hyperbilirubinemia and coagulopathy  -no need for FFP unless pt has clinically significant bleeding    Overall prognosis poor; will continue to follow; call with questions [phone]

## 2020-09-26 NOTE — PROGRESS NOTE ADULT - SUBJECTIVE AND OBJECTIVE BOX
[facility] NEPHROLOGY- PROGRESS NOTE    Patient is a 35yo Male with Liver Cirrhosis, ETOH abuse, esophageal varices p/w weakness with nausea, hematemesis with blood in stool. Pt admitted with acute decompensated cirrhosis and ETOH withdrawal with anemia/ thrombocytopenia s/p brief ICU stay for worsening ETOH withdrawal.  s/p 3.5L paracentesis on 9/17. Neg for SBP. Pt had LEDA which resolved with albumin gtt. Pt found to have proteinuria and SAAG <1.1. Nephrology consulted for proteinuria.     Hospital Medications: Medications reviewed.    REVIEW OF SYSTEMS: +generalized weakness +lower back pain  Denies any SOB, pain, n/v/d or abd pain.     VITALS:  T(F): 97.8 (09-26-20 @ 05:04), Max: 97.8 (09-25-20 @ 20:50)  HR: 82 (09-26-20 @ 05:04)  BP: 129/75 (09-26-20 @ 05:04)  RR: 16 (09-26-20 @ 05:04)  SpO2: 95% (09-26-20 @ 05:04)  Wt(kg): --    09-25 @ 07:01  -  09-26 @ 07:00  --------------------------------------------------------  IN: 0 mL / OUT: 100 mL / NET: -100 mL      PHYSICAL EXAM:  Gen More awake  HEENT: +icterus  Cards: RRR, +S1/S2,  Resp: CTA ant  GI: soft, NT mod distention  : +montoya  Extremities: no LE edema B/L      LABS:  09-26    135  |  103  |  8   ----------------------------<  119<H>  3.4<L>   |  28  |  0.68    Ca    8.1<L>      26 Sep 2020 07:00  Phos  2.7     09-26  Mg     1.6     09-26    TPro  7.1  /  Alb  2.0<L>  /  TBili  7.8<H>  /  DBili      /  AST  63<H>  /  ALT  38  /  AlkPhos  131<H>  09-26    Creatinine Trend: 0.68 <--, 0.75 <--, 0.65 <--, 0.89 <--, 0.62 <--, 0.64 <--, 0.61 <--                        7.8    6.33  )-----------( 60       ( 26 Sep 2020 07:00 )             23.4     Urine Studies:    Creatinine, Random Urine: 284 mg/dL (09-23 @ 14:00)  Osmolality, Random Urine: 574 mos/kg (09-23 @ 14:00)  Sodium, Random Urine: <5 mmol/L (09-23 @ 14:00)

## 2020-09-26 NOTE — PROGRESS NOTE ADULT - ATTENDING COMMENTS
[facility] NEPHROLOGY  Rui Shore M.D.  Cal Montejo D.O.  Cristiana Vigil M.D.  Carie Mclean, MSN, ANP-C    Telephone: [phone]  Facsimile: [phone]    [address] [place] [address]

## 2020-09-26 NOTE — PROGRESS NOTE ADULT - ASSESSMENT
Hospital course:  33yo male with hx of chronic alcohol abuse (actively drinking since age 20), decompensated cirrhosis with ascites (non-compliant with diuretics, no hx of LVP), hx of small esophageal varices and portal hypertensive gastropathy (last EGD 6/2020), (non-compliant with BB, never banded), with recent hospitalization (6/2020 for leg swelling, anemia) presented on 9/15/2020 with malaise, generalized weakness for 1-2 weeks, found to have worsening liver function, with MELD score 32 compared to 23 on prior admission, electrolyte abnormalities, and questionable blood in stool (reported black stools) and/or vomitus, but without any signs of overt bleeding in hospital. On admission had no signs of HE and no LEDA. Acute worsening might have been caused by heavy alcohol intake (high blood alcohol on admission), SBP was r/o by paracentesis, viral and infectious workup negative so far, denied taking any medications or herbal supplements.  He became agitated and was transferred to ICU on 9/17/2020 and was treated for alcohol withdrawal. He became hypotensive and bradycardic on Precedex.  Developed LEDA, but improved with volume expansion with albumin. Anemic, thrombocytopenic, now s/p 1 U PLT, and 4U PRBC (9/18/19/20/21), without evidence of overt GI bleeding (FOBT neg too) and with no evidence of bleeding on CT abd/pelvis and now stable Hb. Patient also noted to have proteinuria, and unexpectedly ascites analysis showed SAAG < 1.1, with low total protein despite that the patient has evidence of portal hypertension (EV, portal HTN gastropathy, recanalization of umbilical vein), but urine protein/Cr ration, 24 hr protein did not reach nephrotic level, nephrology consult appreciated. Patient is s/p NAC and on prednisolone (started on 9/24 after relative contraindications r/o / resolved). CT head neg for bleeding.     A/P:  # Decompensated cirrhosis w ascites; small EV; MELD 33   -  would need transplant referral, but unfortunately still active drinker, and being treated for  EtOH withdrawal (was d/w [facility] transplant hepatology service attending earlier this week)    #ACLF - cirrhosis with development of LEDA during this hospitalization( 0.53-1.05) along with mental status change (EtOH withdrawal, sedation +/- HE); etiology possibly heavy ETOH intake (elevated blood alcohol on admission; AH can be co-existent with cirrhosis)  - no change in MELD, but some further improvement in bilirubin, INR still up-trending without evidence of active bleeding (continue to closely monitor, try Vitamin K again in case nutritional component too)  - c/w monitoring LFTs closely, including INR  - avoid hepatotoxic meds, avoid NSAIDs  - will assess response for prednisolone at day#7 (Lille score), if no improvement, no benefit to continue    #EtOH withdrawal + HE   - continue close monitoring of mental status and neurological exam  - c/w lactulose and please make sure to titrate to have 2-3 soft BM/day   - c/w rifaximin 550 mg bid  - avoid long acting BZD   - C/w CIWA protocol per primary team, c/w folic, thiamin, monitor electrolytes    #Ascites- no SBP; despite signs of CSPH (EV, portal  hypertensive gastropathy on EGD, recanalization of umbilical vein), unexpectedly  SAAG was < 1.1 (1.0), nephrology consult appreciated  - Would continue holding diuretics for now (recovering from LEDA, recent HE, acute decompensation); not tense ascites, no need for paracentesis for now, will assess daily  - Will need to continue with long term SBP prophylaxis (ascites protein < 1.5 g/dl and CPT >9 and bilirubin >3)    #Anemia, thrombocytopenia, coagulopathy  - possibly due to cirrhosis + EtOH caused BM suppression - however b/o recurrent slow drop in Hb after transfusions and required total 4 U PRBC 9/18-9/21 without any evidence of overt bleeding, hematology was consulted, appreciated  -f/u hematology recommendations     #Small EV and portal hypertensive gastropathy on last EGD (no report available whether there were high risk signs, but he was started on BB in June), was non compliant w BB  - endoscopy was deferred during this admission (was no sign of overt active GI bleeding, no hematemesis, melena or hematochezia, FOBT neg), d/w Dr. Gerber (GI is on board)  - keep Hb >7, keep T/S active, 2 large bore iv lines; c/w monitoring H/H and call GI if any bleeding   - c/w holding BB for now, will need to resume prior discharge  - remains on PPI (as being on steroid), consider to switch to po    #LEDA (>0.3 mg /dl above his baseline w/in 48 hrs; /0.53-1.05 on 9/18)- responded to volume expansion (1.05-0.61), but not fully  back to baseline yet (today 0.68, his baseline ~ 0.5)  - C/w close monitoring renal function, electrolytes including Mg and P, and urine output;   - keep MAP >65-70mmHg  - continue holding diuretics    #Etiology of cirrhosis  - while most likely ETOH cirrhosis, given the young age additional workup was sent  - ceruloplasmin < 20, (could be falsely low in end stage liver or protein-losing); 24hr urine copper wnl, <40;  bedside opthalmology exam?  -[id number] nl;  ferritin 331 -EtOH  - total IgG elevated 4018, but LONNY neg, anti SLA neg, anti-SMA neg, anti-LKM neg, can repeat Immunglobulin panel     #HCM - see prior notes  -consider nutritionist intervention / teaching    Poor prognosis, repeatedly counseled patient about alcohol abstinence. Family involvement, discussion could be helpful. Psychiatry /  involvement?      Will continue to follow  D/w primary team  Thank you for the consult.

## 2020-09-26 NOTE — PROGRESS NOTE ADULT - SUBJECTIVE AND OBJECTIVE BOX
Chief Complaint:  Patient is a 34y old  Male who presents with a chief complaint of Alcohol abuse (26 Sep 2020 14:07)     324007    Reason for consult: alcoholic cirrhosis    Interval Events: Patient was seen and examined at bedside. Fully awake, alert, oriented, watching TV. Denies any new complaints, denies fever, chills, abdominal pain or tenderness, nausea, vomiting, cough, CP, SOB, dysuria, diarrhea. His bilirubin improving, but INR increased further. No evidence of bleeding.     Hospital Medications:  ergocalciferol 45009 Unit(s) Oral <User Schedule>  folic acid 1 milliGRAM(s) Oral daily  influenza   Vaccine 0.5 milliLiter(s) IntraMuscular once  lactulose Syrup 20 Gram(s) Oral four times a day  LORazepam   Injectable 2 milliGRAM(s) IV Push every 4 hours PRN  multivitamin 1 Tablet(s) Oral daily  mupirocin 2% Nasal 1 Application(s) Nasal two times a day  pantoprazole    Tablet 40 milliGRAM(s) Oral two times a day  phytonadione   Solution 10 milliGRAM(s) Oral daily  prednisoLONE    3 mG/mL Solution (ORAPRED) 40 milliGRAM(s) Oral daily  rifAXIMin 550 milliGRAM(s) Oral two times a day  thiamine Injectable 100 milliGRAM(s) IntraMuscular daily      ROS:   General:  No  fevers, chills, night sweats, fatigue  Eyes:  Good vision, no reported pain  ENT:  No sore throat, pain, runny nose  CV:  No pain, palpitations  Pulm:  No dyspnea, cough  GI:  See HPI, otherwise negative  :  No  incontinence, nocturia or dysuria, discharge  Muscle:  No pain, but generalized weakness  Neuro:  AAOX3  Psych:  No insomnia, mood problems, depression  Endocrine:  No polyuria, polydipsia, cold/heat intolerance  Heme:  Ecchymosis, easy bruisability  Skin:  No rash    PHYSICAL EXAM:   Vital Signs:  Vital Signs Last 24 Hrs  T(C): 37.1 (26 Sep 2020 14:27), Max: 37.1 (26 Sep 2020 14:27)  T(F): 98.7 (26 Sep 2020 14:27), Max: 98.7 (26 Sep 2020 14:27)  HR: 100 (26 Sep 2020 14:27) (79 - 100)  BP: 110/72 (26 Sep 2020 14:27) (110/72 - 129/75)  BP(mean): --  RR: 18 (26 Sep 2020 14:) (16 - 18)  SpO2: 98% (26 Sep 2020 14:) (95% - 98%)  Daily     Daily Weight in k.1 (26 Sep 2020 05:04)    GENERAL: no acute distress  NEURO: alert, oriented x3, no asterixis  HEENT: icteric sclera  CHEST: no respiratory distress, no accessory muscle use  CARDIAC: regular rate, rhythm  ABDOMEN: soft, non-tender, moderately distended, not tense, + fluid wave, no rebound or guarding  EXTREMITIES: warm, well perfused, no edema  SKIN: Ecchymoses    LABS: reviewed                        7.8    6.33  )-----------( 60       ( 26 Sep 2020 07:00 )             23.4         135  |  103  |  8   ----------------------------<  119<H>  3.4<L>   |  28  |  0.68    Ca    8.1<L>      26 Sep 2020 07:00  Phos  2.7       Mg     1.6         TPro  7.1  /  Alb  2.0<L>  /  TBili  7.8<H>  /  DBili  x   /  AST  63<H>  /  ALT  38  /  AlkPhos  131<H>      LIVER FUNCTIONS - ( 26 Sep 2020 07:00 )  Alb: 2.0 g/dL / Pro: 7.1 g/dL / ALK PHOS: 131 U/L / ALT: 38 U/L DA / AST: 63 U/L / GGT: x             Interval Diagnostic Studies: see sunrise for full report

## 2020-09-26 NOTE — PROGRESS NOTE ADULT - ATTENDING COMMENTS
A/P:   Alcohol withdrawal with suspected Alcoholic Hepatitis and Metabolic encephalopathy with suspected ??Wernicke Encephalopathy resolving well   Acute decompensation of Cirrhosis of liver with ascites and esophageal varices   Anemia etiology unclear : s/p possible upper GI loss doubt active bleed at this time  Thrombocytopenia due to liver failure improving slowly  Elevated INR due to Liver dysfunction  Vitamin D deficiency  Proteinuria     Plan:   Continue Librium q 12 hrs today; if continue to do well will discontinue after AM dose;   Ativan PRN but avoid as much as feasible  H/H remain stable;   Continue, Protonix IV; D/C Octreotide drip,   Continue NAC and Prednisolone  Trial of Prednisolone at 40 mg daily for 7 days ;   If no clinical improvement will discontinue steroids  Resume Rifaximin; inadvertently fell off medication orders.   Holding off BB for now  As d/w Dr. Bradley if acutely decompensate may need transfer to [place] [facility] team.   Monitor CBC, LFT and Total and Direct Bilirubin closely  Cont Lactulose rifaximin, Titrate Lactulose for 3 BMs daily  Bilirubin trending down for first time in few days with clinical improvement today  If clinically remain stable will switch PPI to oral in AM  Aspiration, fall and seizure precautions; If significant agitation will need restraints but will avoid as much as possible  Continue enhanced supervision  No chemical DVT ppx due to anemia, suspected bleeding diathesis and thrombocytopenia  Continue Thiamine IM for potential Wernicke's  Patient with failed TOV; Bladder scan showed 900cc; Informed by PGY1 on call Straight Cath done got only 100cc likely bladder scan showing ascitic fluid. Will hold off Nicolas unless absolutely necessary    Patient has guarded prognosis and high risk for mortality    Discussed with PGY1 Dr. Rose and PGY 3 Dr. Grande and RN

## 2020-09-27 NOTE — PROGRESS NOTE ADULT - SUBJECTIVE AND OBJECTIVE BOX
[facility] NEPHROLOGY- PROGRESS NOTE    Patient is a 33yo Male with Liver Cirrhosis, ETOH abuse, esophageal varices p/w weakness with nausea, hematemesis with blood in stool. Pt admitted with acute decompensated cirrhosis and ETOH withdrawal with anemia/ thrombocytopenia s/p brief ICU stay for worsening ETOH withdrawal.  s/p 3.5L paracentesis on 9/17. Neg for SBP. Pt had LEDA which resolved with albumin gtt. Pt found to have proteinuria and SAAG <1.1. Nephrology consulted for proteinuria.     Hospital Medications: Medications reviewed.    REVIEW OF SYSTEMS: +generalized weakness +lower back pain  Denies any SOB, pain, n/v/d or abd pain.       VITALS:  T(F): 97.7 (09-27-20 @ 14:49), Max: 98.3 (09-26-20 @ 20:16)  HR: 84 (09-27-20 @ 14:49)  BP: 131/77 (09-27-20 @ 14:49)  RR: 19 (09-27-20 @ 14:49)  SpO2: 96% (09-27-20 @ 14:49)  Wt(kg): --    09-26 @ 07:01  -  09-27 @ 07:00  --------------------------------------------------------  IN: 0 mL / OUT: 650 mL / NET: -650 mL    09-27 @ 07:01  -  09-27 @ 15:28  --------------------------------------------------------  IN: 0 mL / OUT: 400 mL / NET: -400 mL      PHYSICAL EXAM:  Gen More awake  HEENT: +icterus  Cards: RRR, +S1/S2,  Resp: CTA ant  GI: soft, NT mod distention  : +montoya  Extremities: no LE edema B/L      LABS:  09-27    136  |  102  |  9   ----------------------------<  124<H>  3.2<L>   |  28  |  0.67    Ca    8.1<L>      27 Sep 2020 07:13  Phos  2.3     09-27  Mg     1.5     09-27    TPro  7.1  /  Alb  2.1<L>  /  TBili  7.5<H>  /  DBili      /  AST  70<H>  /  ALT  39  /  AlkPhos  142<H>  09-27    Creatinine Trend: 0.67 <--, 0.68 <--, 0.75 <--, 0.65 <--, 0.89 <--, 0.62 <--, 0.64 <--                        9.2    6.87  )-----------( 70       ( 27 Sep 2020 11:27 )             28.2     Urine Studies:    Creatinine, Random Urine: 284 mg/dL (09-23 @ 14:00)  Osmolality, Random Urine: 574 mos/kg (09-23 @ 14:00)  Sodium, Random Urine: <5 mmol/L (09-23 @ 14:00)

## 2020-09-27 NOTE — PROGRESS NOTE ADULT - PROBLEM SELECTOR PLAN 1
- improving and stable    - no signs of active bleeding  - likely 2/2 complication of cirrhosis vs chronic EtOH use  - normocytic, hypochromic. Ferritin, Fe total wnl, Fe Sat 91 (high), TIBC 121(low)    - Folate, B12 wnl  - FOBT neg  - retic 4.5, , haptoglobin <20  - s/p 4u pRBC, last on 9/21  - c/w thiamine, multivitamin, venofer   - Monitor CBC, vitals     - GI, Dr. Gerber, onboard - h/o alcoholic jaundice w/ ascites  - jaundiced, scleral icterus   - MELD 32; 3mo mortality 52.6%  - Child-Bojorquez 13  - Maddreys 178.1 (poor prognosis)   - hepatitis panel negative  - US RUQ liver cirrhosis with no focal lesion, cholelithiasis with mild GB wall thickening, no BD dilatation, negative Field's sign   - s/p paracentesis 2.8L jorden fluid drained, low suspicion for SBP   - c/w ppx ceftriaxone, rifaximin and lactulose, NAC, prednisolone 40mg    - Hepatology, Dr. Morales, onboard  - GI, Dr. Gerber, onboard

## 2020-09-27 NOTE — PROGRESS NOTE ADULT - PROBLEM SELECTOR PLAN 6
- symptomatic mucosal bleeds and easy bruisability  - likely secondary to chronic alcoholism   - peripheral smear shows no schistocytes, retic count 2.5%,  (unreliable in setting of liver dysfunction  - Platelet 68 (9/21) -> 58 -> 63 -> 73 -> 76 -> 73 -> 61 -> 60 -> 60  - s/p 1u Plts on 9/18  - monitor plts, transfuse if PC <20K, in liver disease pts

## 2020-09-27 NOTE — PROGRESS NOTE ADULT - SUBJECTIVE AND OBJECTIVE BOX
PGY-1 Progress Note discussed with attending    PAGER #: [[phone]] TILL 5:00 PM  PLEASE CONTACT ON CALL TEAM:  - On Call Team (Please refer to Alison) FROM 5:00 PM - 8:30PM  - Nightfloat Team FROM 8:30 -7:30 AM    INTERVAL HPI/OVERNIGHT EVENTS:   - Patient complains of mild back pain, contributing to flare from an old accident years ago. He states he will trying to get OOB. He also state he has no problem w/ urination.    REVIEW OF SYSTEMS:  CONSTITUTIONAL: No fever, weight loss, or fatigue  RESPIRATORY: No cough, wheezing, chills or hemoptysis; No shortness of breath  CARDIOVASCULAR: No chest pain, palpitations, dizziness, or leg swelling  GASTROINTESTINAL: No abdominal pain. No nausea, vomiting, or hematemesis; No diarrhea or constipation. No melena or hematochezia.  GENITOURINARY: No dysuria or hematuria, urinary frequency  NEUROLOGICAL: No headaches, memory loss, loss of strength, numbness, or tremors  MSK: complains of mild lower back pain    MEDICATIONS  (STANDING):  ergocalciferol 36338 Unit(s) Oral <User Schedule>  folic acid 1 milliGRAM(s) Oral daily  influenza   Vaccine 0.5 milliLiter(s) IntraMuscular once  iron sucrose IVPB 100 milliGRAM(s) IV Intermittent every 24 hours  lactulose Syrup 20 Gram(s) Oral four times a day  multivitamin 1 Tablet(s) Oral daily  mupirocin 2% Nasal 1 Application(s) Nasal two times a day  pantoprazole    Tablet 40 milliGRAM(s) Oral two times a day  phytonadione   Solution 10 milliGRAM(s) Oral daily  potassium chloride    Tablet ER 40 milliEquivalent(s) Oral every 4 hours  prednisoLONE    3 mG/mL Solution (ORAPRED) 40 milliGRAM(s) Oral daily  rifAXIMin 550 milliGRAM(s) Oral two times a day  thiamine Injectable 100 milliGRAM(s) IntraMuscular daily    MEDICATIONS  (PRN):  LORazepam   Injectable 2 milliGRAM(s) IV Push every 4 hours PRN Agitation      Vital Signs Last 24 Hrs  T(C): 36.7 (27 Sep 2020 05:10), Max: 37.1 (26 Sep 2020 14:27)  T(F): 98.1 (27 Sep 2020 05:10), Max: 98.7 (26 Sep 2020 14:27)  HR: 75 (27 Sep 2020 05:10) (75 - 100)  BP: 142/84 (27 Sep 2020 05:10) (110/72 - 142/84)  BP(mean): --  RR: 18 (27 Sep 2020 06:45) (16 - 18)  SpO2: 98% (27 Sep 2020 06:45) (92% - 98%)    PHYSICAL EXAMINATION:  GENERAL: NAD, AAOx3  HEAD: AT/NC  EYES: scleral icterus noted, but improving  NECK: supple, No JVD noted, Normal thyroid  CHEST/LUNG: CTABL; no rales, rhonchi, wheezing, or rubs  HEART: regular rate and rhythm; no murmurs, rubs, or gallops  ABDOMEN: distended and hard, but nontender; Bowel sounds present  EXTREMITIES:  2+ Peripheral Pulses, No clubbing, cyanosis, or edema  MSK: no lower back pain w/ palpation                          8.3    5.99  )-----------( 60       ( 27 Sep 2020 07:13 )             24.0     09-27    136  |  102  |  9   ----------------------------<  124<H>  3.2<L>   |  28  |  0.67    Ca    8.1<L>      27 Sep 2020 07:13  Phos  2.3     09-27  Mg     1.5     09-27    TPro  7.1  /  Alb  2.1<L>  /  TBili  7.5<H>  /  DBili  x   /  AST  70<H>  /  ALT  39  /  AlkPhos  142<H>  09-27    LIVER FUNCTIONS - ( 27 Sep 2020 07:13 )  Alb: 2.1 g/dL / Pro: 7.1 g/dL / ALK PHOS: 142 U/L / ALT: 39 U/L DA / AST: 70 U/L / GGT: x               PT/INR - ( 26 Sep 2020 09:03 )   PT: 59.3 sec;   INR: 5.10-9.00 Unable to detect clot formation by optical method. Result obtained by  mechanical  clot detection method. INR cannot be precisely calculated from mechanical  PT.  Results confirmed by alternate method.  Recommended ranges for therapeutic INR:    2.0-3.0 for most medical and surgical thromboembolic states    2.0-3.0 for atrial fibrillation    2.0-3.0 for bileaflet mechanical valve in aortic position    2.5-3.5 for mechanical heart valves    Chest 2004;126:[id number]  The presence of directthrombin inhibitors (argatroban, refludan)  may falsely increase results. ratio         PTT - ( 26 Sep 2020 09:03 )  PTT:101.6 sec  COVID-19 PCR: NotDetec (15 Sep 2020 00:23)      CAPILLARY BLOOD GLUCOSE          RADIOLOGY & ADDITIONAL TESTS:

## 2020-09-27 NOTE — PROGRESS NOTE ADULT - PROBLEM SELECTOR PROBLEM 2
Cirrhosis of liver with ascites, unspecified hepatic cirrhosis type Alcohol withdrawal syndrome with complication

## 2020-09-27 NOTE — PROGRESS NOTE ADULT - ASSESSMENT
Hospital course:  35yo male with hx of chronic alcohol abuse (actively drinking since age 20), decompensated cirrhosis with ascites (non-compliant with diuretics, no hx of LVP), hx of small esophageal varices and portal hypertensive gastropathy (last EGD 6/2020), (non-compliant with BB, never banded), with recent hospitalization (6/2020 for leg swelling, anemia) presented on 9/15/2020 with malaise, generalized weakness for 1-2 weeks, found to have worsening liver function, with MELD score 32 compared to 23 on prior admission, electrolyte abnormalities, and questionable blood in stool (reported black stools) and/or vomitus, but without any signs of overt bleeding in hospital. On admission had no signs of HE and no LEDA. Acute worsening might have been caused by heavy alcohol intake (high blood alcohol on admission), SBP was r/o by paracentesis, viral and infectious workup negative so far, denied taking any medications or herbal supplements.  He became agitated and was transferred to ICU on 9/17/2020 and was treated for alcohol withdrawal. He became hypotensive and bradycardic on Precedex.  Developed LEDA, but improved with volume expansion with albumin. Anemic, thrombocytopenic, now s/p 1 U PLT, and 4U PRBC (9/18/19/20/21), without evidence of overt GI bleeding (FOBT neg too) and with no evidence of bleeding on CT abd/pelvis and now stable Hb. Patient also noted to have proteinuria, and unexpectedly ascites analysis showed SAAG < 1.1, with low total protein despite that the patient has evidence of portal hypertension (EV, portal HTN gastropathy, recanalization of umbilical vein), but urine protein/Cr ration, 24 hr protein did not reach nephrotic level, nephrology consult appreciated. Patient is s/p NAC and on prednisolone (started on 9/24 after relative contraindications r/o / resolved). CT head neg for bleeding.       A/P:  # Decompensated cirrhosis w ascites; small EV; MELD 33  (yesterday; today INR still in process)  -  would need transplant referral, but unfortunately still active drinker, and was treated for  EtOH withdrawal during this admission, he was also not compliant with meds after June hospital discharge   (was d/w [facility] transplant hepatology service attending earlier this week)    #ACLF - cirrhosis with development of LEDA during this hospitalization( 0.53-1.05) along with mental status change (EtOH withdrawal, sedation +/- HE); etiology possibly heavy ETOH intake (elevated blood alcohol on admission; AH can be co-existent with cirrhosis)  - no change in MELD, but some further improvement in bilirubin, INR was still up-trending without evidence of active bleeding (continue to closely monitor, f/u today INR; got Vitamin K today too)  - c/w monitoring LFTs closely, including INR  - avoid hepatotoxic meds, avoid NSAIDs  - will assess response for prednisolone at day#7 (Lille score), if no improvement, no benefit to continue    #EtOH withdrawal + HE   - continue close monitoring of mental status and neurological exam  - c/w lactulose and please make sure to titrate to have 2-3 soft BM/day   - c/w rifaximin 550 mg bid  - avoid long acting BZD   - C/w CIWA protocol per primary team, c/w folic, thiamin, monitor electrolytes    #Ascites- no SBP; despite signs of CSPH (EV, portal  hypertensive gastropathy on EGD, recanalization of umbilical vein), unexpectedly  SAAG was < 1.1 (1.0), nephrology consult appreciated  - Would continue holding diuretics for now (recovering from LEDA, recent HE, acute decompensation); not tense ascites, no need for paracentesis for now, will assess daily  - Will resume diuretics prior discharge  - Would need to continue with long term SBP prophylaxis (ascites protein < 1.5 g/dl and CPT >9 and bilirubin >3)    #Anemia, thrombocytopenia, coagulopathy  - possibly due to cirrhosis + EtOH caused BM suppression - however b/o recurrent slow drop in Hb after transfusions and required total 4 U PRBC 9/18-9/21 without any evidence of overt bleeding, hematology was consulted, concern for AIHA - steroid was recommended  -f/u hematology recommendations   (Hb stable, improving; PLT >50)    #Small EV and portal hypertensive gastropathy on last EGD (no report available whether there were high risk signs, but he was started on BB in June), was non compliant w BB  - endoscopy was deferred during this admission (was no sign of overt active GI bleeding, no hematemesis, melena or hematochezia, FOBT neg), d/w Dr. Gerber (GI is on board)  - keep Hb >7, keep T/S active, 2 large bore iv lines; c/w monitoring H/H and call GI if any bleeding   - c/w holding BB for now, will need to resume prior discharge  - remains on PPI (as being on steroid), was switched to po  - if he will be on BB and will be compliant, no need for repeat routine endoscopy (unless other indications), but if not taking BB, will need endoscopy in 1 year (6/2021)    #LEDA (>0.3 mg /dl above his baseline w/in 48 hrs; /0.53-1.05 on 9/18)- responded to volume expansion w albumin (1.05-0.61), but not fully  back to baseline yet (today 0.67, his baseline ~ 0.5)  - C/w close monitoring renal function, electrolytes including Mg and P, and urine output; receiving Mg and K, please, replace P too  - keep MAP >65-70mmHg  - continue holding diuretics, will resume prior discharge    #Etiology of cirrhosis  - while most likely ETOH cirrhosis, given the young age additional workup was sent  - ceruloplasmin < 20, (could be falsely low in end stage liver or protein-losing); 24hr urine copper wnl, <40;  bedside opthalmology exam?  -[id number] nl;  ferritin 331 -EtOH  - total IgG elevated 4018, but LONNY neg, anti SLA neg, anti-SMA neg, anti-LKM neg, can repeat Immunglobulin panel     #HCM - see prior notes  -consider nutritionist intervention / teaching  - HCC screening - last US and AFP in 6/2020, no focal mass, nl AFP, next due 12/2020  - vit D low - was started on vit D, c/w supplement   - Vaccinations:         Hep A immune        Hep B immune, not exposed         Flu vaccine         Would check whether got Pneumococcal vaccine     Poor prognosis, repeatedly counseled patient about alcohol abstinence. Family involvement, discussion could be helpful. Psychiatry /  involvement?      Will continue to follow  D/w primary team  Thank you for the consult.

## 2020-09-27 NOTE — PROVIDER CONTACT NOTE (CRITICAL VALUE NOTIFICATION) - SITUATION
[facility] labs : INR from 9/27/20 = unable to detect clot formation by optical method (please see pt chart for printed results-faxed from [facility])
INR from 9/26 @ 9:26am -   INR = 2.10-9.00 unable to detect clot formation by optical method result obtained by mechanical clot FDC - INR cannot be precisely calculated from mechanical PT results confirmed by alternate methods.
Dr. Plasencia made aware of critical hematocrit of 20.5 and made aware of hemoglobin of 7.2. As per Dr. Plasencia no orders to transfuse at this time. As per Dr. Plasencia to transfuse if Hemoglobin <7. As per Dr. Plasencia no orders for follow-up CBC during night; to monitor with routine AM labs. Dr. Plasencia made aware Pt. needs new Type & Screen for possible transfusion in AM; Type and screen ordered for routine AM collection. No signs of active bleeding noted at this time. Will continue to monitor.
Plan for PRBC Transfusion
pt hgb 7.5 Hct 21.0

## 2020-09-27 NOTE — PROGRESS NOTE ADULT - ASSESSMENT
Patient is a 35yo Male with Liver Cirrhosis, ETOH abuse, esophageal varices p/w weakness with nausea, hematemesis with blood in stool. Pt admitted with acute decompensated cirrhosis and ETOH withdrawal with anemia/ thrombocytopenia s/p brief ICU stay for worsening ETOH withdrawal.  s/p 3.5L paracentesis on 9/17. Neg for SBP. Pt had LEDA which resolved with albumin gtt. Pt found to have proteinuria and SAAG <1.1. Nephrology consulted for proteinuria.       1. Proteinuria- with low SAAG r/o nephrotic syndrome. Pt with no overt edema; signs of nephrotic syndrome. Cirrhotics usually have no or little proteinuria unless associated with kidney disease; ?secondary IgA nephropathy 2/2 ETOH abuse is a possibility, however pt had no microscopic hematuria on UA. UA with 30 protein an no blood. Spot UPr/ Cr 0.7 & 24 hr proteinuria 0.97; non nephrotic range.    Recc to repeat SAAG ?error.  Pt would be a poor candidate for biopsy with anemia and thrombocytopenia; high risk of bleeding. Recc to start ARB or ACEi on discharge; if renal function is stable.     2. Decompensated Cirrhosis- Plan as per Hepatology/ GI  3. Anemia/ Thrombocytopenia- due to liver cirrhosis/ ETOH abuse s/p 5 units prbc (total during hospitalization). Plan as per primary team  4. Vitamin D deficiency- 25 OH vit D 8 c/w weekly Ergo 50 k units  Mg, Potassium and Phos repleted

## 2020-09-27 NOTE — PROGRESS NOTE ADULT - REASON FOR ADMISSION
Sore throat? No itchy. Advised salt water gargle, hard candy,throat lozenges. • How are you feeling? About the same  • Running any fever? Never  • Any continuing cough? Off and on especially at HS.    Productive  clear  • Any SOB or trouble Alcohol abuse Alcohol intoxication and withdrawal

## 2020-09-27 NOTE — PROGRESS NOTE ADULT - PROBLEM SELECTOR PLAN 3
- improved    - ICU downgrade  - p/w ADOLFO; h/o AA, cirrhosis, and esophageal varices  - CIWA 0 (9/27)  - c/w ativan q4h prn, IV thiamine, folic acid and multivitamin  - enhanced observation 2/2 agitation  - monitor CIWA - unwitnessed h/o UGIB x2; h/o EtOH cirrhosis   - FOBT neg  - prior EGD in 6/2020 demonstrated small distal EV's (not banded due to low PLTs), and portal gastropathy ([place] Class IIA); d/c w/ spironolactone, nadalol, lasix (noncompliant)  - s/p 4u pRBC; last transfusion on 9/21  - c/w protonix PO  - no BB for now  - monitor CBC    - GI, Dr. Gerber, onboard  - no EGD per GI

## 2020-09-27 NOTE — PROGRESS NOTE ADULT - PROBLEM SELECTOR PLAN 2
- h/o alcoholic jaundice w/ ascites  - jaundiced, scleral icterus   - MELD 32; 3mo mortality 52.6%  - Child-Bojorquez 13  - Maddreys 178.1 (poor prognosis)   - hepatitis panel negative  - US RUQ liver cirrhosis with no focal lesion, cholelithiasis with mild GB wall thickening, no BD dilatation, negative Field's sign   - s/p paracentesis 2.8L jorden fluid drained, low suspicion for SBP   - c/w ppx ceftriaxone, rifaximin and lactulose, NAC, prednisolone 40mg    - Hepatology, Dr. Morales, onboard  - GI, Dr. Gerber, onboard - improved    - ICU downgrade  - p/w ADOLFO; h/o AA, cirrhosis, and esophageal varices  - CIWA 0 (9/27)  - c/w ativan q4h prn, IV thiamine, folic acid and multivitamin  - enhanced observation 2/2 agitation  - monitor CIWA

## 2020-09-27 NOTE — PROGRESS NOTE ADULT - PROBLEM SELECTOR PLAN 4
- unwitnessed h/o UGIB x2; h/o EtOH cirrhosis   - FOBT neg  - prior EGD in 6/2020 demonstrated small distal EV's (not banded due to low PLTs), and portal gastropathy ([place] Class IIA); d/c w/ spironolactone, nadalol, lasix (noncompliant)  - s/p 4u pRBC; last transfusion on 9/21  - c/w protonix PO  - no BB for now  - monitor CBC    - GI, Dr. Gerber, onboard  - no EGD per GI - improving and stable    - no signs of active bleeding  - likely 2/2 complication of cirrhosis vs chronic EtOH use  - normocytic, hypochromic. Ferritin, Fe total wnl, Fe Sat 91 (high), TIBC 121(low)    - Folate, B12 wnl  - FOBT neg  - retic 4.5, , haptoglobin <20  - s/p 4u pRBC, last on 9/21  - c/w thiamine, multivitamin, venofer   - Monitor CBC, vitals     - GI, Dr. Gerber, onboard

## 2020-09-27 NOTE — PROGRESS NOTE ADULT - SUBJECTIVE AND OBJECTIVE BOX
Chief Complaint:  Patient is a 34y old  Male who presents with a chief complaint of Alcohol abuse (27 Sep 2020 09:46)    : Sharda [id number]    Reason for consult: Alcoholic cirrhosis, AH    Interval Events: Patient was seen and examined at bedside. Sitting OOB, awake, alert, oriented x3. Denies any complaints beyond generalized weakness. See detailed ROS below. INR still in process, bilirubin improved.    Hospital Medications:  ergocalciferol 92053 Unit(s) Oral <User Schedule>  folic acid 1 milliGRAM(s) Oral daily  influenza   Vaccine 0.5 milliLiter(s) IntraMuscular once  iron sucrose IVPB 100 milliGRAM(s) IV Intermittent every 24 hours  lactulose Syrup 20 Gram(s) Oral three times a day  LORazepam   Injectable 2 milliGRAM(s) IV Push every 4 hours PRN  multivitamin 1 Tablet(s) Oral daily  mupirocin 2% Nasal 1 Application(s) Nasal two times a day  pantoprazole    Tablet 40 milliGRAM(s) Oral two times a day  phytonadione   Solution 10 milliGRAM(s) Oral daily  prednisoLONE    3 mG/mL Solution (ORAPRED) 40 milliGRAM(s) Oral daily  rifAXIMin 550 milliGRAM(s) Oral two times a day  thiamine Injectable 100 milliGRAM(s) IntraMuscular daily      ROS:   General:  No  fevers, chills, night sweats, fatigue  Eyes:  Good vision, no reported pain  ENT:  No sore throat, pain, runny nose  CV:  No pain, palpitations  Pulm:  No dyspnea, cough  GI:  Denies abdominal pain, N, V, did not have BM yet this morning  : Nicolas in place  Muscle:  No pain, but generalized weakness  Neuro:  AAOX3  Endocrine:  No polyuria, polydipsia, cold/heat intolerance  Heme:  Ecchymosis, easy bruisability  Skin:  No rash    PHYSICAL EXAM:   Vital Signs:  Vital Signs Last 24 Hrs  T(C): 36.7 (27 Sep 2020 05:10), Max: 37.1 (26 Sep 2020 14:27)  T(F): 98.1 (27 Sep 2020 05:10), Max: 98.7 (26 Sep 2020 14:27)  HR: 75 (27 Sep 2020 05:10) (75 - 100)  BP: 142/84 (27 Sep 2020 05:10) (110/72 - 142/84)  BP(mean): --  RR: 18 (27 Sep 2020 06:45) (16 - 18)  SpO2: 98% (27 Sep 2020 06:45) (92% - 98%)  Daily     Daily Weight in k.4 (27 Sep 2020 05:10)    GENERAL: no acute distress, sitting OOB  NEURO: alert, awake, oriented x3, no asterixis  HEENT: icteric sclera  CHEST: no respiratory distress, no accessory muscle use  CARDIAC: regular rate, rhythm  ABDOMEN: soft, non-tender, moderately distended, not tense ascites, still can hold off with LVP, no rebound or guarding  EXTREMITIES: warm, well perfused, no edema  SKIN: icterus, ecchymoses    LABS: reviewed                        9.2    6.87  )-----------( 70       ( 27 Sep 2020 11:27 )             28.2         136  |  102  |  9   ----------------------------<  124<H>  3.2<L>   |  28  |  0.67    Ca    8.1<L>      27 Sep 2020 07:13  Phos  2.3       Mg     1.5         TPro  7.1  /  Alb  2.1<L>  /  TBili  7.5<H>  /  DBili  x   /  AST  70<H>  /  ALT  39  /  AlkPhos  142<H>      LIVER FUNCTIONS - ( 27 Sep 2020 07:13 )  Alb: 2.1 g/dL / Pro: 7.1 g/dL / ALK PHOS: 142 U/L / ALT: 39 U/L DA / AST: 70 U/L / GGT: x             Interval Diagnostic Studies: see sunrise for full report

## 2020-09-27 NOTE — PROGRESS NOTE ADULT - ATTENDING COMMENTS
Patient seen and examined earlier this morning with PGY1; Agree with PGY1 A/P above with editing as needed. My independent assessment, findings on exam, diagnosis and plan of care as listed below. Discussed with Dr. Rose.    Patient is a 34y old  Male who presents with acute alcohol withdrawal and advanced cirrhosis found to have liver failure hospital course complicated by DTs needing ICU admission, downgraded to floor    Patient is doing much better. Today is OOB to chair. Nicolas was placed overnight draining jorden colored urine. still jaundiced but less icteric. Eating well. No tremulousness noted. No fever, chills, SOB, palpitations, chest pain, nausea, vomiting, diarrhea, constipation, dizziness    Vital Signs Last 24 Hrs  T(C): 36.5 (27 Sep 2020 14:49), Max: 36.8 (26 Sep 2020 20:16)  T(F): 97.7 (27 Sep 2020 14:49), Max: 98.3 (26 Sep 2020 20:16)  HR: 84 (27 Sep 2020 14:49) (75 - 90)  BP: 131/77 (27 Sep 2020 14:49) (121/68 - 142/84)  RR: 19 (27 Sep 2020 14:49) (16 - 19)  SpO2: 96% (27 Sep 2020 14:49) (92% - 98%)    PHYSICAL EXAM:  GENERAL: grossly icteric NAD  NERVOUS SYSTEM: more alert and awake, much less confused; followed commands,  CHEST/LUNG: Clear to auscultation anterolaterally  HEART: Regular rate and rhythm; No murmurs, rubs, or gallops  ABDOMEN: Nontender, distended with marked ascites; Bowel sounds present  EXTREMITIES:  , No clubbing, cyanosis, or edema  SKIN: No rashes or lesions    LABS:                        9.2    6.87  )-----------( 70       ( 27 Sep 2020 11:27 )             28.2   09-27    136  |  102  |  9   ----------------------------<  124<H>  3.2<L>   |  28  |  0.67    Ca    8.1<L>      27 Sep 2020 07:13  Phos  2.3     09-27  Mg     1.5     09-27    TPro  7.1  /  Alb  2.1<L>  /  TBili  7.5<H>  /  DBili  x   /  AST  70<H>  /  ALT  39  /  AlkPhos  142<H>  09-27      A/P:   Alcohol withdrawal with suspected Alcoholic Hepatitis and Metabolic encephalopathy with suspected ??Wernicke Encephalopathy resolving well   Acute decompensation of Cirrhosis of liver with ascites and esophageal varices   Anemia etiology unclear : s/p possible upper GI loss doubt active bleed at this time  Thrombocytopenia due to liver failure improving slowly  Elevated INR due to Liver dysfunction  Vitamin D deficiency  Proteinuria     Plan:   Continue Librium q 12 hrs today; if continue to do well will discontinue after AM dose;   Ativan PRN but avoid as much as feasible  H/H remain stable;   Continue, Protonix IV; D/C Octreotide drip,   Continue NAC and Prednisolone  Trial of Prednisolone at 40 mg daily for 7 days ;   If no clinical improvement will discontinue steroids  Resume Rifaximin; inadvertently fell off medication orders.   Holding off BB for now  As d/w Dr. Bradley if acutely decompensate may need transfer to [facility] team.   Monitor CBC, LFT and Total and Direct Bilirubin closely  Cont Lactulose rifaximin, Titrate Lactulose for 3 BMs daily  Bilirubin trending down for first time in few days with clinical improvement today  If clinically remain stable will switch PPI to oral in AM  Aspiration, fall and seizure precautions; If significant agitation will need restraints but will avoid as much as possible  Continue enhanced supervision  No chemical DVT ppx due to anemia, suspected bleeding diathesis and thrombocytopenia  Continue Thiamine IM for potential Wernicke's  Patient with failed TOV; Bladder scan showed 900cc; Informed by PGY1 on call Straight Cath done got only 100cc likely bladder scan showing ascitic fluid. Will hold off Nicolas unless absolutely necessary    Patient has guarded prognosis and high risk for mortality    Discussed with PGY1 Dr. Rose and PGY 3 Dr. Grande and RN Patient seen and examined earlier this morning with PGY1; Agree with PGY1 A/P above with editing as needed. My independent assessment, findings on exam, diagnosis and plan of care as listed below. Discussed with Dr. Rose.    Patient is a 34y old  Male who presents with acute alcohol withdrawal and advanced cirrhosis found to have liver failure hospital course complicated by DTs needing ICU admission, downgraded to floor    Patient is doing much better. Today is OOB to chair. Nicolas was placed overnight draining jorden colored urine. still jaundiced but less icteric. Eating well. No tremulousness noted. No fever, chills, SOB, palpitations, chest pain, nausea, vomiting, diarrhea, constipation, dizziness    Vital Signs Last 24 Hrs  T(C): 36.5 (27 Sep 2020 14:49), Max: 36.8 (26 Sep 2020 20:16)  T(F): 97.7 (27 Sep 2020 14:49), Max: 98.3 (26 Sep 2020 20:16)  HR: 84 (27 Sep 2020 14:49) (75 - 90)  BP: 131/77 (27 Sep 2020 14:49) (121/68 - 142/84)  RR: 19 (27 Sep 2020 14:49) (16 - 19)  SpO2: 96% (27 Sep 2020 14:49) (92% - 98%)    PHYSICAL EXAM:  GENERAL: grossly icteric NAD  NERVOUS SYSTEM: more alert and awake, much less confused; followed commands,  CHEST/LUNG: Clear to auscultation anterolaterally  HEART: Regular rate and rhythm; No murmurs, rubs, or gallops  ABDOMEN: Nontender, distended with marked ascites; Bowel sounds present  EXTREMITIES:  , No clubbing, cyanosis, or edema  SKIN: No rashes or lesions    LABS:                        9.2    6.87  )-----------( 70       ( 27 Sep 2020 11:27 )             28.2   09-27    136  |  102  |  9   ----------------------------<  124<H>  3.2<L>   |  28  |  0.67    Ca    8.1<L>      27 Sep 2020 07:13  Phos  2.3     09-27  Mg     1.5     09-27    TPro  7.1  /  Alb  2.1<L>  /  TBili  7.5<H>  /  DBili  x   /  AST  70<H>  /  ALT  39  /  AlkPhos  142<H>  09-27      A/P:   Alcohol withdrawal with suspected Alcoholic Hepatitis and Metabolic encephalopathy with suspected ??Wernicke Encephalopathy much improved  Acute decompensation of Cirrhosis of liver with ascites and esophageal varices   Anemia etiology unclear : s/p possible upper GI loss doubt active bleed at this time improving H/H  Thrombocytopenia due to liver failure improving slowly  Elevated INR due to Liver dysfunction and partly vitamin K deficiency from poor oral intake   Vitamin D deficiency  Proteinuria     Plan:   Off Librium doing well; Ativan PRN but avoid as much as feasible  H/H remain stable and trending up.  Continue, Protonix oral; off Octreotide drip,   Cont Lactulose rifaximin, Titrate Lactulose for 3 BMs daily  Continue Prednisolone; Trial of Prednisolone at 40 mg daily for 7 days ; appears to be helping at this time  If no clinical improvement will discontinue steroids currently improving clinically and lab wise  Holding off BB for now but resume at discharge;   As d/w Dr. Bradley if acutely decompensate may need transfer to [facility] team.   As per Hepatology patient will need long term prophylaxis for SBP; will try Norfloxacin if not will give Cipro.   Monitor CBC, LFT and Total and Direct Bilirubin closely  Bilirubin continues to be  trending down   Aspiration, fall and seizure precautions; If significant agitation will need restraints but will avoid as much as possible  Continue enhanced supervision  No chemical DVT ppx due to anemia, suspected bleeding diathesis and thrombocytopenia  Continue Thiamine IM for potential Wernicke's  D/C Nicolas and TOV in AM as patient is now OOB to chair.   INR trending up; No active bleed;  continue Vitamin K 10 mg x 1 day more; may help to correct nutritional deficiency.     Patient has guarded prognosis and high risk for mortality Patient seen and examined earlier this morning with PGY1; Agree with PGY1 A/P above with editing as needed. My independent assessment, findings on exam, diagnosis and plan of care as listed below. Discussed with Dr. Rose.    Patient is a 34y old  Male who presents with acute alcohol withdrawal and advanced cirrhosis found to have liver failure hospital course complicated by DTs needing ICU admission, downgraded to floor    Patient is doing much better. Today is OOB to chair. Nicolas was placed overnight draining jorden colored urine. still jaundiced but less icteric. Eating well. No tremulousness noted. No fever, chills, SOB, palpitations, chest pain, nausea, vomiting, diarrhea, constipation, dizziness. c/o some back pain    Vital Signs Last 24 Hrs  T(C): 36.5 (27 Sep 2020 14:49), Max: 36.8 (26 Sep 2020 20:16)  T(F): 97.7 (27 Sep 2020 14:49), Max: 98.3 (26 Sep 2020 20:16)  HR: 84 (27 Sep 2020 14:49) (75 - 90)  BP: 131/77 (27 Sep 2020 14:49) (121/68 - 142/84)  RR: 19 (27 Sep 2020 14:49) (16 - 19)  SpO2: 96% (27 Sep 2020 14:49) (92% - 98%)    PHYSICAL EXAM:  GENERAL: icteric NAD  NERVOUS SYSTEM: more alert and awake, much less confused; followed commands,  CHEST/LUNG: Clear to auscultation anterolaterally  HEART: Regular rate and rhythm; No murmurs, rubs, or gallops  ABDOMEN: Nontender, distended with marked ascites; Bowel sounds present  EXTREMITIES:  , No clubbing, cyanosis, or edema  SKIN: No rashes or lesions    LABS:                        9.2    6.87  )-----------( 70       ( 27 Sep 2020 11:27 )             28.2   09-27    136  |  102  |  9   ----------------------------<  124<H>  3.2<L>   |  28  |  0.67    Ca    8.1<L>      27 Sep 2020 07:13  Phos  2.3     09-27  Mg     1.5     09-27    TPro  7.1  /  Alb  2.1<L>  /  TBili  7.5<H>  /  DBili  x   /  AST  70<H>  /  ALT  39  /  AlkPhos  142<H>  09-27    PT/INR - ( 27 Sep 2020 07:13 )   PT: 55.1 sec;   INR: 5.10-9.00 Unable to detect clot formation by optical method. Result obtained by  mechanical clot detection method. INR cannot be precisely calculated from mechanical PT.     PTT - ( 27 Sep 2020 07:13 )  PTT:83.0 sec      A/P:   Alcohol withdrawal with suspected Alcoholic Hepatitis and Metabolic encephalopathy with suspected ??Wernicke Encephalopathy much improved  Acute decompensation of Cirrhosis of liver with ascites and esophageal varices   Anemia etiology unclear : s/p possible upper GI loss doubt active bleed at this time improving H/H  Thrombocytopenia due to liver failure improving slowly  Elevated INR due to Liver dysfunction and partly vitamin K deficiency from poor oral intake   Vitamin D deficiency    Plan:   Off Librium doing well; Ativan PRN but avoid as much as feasible; not received lately  H/H remain stable and trending up.  Continue, Protonix oral twice daily empirically for upper GI bleed given prior Esophageal varices; off Octreotide drip,   Cont Lactulose rifaximin, Titrate Lactulose for 3 BMs daily  Continue Prednisolone; Trial of Prednisolone at 40 mg daily for 7 days ; appears to be helping at this time  If no clinical improvement will discontinue steroids currently improving clinically and lab wise  Holding off BB for now but resume at discharge;   As d/w Dr. Bradley if acutely decompensate may need transfer to [facility] team.   As per Hepatology patient will need long term prophylaxis for SBP; will try Norfloxacin (not available in pharmacy)  will give Cipro.   Monitor CBC, LFT and Total and Direct Bilirubin closely  Bilirubin continues to be  trending down   Aspiration, fall and seizure precautions; If significant agitation will need restraints but will avoid as much as possible  Continue enhanced supervision  No chemical DVT ppx due to anemia, suspected bleeding diathesis and thrombocytopenia  Continue Thiamine IM for potential Wernicke's  D/C Nicolas and TOV in AM as patient is now OOB to chair.   INR trending up; No active bleed;  continue Vitamin K 10 mg x 1 day more; may help to correct nutritional deficiency.     Patient has guarded prognosis and high risk for mortality  Discussed with patient;  Spoke with Sister at bedside this evening and updated presentation, findings, clinical course and prognosis  Patient was advised to complete cessation of alcohol use to prevent any further damage as well as to be eligible for liver transplant in the future.

## 2020-09-27 NOTE — PROGRESS NOTE ADULT - ATTENDING COMMENTS
[facility]  Rui Shore M.D.  Cal Montejo D.O.  Cristiana Vigil M.D.  Carie Mclean, MSN, ANP-C    Telephone: [phone]  Facsimile: [phone]    [address] [place] [address]

## 2020-09-28 NOTE — PROGRESS NOTE ADULT - SUBJECTIVE AND OBJECTIVE BOX
complete note to follow · Subjective and Objective:   INTERVAL HPI: Patient seen and examined at bedside; Events noted; Patient w/o complaint    PMH/PSH as above    FmHx/Soc Hx NC    ROS as above; pt is not able to provide detailed review of systems  General: Noncontributory; Skin/Breast: NC;Ophthalmologic:NC; ENMT: NC; Respiratory and Thorax: NC; Cardiovascular: NC; 	  Gastrointestinal: NC; Genitourinary:NC; 	Musculoskeletal:NC; Neurological: NC; Psychiatric: NC; Hematology/Lymphatics: NC; Endocrine: NC; Allergic/Immunologic: NC    MEDICATIONS  (STANDING):  chlordiazePOXIDE 25 milliGRAM(s) Oral every 12 hours  ergocalciferol 63782 Unit(s) Oral <User Schedule>  folic acid 1 milliGRAM(s) Oral daily  influenza   Vaccine 0.5 milliLiter(s) IntraMuscular once  lactulose Syrup 20 Gram(s) Oral four times a day  multivitamin 1 Tablet(s) Oral daily  mupirocin 2% Nasal 1 Application(s) Nasal two times a day  pantoprazole  Injectable 40 milliGRAM(s) IV Push two times a day  prednisoLONE    3 mG/mL Solution (ORAPRED) 40 milliGRAM(s) Oral daily  rifAXIMin 550 milliGRAM(s) Oral two times a day  thiamine Injectable 100 milliGRAM(s) IntraMuscular daily    MEDICATIONS  (PRN):  LORazepam   Injectable 2 milliGRAM(s) IV Push every 4 hours PRN Agitation      Vitals:  Vital Signs Last 24 Hrs  T(C): 37 (28 Sep 2020 14:11), Max: 37 (28 Sep 2020 14:11)  T(F): 98.6 (28 Sep 2020 14:11), Max: 98.6 (28 Sep 2020 14:11)  HR: 95 (28 Sep 2020 14:11) (80 - 95)  BP: 118/79 (28 Sep 2020 14:11) (111/61 - 126/71)  BP(mean): --  ABP: --  ABP(mean): --  RR: 18 (28 Sep 2020 14:11) (18 - 18)  SpO2: 94% (28 Sep 2020 14:11) (94% - 97%)    _________________  PHYSICAL EXAM:  ---------------------------  GEN: NAD; NC/AT; A and O x 3; jaundiced  EYES: Scleral icterus  LUNGS: no wheezing; decreased bilateral air entry; no use of accessory muscles for breathing  HEART: Nl S1 S2; no M   ABDOMEN: Soft, Nontender, non distended  EXTREMITIES: no cyanosis; no edema; warm and dry  NERVOUS SYSTEM:  Awake and alert; no focal neuro  deficits    _________________________________________________  LABS:             CBC Full  -  ( 28 Sep 2020 07:03 )  WBC Count : 8.03 K/uL  RBC Count : 2.51 M/uL  Hemoglobin : 8.5 g/dL  Hematocrit : 24.2 %  Platelet Count - Automated : 54 K/uL  Mean Cell Volume : 96.4 fl  Mean Cell Hemoglobin : 33.9 pg  Mean Cell Hemoglobin Concentration : 35.1 gm/dL  Auto Neutrophil # : 5.50 K/uL  Auto Lymphocyte # : 1.47 K/uL  Auto Monocyte # : 0.88 K/uL  Auto Eosinophil # : 0.12 K/uL  Auto Basophil # : 0.01 K/uL  Auto Neutrophil % : 68.5 %  Auto Lymphocyte % : 18.3 %  Auto Monocyte % : 11.0 %  Auto Eosinophil % : 1.5 %  Auto Basophil % : 0.1 %    09-28    135  |  104  |  10  ----------------------------<  114<H>  4.0   |  26  |  0.70    Ca    8.2<L>      28 Sep 2020 07:03  Phos  2.5     09-28  Mg     1.6     09-28    TPro  7.0  /  Alb  2.0<L>  /  TBili  6.9<H>  /  DBili  x   /  AST  71<H>  /  ALT  42  /  AlkPhos  149<H>  09-28    Dir Antiglob IgG Interpretation: POS (09.22.20 @ 07:46) Dir Antiglob Polyspecific Interpretation: POS (09.22.20 @ 07:46)    Lactate Dehydrogenase, Serum in AM (09.24.20 @ 07:27)   Lactate Dehydrogenase, Serum: 405 U/L   Haptoglobin, Serum in AM (09.24.20 @ 10:28)   Haptoglobin, Serum: <20: Test Repeated mg/dL     CAPILLARY BLOOD GLUCOSEDir Antiglob IgG Interpretation: POS (09.22.20 @ 07:46)

## 2020-09-28 NOTE — PROGRESS NOTE ADULT - SUBJECTIVE AND OBJECTIVE BOX
PGY-1 Progress Note discussed with attending    PAGER #: [[phone]] TILL 5:00 PM  PLEASE CONTACT ON CALL TEAM:  - On Call Team (Please refer to Alison) FROM 5:00 PM - 8:30PM  - Nightfloat Team FROM 8:30 -7:30 AM    INTERVAL HPI/OVERNIGHT EVENTS:   - patient reports improving status and back pain. He denies any pain, discomfort, tremor, or agitation.    REVIEW OF SYSTEMS:  CONSTITUTIONAL: No fever, weight loss, or fatigue  RESPIRATORY: No cough, wheezing, chills or hemoptysis; No shortness of breath  CARDIOVASCULAR: No chest pain, palpitations, dizziness, or leg swelling  GASTROINTESTINAL: No abdominal pain. No nausea, vomiting, or hematemesis; No diarrhea or constipation. No melena or hematochezia.  GENITOURINARY: No dysuria or hematuria, urinary frequency  NEUROLOGICAL: No headaches, memory loss, loss of strength, numbness, or tremors    MEDICATIONS  (STANDING):  ciprofloxacin     Tablet 500 milliGRAM(s) Oral daily  ergocalciferol 66973 Unit(s) Oral <User Schedule>  folic acid 1 milliGRAM(s) Oral daily  influenza   Vaccine 0.5 milliLiter(s) IntraMuscular once  iron sucrose IVPB 100 milliGRAM(s) IV Intermittent every 24 hours  lactulose Syrup 20 Gram(s) Oral three times a day  multivitamin 1 Tablet(s) Oral daily  mupirocin 2% Nasal 1 Application(s) Nasal two times a day  pantoprazole    Tablet 40 milliGRAM(s) Oral two times a day  phytonadione   Solution 10 milliGRAM(s) Oral daily  potassium phosphate / sodium phosphate Tablet (K-PHOS No. 2) 1 Tablet(s) Oral three times a day with meals  prednisoLONE    3 mG/mL Solution (ORAPRED) 40 milliGRAM(s) Oral daily  rifAXIMin 550 milliGRAM(s) Oral two times a day  thiamine Injectable 100 milliGRAM(s) IntraMuscular daily    MEDICATIONS  (PRN):  LORazepam   Injectable 2 milliGRAM(s) IV Push every 4 hours PRN Agitation      Vital Signs Last 24 Hrs  T(C): 36.7 (28 Sep 2020 05:47), Max: 36.7 (28 Sep 2020 05:47)  T(F): 98.1 (28 Sep 2020 05:47), Max: 98.1 (28 Sep 2020 05:47)  HR: 81 (28 Sep 2020 05:47) (80 - 84)  BP: 111/61 (28 Sep 2020 05:47) (111/61 - 131/77)  BP(mean): --  RR: 18 (28 Sep 2020 05:47) (18 - 19)  SpO2: 97% (28 Sep 2020 05:47) (96% - 97%)    PHYSICAL EXAMINATION:  GENERAL: NAD, AAOx3  HEAD: AT/NC  EYES: scleral icterus noted, but improving  NECK: supple, No JVD noted, Normal thyroid  CHEST/LUNG: CTABL; no rales, rhonchi, wheezing, or rubs  HEART: regular rate and rhythm; no murmurs, rubs, or gallops  ABDOMEN: moderately distended and hard, but nontender; Bowel sounds present  EXTREMITIES:  2+ Peripheral Pulses, No clubbing, cyanosis, or edema                          8.5    8.03  )-----------( 54       ( 28 Sep 2020 07:03 )             24.2     09-28    135  |  104  |  10  ----------------------------<  114<H>  4.0   |  26  |  0.70    Ca    8.2<L>      28 Sep 2020 07:03  Phos  2.5     09-28  Mg     1.6     09-28    TPro  7.0  /  Alb  2.0<L>  /  TBili  6.9<H>  /  DBili  x   /  AST  71<H>  /  ALT  42  /  AlkPhos  149<H>  09-28    LIVER FUNCTIONS - ( 28 Sep 2020 07:03 )  Alb: 2.0 g/dL / Pro: 7.0 g/dL / ALK PHOS: 149 U/L / ALT: 42 U/L DA / AST: 71 U/L / GGT: x               PT/INR - ( 28 Sep 2020 07:03 )   PT: 53.9 sec;   INR: 5.10-9.00 Cell-free aliquot received at the [facility]. Unable to determine  any blood drawing artifact or dilution error of anticoagulant of whole  blood specimen. Interpret with caution.  Test repeated.  Results confirmed by alternate method.  Recommended ranges for therapeutic INR:    2.0-3.0 for most medical and surgical thromboembolic states    2.0-3.0 for atrial fibrillation    2.0-3.0 for bileaflet mechanical valve in aortic position    2.5-3.5 for mechanical heart valves    Chest 2004;[id number]  The presence of direct thrombin inhibitors (argatroban, refludan)  may falsely increase results. ratio         PTT - ( 28 Sep 2020 07:03 )  PTT:78.1 sec  COVID-19 PCR: NotDetec (15 Sep 2020 00:23)      CAPILLARY BLOOD GLUCOSE          RADIOLOGY & ADDITIONAL TESTS:

## 2020-09-28 NOTE — PROGRESS NOTE ADULT - ASSESSMENT
33yo male with hx of chronic alcohol abuse (actively drinking since age 20), decompensated cirrhosis with ascites (non-compliant with diuretics, no hx of LVP), hx of small esophageal varices and portal hypertensive gastropathy (last EGD 6/2020), (non-compliant with BB, never banded), with recent hospitalization (6/2020 for leg swelling, anemia) presented on 9/15/2020 with malaise, generalized weakness for 1-2 weeks, found to have worsening liver function, with MELD score 32 compared to 23 on prior admission, electrolyte abnormalities, and questionable blood in stool (reported black stools) and/or vomitus, but without any signs of overt bleeding in hospital. On admission had no signs of HE and no LEDA. Acute worsening might have been caused by heavy alcohol intake (high blood alcohol on admission), SBP was r/o by paracentesis, viral and infectious workup negative so far, denied taking any medications or herbal supplements.  He became agitated and was transferred to ICU on 9/17/2020 and was treated for alcohol withdrawal. He became hypotensive and bradycardic on Precedex.  Developed LEDA, but improved with volume expansion with albumin. Anemic, thrombocytopenic, now s/p 1 U PLT, and 4U PRBC (9/18/19/20/21), without evidence of overt GI bleeding (FOBT neg too) and with no evidence of bleeding on CT abd/pelvis and now stable Hb. Patient also noted to have proteinuria, and unexpectedly ascites analysis showed SAAG < 1.1, with low total protein despite that the patient has evidence of portal hypertension (EV, portal HTN gastropathy, recanalization of umbilical vein), but urine protein/Cr ration, 24 hr protein did not reach nephrotic level, nephrology consult appreciated. Patient is s/p NAC and on prednisolone (started on 9/24 after relative contraindications r/o / resolved). CT head neg for bleeding.     A/P:  # Decompensated cirrhosis w ascites; small EV; MELD is still 33  (bilirubin improving, INR still high)   -  would need transplant referral, but unfortunately still active drinker, and was treated for  EtOH withdrawal during this admission, he was also not compliant with meds after June hospital discharge   (was d/w [facility] transplant hepatology service attending last week)    #ACLF - cirrhosis with development of LEDA during this hospitalization( 0.53-1.05) along with mental status change (EtOH withdrawal, sedation +/- HE); etiology possibly heavy ETOH intake (elevated blood alcohol on admission; AH can be co-existent with cirrhosis)  - no change in MELD, but some further improvement in bilirubin, INR (PT 53.9) is still high without evidence of active bleeding (continue to closely monitor, s/p vit K)  - c/w monitoring LFTs closely, including INR  - avoid hepatotoxic meds, avoid NSAIDs  - will assess response for prednisolone at day#7 (Lille score)      #Ascites- no SBP; despite signs of CSPH (EV, portal  hypertensive gastropathy on EGD, recanalization of umbilical vein), unexpectedly  SAAG was < 1.1 (1.0), nephrology consult appreciated  - Would still continue holding diuretics for now (recovering from LEDA, recent HE, recovering from acute decompensation); not tense ascites, no need for paracentesis for now, will assess daily  - Will resume diuretics prior discharge  - Would need to continue with long term SBP prophylaxis (ascites protein < 1.5 g/dl and CPT >9 and bilirubin >3), was switched to cipro 500 mg daily    #LEDA (>0.3 mg /dl above his baseline w/in 48 hrs; /0.53-1.05 on 9/18)- responded to volume expansion w albumin (1.05-0.61), but not fully back to baseline yet (today 0.70, his baseline ~ 0.5)  - C/w close monitoring renal function, electrolytes including Mg and P, and urine output; and replace accordingly  - keep MAP >65-70mmHg  - continue holding diuretics, will resume prior discharge    #Small EV and portal hypertensive gastropathy on last EGD (no report available whether there were high risk signs, but he was started on BB in June), was non compliant w BB  - endoscopy was deferred during this admission (was no sign of overt active GI bleeding, no hematemesis, melena or hematochezia, FOBT neg), d/w Dr. Gerber (GI is on board)  - keep Hb >7, c/w monitoring H/H and call GI if any bleeding   - could resume nadolol 20 mg (BB has mortality benefit) while in hospital, monitor BP, HR (~55-60/min)  - remains on PPI (as being on steroid), was switched to po (when off steroid, would carefully assess the need of PPI, as it changes the microbiome and might increase the risk of SBP)  - if he will be on BB and will be compliant, no need for repeat routine endoscopy (unless other indications), but if not taking BB, will need endoscopy in 1 year (6/2021)    #EtOH withdrawal + HE - resolved  - continue close monitoring of mental status and neurological exam  - c/w lactulose and please make sure to titrate to have 2-3 soft BM/day   - c/w rifaximin 550 mg bid  - avoid long acting BZD   - C/w CIWA protocol per primary team, c/w folic, thiamin, monitor electrolytes    #Anemia, thrombocytopenia, coagulopathy  - possibly due to cirrhosis + EtOH caused BM suppression - however b/o recurrent slow drop in Hb after transfusions and required total 4 U PRBC 9/18-9/21 without any evidence of overt bleeding, hematology was consulted, concern for AIHA - steroid was recommended  -f/u hematology recommendations and f/u with hematology regarding PT and PTT (liver function otherwise improving, mental status improved, bilirubin downtrending)  (Hb stable; PLT >50)      #Etiology of cirrhosis  - while most likely ETOH cirrhosis, given the young age additional workup was sent  - ceruloplasmin < 20, (could be falsely low in end stage liver or protein-losing); 24hr urine copper wnl, <40;  bedside opthalmology exam?  -[id number] nl;  ferritin 331 -EtOH  - total IgG elevated 4018, but LONNY neg, anti SLA neg, anti-SMA neg, anti-LKM neg, can repeat Immunglobulin panel     #HCM   - consider nutritionist intervention / teaching  - HCC screening - last US and AFP in 6/2020, no focal mass, nl AFP, next due 12/2020  - vit D low - was started on vit D, c/w supplement   - Vaccinations:         Hep A immune        Hep B immune, not exposed         Flu vaccine         Would check whether got Pneumococcal vaccine   - consider PT, pt requested walker for home    Poor prognosis, repeatedly counseled patient about alcohol abstinence. Family involvement, discussion could be helpful. Primary team had discussed with patients family. Psychiatry /  involvement?    Will continue to follow  D/w primary team  Thank you for the consult.

## 2020-09-28 NOTE — PROGRESS NOTE ADULT - ATTENDING COMMENTS
I have examined the patient at bedside and reviewed patient's data and participated in the management of the patient along with [facility] as well as hemotology/med oncology faculty consisting of Dr. Zain Maharaj, Dr. MARC Anton, Dr. MARC Clark, Dr. Marc Ramon, Dr. Livier Alegre, Dr. Gregorio Kapoor as well as myself during the daily heme/onc case review. I reviewed pertinent clinical information, PE,  labs as well as A/P as outline above.

## 2020-09-28 NOTE — PROGRESS NOTE ADULT - SUBJECTIVE AND OBJECTIVE BOX
[facility] NEPHROLOGY- PROGRESS NOTE    Patient is a 35yo Male with Liver Cirrhosis, ETOH abuse, esophageal varices p/w weakness with nausea, hematemesis with blood in stool. Pt admitted with acute decompensated cirrhosis and ETOH withdrawal with anemia/ thrombocytopenia s/p brief ICU stay for worsening ETOH withdrawal.  s/p 3.5L paracentesis on 9/17. Neg for SBP. Pt had LEDA which resolved with albumin gtt. Pt found to have proteinuria and SAAG <1.1. Nephrology consulted for proteinuria.     Hospital Medications: Medications reviewed.    REVIEW OF SYSTEMS:   Denies any SOB, pain, n/v/d or abd pain.       VITALS:  T(F): 98.1 (09-28-20 @ 05:47), Max: 98.1 (09-28-20 @ 05:47)  HR: 81 (09-28-20 @ 05:47)  BP: 111/61 (09-28-20 @ 05:47)  RR: 18 (09-28-20 @ 05:47)  SpO2: 97% (09-28-20 @ 05:47)  Wt(kg): --    09-27 @ 07:01  -  09-28 @ 07:00  --------------------------------------------------------  IN: 0 mL / OUT: 1200 mL / NET: -1200 mL      PHYSICAL EXAM:  Gen NAD  HEENT: +icterus  Cards: RRR, +S1/S2,  Resp: CTA ant  GI: soft, NT mod distention  Extremities: no LE edema B/L      LABS:  09-28    135  |  104  |  10  ----------------------------<  114<H>  4.0   |  26  |  0.70    Ca    8.2<L>      28 Sep 2020 07:03  Phos  2.5     09-28  Mg     1.6     09-28    TPro  7.0  /  Alb  2.0<L>  /  TBili  6.9<H>  /  DBili      /  AST  71<H>  /  ALT  42  /  AlkPhos  149<H>  09-28    Creatinine Trend: 0.70 <--, 0.67 <--, 0.68 <--, 0.75 <--, 0.65 <--, 0.89 <--, 0.62 <--                        8.5    8.03  )-----------( 54       ( 28 Sep 2020 07:03 )             24.2     Urine Studies:    Creatinine, Random Urine: 284 mg/dL (09-23 @ 14:00)  Osmolality, Random Urine: 574 mos/kg (09-23 @ 14:00)  Sodium, Random Urine: <5 mmol/L (09-23 @ 14:00)

## 2020-09-28 NOTE — PROGRESS NOTE ADULT - ATTENDING COMMENTS
Patient seen and examined earlier this morning with PGY1; Agree with PGY1 A/P above with editing as needed. My independent assessment, findings on exam, diagnosis and plan of care as listed below. Discussed with Dr. Rose.    Patient is a 34y old  Male who presents with acute alcohol withdrawal and advanced cirrhosis found to have liver failure hospital course complicated by DTs needing ICU admission, downgraded to floor    Patient is doing much better. Today is OOB to chair. Nicolas was placed overnight draining jorden colored urine. still jaundiced but less icteric. Eating well. No tremulousness noted. No fever, chills, SOB, palpitations, chest pain, nausea, vomiting, diarrhea, constipation, dizziness. c/o some back pain    Vital Signs Last 24 Hrs  T(C): 37 (28 Sep 2020 14:11), Max: 37 (28 Sep 2020 14:11)  T(F): 98.6 (28 Sep 2020 14:11), Max: 98.6 (28 Sep 2020 14:11)  HR: 95 (28 Sep 2020 14:11) (80 - 95)  BP: 118/79 (28 Sep 2020 14:11) (111/61 - 126/71)  RR: 18 (28 Sep 2020 14:11) (18 - 18)  SpO2: 94% (28 Sep 2020 14:11) (94% - 97%)    PHYSICAL EXAM:  GENERAL: icteric NAD  NERVOUS SYSTEM: more alert and awake, much less confused; followed commands,  CHEST/LUNG: Clear to auscultation anterolaterally  HEART: Regular rate and rhythm; No murmurs, rubs, or gallops  ABDOMEN: Nontender, distended with marked ascites; Bowel sounds present  EXTREMITIES:  , No clubbing, cyanosis, or edema  SKIN: No rashes or lesions    LABS:                        8.5    8.03  )-----------( 54       ( 28 Sep 2020 07:03 )             24.2   09-28    135  |  104  |  10  ----------------------------<  114<H>  4.0   |  26  |  0.70    Ca    8.2<L>      28 Sep 2020 07:03  Phos  2.5     09-28  Mg     1.6     09-28    TPro  7.0  /  Alb  2.0<L>  /  TBili  6.9<H>  /  DBili  x   /  AST  71<H>  /  ALT  42  /  AlkPhos  149<H>  09-28    Prothrombin Time and INR, Plasma in AM (09.28.20 @ 07:03)   Prothrombin Time, Plasma: 53.9 sec   INR: 5.10-9.00     A/P:   Alcohol withdrawal with suspected Alcoholic Hepatitis and Metabolic encephalopathy with suspected ??Wernicke Encephalopathy much improved  Acute decompensation of Cirrhosis of liver with ascites and esophageal varices   Anemia etiology unclear : s/p possible upper GI loss doubt active bleed at this time improving H/H  Thrombocytopenia due to liver failure improving slowly  Elevated INR due to Liver dysfunction and partly vitamin K deficiency from poor oral intake   Vitamin D deficiency    Plan:   Off Librium doing well; Ativan PRN but avoid as much as feasible; not received lately  H/H remain stable and trending up.  Continue, Protonix oral twice daily empirically for upper GI bleed given prior Esophageal varices; off Octreotide drip,   Cont Lactulose rifaximin, Titrate Lactulose for 3 BMs daily  Continue Prednisolone; Trial of Prednisolone at 40 mg daily for 7 days ; appears to be helping at this time  If no clinical improvement will discontinue steroids currently improving clinically and lab hutchinson  As d/w Dr. Bradley add nadolol 20 mg daily and monitor  If remain stable add diuretics next 24 to 48 hrs  As d/w Dr. Bradley if acutely decompensate may need transfer to [place] Transplant team.   As per Hepatology patient will need long term prophylaxis for SBP; (Norfloxacin not available in pharmacy)  will  continue Cipro.   Monitor CBC, LFT and Total and Direct Bilirubin closely  Bilirubin continues to be  trending down   No chemical DVT ppx due to anemia, suspected bleeding diathesis and thrombocytopenia  Continue Thiamine IM for potential Wernicke's; can switch to oral in AM  Trial of Void; OOB to chair.   INR trending up; No active bleed;  continue Vitamin K 10 mg x 1 day more; may help to correct nutritional deficiency.     Patient has guarded prognosis and high risk for mortality  Discussed with patient;  Spoke with Sister at bedside this evening and updated presentation, findings, clinical course and prognosis  Patient was advised to complete cessation of alcohol use to prevent any further damage as well as to be eligible for liver transplant in the future. Patient seen and examined earlier this morning with PGY1; Agree with PGY1 A/P above with editing as needed. My independent assessment, findings on exam, diagnosis and plan of care as listed below. Discussed with Dr. Rose.    Patient is a 34y old  Male who presents with acute alcohol withdrawal and advanced cirrhosis found to have liver failure hospital course complicated by DTs needing ICU admission, downgraded to floor    Patient is doing much better. OOB to chair. Nicolas removed. TOV in progress.  still jaundiced but less icteric. Eating well. No tremulousness noted. Denies any new complaints. No fever, chills, SOB, palpitations, chest pain, nausea, vomiting, diarrhea, constipation, dizziness.     Vital Signs Last 24 Hrs  T(C): 37 (28 Sep 2020 14:11), Max: 37 (28 Sep 2020 14:11)  T(F): 98.6 (28 Sep 2020 14:11), Max: 98.6 (28 Sep 2020 14:11)  HR: 95 (28 Sep 2020 14:11) (80 - 95)  BP: 118/79 (28 Sep 2020 14:11) (111/61 - 126/71)  RR: 18 (28 Sep 2020 14:11) (18 - 18)  SpO2: 94% (28 Sep 2020 14:11) (94% - 97%)    PHYSICAL EXAM:  GENERAL: icteric NAD  NERVOUS SYSTEM: much more alert and awake, not confused; followed commands,  CHEST/LUNG: Clear to auscultation anterolaterally  HEART: Regular rate and rhythm; No murmurs, rubs, or gallops  ABDOMEN: Nontender, distended with marked ascites; Bowel sounds present  EXTREMITIES:  , No clubbing, cyanosis, or edema  SKIN: No rashes or lesions    LABS:                        8.5    8.03  )-----------( 54       ( 28 Sep 2020 07:03 )             24.2   09-28    135  |  104  |  10  ----------------------------<  114<H>  4.0   |  26  |  0.70    Ca    8.2<L>      28 Sep 2020 07:03  Phos  2.5     09-28  Mg     1.6     09-28    TPro  7.0  /  Alb  2.0<L>  /  TBili  6.9<H>  /  DBili  x   /  AST  71<H>  /  ALT  42  /  AlkPhos  149<H>  09-28    Prothrombin Time and INR, Plasma in AM (09.28.20 @ 07:03)   Prothrombin Time, Plasma: 53.9 sec   INR: 5.10-9.00     A/P:   Alcohol withdrawal with suspected Alcoholic Hepatitis and Metabolic encephalopathy with suspected ??Wernicke Encephalopathy much improved  Acute decompensation of Cirrhosis of liver with ascites and esophageal varices   Anemia etiology unclear : s/p possible upper GI loss doubt active bleed at this time improving H/H  Thrombocytopenia due to liver failure improving slowly  Elevated INR due to Liver dysfunction and partly vitamin K deficiency from poor oral intake   Vitamin D deficiency    Plan:   Off Librium doing well; Ativan PRN but avoid as much as feasible; not received lately last 2-3 days  H/H remain stable and trending up.  Continue, Protonix oral twice daily empirically for upper GI bleed given prior Esophageal varices; off Octreotide drip,   Cont Lactulose rifaximin, Titrate Lactulose for 3 BMs daily  Continue Prednisolone; Trial of Prednisolone at 40 mg daily for 7 days ; appears to be helping at this time should continue if okay with Hepatology.  If no clinical improvement will discontinue steroids currently improving clinically and lab hutchinson  As d/w Dr. Bradley add nadolol 20 mg daily and monitor; If remain stable add diuretics next 24 to 48 hrs Lasix 20mg plus Aldactone 50 mg with close monitoring of renal function.  As d/w Dr. Bradley if acutely decompensate may need transfer to [place] [facility]plant team.   As per Hepatology patient will need long term prophylaxis for SBP; (Norfloxacin not available in pharmacy)  will  continue Cipro.   Monitor CBC, LFT and Total and Direct Bilirubin closely; Bilirubin continues to be  trending down   No chemical DVT ppx due to anemia, suspected bleeding diathesis and thrombocytopenia  Continue Thiamine IM for potential Wernicke's; can switch to oral if remain stable  Trial of Void; OOB to chair.   INR trending up; No active bleed;  continue Vitamin K 10 mg x 1 day more; may help to correct nutritional deficiency.     Patient has guarded prognosis and high risk for mortality  Discussed with patient;  Spoke with Sister at bedside this evening and updated presentation, findings, clinical course and prognosis  Patient was advised to complete cessation of alcohol use to prevent any further damage as well as to be eligible for liver transplant in the future.    PATIENT WILL NEED SOCIAL WORK TO ARRANGE APPOINTMENT AT [facility] PRIOR TO DISCHARGE FOR CONTINUED FOLLOW UP CARE WITH PRIMARY DOCOTOR AND HEPATOLOGY REFERRAL.     I will be away 9/29/20 onwards. Dr. Krishnan to assume care

## 2020-09-28 NOTE — PROGRESS NOTE ADULT - PROBLEM SELECTOR PLAN 4
- improving and stable    - no signs of active bleeding  - likely 2/2 complication of cirrhosis vs chronic EtOH use  - normocytic, hypochromic. Ferritin, Fe total wnl, Fe Sat 91 (high), TIBC 121(low)    - Folate, B12 wnl  - FOBT neg  - retic 4.5, , haptoglobin <20  - s/p 4u pRBC, last on 9/21  - c/w thiamine, multivitamin, venofer  - Monitor CBC, vitals     - GI, Dr. Gerber, onboard

## 2020-09-28 NOTE — PROGRESS NOTE ADULT - ASSESSMENT
Assessment and Plan:   · Assessment	  Problem #1 Anemia - chronic and multifactorial (including GIB in the past), normocytic, hypochromic; stable Hg post PRBC; no clinical evidence of active bleeding at this time   -QUEENIE positive (9/22) w/ elevated LDH and low haptoglobin; albumin is low so low haptoglobin maybe reflection of poor hepatic synthetic function; LDH elevation may also be due to severe liver dysfunction  -given  likelihood of presence of AIHA, continue trial of prednisone 1 mg/kg po as long as there is no contraindication from active infection or other co-morbidities  -it is not clear that there is significant hemolysis contributing to current anemia but prednisone trial with daily LDH and Hg monitoring may elucidate role of AIHA  -Hgb slightly improved    Problem #2 Thrombocytopenia - no schistocytosis  -etiology likely cirrhosis/ETOH/medications  -s/p 1u Plts on 9/18  -transfuse SDP if <20k in liver disease   avoid nonessential meds  plt stable, no active bleeding    Problem #3 Alcoholic cirrhosis w/ esophageal varices- h/o UGIB x2  -significant hyperbilirubinemia and coagulopathy  -no need for FFP unless pt has clinically significant bleeding    Overall prognosis poor; will continue to follow; call with questions [phone]  Above reviewed with Attending Dr. Powers               Assessment and Plan:   · Assessment	  Problem #1 Anemia - chronic and multifactorial (including GIB in the past), normocytic, hypochromic; stable Hg post PRBC; no clinical evidence of active bleeding at this time   -QUEENIE positive (9/22) w/ elevated LDH and low haptoglobin; albumin is low so low haptoglobin maybe reflection of poor hepatic synthetic function; LDH elevation may also be due to severe liver dysfunction  -given  likelihood of presence of AIHA, continue trial of prednisone 1 mg/kg po as long as there is no contraindication from active infection or other co-morbidities  -Hgb slightly improved w/ prednisone; can taper down to 0.5 mg /kg per day    Problem #2 Thrombocytopenia - no schistocytosis  -etiology likely cirrhosis/ETOH/medications  -s/p 1u Plts on 9/18  -transfuse SDP if <20k in liver disease   avoid nonessential meds  plt stable, no active bleeding    Problem #3 Alcoholic cirrhosis w/ esophageal varices- h/o UGIB x2  -significant hyperbilirubinemia and coagulopathy  -no need for FFP unless pt has clinically significant bleeding    Overall prognosis poor; will continue to follow; call with questions [phone]  Above reviewed with Attending Dr. Powers

## 2020-09-28 NOTE — PROGRESS NOTE ADULT - ATTENDING COMMENTS
[facility] NEPHROLOGY  Rui Shore M.D.  Cal Montejo D.O.  Cristiana Vigil M.D.  Carie Mclean, MSN, ANP-C    Telephone: [phone]  Facsimile: [phone]    [address] [place] [address]

## 2020-09-28 NOTE — PROGRESS NOTE ADULT - PROBLEM SELECTOR PLAN 2
- improved    - ICU downgrade  - p/w ADOLFO; h/o AA, cirrhosis, and esophageal varices  - CIWA 0 (9/27)  - c/w ativan q4h prn, IV thiamine, folic acid and multivitamin  - enhanced observation 2/2 agitation  - monitor CIWA

## 2020-09-28 NOTE — PROGRESS NOTE ADULT - SUBJECTIVE AND OBJECTIVE BOX
Chief Complaint:  Patient is a 34y old  Male who presents with a chief complaint of Alcohol abuse (28 Sep 2020 11:28)      Reason for consult: Alcoholic cirrhosis    Interval Events: Patient was seen and examined at bedside. No acute event overnight, remains hemodynamically stable, afebrile, AAOX3, w/o asterixis. Sitting OOB. He denies abdominal pain, N/V, tolerating food, had BM in morning. No signs of active bleeding. Bilirubin further improving, INR still elevated. He c/o b/l LE weakness when trying to walk, requesting walker.     Hospital Medications:  ciprofloxacin     Tablet 500 milliGRAM(s) Oral daily  ergocalciferol 77749 Unit(s) Oral <User Schedule>  folic acid 1 milliGRAM(s) Oral daily  influenza   Vaccine 0.5 milliLiter(s) IntraMuscular once  iron sucrose IVPB 100 milliGRAM(s) IV Intermittent every 24 hours  lactulose Syrup 20 Gram(s) Oral three times a day  LORazepam   Injectable 2 milliGRAM(s) IV Push every 4 hours PRN  multivitamin 1 Tablet(s) Oral daily  mupirocin 2% Nasal 1 Application(s) Nasal two times a day  pantoprazole    Tablet 40 milliGRAM(s) Oral two times a day  prednisoLONE    3 mG/mL Solution (ORAPRED) 40 milliGRAM(s) Oral daily  rifAXIMin 550 milliGRAM(s) Oral two times a day  thiamine Injectable 100 milliGRAM(s) IntraMuscular daily      ROS:   General:  No  fevers, chills, night sweats, fatigue  Eyes:  Good vision, no reported pain  ENT:  No sore throat, pain, runny nose  CV:  No pain, palpitations  Pulm:  No dyspnea, cough  GI:  See above, otherwise negative  :  No  dysuria  Muscle:  No pain, but generalized weakness (c/o LE weakness when trying to walk, symmetric)  Neuro:  AAOx3  Endocrine:  No polyuria, polydipsia, cold/heat intolerance  Heme:  Ecchymosis, easy bruisability  Skin:  Ecchymosis    PHYSICAL EXAM:   Vital Signs:  Vital Signs Last 24 Hrs  T(C): 36.7 (28 Sep 2020 05:47), Max: 36.7 (28 Sep 2020 05:47)  T(F): 98.1 (28 Sep 2020 05:47), Max: 98.1 (28 Sep 2020 05:47)  HR: 81 (28 Sep 2020 05:47) (80 - 84)  BP: 111/61 (28 Sep 2020 05:47) (111/61 - 131/77)  BP(mean): --  RR: 18 (28 Sep 2020 05:47) (18 - 19)  SpO2: 97% (28 Sep 2020 05:47) (96% - 97%)  Daily     Daily Weight in k.5 (28 Sep 2020 05:47)    GENERAL: no acute distress  NEURO: awake, alert, oriented x3, no asterixis  HEENT: icteric sclera  CHEST: no respiratory distress, no accessory muscle use  CARDIAC: regular rate, rhythm  ABDOMEN: soft, non-tender,  moderately distended, + fluid wave - ascites, but not tense, still can hold off with paracentesis, no rebound or guarding  EXTREMITIES: warm, well perfused, no edema  SKIN: Ecchymoses    LABS: reviewed                        8.5    8.03  )-----------( 54       ( 28 Sep 2020 07:03 )             24.2         135  |  104  |  10  ----------------------------<  114<H>  4.0   |  26  |  0.70    Ca    8.2<L>      28 Sep 2020 07:03  Phos  2.5       Mg     1.6         TPro  7.0  /  Alb  2.0<L>  /  TBili  6.9<H>  /  DBili  x   /  AST  71<H>  /  ALT  42  /  AlkPhos  149<H>      LIVER FUNCTIONS - ( 28 Sep 2020 07:03 )  Alb: 2.0 g/dL / Pro: 7.0 g/dL / ALK PHOS: 149 U/L / ALT: 42 U/L DA / AST: 71 U/L / GGT: x             Interval Diagnostic Studies: see sunrise for full report   Chief Complaint:  Patient is a 34y old  Male who presents with a chief complaint of Alcohol abuse (28 Sep 2020 11:28)    : [id number]  Reason for consult: Alcoholic cirrhosis    Interval Events: Patient was seen and examined at bedside. No acute event overnight, remains hemodynamically stable, afebrile, AAOX3, w/o asterixis. Sitting OOB. He denies abdominal pain, N/V, tolerating food, had BM in morning. No signs of active bleeding. Bilirubin further improving, INR still elevated. He c/o b/l LE weakness when trying to walk, requesting walker.     Hospital Medications:  ciprofloxacin     Tablet 500 milliGRAM(s) Oral daily  ergocalciferol 09948 Unit(s) Oral <User Schedule>  folic acid 1 milliGRAM(s) Oral daily  influenza   Vaccine 0.5 milliLiter(s) IntraMuscular once  iron sucrose IVPB 100 milliGRAM(s) IV Intermittent every 24 hours  lactulose Syrup 20 Gram(s) Oral three times a day  LORazepam   Injectable 2 milliGRAM(s) IV Push every 4 hours PRN  multivitamin 1 Tablet(s) Oral daily  mupirocin 2% Nasal 1 Application(s) Nasal two times a day  pantoprazole    Tablet 40 milliGRAM(s) Oral two times a day  prednisoLONE    3 mG/mL Solution (ORAPRED) 40 milliGRAM(s) Oral daily  rifAXIMin 550 milliGRAM(s) Oral two times a day  thiamine Injectable 100 milliGRAM(s) IntraMuscular daily      ROS:   General:  No  fevers, chills, night sweats, fatigue  Eyes:  Good vision, no reported pain  ENT:  No sore throat, pain, runny nose  CV:  No pain, palpitations  Pulm:  No dyspnea, cough  GI:  See above, otherwise negative  :  No  dysuria  Muscle:  No pain, but generalized weakness (c/o LE weakness when trying to walk, symmetric)  Neuro:  AAOx3  Endocrine:  No polyuria, polydipsia, cold/heat intolerance  Heme:  Ecchymosis, easy bruisability  Skin:  Ecchymosis    PHYSICAL EXAM:   Vital Signs:  Vital Signs Last 24 Hrs  T(C): 36.7 (28 Sep 2020 05:47), Max: 36.7 (28 Sep 2020 05:47)  T(F): 98.1 (28 Sep 2020 05:47), Max: 98.1 (28 Sep 2020 05:47)  HR: 81 (28 Sep 2020 05:47) (80 - 84)  BP: 111/61 (28 Sep 2020 05:47) (111/61 - 131/77)  BP(mean): --  RR: 18 (28 Sep 2020 05:47) (18 - 19)  SpO2: 97% (28 Sep 2020 05:47) (96% - 97%)  Daily     Daily Weight in k.5 (28 Sep 2020 05:47)    GENERAL: no acute distress  NEURO: awake, alert, oriented x3, no asterixis  HEENT: icteric sclera  CHEST: no respiratory distress, no accessory muscle use  CARDIAC: regular rate, rhythm  ABDOMEN: soft, non-tender,  moderately distended, + fluid wave - ascites, but not tense, still can hold off with paracentesis, no rebound or guarding  EXTREMITIES: warm, well perfused, no edema  SKIN: Ecchymoses    LABS: reviewed                        8.5    8.03  )-----------( 54       ( 28 Sep 2020 07:03 )             24.2         135  |  104  |  10  ----------------------------<  114<H>  4.0   |  26  |  0.70    Ca    8.2<L>      28 Sep 2020 07:03  Phos  2.5       Mg     1.6         TPro  7.0  /  Alb  2.0<L>  /  TBili  6.9<H>  /  DBili  x   /  AST  71<H>  /  ALT  42  /  AlkPhos  149<H>      LIVER FUNCTIONS - ( 28 Sep 2020 07:03 )  Alb: 2.0 g/dL / Pro: 7.0 g/dL / ALK PHOS: 149 U/L / ALT: 42 U/L DA / AST: 71 U/L / GGT: x             Interval Diagnostic Studies: see sunrise for full report

## 2020-09-28 NOTE — PROGRESS NOTE ADULT - ASSESSMENT
Patient is a 35yo Male with Liver Cirrhosis, ETOH abuse, esophageal varices p/w weakness with nausea, hematemesis with blood in stool. Pt admitted with acute decompensated cirrhosis and ETOH withdrawal with anemia/ thrombocytopenia s/p brief ICU stay for worsening ETOH withdrawal.  s/p 3.5L paracentesis on 9/17. Neg for SBP. Pt had LEDA which resolved with albumin gtt. Pt found to have proteinuria and SAAG <1.1. Nephrology consulted for proteinuria.       1. Proteinuria- with low SAAG r/o nephrotic syndrome. Pt with no overt edema; signs of nephrotic syndrome. Cirrhotics usually have no or little proteinuria unless associated with kidney disease; ?secondary IgA nephropathy 2/2 ETOH abuse is a possibility, however pt had no microscopic hematuria on UA. UA with 30 protein an no blood. Spot UPr/ Cr 0.7 & 24 hr proteinuria 0.97; non nephrotic range.    Recc to repeat SAAG ?error.  Pt would be a poor candidate for biopsy with anemia and thrombocytopenia; high risk of bleeding. Recc to start Lisinipril 2.5mg PO qd, titrate as BP tolerates for antiproteinuric effects. .     2. Decompensated Cirrhosis- Plan as per Hepatology/ GI  3. Anemia/ Thrombocytopenia- due to liver cirrhosis/ ETOH abuse s/p 5 units prbc (total during hospitalization). Plan as per primary team  4. Vitamin D deficiency- 25 OH vit D 8 c/w weekly Ergo 50 k units

## 2020-09-28 NOTE — PROGRESS NOTE ADULT - PROBLEM SELECTOR PLAN 3
- unwitnessed h/o UGIB x2; h/o EtOH cirrhosis   - FOBT neg  - prior EGD in 6/2020 demonstrated small distal EV's (not banded due to low PLTs), and portal gastropathy ([place] Class IIA); d/c w/ spironolactone, nadalol, lasix (noncompliant)  - s/p 4u pRBC; last transfusion on 9/21  - c/w protonix PO  - no BB for now  - monitor CBC    - GI, Dr. Gerber, onboard  - no EGD per GI

## 2020-09-29 NOTE — PROGRESS NOTE ADULT - SUBJECTIVE AND OBJECTIVE BOX
PGY-1 Progress Note discussed with attending    PAGER #: [phone]] TILL 5:00 PM  PLEASE CONTACT ON CALL TEAM:  - On Call Team (Please refer to Alison) FROM 5:00 PM - 8:30PM  - Nightfloat Team FROM 8:30 -7:30 AM    INTERVAL HPI/OVERNIGHT EVENTS:   - No acute events overnight. Patient reports no complaints.     REVIEW OF SYSTEMS:  CONSTITUTIONAL: No fever, weight loss, or fatigue  RESPIRATORY: No cough, wheezing, chills or hemoptysis; No shortness of breath  CARDIOVASCULAR: No chest pain, palpitations, dizziness, or leg swelling  GASTROINTESTINAL: No abdominal pain. No nausea, vomiting, or hematemesis; No diarrhea or constipation. No melena or hematochezia.  GENITOURINARY: No dysuria or hematuria, urinary frequency  NEUROLOGICAL: No headaches, memory loss, loss of strength, numbness, or tremors    MEDICATIONS  (STANDING):  ciprofloxacin     Tablet 500 milliGRAM(s) Oral daily  ergocalciferol 81068 Unit(s) Oral <User Schedule>  folic acid 1 milliGRAM(s) Oral daily  influenza   Vaccine 0.5 milliLiter(s) IntraMuscular once  iron sucrose IVPB 100 milliGRAM(s) IV Intermittent every 24 hours  lactulose Syrup 20 Gram(s) Oral three times a day  multivitamin 1 Tablet(s) Oral daily  mupirocin 2% Nasal 1 Application(s) Nasal two times a day  nadolol 20 milliGRAM(s) Oral daily  pantoprazole    Tablet 40 milliGRAM(s) Oral two times a day  prednisoLONE    3 mG/mL Solution (ORAPRED) 40 milliGRAM(s) Oral daily  rifAXIMin 550 milliGRAM(s) Oral two times a day  thiamine Injectable 100 milliGRAM(s) IntraMuscular daily    MEDICATIONS  (PRN):  LORazepam   Injectable 2 milliGRAM(s) IV Push every 4 hours PRN Agitation      Vital Signs Last 24 Hrs  T(C): 36.7 (29 Sep 2020 12:57), Max: 36.8 (28 Sep 2020 20:25)  T(F): 98 (29 Sep 2020 12:57), Max: 98.2 (28 Sep 2020 20:25)  HR: 73 (29 Sep 2020 12:57) (67 - 73)  BP: 117/75 (29 Sep 2020 12:57) (117/75 - 122/66)  BP(mean): --  RR: 18 (29 Sep 2020 12:57) (16 - 18)  SpO2: 97% (29 Sep 2020 12:57) (96% - 98%)    PHYSICAL EXAMINATION:  GENERAL: NAD, AAOx3  HEAD: AT/NC  EYES: scleral icterus present, but improving  NECK: supple, No JVD noted, Normal thyroid  CHEST/LUNG: CTABL; no rales, rhonchi, wheezing, or rubs  HEART: regular rate and rhythm; no murmurs, rubs, or gallops  ABDOMEN: moderately distended and hard, but nontender; Bowel sounds present  EXTREMITIES:  2+ Peripheral Pulses, No clubbing, cyanosis, or edema                          8.6    11.07 )-----------( 56       ( 29 Sep 2020 06:58 )             25.2     09-29    134<L>  |  103  |  9   ----------------------------<  128<H>  4.0   |  27  |  0.72    Ca    8.3<L>      29 Sep 2020 06:58  Phos  3.0     09-29  Mg     1.6     09-29    TPro  6.9  /  Alb  1.9<L>  /  TBili  6.1<H>  /  DBili  x   /  AST  68<H>  /  ALT  44  /  AlkPhos  160<H>  09-29    LIVER FUNCTIONS - ( 29 Sep 2020 06:58 )  Alb: 1.9 g/dL / Pro: 6.9 g/dL / ALK PHOS: 160 U/L / ALT: 44 U/L DA / AST: 68 U/L / GGT: x               PT/INR - ( 29 Sep 2020 06:58 )   PT: 48.5 sec;   INR: 4.45 ratio         PTT - ( 28 Sep 2020 07:03 )  PTT:78.1 sec  COVID-19 PCR: NotDetec (15 Sep 2020 00:23)      CAPILLARY BLOOD GLUCOSE          RADIOLOGY & ADDITIONAL TESTS:

## 2020-09-29 NOTE — PROGRESS NOTE ADULT - ASSESSMENT
Hospital course:  33yo male with hx of chronic alcohol abuse (actively drinking since age 20), decompensated cirrhosis with ascites (non-compliant with diuretics, no hx of LVP), hx of small esophageal varices and portal hypertensive gastropathy (last EGD 6/2020), (non-compliant with BB, never banded), with recent hospitalization (6/2020 for leg swelling, anemia) presented on 9/15/2020 with malaise, generalized weakness for 1-2 weeks, found to have worsening liver function, with MELD score 32 compared to 23 on prior admission, electrolyte abnormalities, and questionable blood in stool (reported black stools) and/or vomitus, but without any signs of overt bleeding in hospital. On admission had no signs of HE and no LEDA. Acute worsening might have been caused by heavy alcohol intake (high blood alcohol on admission), SBP was r/o by paracentesis, viral and infectious workup negative so far, denied taking any medications or herbal supplements.  He became agitated and was transferred to ICU on 9/17/2020 and was treated for alcohol withdrawal. He became hypotensive and bradycardic on Precedex.  Developed LEDA, but improved with volume expansion with albumin. Anemic, thrombocytopenic, now s/p 1 U PLT, and 4U PRBC (9/18/19/20/21), without evidence of overt GI bleeding (FOBT neg too) and with no evidence of bleeding on CT abd/pelvis and now stable Hb. Patient also noted to have proteinuria, and unexpectedly ascites analysis showed SAAG < 1.1, with low total protein despite that the patient has evidence of portal hypertension (EV, portal HTN gastropathy, recanalization of umbilical vein), but urine protein/Cr ration, 24 hr protein did not reach nephrotic level, nephrology consult appreciated. Patient is s/p NAC and on prednisolone given the acute decompensation and possible AH component (started on 9/24 after relative contraindications r/o / resolved). CT head neg for bleeding. Patient is improving clinically, MELD slightly decreased.     A/P:  # Decompensated cirrhosis w ascites; small EV; MELD is 31  (from 33)  -  would need transplant referral, but unfortunately still active drinker, and was treated for  EtOH withdrawal during this admission, he was also not compliant with meds after June hospital discharge   (was d/w [facility] transplant hepatology service attending last week)  - will refer for outpatient Transplant Eval to [address] [place] [address] upon discharge    #ACLF - cirrhosis with development of LEDA during this hospitalization( 0.53-1.05) along with mental status change (EtOH withdrawal, sedation +/- HE - now resolved); etiology possibly heavy ETOH intake (elevated blood alcohol on admission; AH can be co-existent with cirrhosis)  - slight improvement in MELD, mental status back to baseline  - c/w monitoring LFTs closely, including INR  - avoid hepatotoxic meds, avoid NSAIDs  - will assess response for prednisolone at day#7 ([place] score)      #Ascites- no SBP; despite signs of CSPH (EV, portal  hypertensive gastropathy on EGD, recanalization of umbilical vein), unexpectedly  SAAG was < 1.1 (1.0), nephrology consult appreciated  - If Cr remains stable, can resume diuretics prior discharge (50 mg Spironolactone+20mg Furosemide)  - Would need to continue with long term SBP prophylaxis (ascites protein < 1.5 g/dl and CPT >9 and bilirubin >3), was switched to cipro 500 mg daily    #LEDA (>0.3 mg /dl above his baseline w/in 48 hrs; /0.53-1.05 on 9/18)- responded to volume expansion w albumin (1.05-0.61), but not fully back to baseline yet (today 0.72, his baseline ~ 0.5)  - C/w close monitoring renal function, electrolytes including Mg and P, and urine output; and replace accordingly  - keep MAP >65-70mmHg      #Small EV and portal hypertensive gastropathy on last EGD (no report available whether there were high risk signs, but he was started on BB in June), was non compliant w BB  - endoscopy was deferred during this admission (was no sign of overt active GI bleeding, no hematemesis, melena or hematochezia, FOBT neg), d/w Dr. Gerber (GI is on board)  - keep Hb >7, c/w monitoring H/H and call GI if any bleeding   -c/w nadolol 20 mg (BB has mortality benefit), monitor BP, HR (~55-60/min)  - remains on PPI (as being on steroid), was switched to po (when off steroid, would carefully assess the need of PPI, as it changes the microbiome and might increase the risk of SBP)  - if he will be on BB and will be compliant, no need for repeat routine endoscopy (unless other indications), but if not taking BB, will need endoscopy in 1 year (6/2021)    #EtOH withdrawal + HE - resolved  - continue close monitoring of mental status and neurological exam  - c/w lactulose and please make sure to titrate to have 2-3 soft BM/day   - c/w rifaximin 550 mg bid  - avoid long acting BZD   - C/w CIWA protocol per primary team, c/w folic, thiamin, monitor electrolytes    #Anemia, thrombocytopenia, coagulopathy  - possibly due to cirrhosis + EtOH caused BM suppression - however b/o recurrent slow drop in Hb after transfusions and required total 4 U PRBC 9/18-9/21 without any evidence of overt bleeding, hematology was consulted, was concern for AIHA - steroid was recommended  -f/u hematology recommendations  (Hb stable; PLT >50)      #Etiology of cirrhosis  - while most likely ETOH cirrhosis, given the young age additional workup was sent  - ceruloplasmin < 20, (could be falsely low in end stage liver or protein-losing); 24hr urine copper wnl, <40;  bedside opthalmology exam?  -[id number] nl;  ferritin 331 -EtOH, acute phase protein, would repeat iron studies when acute issues resolved  - total IgG elevated 4018, but LONNY neg, anti SLA neg, anti-SMA neg, anti-LKM neg, can repeat Immunglobulin panel     #HCM   - consider nutritionist intervention / teaching  - HCC screening - last US and AFP in 6/2020, no focal mass, nl AFP, next due 12/2020  - vit D low - was started on vit D, c/w supplement   - Vaccinations:         Hep A immune        Hep B immune, not exposed         Flu vaccine         Would check whether got Pneumococcal vaccine   - consider PT, pt requested walker for home    Poor prognosis, repeatedly counseled patient about alcohol abstinence. Family involvement, discussion could be helpful. Primary team had discussed with patients family. Psychiatry /  involvement?    Will continue to follow  D/w primary team  Thank you for the consult.

## 2020-09-29 NOTE — PHYSICAL THERAPY INITIAL EVALUATION ADULT - STANDING BALANCE: STATIC
fair plus/with walker
Spoke with Dr. Alexandre- put in xeroform, augmentin, will see in office today. Stable for dc to Dr. Alexandre's office.

## 2020-09-29 NOTE — PROGRESS NOTE ADULT - ATTENDING COMMENTS
[facility] NEPHROLOGY  Rui Shore M.D.  Cal Montejo D.O.  Cristiana Vigil M.D.  Carie Mclean, MSN, ANP-C    Telephone: [phone]  Facsimile: [phone]    [address] [place] [address]

## 2020-09-29 NOTE — PROGRESS NOTE ADULT - PROBLEM SELECTOR PLAN 3
- symptomatic mucosal bleeds and easy bruisability  - likely secondary to chronic alcoholism   - peripheral smear shows no schistocytes, retic count 2.5%,  (unreliable in setting of liver dysfunction  - Platelet 68 (9/21) ->58->63->73->76->73->61->60->60->70->54->56  - s/p 1u Plts on 9/18  - monitor plts, transfuse if PC <20K, in liver disease pts

## 2020-09-29 NOTE — PROGRESS NOTE ADULT - SUBJECTIVE AND OBJECTIVE BOX
Chief Complaint:  Patient is a 34y old  Male who presents with a chief complaint of Alcohol abuse (29 Sep 2020 14:10)    : [id number]  Reason for consult: Alcoholic cirrhosis    Interval Events:  Patient was seen and examined at bedside. No acute events. Sitting OOB, eating. AAOx3, no asterixis, no signs of HE - resolved. Denies abdominal pain, N/V, diarrhea, melena or hematochezia. No signs of bleeding.     Hospital Medications:  ciprofloxacin     Tablet 500 milliGRAM(s) Oral daily  ergocalciferol 19842 Unit(s) Oral <User Schedule>  folic acid 1 milliGRAM(s) Oral daily  influenza   Vaccine 0.5 milliLiter(s) IntraMuscular once  iron sucrose IVPB 100 milliGRAM(s) IV Intermittent every 24 hours  lactulose Syrup 20 Gram(s) Oral three times a day  LORazepam   Injectable 2 milliGRAM(s) IV Push every 4 hours PRN  multivitamin 1 Tablet(s) Oral daily  mupirocin 2% Nasal 1 Application(s) Nasal two times a day  nadolol 20 milliGRAM(s) Oral daily  pantoprazole    Tablet 40 milliGRAM(s) Oral two times a day  prednisoLONE    3 mG/mL Solution (ORAPRED) 40 milliGRAM(s) Oral daily  rifAXIMin 550 milliGRAM(s) Oral two times a day  thiamine Injectable 100 milliGRAM(s) IntraMuscular daily      ROS:   General:  No  fevers, chills, night sweats, fatigue  Eyes:  Good vision, no reported pain  ENT:  No sore throat, pain, runny nose  CV:  No pain, palpitations  Pulm:  No dyspnea, cough  GI:  See HPI, otherwise negative  :  No  incontinence, nocturia  Muscle:  No pain, weakness  Neuro:  No memory problems  Psych:  No insomnia, mood problems, depression  Endocrine:  No polyuria, polydipsia, cold/heat intolerance  Heme:  Ecchymosis, easy bruisability  Skin:  No rash    PHYSICAL EXAM:   Vital Signs:  Vital Signs Last 24 Hrs  T(C): 36.7 (29 Sep 2020 12:57), Max: 36.8 (28 Sep 2020 20:25)  T(F): 98 (29 Sep 2020 12:57), Max: 98.2 (28 Sep 2020 20:25)  HR: 73 (29 Sep 2020 12:57) (67 - 73)  BP: 117/75 (29 Sep 2020 12:57) (117/75 - 122/66)  BP(mean): --  RR: 18 (29 Sep 2020 12:57) (16 - 18)  SpO2: 97% (29 Sep 2020 12:57) (96% - 98%)  Daily     Daily Weight in k (29 Sep 2020 04:46)    GENERAL: no acute distress  NEURO: AAOX3, no asterixis  HEENT: icteric sclera, no conjunctival pallor appreciated  CHEST: no respiratory distress, no accessory muscle use  CARDIAC: regular rate, rhythm  ABDOMEN: soft, non-tender, moderately distended, + fluid wave, not tense, no rebound or guarding  EXTREMITIES: warm, well perfused, no edema  SKIN: Ecchymoses    LABS: reviewed                        8.6    11.07 )-----------( 56       ( 29 Sep 2020 06:58 )             25.2     -    134<L>  |  103  |  9   ----------------------------<  128<H>  4.0   |  27  |  0.72    Ca    8.3<L>      29 Sep 2020 06:58  Phos  3.0     -  Mg     1.6         TPro  6.9  /  Alb  1.9<L>  /  TBili  6.1<H>  /  DBili  x   /  AST  68<H>  /  ALT  44  /  AlkPhos  160<H>      LIVER FUNCTIONS - ( 29 Sep 2020 06:58 )  Alb: 1.9 g/dL / Pro: 6.9 g/dL / ALK PHOS: 160 U/L / ALT: 44 U/L DA / AST: 68 U/L / GGT: x             Interval Diagnostic Studies: see sunrise for full report

## 2020-09-29 NOTE — PROGRESS NOTE ADULT - ASSESSMENT
Patient is a 33yo Male with Liver Cirrhosis, ETOH abuse, esophageal varices p/w weakness with nausea, hematemesis with blood in stool. Pt admitted with acute decompensated cirrhosis and ETOH withdrawal with anemia/ thrombocytopenia s/p brief ICU stay for worsening ETOH withdrawal.  s/p 3.5L paracentesis on 9/17. Neg for SBP. Pt had LEDA which resolved with albumin gtt. Pt found to have proteinuria and SAAG <1.1. Nephrology consulted for proteinuria.       1. Proteinuria- with low SAAG r/o nephrotic syndrome. Pt with no overt edema; signs of nephrotic syndrome. Cirrhotics usually have no or little proteinuria unless associated with kidney disease; ?secondary IgA nephropathy 2/2 ETOH abuse is a possibility, however pt had no microscopic hematuria on UA. UA with 30 protein an no blood. Spot UPr/ Cr 0.7 & 24 hr proteinuria 0.97; non nephrotic range.    Recc to repeat SAAG ?error.  Pt would be a poor candidate for biopsy with anemia and thrombocytopenia; high risk of bleeding. Recc to start Lisinipril 2.5mg PO qd, titrate as BP tolerates for antiproteinuric effects. .     2. Decompensated Cirrhosis- Plan as per Hepatology/ GI  3. Anemia/ Thrombocytopenia- due to liver cirrhosis/ ETOH abuse s/p 5 units prbc (total during hospitalization). Plan as per primary team  4. Vitamin D deficiency- 25 OH vit D 8 c/w weekly Ergo 50 k units

## 2020-09-29 NOTE — PROGRESS NOTE ADULT - PROBLEM SELECTOR PLAN 2
- stable    - unwitnessed h/o UGIB x2; h/o EtOH cirrhosis   - FOBT neg  - prior EGD in 6/2020 demonstrated small distal EV's (not banded due to low PLTs), and portal gastropathy (Waldorf Class IIA); d/c w/ spironolactone, nadalol, lasix (noncompliant)  - s/p 4u pRBC; last transfusion on 9/21  - c/w protonix PO, nadolol PO 20mg qd  - no BB for now  - monitor CBC    - GI, Dr. Gerber, onboard  - no EGD per GI - stable  - unwitnessed h/o UGIB x2; h/o EtOH cirrhosis   - FOBT neg  - prior EGD in 6/2020 demonstrated small distal EV's (not banded due to low PLTs), and portal gastropathy (V[place] Class IIA); d/c w/ spironolactone, nadalol, lasix (noncompliant)  - s/p 4u pRBC; last transfusion on 9/21  - c/w protonix PO, nadolol PO 20mg qd  - no BB for now  - monitor CBC  - GI, Dr. Gerber, onboard  - no EGD per GI

## 2020-09-29 NOTE — PROGRESS NOTE ADULT - PROBLEM SELECTOR PLAN 1
- h/o alcoholic jaundice w/ ascites  - jaundiced, scleral icterus   - MELD 32; 3mo mortality 52.6%  - Child-Bojorquez 13  - Maddreys 178.1 (poor prognosis)   - hepatitis panel negative  - US RUQ liver cirrhosis with no focal lesion, cholelithiasis with mild GB wall thickening, no BD dilatation, negative Field's sign   - s/p paracentesis 2.8L jorden fluid drained, low suspicion for SBP   - c/w ppx ceftriaxone, rifaximin and lactulose, NAC, prednisolone 40mg    - Hepatology, Dr. Morales, onboard  - GI, Dr. Gerber, onboard - h/o alcoholic jaundice w/ ascites  - jaundiced, scleral icterus   - MELD 32; 3mo mortality 52.6%  - Child-Bojorquez 13  - Maddreys 178.1 (poor prognosis)   - hepatitis panel negative  - US RUQ liver cirrhosis with no focal lesion, cholelithiasis with mild GB wall thickening, no BD dilatation, negative Field's sign   - s/p paracentesis 2.8L jorden fluid drained, low suspicion for SBP   - c/w ppx cipro, rifaximin and lactulose, NAC, prednisolone 40mg  - Hepatology, Dr. Morales, onboard  - GI, Dr. Gerber, onboard

## 2020-09-29 NOTE — PROGRESS NOTE ADULT - ATTENDING COMMENTS
I reviewed patient's data and participated in the management of the patient along with [facility] as well as hematology/med oncology faculty during the daily heme/onc case review. I reviewed pertinent clinical information, PE,  labs as well as A/P as outline above, in agreement and edited as appropriate. Discussed case with Hepatology team. Continue prednisone and taper weekly if response. We will continue to follow.

## 2020-09-29 NOTE — PROGRESS NOTE ADULT - PROBLEM SELECTOR PLAN 4
- improved    - ICU downgrade  - p/w ADOLFO; h/o AA, cirrhosis, and esophageal varices  - CIWA 0 (9/27)  - c/w ativan q4h prn, IV thiamine, folic acid and multivitamin  - enhanced observation 2/2 agitation  - monitor CIWA - improved  - off ativan  - CIWA 0 (9/29)  - c/w ativan q4h prn, IV thiamine, folic acid and multivitamin  - monitor CIWA

## 2020-09-29 NOTE — PROGRESS NOTE ADULT - ASSESSMENT
Assessment and Plan:   · Assessment	  Problem #1 Anemia - chronic and multifactorial (including GIB in the past), normocytic, hypochromic; stable Hg post PRBC; no clinical evidence of active bleeding at this time   -QUEENIE positive (9/22) w/ elevated LDH and low haptoglobin; albumin is low so low haptoglobin maybe reflection of poor hepatic synthetic function; LDH elevation may also be due to severe liver dysfunction  -given  likelihood of presence of AIHA, continue trial of prednisone 1 mg/kg po as long as there is no contraindication from active infection or other co-morbidities  FOBT (-)  -Hgb slightly improved and now stable w/ prednisone; continue taper down to 0.5 mg /kg per day    Problem #2 Thrombocytopenia - no schistocytosis  -etiology likely cirrhosis/ETOH/medications  -s/p 1u Plts on 9/18  -transfuse SDP if <20k in liver disease   avoid nonessential meds  plt stable, no active bleeding    Problem #3 Alcoholic cirrhosis w/ esophageal varices- h/o UGIB x2  -significant hyperbilirubinemia and coagulopathy  -no need for FFP unless pt has clinically significant bleeding    Overall prognosis poor; will continue to follow; call with questions [phone]  Above reviewed with Attending Dr. Clark     Assessment and Plan:   · Assessment	  Problem #1 Anemia - chronic and multifactorial (including GIB in the past), normocytic, hypochromic; stable Hg post PRBC; no clinical evidence of active bleeding at this time   -QUEENIE positive (9/22) w/ elevated LDH and low haptoglobin; albumin is low so low haptoglobin maybe reflection of poor hepatic synthetic function; LDH elevation may also be due to severe liver dysfunction  -given  likelihood of presence of AIHA, continue trial of prednisone PO as long as there is no contraindication from active infection or other co-morbidities  FOBT (-)  -Hgb slightly improved and now stable w/ prednisone; continue taper down to 0.5 mg /kg per day    Problem #2 Thrombocytopenia - no schistocytosis  -etiology likely cirrhosis/ETOH/medications  -s/p 1u Plts on 9/18  -transfuse SDP if <20k in liver disease   avoid nonessential meds  plt stable, no active bleeding    Problem #3 Alcoholic cirrhosis w/ esophageal varices- h/o UGIB x2  -significant hyperbilirubinemia and coagulopathy  -no need for FFP unless pt has clinically significant bleeding    Overall prognosis poor; will continue to follow; call with questions [phone]  Above reviewed with Attending Dr. Clark

## 2020-09-29 NOTE — PROGRESS NOTE ADULT - PROBLEM SELECTOR PLAN 5
- improving and stable    - no signs of active bleeding  - likely 2/2 complication of cirrhosis vs chronic EtOH use  - normocytic, hypochromic. Ferritin, Fe total wnl, Fe Sat 91 (high), TIBC 121(low)    - Folate, B12 wnl  - FOBT neg  - retic 4.5, , haptoglobin <20  - s/p 4u pRBC, last on 9/21  - c/w thiamine, multivitamin, venofer  - Monitor CBC, vitals     - GI, Dr. Gerber, onboard - improving and stable  - no signs of active bleeding  - likely 2/2 complication of cirrhosis vs chronic EtOH use  - normocytic, hypochromic. Ferritin, Fe total wnl, Fe Sat 91 (high), TIBC 121(low)    - Folate, B12 wnl  - FOBT neg  - retic 4.5, , haptoglobin <20  - s/p 4u pRBC, last on 9/21  - c/w thiamine, multivitamin, venofer  - Monitor CBC, vitals   - GI, Dr. Gerber, onboard

## 2020-09-29 NOTE — PROGRESS NOTE ADULT - SUBJECTIVE AND OBJECTIVE BOX
[facility] NEPHROLOGY- PROGRESS NOTE    Patient is a 35yo Male with Liver Cirrhosis, ETOH abuse, esophageal varices p/w weakness with nausea, hematemesis with blood in stool. Pt admitted with acute decompensated cirrhosis and ETOH withdrawal with anemia/ thrombocytopenia s/p brief ICU stay for worsening ETOH withdrawal.  s/p 3.5L paracentesis on 9/17. Neg for SBP. Pt had LEDA which resolved with albumin gtt. Pt found to have proteinuria and SAAG <1.1. Nephrology consulted for proteinuria.     Hospital Medications: Medications reviewed.    REVIEW OF SYSTEMS:   Denies any SOB, pain, n/v/d or abd pain.     VITALS:  T(F): 98 (09-29-20 @ 04:46), Max: 98.6 (09-28-20 @ 14:11)  HR: 68 (09-29-20 @ 11:00)  BP: 118/70 (09-29-20 @ 11:00)  RR: 16 (09-29-20 @ 04:46)  SpO2: 98% (09-29-20 @ 11:00)  Wt(kg): --    09-28 @ 07:01  -  09-29 @ 07:00  --------------------------------------------------------  IN: 0 mL / OUT: 350 mL / NET: -350 mL        PHYSICAL EXAM:  Gen NAD  HEENT: +icterus  Cards: RRR, +S1/S2,  Resp: CTA ant  GI: soft, NT mod distention  Extremities: no LE edema B/L      LABS:  09-29    134<L>  |  103  |  9   ----------------------------<  128<H>  4.0   |  27  |  0.72    Ca    8.3<L>      29 Sep 2020 06:58  Phos  3.0     09-29  Mg     1.6     09-29    TPro  6.9  /  Alb  1.9<L>  /  TBili  6.1<H>  /  DBili      /  AST  68<H>  /  ALT  44  /  AlkPhos  160<H>  09-29    Creatinine Trend: 0.72 <--, 0.70 <--, 0.67 <--, 0.68 <--, 0.75 <--, 0.65 <--, 0.89 <--                        8.6    11.07 )-----------( 56       ( 29 Sep 2020 06:58 )             25.2     Urine Studies:    Creatinine, Random Urine: 284 mg/dL (09-23 @ 14:00)  Osmolality, Random Urine: 574 mos/kg (09-23 @ 14:00)  Sodium, Random Urine: <5 mmol/L (09-23 @ 14:00)

## 2020-09-29 NOTE — PROGRESS NOTE ADULT - SUBJECTIVE AND OBJECTIVE BOX
complete note to follow · Subjective and Objective:   INTERVAL HPI: Patient seen and examined at bedside; Events noted; Patient w/o complaint    PMH/PSH as above    FmHx/Soc Hx NC    ROS as above; pt is not able to provide detailed review of systems  General: Noncontributory; Skin/Breast: NC;Ophthalmologic:NC; ENMT: NC; Respiratory and Thorax: NC; Cardiovascular: NC; 	  Gastrointestinal: NC; Genitourinary:NC; 	Musculoskeletal:NC; Neurological: NC; Psychiatric: NC; Hematology/Lymphatics: NC; Endocrine: NC; Allergic/Immunologic: NC    MEDICATIONS  (STANDING):  chlordiazePOXIDE 25 milliGRAM(s) Oral every 12 hours  ergocalciferol 79703 Unit(s) Oral <User Schedule>  folic acid 1 milliGRAM(s) Oral daily  influenza   Vaccine 0.5 milliLiter(s) IntraMuscular once  lactulose Syrup 20 Gram(s) Oral four times a day  multivitamin 1 Tablet(s) Oral daily  mupirocin 2% Nasal 1 Application(s) Nasal two times a day  pantoprazole  Injectable 40 milliGRAM(s) IV Push two times a day  prednisoLONE    3 mG/mL Solution (ORAPRED) 40 milliGRAM(s) Oral daily  rifAXIMin 550 milliGRAM(s) Oral two times a day  thiamine Injectable 100 milliGRAM(s) IntraMuscular daily    MEDICATIONS  (PRN):  LORazepam   Injectable 2 milliGRAM(s) IV Push every 4 hours PRN Agitation      Vitals:  Vital Signs Last 24 Hrs  T(C): 36.7 (29 Sep 2020 12:57), Max: 36.8 (28 Sep 2020 20:25)  T(F): 98 (29 Sep 2020 12:57), Max: 98.2 (28 Sep 2020 20:25)  HR: 73 (29 Sep 2020 12:57) (67 - 73)  BP: 117/75 (29 Sep 2020 12:57) (117/75 - 122/66)  BP(mean): --  ABP: --  ABP(mean): --  RR: 18 (29 Sep 2020 12:57) (16 - 18)  SpO2: 97% (29 Sep 2020 12:57) (96% - 98%)      _________________  PHYSICAL EXAM:  ---------------------------  GEN: NAD; A and O x 3; jaundiced, OOB to chair  EYES: Scleral icterus  LUNGS: no wheezing; decreased bilateral air entry; no use of accessory muscles for breathing  HEART: Nl S1 S2; no M   ABDOMEN: Soft, Nontender, non distended  EXTREMITIES: no cyanosis; no edema; warm and dry  NERVOUS SYSTEM:  Awake and alert; no focal neuro  deficits    _________________________________________________  LABS:             CBC Full  -  ( 29 Sep 2020 06:58 )  WBC Count : 11.07 K/uL  RBC Count : 2.60 M/uL  Hemoglobin : 8.6 g/dL  Hematocrit : 25.2 %  Platelet Count - Automated : 56 K/uL  Mean Cell Volume : 96.9 fl  Mean Cell Hemoglobin : 33.1 pg  Mean Cell Hemoglobin Concentration : 34.1 gm/dL  Auto Neutrophil # : x  Auto Lymphocyte # : x  Auto Monocyte # : x  Auto Eosinophil # : x  Auto Basophil # : x  Auto Neutrophil % : x  Auto Lymphocyte % : x  Auto Monocyte % : x  Auto Eosinophil % : x  Auto Basophil % : x    09-29    134<L>  |  103  |  9   ----------------------------<  128<H>  4.0   |  27  |  0.72    Ca    8.3<L>      29 Sep 2020 06:58  Phos  3.0     09-29  Mg     1.6     09-29    TPro  6.9  /  Alb  1.9<L>  /  TBili  6.1<H>  /  DBili  x   /  AST  68<H>  /  ALT  44  /  AlkPhos  160<H>  09-29    Dir Antiglob IgG Interpretation: POS (09.22.20 @ 07:46) Dir Antiglob Polyspecific Interpretation: POS (09.22.20 @ 07:46)    Lactate Dehydrogenase, Serum in AM (09.24.20 @ 07:27)   Lactate Dehydrogenase, Serum: 405 U/L   Haptoglobin, Serum in AM (09.24.20 @ 10:28)   Haptoglobin, Serum: <20: Test Repeated mg/dL     CAPILLARY BLOOD GLUCOSEDir Antiglob IgG Interpretation: POS (09.22.20 @ 07:46)         Radiology:  < from: CT Head No Cont (09.24.20 @ 12:36) >  EXAM:  CT BRAIN                            PROCEDURE DATE:  09/24/2020          INTERPRETATION:  HISTORY:  Altered mental status  Evaluation demonstrates no evidence of mass-effect or midline shift. No intraparenchymal mass lesions or hemorrhage isidentified. There is no evidence of hydrocephalus. No extra-axial collections are noted.    The bone windows demonstrate no gross osseous abnormalities.    Impression:  1. Unremarkable noncontrast CT scan of the brain.                  MAXIMILIANO BIRD M.D., ATTENDING RADIOLOGIST  This document has been electronically signed. Sep 24 2020  1:00PM    < end of copied text >            Above reviewed with Attending

## 2020-09-29 NOTE — PROGRESS NOTE ADULT - PROBLEM SELECTOR PLAN 6
- improved    - grade 1; clinically drowsy with sleep reversal  - Ammonia 58 ->100  - CT head neg for acute pathology  - c/w lactulose, rifaximin, thiamin  - frequent neurochecks - improved  - grade 1; clinically drowsy with sleep reversal  - CT head neg for acute pathology  - c/w lactulose, rifaximin, thiamin  - frequent neurochecks

## 2020-09-30 NOTE — PROGRESS NOTE ADULT - ASSESSMENT
Assessment and Plan:   · Assessment	  Problem #1 Anemia - chronic and multifactorial (including GIB in the past), normocytic, hypochromic; stable Hg post PRBC; no clinical evidence of active bleeding at this time   -QUEENIE positive (9/22) w/ elevated LDH and low haptoglobin; albumin is low so low haptoglobin maybe reflection of poor hepatic synthetic function; LDH elevation may also be due to severe liver dysfunction  -given  likelihood of presence of AIHA, continue trial of prednisone PO as long as there is no contraindication from active infection or other co-morbidities  FOBT (-)  -Hgb slightly improved and now stable w/ prednisone; continue taper down to 0.5 mg /kg per day    Problem #2 Thrombocytopenia - no schistocytosis  -etiology likely cirrhosis/ETOH/medications  -s/p 1u Plts on 9/18  -transfuse SDP if <20k in liver disease   avoid nonessential meds  plt stable, no active bleeding  ecchymosis scattered on B/L UE, left hand edema with hematoma- Rec warm compress, elevation  will review smear as today's manual diff shows slight shistocytes    Problem #3 Alcoholic cirrhosis w/ esophageal varices- h/o UGIB x2  -significant hyperbilirubinemia and coagulopathy  -no need for FFP unless pt has clinically significant bleeding  appreciate hepatology consult    Overall prognosis poor; will continue to follow; call with questions [phone]  Above reviewed with Attending Dr. Alegre Assessment and Plan:   · Assessment	  Problem #1 Anemia - chronic and multifactorial (including GIB in the past), normocytic, hypochromic; stable Hg post PRBC; no clinical evidence of active bleeding at this time   -QUEENIE positive (9/22) w/ elevated LDH and low haptoglobin; albumin is low so low haptoglobin maybe reflection of poor hepatic synthetic function; LDH elevation may also be due to severe liver dysfunction  -given  likelihood of presence of AIHA, continue trial of prednisone PO as long as there is no contraindication from active infection or other co-morbidities  FOBT (-)  -Hgb slightly improved and now stable w/ prednisone; continue taper down to 0.5 mg /kg per day    Problem #2 Thrombocytopenia - no schistocytosis  -etiology likely cirrhosis/ETOH/medications  -s/p 1u Plts on 9/18  -transfuse SDP if <20k in liver disease   avoid nonessential meds  plt stable, no active bleeding  ecchymosis scattered on B/L UE, left hand edema with hematoma- Rec warm compress, elevation  will review smear as today's manual diff shows slight shistocytes (DIC vs liver disease)  Fibrinogen low and elevated D-dimer changes c/w DIC  Rec's:  -Monitor Plt count,   -Check Factor VIII  -further recommendation pending results    Problem #3 Alcoholic cirrhosis w/ esophageal varices- h/o UGIB x2  -significant hyperbilirubinemia and coagulopathy  -no need for FFP unless pt has clinically significant bleeding  appreciate hepatology consult    Overall prognosis poor; will continue to follow; call with questions [phone]  Above reviewed with Attending Dr. Alegre Assessment and Plan:   · Assessment	  Problem #1 Anemia - chronic and multifactorial (including GIB in the past), normocytic, hypochromic; stable Hg post PRBC; no clinical evidence of active bleeding at this time   -QUEENIE positive (9/22) w/ elevated LDH and low haptoglobin; albumin is low so low haptoglobin maybe reflection of poor hepatic synthetic function; LDH elevation may also be due to severe liver dysfunction  -given  likelihood of presence of AIHA, continue trial of prednisone PO as long as there is no contraindication from active infection or other co-morbidities  FOBT (-)  -Hgb slightly improved and now stable w/ prednisone; continue taper down to 0.5 mg /kg per day    Problem #2 Thrombocytopenia - no schistocytosis  -etiology likely cirrhosis/ETOH/medications  -s/p 1u Plts on 9/18  -transfuse SDP if <20k in liver disease   avoid nonessential meds  plt stable, no active bleeding  ecchymosis scattered on B/L UE, left hand edema with hematoma- Rec warm compress, elevation  will review smear as today's manual diff shows slight shistocytes (DIC vs liver disease)  Fibrinogen low, elevated PT/PTT and elevated D-dimer changes c/w poss DIC vs liver disease  Rec's:  -Monitor Plt count,   -Check Factor VIII  -further recommendation pending results    Problem #3 Alcoholic cirrhosis w/ esophageal varices- h/o UGIB x2  -significant hyperbilirubinemia and coagulopathy  -no need for FFP unless pt has clinically significant bleeding  appreciate hepatology consult    Overall prognosis poor; will continue to follow; call with questions [phone]  Above reviewed with Attending Dr. Alegre

## 2020-09-30 NOTE — PHARMACOTHERAPY INTERVENTION NOTE - COMMENTS
Recommended to change Thiamine IM (Day #7) to PO.
Pt is on [facility] protocol.  Recommended to order Thiamine po.

## 2020-09-30 NOTE — PROVIDER CONTACT NOTE (CRITICAL VALUE NOTIFICATION) - PERSON GIVING RESULT:
Jesi/[facility]
Lab- Filipe Benedict
Leelee/ [facility]
MARC Anglin /[facility]
Nisa
[place]
TYRESE ALSTON
[place]
ghislaine sierra, lab
lab
Lab.

## 2020-09-30 NOTE — PROGRESS NOTE ADULT - SUBJECTIVE AND OBJECTIVE BOX
PGY-1 Progress Note discussed with attending    PAGER #: [[phone]] TILL 5:00 PM  PLEASE CONTACT ON CALL TEAM:  - On Call Team (Please refer to Alison) FROM 5:00 PM - 8:30PM  - Nightfloat Team FROM 8:30 -7:30 AM    INTERVAL HPI/OVERNIGHT EVENTS:   - no acute events overnight or this am. He denies any bleeding or bloody bowel movement. He denies any discomfort, as well as pain. He is resting comfortably in bed. He reports frequent OOB throughout the day.    REVIEW OF SYSTEMS:  CONSTITUTIONAL: No fever, weight loss, or fatigue  RESPIRATORY: No cough, wheezing, chills or hemoptysis; No shortness of breath  CARDIOVASCULAR: No chest pain, palpitations, dizziness, or leg swelling  GASTROINTESTINAL: No abdominal pain. No nausea, vomiting, or hematemesis; No diarrhea or constipation. No melena or hematochezia.  GENITOURINARY: No dysuria or hematuria, urinary frequency  NEUROLOGICAL: No headaches, memory loss, loss of strength, numbness, or tremors     MEDICATIONS  (STANDING):  ciprofloxacin     Tablet 500 milliGRAM(s) Oral daily  ergocalciferol 84365 Unit(s) Oral <User Schedule>  folic acid 1 milliGRAM(s) Oral daily  influenza   Vaccine 0.5 milliLiter(s) IntraMuscular once  lactulose Syrup 20 Gram(s) Oral three times a day  multivitamin 1 Tablet(s) Oral daily  mupirocin 2% Nasal 1 Application(s) Nasal two times a day  nadolol 20 milliGRAM(s) Oral daily  pantoprazole    Tablet 40 milliGRAM(s) Oral two times a day  prednisoLONE    3 mG/mL Solution (ORAPRED) 40 milliGRAM(s) Oral daily  thiamine Injectable 100 milliGRAM(s) IntraMuscular daily    MEDICATIONS  (PRN):  LORazepam   Injectable 2 milliGRAM(s) IV Push every 4 hours PRN Agitation      Vital Signs Last 24 Hrs  T(C): 36.7 (30 Sep 2020 05:15), Max: 37.1 (29 Sep 2020 20:10)  T(F): 98.1 (30 Sep 2020 05:15), Max: 98.7 (29 Sep 2020 20:10)  HR: 71 (30 Sep 2020 05:15) (71 - 78)  BP: 108/68 (30 Sep 2020 05:15) (108/68 - 117/75)  BP(mean): --  RR: 16 (30 Sep 2020 05:15) (16 - 18)  SpO2: 97% (30 Sep 2020 05:15) (97% - 98%)    PHYSICAL EXAMINATION:  GENERAL: NAD, AAOx3  HEAD: AT/NC  EYES: scleral icterus noted, improving  NECK: supple, No JVD noted, Normal thyroid  CHEST/LUNG: CTABL; no rales, rhonchi, wheezing, or rubs  HEART: regular rate and rhythm; no murmurs, rubs, or gallops  ABDOMEN: moderately distended and hard, but nontender; Bowel sounds present  ETREMITIES:  2+ Peripheral Pulses, No clubbing, cyanosis, or edema                            9.4    12.70 )-----------( 59       ( 30 Sep 2020 09:21 )             27.2     09-30    133<L>  |  103  |  9   ----------------------------<  129<H>  4.1   |  26  |  0.73    Ca    8.3<L>      30 Sep 2020 06:53  Phos  3.2     09-30  Mg     1.7     09-30    TPro  6.9  /  Alb  1.9<L>  /  TBili  6.1<H>  /  DBili  x   /  AST  78<H>  /  ALT  51  /  AlkPhos  178<H>  09-30    LIVER FUNCTIONS - ( 30 Sep 2020 06:53 )  Alb: 1.9 g/dL / Pro: 6.9 g/dL / ALK PHOS: 178 U/L / ALT: 51 U/L DA / AST: 78 U/L / GGT: x               PT/INR - ( 30 Sep 2020 06:53 )   PT: 44.5 sec;   INR: 4.07 ratio         PTT - ( 30 Sep 2020 06:53 )  PTT:>200.0 sec  COVID-19 PCR: NotDetec (15 Sep 2020 00:23)      CAPILLARY BLOOD GLUCOSE          RADIOLOGY & ADDITIONAL TESTS:                   PGY-1 Progress Note discussed with attending    PAGER #: [[phone]] TILL 5:00 PM  PLEASE CONTACT ON CALL TEAM:  - On Call Team (Please refer to Alison) FROM 5:00 PM - 8:30PM  - Nightfloat Team FROM 8:30 -7:30 AM    INTERVAL HPI/OVERNIGHT EVENTS:   - no acute events overnight or this am. He denies any bleeding or bloody bowel movements. He denies any discomfort, as well as pain. He is resting comfortably in bed. He reports frequent OOB throughout the day.    REVIEW OF SYSTEMS:  CONSTITUTIONAL: No fever, weight loss, or fatigue  RESPIRATORY: No cough, wheezing, chills or hemoptysis; No shortness of breath  CARDIOVASCULAR: No chest pain, palpitations, dizziness, or leg swelling  GASTROINTESTINAL: No abdominal pain. No nausea, vomiting, or hematemesis; No diarrhea or constipation. No melena or hematochezia.  GENITOURINARY: No dysuria or hematuria, urinary frequency  NEUROLOGICAL: No headaches, memory loss, loss of strength, numbness, or tremors     MEDICATIONS  (STANDING):  ciprofloxacin     Tablet 500 milliGRAM(s) Oral daily  ergocalciferol 88932 Unit(s) Oral <User Schedule>  folic acid 1 milliGRAM(s) Oral daily  influenza   Vaccine 0.5 milliLiter(s) IntraMuscular once  lactulose Syrup 20 Gram(s) Oral three times a day  multivitamin 1 Tablet(s) Oral daily  mupirocin 2% Nasal 1 Application(s) Nasal two times a day  nadolol 20 milliGRAM(s) Oral daily  pantoprazole    Tablet 40 milliGRAM(s) Oral two times a day  prednisoLONE    3 mG/mL Solution (ORAPRED) 40 milliGRAM(s) Oral daily  thiamine Injectable 100 milliGRAM(s) IntraMuscular daily    MEDICATIONS  (PRN):  LORazepam   Injectable 2 milliGRAM(s) IV Push every 4 hours PRN Agitation      Vital Signs Last 24 Hrs  T(C): 36.7 (30 Sep 2020 05:15), Max: 37.1 (29 Sep 2020 20:10)  T(F): 98.1 (30 Sep 2020 05:15), Max: 98.7 (29 Sep 2020 20:10)  HR: 71 (30 Sep 2020 05:15) (71 - 78)  BP: 108/68 (30 Sep 2020 05:15) (108/68 - 117/75)  BP(mean): --  RR: 16 (30 Sep 2020 05:15) (16 - 18)  SpO2: 97% (30 Sep 2020 05:15) (97% - 98%)    PHYSICAL EXAMINATION:  GENERAL: NAD, AAOx3  HEAD: AT/NC  EYES: scleral icterus noted, improving  NECK: supple, No JVD noted, Normal thyroid  CHEST/LUNG: CTABL; no rales, rhonchi, wheezing, or rubs  HEART: regular rate and rhythm; no murmurs, rubs, or gallops  ABDOMEN: moderately distended and hard, but nontender; Bowel sounds present  ETREMITIES:  2+ Peripheral Pulses, No clubbing, cyanosis, or edema                            9.4    12.70 )-----------( 59       ( 30 Sep 2020 09:21 )             27.2     09-30    133<L>  |  103  |  9   ----------------------------<  129<H>  4.1   |  26  |  0.73    Ca    8.3<L>      30 Sep 2020 06:53  Phos  3.2     09-30  Mg     1.7     09-30    TPro  6.9  /  Alb  1.9<L>  /  TBili  6.1<H>  /  DBili  x   /  AST  78<H>  /  ALT  51  /  AlkPhos  178<H>  09-30    LIVER FUNCTIONS - ( 30 Sep 2020 06:53 )  Alb: 1.9 g/dL / Pro: 6.9 g/dL / ALK PHOS: 178 U/L / ALT: 51 U/L DA / AST: 78 U/L / GGT: x               PT/INR - ( 30 Sep 2020 06:53 )   PT: 44.5 sec;   INR: 4.07 ratio         PTT - ( 30 Sep 2020 06:53 )  PTT:>200.0 sec  COVID-19 PCR: NotDetec (15 Sep 2020 00:23)      CAPILLARY BLOOD GLUCOSE          RADIOLOGY & ADDITIONAL TESTS:

## 2020-09-30 NOTE — PROVIDER CONTACT NOTE (CRITICAL VALUE NOTIFICATION) - NAME OF MD/NP/PA/DO NOTIFIED:
Dr. Grande
Dr Connolly (on site)
Dr Brandon (on site)
Dr Frank/Dr Lomeli
Dr. Brandon
Dr. Brandon
Dr. Plasencia
SULMA Aguilar
dr. Brandon
endorsing to next shift
Dr. Brandon

## 2020-09-30 NOTE — PROGRESS NOTE ADULT - PROBLEM SELECTOR PLAN 2
- stable  - unwitnessed h/o UGIB x2; h/o EtOH cirrhosis   - FOBT neg  - prior EGD in 6/2020 demonstrated small distal EV's (not banded due to low PLTs), and portal gastropathy ([place] Class IIA); d/c w/ spironolactone, nadalol, lasix (noncompliant)  - s/p 4u pRBC; last transfusion on 9/21  - c/w protonix PO, nadolol PO 20mg qd  - no BB for now  - monitor CBC  - GI, Dr. Gerber, onboard  - no EGD per GI

## 2020-09-30 NOTE — PROGRESS NOTE ADULT - PROBLEM SELECTOR PLAN 6
- improved  - grade 1; clinically drowsy with sleep reversal  - CT head neg for acute pathology  - c/w lactulose, rifaximin, thiamin  - frequent neurochecks

## 2020-09-30 NOTE — PROGRESS NOTE ADULT - SUBJECTIVE AND OBJECTIVE BOX
complete note to follow · Subjective and Objective:   INTERVAL HPI: Patient seen and examined at bedside; OOB to chair, denies bleeding. Events noted; Patient w/o complaint    PMH/PSH as above    FmHx/Soc Hx NC    ROS as above; pt is not able to provide detailed review of systems  General: Noncontributory; Skin/Breast: NC;Ophthalmologic:NC; ENMT: NC; Respiratory and Thorax: NC; Cardiovascular: NC; 	  Gastrointestinal: NC; Genitourinary:NC; 	Musculoskeletal:NC; Neurological: NC; Psychiatric: NC; Hematology/Lymphatics: NC; Endocrine: NC; Allergic/Immunologic: NC    MEDICATIONS  (STANDING):  chlordiazePOXIDE 25 milliGRAM(s) Oral every 12 hours  ergocalciferol 36915 Unit(s) Oral <User Schedule>  folic acid 1 milliGRAM(s) Oral daily  influenza   Vaccine 0.5 milliLiter(s) IntraMuscular once  lactulose Syrup 20 Gram(s) Oral four times a day  multivitamin 1 Tablet(s) Oral daily  mupirocin 2% Nasal 1 Application(s) Nasal two times a day  pantoprazole  Injectable 40 milliGRAM(s) IV Push two times a day  prednisoLONE    3 mG/mL Solution (ORAPRED) 40 milliGRAM(s) Oral daily  rifAXIMin 550 milliGRAM(s) Oral two times a day  thiamine Injectable 100 milliGRAM(s) IntraMuscular daily    MEDICATIONS  (PRN):  LORazepam   Injectable 2 milliGRAM(s) IV Push every 4 hours PRN Agitation      Vitals:  Vital Signs Last 24 Hrs  T(C): 36.7 (30 Sep 2020 14:07), Max: 37.1 (29 Sep 2020 20:10)  T(F): 98 (30 Sep 2020 14:07), Max: 98.7 (29 Sep 2020 20:10)  HR: 74 (30 Sep 2020 14:07) (71 - 78)  BP: 120/75 (30 Sep 2020 14:07) (108/68 - 120/75)  BP(mean): --  ABP: --  ABP(mean): --  RR: 17 (30 Sep 2020 14:07) (16 - 18)  SpO2: 100% (30 Sep 2020 14:07) (97% - 100%)        _________________  PHYSICAL EXAM:  ---------------------------  GEN: NAD; A and O x 3; jaundiced, OOB to chair  EYES: Scleral icterus  LUNGS: no wheezing; decreased bilateral air entry; no use of accessory muscles for breathing  HEART: Nl S1 S2; no M   ABDOMEN: Soft, Nontender, non distended  EXTREMITIES: no cyanosis; no edema; warm and dry  NERVOUS SYSTEM:  Awake and alert; no focal neuro  deficits  DERM: B/L UE large scattered ecchymosis with left hand edema with noted hematoma    _________________________________________________  LABS:             CBC Full  -  ( 30 Sep 2020 09:21 )  WBC Count : 12.70 K/uL  RBC Count : 2.79 M/uL  Hemoglobin : 9.4 g/dL  Hematocrit : 27.2 %  Platelet Count - Automated : 59 K/uL  Mean Cell Volume : 97.5 fl  Mean Cell Hemoglobin : 33.7 pg  Mean Cell Hemoglobin Concentration : 34.6 gm/dL  Auto Neutrophil # : x  Auto Lymphocyte # : x  Auto Monocyte # : x  Auto Eosinophil # : x  Auto Basophil # : x  Auto Neutrophil % : x  Auto Lymphocyte % : x  Auto Monocyte % : x  Auto Eosinophil % : x  Auto Basophil % : x    09-30    133<L>  |  103  |  9   ----------------------------<  129<H>  4.1   |  26  |  0.73    Ca    8.3<L>      30 Sep 2020 06:53  Phos  3.2     09-30  Mg     1.7     09-30    TPro  6.9  /  Alb  1.9<L>  /  TBili  6.1<H>  /  DBili  x   /  AST  78<H>  /  ALT  51  /  AlkPhos  178<H>  09-30      Dir Antiglob IgG Interpretation: POS (09.22.20 @ 07:46) Dir Antiglob Polyspecific Interpretation: POS (09.22.20 @ 07:46)    Lactate Dehydrogenase, Serum in AM (09.24.20 @ 07:27)   Lactate Dehydrogenase, Serum: 405 U/L   Haptoglobin, Serum in AM (09.24.20 @ 10:28)   Haptoglobin, Serum: <20: Test Repeated mg/dL     CAPILLARY BLOOD GLUCOSEDir Antiglob IgG Interpretation: POS (09.22.20 @ 07:46)         Radiology:  < from: CT Head No Cont (09.24.20 @ 12:36) >  EXAM:  CT BRAIN                            PROCEDURE DATE:  09/24/2020          INTERPRETATION:  HISTORY:  Altered mental status  Evaluation demonstrates no evidence of mass-effect or midline shift. No intraparenchymal mass lesions or hemorrhage isidentified. There is no evidence of hydrocephalus. No extra-axial collections are noted.    The bone windows demonstrate no gross osseous abnormalities.    Impression:  1. Unremarkable noncontrast CT scan of the brain.                  MAXIMILIANO BIRD M.D., ATTENDING RADIOLOGIST  This document has been electronically signed. Sep 24 2020  1:00PM    < end of copied text >

## 2020-09-30 NOTE — PROGRESS NOTE ADULT - ATTENDING COMMENTS
I reviewed patient's data and participated in the management of the patient along with [facility] as well as hematology/med oncology faculty during the daily heme/onc case review. I reviewed pertinent clinical information, PE,  labs as well as A/P as outline above, in agreement and edited as appropriate.

## 2020-09-30 NOTE — PROGRESS NOTE ADULT - ASSESSMENT
Hospital course:  35yo male with hx of chronic alcohol abuse (actively drinking since age 20), decompensated cirrhosis with ascites (non-compliant with diuretics, no hx of LVP), hx of small esophageal varices and portal hypertensive gastropathy (last EGD 6/2020), (non-compliant with BB, never banded), with recent hospitalization (6/2020 for leg swelling, anemia) presented on 9/15/2020 with malaise, generalized weakness for 1-2 weeks, found to have sudden worsening of liver function, with MELD score 32 compared to 23 on recent prior admission, electrolyte abnormalities, and questionable blood in stool (reported black stools) and/or vomitus, but without any signs of overt bleeding in hospital. On admission had no signs of HE and no LEDA. Acute worsening might have been caused by heavy alcohol intake (high blood alcohol on admission), SBP was r/o by paracentesis, viral and infectious workup negative so far, denied taking any medications or herbal supplements.  He became agitated and was transferred to ICU on 9/17/2020 and was treated for alcohol withdrawal. He became hypotensive and bradycardic on Precedex.  Developed LEDA, but improved with volume expansion with albumin. Anemic, thrombocytopenic, coagulopathic,  s/p 1 U PLT, and 4U PRBC (9/18/19/20/21), without evidence of overt GI bleeding (FOBT neg too) and with no evidence of bleeding on CT abd/pelvis; thus with concern for AIHA;  now stable Hb; hematology consult appreciated. Patient also noted to have proteinuria, and unexpectedly ascites analysis showed SAAG < 1.1, with low total protein despite evidence of portal hypertension (EV, portal HTN gastropathy, recanalization of umbilical vein), however urine protein/Cr ration, 24 hr protein did not reach nephrotic level, nephrology consult appreciated. Patient is s/p NAC and on prednisolone given the acute decompensation and possible AH component (started on 9/24 after relative contraindications r/o / resolved) and also recommended by hematology. CT head was neg for bleeding. Patient is improving clinically, MELD slightly decreased.       A/P:  # Decompensated cirrhosis w ascites; small EV; MELD is 30  (from 33)  -  would need transplant referral, but unfortunately still active drinker, and was treated for  EtOH withdrawal during this admission, he was also not compliant with meds after June hospital discharge (was d/w [facility] transplant hepatology service attending last week)  - will refer for outpatient Transplant Eval to [address] [place] [address] upon discharge or initiate transfer if worsening    #ACLF - slight improvement   (cirrhosis with development of LEDA during this hospitalization( 0.53-1.05) along with mental status change (EtOH withdrawal, sedation +/- HE - now resolved); etiology possibly heavy ETOH intake (elevated blood alcohol on admission); AH can be co-existent with cirrhosis)  - slight improvement in MELD (30), HE resolved, LEDA improved  - c/w monitoring LFTs closely, including INR  - avoid hepatotoxic meds, avoid NSAIDs  - Lille score: 0.243 (<0.45), thus ideally can continue with steroid if no contraindication (optimal 28 days with 2-4 weeks taper after that, however in this patient outpatient follow up, compliance and medication access is questionable)       #Ascites- no SBP; despite signs of CSPH (EV, portal  hypertensive gastropathy on EGD, recanalization of umbilical vein), unexpectedly  SAAG was < 1.1 (1.0), nephrology consult appreciated  - If Cr remains stable, can resume diuretics prior discharge (50 mg Spironolactone+20mg Furosemide)  - Would need to continue with long term SBP prophylaxis (ascites protein < 1.5 g/dl and CPT >9 and bilirubin >3), was switched to cipro 500 mg daily    #LEDA (>0.3 mg /dl above his baseline w/in 48 hrs; /0.53-1.05 on 9/18)- responded to volume expansion w albumin (1.05-0.61), but not fully back to baseline yet (today 0.73, his baseline ~ 0.5)  - C/w close monitoring renal function, electrolytes including Mg and P, and urine output; and replace accordingly  - keep MAP >65-70mmHg      #Small EV and portal hypertensive gastropathy w/o evidence of active GI bleed  (last EGD 6/2020, no report available whether there were high risk signs, but he was started on BB in June), was non compliant w BB  - endoscopy was deferred during this admission (was no sign of overt active GI bleeding, no hematemesis, melena or hematochezia, FOBT neg), d/w Dr. Gross (GI iss on board)  - keep Hb >7, c/w monitoring H/H and call GI if any bleeding   -c/w nadolol 20 mg (BB has mortality benefit), monitor BP, HR (~55-60/min)  - remains on PPI (as being on steroid), was switched to po (when off steroid, would carefully assess the need of PPI, as it changes the microbiome and might increase the risk of SBP)  - if he will be on BB and will be compliant, no need for repeat routine endoscopy (unless other indications), but if not taking BB, will need endoscopy in 1 year (6/2021)    #EtOH withdrawal + HE - resolved  - continue close monitoring of mental status and neurological exam  - c/w lactulose to titrate to have 2-3 soft BM/day   - would c/w rifaximin 550 mg bid   - avoid long acting BZD   - c/w folic, thiamin, monitor electrolytes    #Anemia, thrombocytopenia, coagulopathy  - possibly due to cirrhosis + EtOH caused BM suppression - however b/o recurrent slow drop in Hb after transfusions and required total 4 U PRBC 9/18-9/21 without any evidence of overt bleeding, hematology was consulted, was concern for AIHA - steroid was recommended  -f/u hematology recommendations  (Hb stable; PLT >50)  - now slight schistocytes on smear, f/u w hematology      #Etiology of cirrhosis  - while most likely ETOH cirrhosis, given the young age additional workup was sent  - ceruloplasmin < 20, (could be falsely low in end stage liver or protein-losing); 24hr urine copper wnl, <40;  bedside opthalmology exam?  -[id number] nl;  ferritin 331 -EtOH, acute phase protein, would repeat iron studies when acute issues resolved  - total IgG elevated 4018, but LONNY neg, anti SLA neg, anti-SMA neg, anti-LKM neg, can repeat Immunglobulin panel     #HCM   - consider nutritionist intervention / teaching  - HCC screening - last US and AFP in 6/2020, no focal mass, nl AFP, next due 12/2020  - vit D low - was started on vit D, c/w supplement   - Vaccinations:         Hep A immune        Hep B immune, not exposed         Flu vaccine         Would check whether got Pneumococcal vaccine   - consider PT, pt requested walker for home    Poor prognosis, repeatedly counseled patient about alcohol abstinence. Family involvement, discussion could be helpful. Primary team had discussed with patients family. Psychiatry /  involvement?    Will continue to follow  D/w primary team  Thank you for the consult.         Hospital course:  33yo male with hx of chronic alcohol abuse (actively drinking since age 20), decompensated cirrhosis with ascites (non-compliant with diuretics, no hx of LVP), hx of small esophageal varices and portal hypertensive gastropathy (last EGD 6/2020), (non-compliant with BB, never banded), with recent hospitalization (6/2020 for leg swelling, anemia) presented on 9/15/2020 with malaise, generalized weakness for 1-2 weeks, found to have sudden worsening of liver function, with MELD score 32 compared to 23 on recent prior admission, electrolyte abnormalities, and questionable blood in stool (reported black stools) and/or vomitus, but without any signs of overt bleeding in hospital. On admission had no signs of HE and no LEDA. Acute worsening might have been caused by heavy alcohol intake (high blood alcohol on admission), SBP was r/o by paracentesis, viral and infectious workup negative so far, denied taking any medications or herbal supplements.  He became agitated and was transferred to ICU on 9/17/2020 and was treated for alcohol withdrawal. He became hypotensive and bradycardic on Precedex.  Developed LEDA, but improved with volume expansion with albumin. Anemic, thrombocytopenic, coagulopathic,  s/p 1 U PLT, and 4U PRBC (9/18/19/20/21), without evidence of overt GI bleeding (FOBT neg too) and with no evidence of bleeding on CT abd/pelvis; thus with concern for AIHA;  now stable Hb; hematology consult appreciated. Patient also noted to have proteinuria, and unexpectedly ascites analysis showed SAAG < 1.1, with low total protein despite evidence of portal hypertension (EV, portal HTN gastropathy, recanalization of umbilical vein), however urine protein/Cr ration, 24 hr protein did not reach nephrotic level, nephrology consult appreciated. Patient is s/p NAC and on prednisolone given the acute decompensation and possible AH component (started on 9/24 after relative contraindications r/o / resolved) and also recommended by hematology. CT head was neg for bleeding. Patient is improving clinically, MELD slightly decreased.       A/P:  # Decompensated cirrhosis w ascites; small EV; MELD is 30  (from 33)  -  would need transplant referral, but unfortunately still active drinker, and was treated for  EtOH withdrawal during this admission, he was also not compliant with meds after June hospital discharge (was d/w [facility] transplant hepatology service attending last week)  - will refer for outpatient Transplant Eval to [address] [place] [address] upon discharge or initiate transfer if worsening    #ACLF - some improvement   (cirrhosis with development of LEDA during this hospitalization( 0.53-1.05) along with mental status change (EtOH withdrawal, sedation +/- HE - now resolved); etiology possibly heavy ETOH intake (elevated blood alcohol on admission); AH can be co-existent with cirrhosis)  - slight improvement in MELD (30), HE resolved, LEDA improved  - c/w monitoring LFTs closely, including INR  - avoid hepatotoxic meds, avoid NSAIDs  - Lille score: 0.243 (<0.45), thus ideally can continue with steroid if no contraindication (optimal 28 days with 2-4 weeks taper after that, however in this patient outpatient follow up, close monitoring for infection, compliance and medication access is questionable, thus would consider taper down within the time frame till the hospital can provide free medication for him, or unless hematology recommends otherwise)       #Ascites- no SBP; despite signs of CSPH (EV, portal  hypertensive gastropathy on EGD, recanalization of umbilical vein), unexpectedly  SAAG was < 1.1 (1.0), nephrology consult appreciated  - Cr has been stable in last few days, consider resuming diuretics (50 mg Spironolactone+20mg Furosemide)   - Would need to continue with long term SBP prophylaxis (ascites protein < 1.5 g/dl and CPT >9 and bilirubin >3), was switched to cipro 500 mg daily    #LEDA (>0.3 mg /dl above his baseline w/in 48 hrs; /0.53-1.05 on 9/18)- responded to volume expansion w albumin (1.05-0.61), but not fully back to baseline yet (today 0.73, his baseline ~ 0.5), however has been stable in last few days  - C/w close monitoring renal function, electrolytes including Mg and P, and urine output; and replace accordingly  - keep MAP >65-70mmHg      #Small EV and portal hypertensive gastropathy w/o evidence of active GI bleed  (last EGD 6/2020, no report available whether there were high risk signs, but he was started on BB in June), was non compliant w BB  - endoscopy was deferred during this admission (was no sign of overt active GI bleeding, no hematemesis, melena or hematochezia, FOBT neg), d/w Dr. Gerber (GI iss on board)  - keep Hb >7, c/w monitoring H/H and call GI if any bleeding   -c/w nadolol 20 mg (BB has mortality benefit), monitor BP, HR (~55-60/min)  - remains on PPI (as being on steroid), was switched to po (when off steroid, would carefully assess the need of PPI, as it changes the microbiome and might increase the risk of SBP)  - if he will be on BB and will be compliant, no need for repeat routine endoscopy (unless other indications), but if not taking BB, will need endoscopy in 1 year (6/2021)    #EtOH withdrawal + HE - resolved  - continue close monitoring of mental status and neurological exam  - c/w lactulose to titrate to have 2-3 soft BM/day   - would c/w rifaximin 550 mg bid   - avoid long acting BZD   - c/w folic, thiamin, monitor electrolytes    #Anemia, thrombocytopenia, coagulopathy  - possibly due to cirrhosis + EtOH caused BM suppression - however b/o recurrent slow drop in Hb after transfusions and required total 4 U PRBC 9/18-9/21 without any evidence of overt bleeding, hematology was consulted, was concern for AIHA - steroid was recommended  -f/u hematology recommendations  (Hb stable; PLT >50)  - now slight schistocytes on smear, f/u w hematology      #Etiology of cirrhosis  - while most likely ETOH cirrhosis, given the young age additional workup was sent  - ceruloplasmin < 20, (could be falsely low in end stage liver or protein-losing); 24hr urine copper wnl, <40;  bedside opthalmology exam?  -[id number] nl;  ferritin 331 -EtOH, acute phase protein, would repeat iron studies when acute issues resolved  - total IgG elevated 4018, but LONNY neg, anti SLA neg, anti-SMA neg, anti-LKM neg, can repeat Immunglobulin panel     #HCM   - consider nutritionist intervention / teaching  - HCC screening - last US and AFP in 6/2020, no focal mass, nl AFP, next due 12/2020  - vit D low - was started on vit D, c/w supplement   - Vaccinations:         Hep A immune        Hep B immune, not exposed         Flu vaccine         Would check whether got Pneumococcal vaccine   - consider PT, pt requested walker for home    Poor prognosis, repeatedly counseled patient about alcohol abstinence. Family involvement, discussion could be helpful. Primary team had discussed with patients family. Psychiatry /  involvement?    Will continue to follow  D/w primary team  Thank you for the consult.

## 2020-09-30 NOTE — PROGRESS NOTE ADULT - PROBLEM SELECTOR PLAN 4
- improved  - off ativan  - CIWA 0 (9/29)  - c/w ativan q4h prn, IV thiamine, folic acid and multivitamin  - monitor CIWA

## 2020-09-30 NOTE — PROGRESS NOTE ADULT - PROBLEM SELECTOR PLAN 1
- h/o alcoholic jaundice w/ ascites  - jaundiced, scleral icterus   - MELD 32; 3mo mortality 52.6%  - Child-Bojorquez 13  - Maddreys 148.2 (poor prognosis)(9/29)   - hepatitis panel negative  - US RUQ liver cirrhosis with no focal lesion, cholelithiasis with mild GB wall thickening, no BD dilatation, negative Field's sign   - s/p paracentesis 2.8L jorden fluid drained, low suspicion for SBP   - c/w ppx cipro, rifaximin and lactulose, NAC, prednisolone 40mg  - plan for diuresis before d/c  - refer to outpt transplant upon d/c  - long-term SBP ppx w/ cipro 500mg qd  - Hepatology, Dr. Morales, onboard  - GI, Dr. Gerber, onboard - h/o alcoholic jaundice w/ ascites  - jaundiced, scleral icterus   - MELD 32; 3mo mortality 52.6%  - Child-Bojorquez 13  - Maddreys 148.2 (poor prognosis)(9/29)   - Lille score 0.243 (9/30)  - hepatitis panel negative  - US RUQ liver cirrhosis with no focal lesion, cholelithiasis with mild GB wall thickening, no BD dilatation, negative Field's sign   - s/p paracentesis 2.8L jorden fluid drained, low suspicion for SBP   - c/w ppx cipro, rifaximin and lactulose, NAC, prednisolone 40mg  - plan for diuresis upon d/c  - refer to outpt transplant upon d/c  - long-term SBP ppx w/ cipro 500mg qd  - Hepatology, Dr. Morales, onboard  - GI, Dr. Gerber, onboard

## 2020-09-30 NOTE — PROGRESS NOTE ADULT - ATTENDING COMMENTS
[facility] NEPHROLOGY  Rui Shore M.D.  Cal Montejo D.O.  Cristiana Vigil M.D.  Carie Mclean, MSN, ANP-C    Telephone: [phone]  Facsimile: [phone]    [address] [place] [address]

## 2020-09-30 NOTE — PROGRESS NOTE ADULT - SUBJECTIVE AND OBJECTIVE BOX
[facility] NEPHROLOGY- PROGRESS NOTE    Patient is a 35yo Male with Liver Cirrhosis, ETOH abuse, esophageal varices p/w weakness with nausea, hematemesis with blood in stool. Pt admitted with acute decompensated cirrhosis and ETOH withdrawal with anemia/ thrombocytopenia s/p brief ICU stay for worsening ETOH withdrawal.  s/p 3.5L paracentesis on 9/17. Neg for SBP. Pt had LEDA which resolved with albumin gtt. Pt found to have proteinuria and SAAG <1.1. Nephrology consulted for proteinuria.     Hospital Medications: Medications reviewed.    REVIEW OF SYSTEMS:   Denies any SOB, pain, n/v/d or abd pain.     VITALS:  T(F): 98.1 (09-30-20 @ 05:15), Max: 98.7 (09-29-20 @ 20:10)  HR: 71 (09-30-20 @ 05:15)  BP: 108/68 (09-30-20 @ 05:15)  RR: 16 (09-30-20 @ 05:15)  SpO2: 97% (09-30-20 @ 05:15)  Wt(kg): --      PHYSICAL EXAM:  Gen NAD  HEENT: +icterus  Cards: RRR, +S1/S2,  Resp: CTA ant  GI: soft, NT mod distention  Extremities: no LE edema B/L      LABS:  09-30    133<L>  |  103  |  9   ----------------------------<  129<H>  4.1   |  26  |  0.73    Ca    8.3<L>      30 Sep 2020 06:53  Phos  3.2     09-30  Mg     1.7     09-30    TPro  6.9  /  Alb  1.9<L>  /  TBili  6.1<H>  /  DBili      /  AST  78<H>  /  ALT  51  /  AlkPhos  178<H>  09-30    Creatinine Trend: 0.73 <--, 0.72 <--, 0.70 <--, 0.67 <--, 0.68 <--, 0.75 <--, 0.65 <--                        9.4    12.70 )-----------( 59       ( 30 Sep 2020 09:21 )             27.2     Urine Studies:    Creatinine, Random Urine: 284 mg/dL (09-23 @ 14:00)  Osmolality, Random Urine: 574 mos/kg (09-23 @ 14:00)  Sodium, Random Urine: <5 mmol/L (09-23 @ 14:00)

## 2020-09-30 NOTE — PROVIDER CONTACT NOTE (CRITICAL VALUE NOTIFICATION) - TEST AND RESULT REPORTED:
H & H 6.9/ 19.2
HGB AND HCT
Hematocrit 20.2
Hematocrit of 20.5
Hgb 6.9, hct 20.1
INR from 9/26 @ 9:26am -   INR = 2.10-9.00 unable to detect clot formation by optical method result obtained by mechanical clot custodial - INR cannot be precisley calculated from mechanical PT resuldts confirmed by alternate methods.
PTT greater than 200
h/h 7.4/ 20.6
magnesium = 1.0
Hgb 7.0 Hct 20

## 2020-09-30 NOTE — PROGRESS NOTE ADULT - ATTENDING COMMENTS
Patient is a 34y old  Male who presents with acute alcohol withdrawal and advanced cirrhosis    Patient was seen and examined at bedside  CIWA 0  Ambulatory, denies any complains    Vitals stable    REVIEW OF SYSTEMS: denies fever, chills, cough, SOB, abd pain, N/V; moved bowel once since morning     PHYSICAL EXAM:  GENERAL: NAD, icteric   NERVOUS SYSTEM:  Alert & Oriented X3, CN intact, muscle strength 5/5 in BL UE and LE  CHEST/LUNG: Clear to auscultation anterolaterally  HEART: Regular rate and rhythm; No murmurs, rubs, or gallops  ABDOMEN: Nontender, distended with marked ascites; Bowel sounds present  EXTREMITIES:  2+ Peripheral Pulses    LABS:  Noted     A/P:   Acute decompensation of Cirrhosis of liver with ascites and esophageal varices   Normocytic Anemia  Thrombocytopenia  Alcohol induced hepatitis   Coagulopathy  Electrolyte imbalance   Alcohol withdrawal   Proteinuria     Plan:   Cont steroids  Start on low dose Lasix and spironolactone   Factor VIII level   Cont Ativan PRN  Monitor CBC, LFT  Cont Lactulose rifaximin, ensure 2-3 BM every day (rifaximin reordered as per hepatology)  Cipro for SBP ppx  Aspiration, fall and seizure precautions   Full code   Patient will need VIVO meds before discharge and follow up with [facility]   Dr Garvey will make appointment with transplant center

## 2020-09-30 NOTE — PROGRESS NOTE ADULT - SUBJECTIVE AND OBJECTIVE BOX
Chief Complaint:  Patient is a 34y old  Male who presents with a chief complaint of Alcohol abuse (30 Sep 2020 13:21)    : [id number]    Reason for consult: Alcoholic cirrhosis    Interval Events:   Patient was seen and examined at bedside. He denies new complaints, afebrile, without any pain, abdominal pain, N/V or diarrhea, blood in stool or melena. He is AAOx3, sitting out of bed, walking to bathroom. He was restarted on nadolol, BP and HR acceptable. Large but not tense ascites.     Hospital Medications:  ciprofloxacin     Tablet 500 milliGRAM(s) Oral daily  ergocalciferol 95734 Unit(s) Oral <User Schedule>  folic acid 1 milliGRAM(s) Oral daily  influenza   Vaccine 0.5 milliLiter(s) IntraMuscular once  lactulose Syrup 20 Gram(s) Oral three times a day  LORazepam   Injectable 2 milliGRAM(s) IV Push every 4 hours PRN  multivitamin 1 Tablet(s) Oral daily  mupirocin 2% Nasal 1 Application(s) Nasal two times a day  nadolol 20 milliGRAM(s) Oral daily  pantoprazole    Tablet 40 milliGRAM(s) Oral two times a day  prednisoLONE    3 mG/mL Solution (ORAPRED) 40 milliGRAM(s) Oral daily      ROS:   General:  No  fevers, chills, night sweats, fatigue  Eyes:  Good vision, no reported pain  ENT:  No sore throat, pain, runny nose  CV:  No pain, palpitations  Pulm:  No dyspnea, cough  GI:  See HPI, otherwise negative  :  No  incontinence, nocturia, dysuria  Muscle:  No pain, weakness  Neuro:  No memory problems, AAOX3  Psych:  No insomnia, mood problems, depression  Endocrine:  No polyuria, polydipsia, cold/heat intolerance  Heme:  Ecchymosis, easy bruisability  Skin:  Ecchymosis    PHYSICAL EXAM:   Vital Signs:  Vital Signs Last 24 Hrs  T(C): 36.7 (30 Sep 2020 05:15), Max: 37.1 (29 Sep 2020 20:10)  T(F): 98.1 (30 Sep 2020 05:15), Max: 98.7 (29 Sep 2020 20:10)  HR: 71 (30 Sep 2020 05:15) (71 - 78)  BP: 108/68 (30 Sep 2020 05:15) (108/68 - 115/74)  BP(mean): --  RR: 16 (30 Sep 2020 05:15) (16 - 18)  SpO2: 97% (30 Sep 2020 05:15) (97% - 98%)  Daily     Daily Weight in k.6 (30 Sep 2020 05:15)    GENERAL: no acute distress  NEURO: awake, alert, oriented, no asterixis  HEENT: icteric sclera  CHEST: no respiratory distress, no accessory muscle use  CARDIAC: regular rate, rhythm  ABDOMEN: soft, non-tender, moderately distended, + fluid wave, not tense, no rebound or guarding  EXTREMITIES: warm, well perfused, trace b/l LE edema  SKIN: multiple ecchymosis    LABS: reviewed                        9.4    12.70 )-----------( 59       ( 30 Sep 2020 09:21 )             27.2     09-30    133<L>  |  103  |  9   ----------------------------<  129<H>  4.1   |  26  |  0.73    Ca    8.3<L>      30 Sep 2020 06:53  Phos  3.2     09-30  Mg     1.7     09-30    TPro  6.9  /  Alb  1.9<L>  /  TBili  6.1<H>  /  DBili  x   /  AST  78<H>  /  ALT  51  /  AlkPhos  178<H>  09-30    LIVER FUNCTIONS - ( 30 Sep 2020 06:53 )  Alb: 1.9 g/dL / Pro: 6.9 g/dL / ALK PHOS: 178 U/L / ALT: 51 U/L DA / AST: 78 U/L / GGT: x             Interval Diagnostic Studies: see sunrise for full report

## 2020-09-30 NOTE — PROGRESS NOTE ADULT - ASSESSMENT
Patient is a 35yo Male with Liver Cirrhosis, ETOH abuse, esophageal varices p/w weakness with nausea, hematemesis with blood in stool. Pt admitted with acute decompensated cirrhosis and ETOH withdrawal with anemia/ thrombocytopenia s/p brief ICU stay for worsening ETOH withdrawal.  s/p 3.5L paracentesis on 9/17. Neg for SBP. Pt had LEDA which resolved with albumin gtt. Pt found to have proteinuria and SAAG <1.1. Nephrology consulted for proteinuria.       1. Proteinuria- with low SAAG r/o nephrotic syndrome. Pt with no overt edema; signs of nephrotic syndrome. Cirrhotics usually have no or little proteinuria unless associated with kidney disease; ?secondary IgA nephropathy 2/2 ETOH abuse is a possibility, however pt had no microscopic hematuria on UA. UA with 30 protein an no blood. Spot UPr/ Cr 0.7 & 24 hr proteinuria 0.97; non nephrotic range.    Recc to repeat SAAG ?error.  Pt would be a poor candidate for biopsy with anemia and thrombocytopenia; high risk of bleeding. Recc to start Lisinipril 2.5mg PO qd, titrate as BP tolerates for antiproteinuric effects. .     2. Decompensated Cirrhosis- Plan as per Hepatology/ GI  3. Anemia/ Thrombocytopenia- due to liver cirrhosis/ ETOH abuse s/p 5 units prbc (total during hospitalization). Plan as per primary team  4. Vitamin D deficiency- 25 OH vit D 8 c/w weekly Ergo 50 k units  5. Hyponatremia- mild. Check serum and urine osm and urine sodium. Check serum uric acid. Pt would benefit from Lasix (check urine studies 1st).

## 2020-10-01 NOTE — PROGRESS NOTE ADULT - PROBLEM SELECTOR PLAN 9
- hold DVT ppx given high risk of bleeding
DVT ppx- SCDs ordered
- hold DVT ppx given high risk of bleeding
- noted to have cardiomegaly on CXR  - likely 2/2 EtOH  - EKG NSR  - Echo EF 55%, chambers and valves wnl
- hold DVT ppx given high risk of bleeding
- hold DVT ppx given high risk of bleeding

## 2020-10-01 NOTE — PROGRESS NOTE ADULT - ASSESSMENT
Hospital course:  35yo male with hx of chronic alcohol abuse (actively drinking since age 20), decompensated cirrhosis with ascites (non-compliant with diuretics, no hx of LVP), hx of small esophageal varices and portal hypertensive gastropathy (last EGD 6/2020), (non-compliant with BB, never banded), with recent hospitalization (6/2020 for leg swelling, anemia) presented on 9/15/2020 with malaise, generalized weakness for 1-2 weeks, found to have sudden worsening of liver function, with MELD score 32 compared to 23 on recent prior admission, electrolyte abnormalities, and questionable blood in stool (reported black stools) and/or vomitus, but without any signs of overt bleeding in hospital. On admission had no signs of HE and no LEDA. Acute worsening might have been caused by heavy alcohol intake (high blood alcohol on admission), SBP was r/o by paracentesis, viral and infectious workup negative so far, denied taking any medications or herbal supplements.  He became agitated and was transferred to ICU on 9/17/2020 and was treated for alcohol withdrawal. He became hypotensive and bradycardic on Precedex.  Developed LEDA, but improved with volume expansion with albumin. Anemic, thrombocytopenic, coagulopathic,  s/p 1 U PLT, and 4U PRBC (9/18/19/20/21), without evidence of overt GI bleeding (FOBT neg too) and with no evidence of bleeding on CT abd/pelvis; with concern for AIHA (Zenia +);  now stable Hb; hematology consult appreciated. Patient also noted to have proteinuria, and unexpectedly ascites analysis showed SAAG < 1.1, with low total protein despite evidence of portal hypertension (EV, portal HTN gastropathy, recanalization of umbilical vein), however urine protein/Cr ration, 24 hr protein did not reach nephrotic level, nephrology consult appreciated. Patient is s/p NAC and on prednisolone given the acute decompensation and possible AH component (started on 9/24 after relative contraindications r/o / resolved); now it is used also for hematology indication. CT head was neg for bleeding. Patient is improving clinically, MELD slightly decreased, Lille score <0.45 at day#7.     A/P:  # Decompensated cirrhosis w ascites; small EV; MELD is 31  (from max 34)  -  would need transplant referral, but unfortunately still active drinker up till this admission, and was treated for  EtOH withdrawal during this admission, he was also not compliant with meds after June hospital discharge (was d/w [facility] transplant hepatology service attending last week)  - will refer for outpatient Transplant Eval to [address] [place] [address] upon discharge but will need  involvement to arrange insurance for patient    #ACLF - some improvement  (cirrhosis with development of LEDA during this hospitalization( 0.53-1.05) along with mental status change (EtOH withdrawal, sedation +/- HE - now resolved); etiology possibly heavy ETOH intake (elevated blood alcohol on admission); AH can be co-existent with cirrhosis)    - slight improvement in MELD (31 from 34), HE resolved, LEDA improved  - c/w monitoring LFTs closely, including INR  - avoid hepatotoxic meds, avoid NSAIDs  - Lille score: 0.243 (<0.45), thus ideally can continue with steroid if no contraindication (optimal 28 days with 2-4 weeks taper after that, however in this patient outpatient follow up, close monitoring for infection, compliance and medication access is questionable, thus would consider taper down within the time frame till the hospital can provide free medication for him, or unless hematology recommends otherwise)     #Ascites- no SBP; despite signs of CSPH (EV, portal  hypertensive gastropathy on EGD, recanalization of umbilical vein), unexpectedly  SAAG was < 1.1 (1.0), nephrology consult appreciated  - Cr has been stable in last few days, now with resumed diuretics (50 mg Spironolactone+20mg Furosemide)   - Would need to continue with long term SBP prophylaxis (ascites protein < 1.5 g/dl and CPT >9 and bilirubin >3), was switched to cipro 500 mg daily    #LEDA (>0.3 mg /dl above his baseline w/in 48 hrs; /0.53-1.05 on 9/18)- responded to volume expansion w albumin (1.05-0.61), but not fully back to baseline yet (today 0.78, his baseline ~ 0.5), however has been stable in last few days  - C/w close monitoring renal function, electrolytes including Mg and P, and urine output; and replace accordingly (today Mg 1.5)  - keep MAP >65-70mmHg  - as he was started on ACEi today, will need close monitoring of BP and renal function    #Small EV and portal hypertensive gastropathy w/o evidence of active GI bleed  (last EGD 6/2020, no report available whether there were high risk signs, but he was started on BB in June), was non compliant w BB  - endoscopy was deferred during this admission (was no sign of overt active GI bleeding, no hematemesis, melena or hematochezia, FOBT neg), d/w Dr. Gerber (GI iss on board)  - keep Hb >7, c/w monitoring H/H and call GI if any bleeding   -c/w nadolol 20 mg (BB has mortality benefit), monitor BP, HR (~55-60/min)  - remains on PPI (as being on steroid), was switched to po (when off steroid, would carefully assess the need of PPI, as it changes the microbiome and might increase the risk of SBP)  - if he will be on BB and will be compliant, no need for repeat routine endoscopy (unless other indications), but if not taking BB, will need endoscopy in 1 year (6/2021)    #EtOH withdrawal + HE - resolved  - continue close monitoring of mental status and neurological exam  - c/w lactulose to titrate to have 2-3 soft BM/day   - would c/w rifaximin 550 mg bid   - avoid long acting BZD   - c/w folic, thiamin, monitor electrolytes    #Anemia, thrombocytopenia, coagulopathy  - possibly due to cirrhosis + EtOH caused BM suppression - however b/o recurrent slow drop in Hb after transfusions and requirement of total 4 U PRBC 9/18-9/21 without any evidence of overt bleeding, hematology was consulted, was concern for AIHA - steroid was recommended for hematology indication as well; also there was question of DIC, but factor VIII was elevated, thus coagulopathy most likely due to liver   -hematology consult appreciated; f/u hematology recommendations  (Hb stable; PLT >50)    #Etiology of cirrhosis  - while most likely ETOH cirrhosis, given the young age additional workup was sent  - ceruloplasmin < 20, (could be falsely low in end stage liver or protein-losing); 24hr urine copper wnl, <40;  opthalmology exam?  -jqced1CS nl;  ferritin 331 -EtOH, acute phase protein, would repeat iron studies when acute issues resolved  - total IgG elevated 4018, but LONNY neg, anti SLA neg, anti-SMA neg, anti-LKM neg, can repeat Immunglobulin panel     #HCM   - Had a long discussion with patient about salt restriction, adequate calorie intake (30-35 kcal/bwkg) with 1.2-1.5g/bwkg protein intake, avoiding raw, un- or undercooked seafood, meat (educated about Vibrio vulnificus), total alcohol abstinence (emphasized mortality if he does not quit alcohol), and avoiding OTC meds, herbal supplements  - He had questions about "natural" pills that his sister has for "recuperating". Explained the risk of drug induced liver injury in details and that he should not take anything else beyond the prescribed medication from this hospital admission. He understood and agreed.  - nutritionist consult appreciated   - HCC screening - last US and AFP in 6/2020, no focal mass, nl AFP, next due 12/2020  - vit D low - was started on vit D, c/w supplement   - Vaccinations:         Hep A immune        Hep B immune, not exposed         Flu vaccine         Would check whether got Pneumococcal vaccine and vaccinate if not yet      Poor prognosis, repeatedly counseled patient about alcohol abstinence.   Family involvement, discussion could be helpful. Primary team had discussed with patients family.     Consider psychiatry referral for assistance with alcohol cessation  Consider SW involvement to assist to apply for insurance     Will continue to follow  D/w primary team  Thank you for the consult.

## 2020-10-01 NOTE — PROGRESS NOTE ADULT - SUBJECTIVE AND OBJECTIVE BOX
Patient was seen and examined at bedside. Full note to follow. Chief Complaint:  Patient is a 34y old  Male who presents with a chief complaint of Alcohol abuse (01 Oct 2020 13:12)    :     Reason for consult: Alcoholic cirrhosis    Interval Events: Patient was seen and examined at bedside. No acute events overnight. He is sitting on a chair, walking without assistance. Denies any complaints, no abdominal pain, N,V, itching, diarrhea, blood in stool, CP, SOB, cough, dysuria.   MELD score has not changed significantly.    Hospital Medications:  ciprofloxacin     Tablet 500 milliGRAM(s) Oral daily  ergocalciferol 69459 Unit(s) Oral <User Schedule>  folic acid 1 milliGRAM(s) Oral daily  furosemide    Tablet 20 milliGRAM(s) Oral daily  influenza   Vaccine 0.5 milliLiter(s) IntraMuscular once  lactulose Syrup 20 Gram(s) Oral three times a day  lisinopril 2.5 milliGRAM(s) Oral daily  LORazepam   Injectable 2 milliGRAM(s) IV Push every 4 hours PRN  multivitamin 1 Tablet(s) Oral daily  mupirocin 2% Nasal 1 Application(s) Nasal two times a day  nadolol 20 milliGRAM(s) Oral daily  pantoprazole    Tablet 40 milliGRAM(s) Oral two times a day  rifAXIMin 550 milliGRAM(s) Oral two times a day  spironolactone 50 milliGRAM(s) Oral daily  thiamine 100 milliGRAM(s) Oral daily      ROS:   General:  No  fevers, chills, night sweats, fatigue  Eyes:  Good vision, no reported pain  ENT:  No sore throat, pain, runny nose  CV:  No pain, palpitations  Pulm:  No dyspnea, cough  GI:  See HPI, otherwise negative  :  No  incontinence, nocturia, dysuria  Muscle:  No pain, weakness  Neuro:  No memory problems  Psych:  No insomnia, mood problems, depression  Endocrine:  No polyuria, polydipsia, cold/heat intolerance  Heme:  Ecchymoses      PHYSICAL EXAM:   Vital Signs:  Vital Signs Last 24 Hrs  T(C): 36.7 (01 Oct 2020 12:40), Max: 36.8 (30 Sep 2020 21:07)  T(F): 98.1 (01 Oct 2020 12:40), Max: 98.2 (30 Sep 2020 21:07)  HR: 72 (01 Oct 2020 12:40) (70 - 74)  BP: 117/60 (01 Oct 2020 12:40) (114/65 - 129/80)  BP(mean): --  RR: 18 (01 Oct 2020 12:40) (17 - 18)  SpO2: 98% (01 Oct 2020 12:40) (98% - 100%)  Daily     Daily     GENERAL: no acute distress  NEURO: alert, no asterixis, AAOx3  HEENT: icteric sclera  CHEST: no respiratory distress, no accessory muscle use  CARDIAC: regular rate, rhythm  ABDOMEN: soft, non-tender, moderately distended, +fluid wave, not tense, no rebound or guarding  EXTREMITIES: warm, well perfused, no edema  SKIN: Multiple ecchymoses    LABS: reviewed                        8.7    10.88 )-----------( 56       ( 01 Oct 2020 06:46 )             24.3     10-01    132<L>  |  101  |  11  ----------------------------<  128<H>  4.0   |  27  |  0.78    Ca    8.2<L>      01 Oct 2020 06:46  Phos  3.3     10-01  Mg     1.5     10-01    TPro  6.5  /  Alb  1.9<L>  /  TBili  6.1<H>  /  DBili  x   /  AST  84<H>  /  ALT  57  /  AlkPhos  184<H>  10-01    LIVER FUNCTIONS - ( 01 Oct 2020 06:46 )  Alb: 1.9 g/dL / Pro: 6.5 g/dL / ALK PHOS: 184 U/L / ALT: 57 U/L DA / AST: 84 U/L / GGT: x             Interval Diagnostic Studies: see sunrise for full report

## 2020-10-01 NOTE — PROGRESS NOTE ADULT - PROBLEM/PLAN-6
DISPLAY PLAN FREE TEXT
Song Alvarado (MD), Orthopaedic Surgery  [facility] [place] [address]  Phone: [phone]  Fax: [phone]
DISPLAY PLAN FREE TEXT

## 2020-10-01 NOTE — PROGRESS NOTE ADULT - PROBLEM SELECTOR PROBLEM 6
Elevated INR
Hypokalemia
Hypomagnesemia
Hypomagnesemia
Anemia
Elevated INR
Elevated INR
Hepatic encephalopathy syndrome
LEDA (acute kidney injury)
LEDA (acute kidney injury)
Thrombocytopenia
Hepatic encephalopathy syndrome

## 2020-10-01 NOTE — PROGRESS NOTE ADULT - ATTENDING COMMENTS
Patient is a 34y old  Male who presents with acute alcohol withdrawal and advanced cirrhosis    Patient was seen and examined at bedside  CIWA 0  Ambulatory, denies any complains    Vitals stable    REVIEW OF SYSTEMS: denies fever, chills, cough, SOB, abd pain, N/V, diarrhea     PHYSICAL EXAM:  GENERAL: NAD, icteric   NERVOUS SYSTEM:  Alert & Oriented X3, CN intact, muscle strength 5/5 in BL UE and LE  CHEST/LUNG: Clear to auscultation anterolaterally  HEART: Regular rate and rhythm; No murmurs, rubs, or gallops  ABDOMEN: Nontender, distended with marked ascites; Bowel sounds present  EXTREMITIES:  2+ Peripheral Pulses  Skin: extensive bruise in extremities, worse on Left hand.     LABS:  Noted     A/P:   Acute decompensation of Cirrhosis of liver with ascites and esophageal varices   Normocytic Anemia, multifactorial, concern for AIHA  Thrombocytopenia  Alcohol induced hepatitis   Coagulopathy  Electrolyte imbalance   Alcohol withdrawal   Proteinuria     Plan:   Cont steroids, will taper on discharge   Cont on low dose Lasix and spironolactone   Factor VIII level elevated, no concern for DIC, will dc septic work up  Cont Ativan PRN  Monitor CBC, LFT  Cont Lactulose rifaximin, ensure 2-3 BM every day   Cipro for SBP ppx  Aspiration, fall and seizure precautions   Full code   Patient will need VIVO meds before discharge and follow up with [facility]   Dr Garvey will make appointment with transplant center   to work on insurance

## 2020-10-01 NOTE — PROGRESS NOTE ADULT - PROBLEM SELECTOR PLAN 3
- stable  - unwitnessed h/o UGIB x2; h/o EtOH cirrhosis   - FOBT neg  - prior EGD in 6/2020 demonstrated small distal EV's (not banded due to low PLTs), and portal gastropathy (Loving Class IIA); d/c w/ spironolactone, nadalol, lasix (noncompliant)  - s/p 4u pRBC; last transfusion on 9/21  - c/w protonix PO, nadolol PO 20mg qd  - no BB for now  - monitor CBC  - GI, Dr. Gerber, onboard  - no EGD per GI

## 2020-10-01 NOTE — PROGRESS NOTE ADULT - SUBJECTIVE AND OBJECTIVE BOX
[facility] NEPHROLOGY- PROGRESS NOTE    Patient is a 35yo Male with Liver Cirrhosis, ETOH abuse, esophageal varices p/w weakness with nausea, hematemesis with blood in stool. Pt admitted with acute decompensated cirrhosis and ETOH withdrawal with anemia/ thrombocytopenia s/p brief ICU stay for worsening ETOH withdrawal.  s/p 3.5L paracentesis on 9/17. Neg for SBP. Pt had LEDA which resolved with albumin gtt. Pt found to have proteinuria and SAAG <1.1. Nephrology consulted for proteinuria.     Hospital Medications: Medications reviewed.    REVIEW OF SYSTEMS:   Denies any SOB, pain, n/v/d or abd pain.     VITALS:  T(F): 98.1 (10-01-20 @ 05:44), Max: 98.2 (09-30-20 @ 21:07)  HR: 74 (10-01-20 @ 05:44)  BP: 114/65 (10-01-20 @ 05:44)  RR: 17 (10-01-20 @ 05:44)  SpO2: 100% (10-01-20 @ 05:44)  Wt(kg): --      PHYSICAL EXAM:  Gen NAD  HEENT: +icterus  Cards: RRR, +S1/S2,  Resp: CTA ant  GI: soft, NT mod distention  Extremities: trace LE edema B/L      LABS:  10-01  132 <--, 133 <--, 134 <--, 135 <--, 136 <--, 135 <--, 134 <--  132<L>  |  101  |  11  ----------------------------<  128<H>  4.0   |  27  |  0.78    Ca    8.2<L>      01 Oct 2020 06:46  Phos  3.3     10-01  Mg     1.5     10-01    TPro  6.5  /  Alb  1.9<L>  /  TBili  6.1<H>  /  DBili      /  AST  84<H>  /  ALT  57  /  AlkPhos  184<H>  10-01    Creatinine Trend: 0.78 <--, 0.73 <--, 0.72 <--, 0.70 <--, 0.67 <--, 0.68 <--, 0.75 <--                        8.7    10.88 )-----------( 56       ( 01 Oct 2020 06:46 )             24.3     Urine Studies:    Creatinine, Random Urine: 131 mg/dL (09-30 @ 14:59)  Sodium, Random Urine: 56 mmol/L (09-30 @ 14:59)  Osmolality, Random Urine: 667 mos/kg (09-30 @ 14:59)

## 2020-10-01 NOTE — PROGRESS NOTE ADULT - SUBJECTIVE AND OBJECTIVE BOX
PGY-1 Progress Note discussed with attending    PAGER #: [[phone]] TILL 5:00 PM  PLEASE CONTACT ON CALL TEAM:  - On Call Team (Please refer to Alison) FROM 5:00 PM - 8:30PM  - Nightfloat Team FROM 8:30 -7:30 AM    INTERVAL HPI/OVERNIGHT EVENTS:   - Patient reports increased urinary output, but no dysuria or hematuria. He denies dizziness, abdominal pain, or bowel movement changes.    REVIEW OF SYSTEMS:  CONSTITUTIONAL: No fever, weight loss, or fatigue  RESPIRATORY: No cough, wheezing, chills or hemoptysis; No shortness of breath  CARDIOVASCULAR: No chest pain, palpitations, dizziness, or leg swelling  GASTROINTESTINAL: No abdominal pain. No nausea, vomiting, or hematemesis; No diarrhea or constipation. No melena or hematochezia.  GENITOURINARY: reports increased urinary output; No dysuria or hematuria, urinary frequency  NEUROLOGICAL: No headaches, memory loss, loss of strength, numbness, or tremors  SKIN: No itching, burning, rashes, or lesions     MEDICATIONS  (STANDING):  ciprofloxacin     Tablet 500 milliGRAM(s) Oral daily  ergocalciferol 67911 Unit(s) Oral <User Schedule>  folic acid 1 milliGRAM(s) Oral daily  furosemide    Tablet 20 milliGRAM(s) Oral daily  influenza   Vaccine 0.5 milliLiter(s) IntraMuscular once  lactulose Syrup 20 Gram(s) Oral three times a day  multivitamin 1 Tablet(s) Oral daily  mupirocin 2% Nasal 1 Application(s) Nasal two times a day  nadolol 20 milliGRAM(s) Oral daily  pantoprazole    Tablet 40 milliGRAM(s) Oral two times a day  rifAXIMin 550 milliGRAM(s) Oral two times a day  spironolactone 50 milliGRAM(s) Oral daily    MEDICATIONS  (PRN):  LORazepam   Injectable 2 milliGRAM(s) IV Push every 4 hours PRN Agitation      Vital Signs Last 24 Hrs  T(C): 36.7 (01 Oct 2020 05:44), Max: 36.8 (30 Sep 2020 21:07)  T(F): 98.1 (01 Oct 2020 05:44), Max: 98.2 (30 Sep 2020 21:07)  HR: 74 (01 Oct 2020 05:44) (70 - 80)  BP: 114/65 (01 Oct 2020 05:44) (114/65 - 129/80)  BP(mean): --  RR: 17 (01 Oct 2020 05:44) (17 - 18)  SpO2: 100% (01 Oct 2020 05:44) (100% - 100%)    PHYSICAL EXAMINATION:  GENERAL: NAD, AAOx3  HEAD: AT/NC  EYES: scleral icterus noted, but improving  NECK: supple, No JVD noted, Normal thyroid  CHEST/LUNG: CTABL; no rales, rhonchi, wheezing, or rubs  HEART: regular rate and rhythm; no murmurs, rubs, or gallops  ABDOMEN: mildly distended (improving), but soft and nontender; Bowel sounds present  EXTREMITIES:  2+ Peripheral Pulses, No clubbing, cyanosis, or edema                          8.7    10.88 )-----------( 56       ( 01 Oct 2020 06:46 )             24.3     10-01    132<L>  |  101  |  11  ----------------------------<  128<H>  4.0   |  27  |  0.78    Ca    8.2<L>      01 Oct 2020 06:46  Phos  3.3     10-01  Mg     1.5     10-01    TPro  6.5  /  Alb  1.9<L>  /  TBili  6.1<H>  /  DBili  x   /  AST  84<H>  /  ALT  57  /  AlkPhos  184<H>  10-01    LIVER FUNCTIONS - ( 01 Oct 2020 06:46 )  Alb: 1.9 g/dL / Pro: 6.5 g/dL / ALK PHOS: 184 U/L / ALT: 57 U/L DA / AST: 84 U/L / GGT: x               PT/INR - ( 01 Oct 2020 06:46 )   PT: 45.4 sec;   INR: 4.15 ratio         PTT - ( 01 Oct 2020 06:46 )  PTT:57.8 sec  COVID-19 PCR: NotDetec (15 Sep 2020 00:23)      CAPILLARY BLOOD GLUCOSE          RADIOLOGY & ADDITIONAL TESTS:

## 2020-10-01 NOTE — PROGRESS NOTE ADULT - PROBLEM SELECTOR PLAN 1
- h/o alcoholic jaundice w/ ascites  - jaundiced, scleral icterus   - MELD 32; 3mo mortality 52.6%  - Child-Bojorquez 13  - Maddreys 148.2 (poor prognosis)(9/29)   - Lille score 0.243 (9/30)  - hepatitis panel negative  - US RUQ liver cirrhosis with no focal lesion, cholelithiasis with mild GB wall thickening, no BD dilatation, negative Field's sign   - s/p paracentesis 2.8L jorden fluid drained, low suspicion for SBP   - c/w ppx cipro, rifaximin and lactulose, NAC, prednisolone 40mg, furosemide/spironolactone 20/50  - refer to outpt transplant upon d/c    - Hepatology, Dr. Morales, onboard  - GI, Dr. Gerber, onboard  - long-term SBP ppx w/ cipro 500mg qd

## 2020-10-01 NOTE — PROGRESS NOTE ADULT - PROBLEM SELECTOR PROBLEM 9
Prophylactic measure
DVT prophylaxis
Cardiomegaly
Prophylactic measure

## 2020-10-01 NOTE — PROGRESS NOTE ADULT - ATTENDING COMMENTS
[facility] NEPHROLOGY  Rui Shore M.D.  Cal Montejo D.O.  Cristiana Vigil M.D.  Carie Mclean, MSN, ANP-C    Telephone: [phone]  Facsimile: [phone]    [address] [place] [address]

## 2020-10-01 NOTE — PROGRESS NOTE ADULT - PROBLEM SELECTOR PROBLEM 8
Cardiomegaly
Discharge planning issues
Discharge planning issues
Hypomagnesemia
Cardiomegaly
Elevated INR
Cardiomegaly
Cardiomegaly

## 2020-10-01 NOTE — PROGRESS NOTE ADULT - PROBLEM SELECTOR PLAN 2
- h/o cirrhosis  - unlikely 2/2 SIADH  - Na 134 (9/29) -> 133 -> 132 (post-diuretic)  - FENa 0.2%  - serum osmolality 294  - urine osmolality 667  - c/w fluid restriction <1L for now   - monitor Na daily  - will d/c spironolactone if Na continues to trend down    - Dr. Musa rivera, onboard

## 2020-10-01 NOTE — PROGRESS NOTE ADULT - PROBLEM SELECTOR PROBLEM 7
DVT prophylaxis
DVT prophylaxis
Hypophosphatemia
Elevated INR
Hepatic encephalopathy syndrome
Elevated INR
Hepatic encephalopathy syndrome
Elevated INR

## 2020-10-01 NOTE — PROGRESS NOTE ADULT - ASSESSMENT
Assessment and Plan:   · Assessment	  Assessment and Plan:   · Assessment	  Problem #1 Anemia - chronic and multifactorial (including GIB in the past), normocytic, hypochromic; stable Hg post PRBC; no clinical evidence of active bleeding at this time   -QUEENIE positive (9/22) w/ elevated LDH and low haptoglobin; albumin is low so low haptoglobin maybe reflection of poor hepatic synthetic function; LDH elevation may also be due to severe liver dysfunction  -given  likelihood of presence of AIHA, continue trial of prednisone PO as long as there is no contraindication from active infection or other co-morbidities  FOBT (-)  -Hgb stable w/ prednisone; continue taper down to 0.5 mg /kg per day    Problem #2 Thrombocytopenia - no schistocytosis  -etiology likely cirrhosis/ETOH/medications  -s/p 1u Plts on 9/18  -transfuse SDP if <20k in liver disease   avoid nonessential meds  plt stable, no active bleeding  ecchymosis scattered on B/L UE, left hand edema with hematoma- Rec warm compress, elevation  will review smear as today's manual diff shows slight shistocytes (DIC vs liver disease)  Fibrinogen low, elevated PT/PTT and elevated D-dimer changes c/w poss DIC vs liver disease  Factor VIII is elevated which goes against DIC,  etiology related to known liver disease  Rec's:  -Monitor Plt count    Problem #3 Alcoholic cirrhosis w/ esophageal varices- h/o UGIB x2  -significant hyperbilirubinemia and coagulopathy  -no need for FFP unless pt has clinically significant bleeding  appreciate hepatology consult    Upon discharge pt to f/u with Hepatology and further discussion regarding transplant  Overall prognosis poor; will continue to follow; call with questions [phone]  Above reviewed with Attending Dr. Anton

## 2020-10-01 NOTE — PROGRESS NOTE ADULT - PROBLEM SELECTOR PLAN 7
DVT ppx- SCDs ordered
DVT ppx- SCDs ordered
Phos 2.4  Supplemented  Phos level ordered for 0000, and in AM
- INR noted to be 4.32  - likely 2/2 liver disease   - INR 4.95 (9/21) -> 3.4 -> 3.42 -> 4.37 -> 4.63  - s/p vitK on 9/18 & 9/22  - monitor PT/INR, for bleeds
- INR noted to be 4.32  - likely 2/2 liver disease   - INR 4.95 (9/21) -> 3.4 -> 3.42 -> 4.37 -> 4.63 -> 5.10  - s/p vitK on 9/18 & 9/22  - monitor PT/INR, for bleeds
- INR noted to be 4.32  - likely 2/2 liver disease   - INR 4.95 (9/21) -> 3.4 -> 3.42 -> 4.37 -> 4.63 -> 5.10 -> 5.10  - s/p vitK on 9/18, 9/22, 9/26, 9/27  - monitor PT/INR, for bleeds
- INR noted to be 4.32  - likely 2/2 liver disease   - INR 4.95 (9/21) -> 3.4 -> 3.42 -> 4.37 -> 4.63 -> 5.10 -> 5.10  - s/p vitK on 9/18, 9/22, 9/26, 9/27  - monitor PT/INR, for bleeds
- grade 1; clinically drowsy with sleep reversal  - Ammonia 58 ->100  - c/w lactulose, rifaximin   - neurochecks
- grade 1; clinically drowsy with sleep reversal  - Ammonia 58 ->100  - c/w lactulose, rifaximin   - neurochecks
- improved  - grade 1; clinically drowsy with sleep reversal  - CT head neg for acute pathology  - c/w lactulose, rifaximin, thiamin  - frequent neurochecks
- INR noted to be 4.32  - likely 2/2 liver disease   - INR 4.95 (9/21) -> 3.4 -> 3.42 -> 4.37  - s/p vitK on 9/18 & 9/22  - monitor PT/INR, for bleeds
- grade 1; clinically drowsy with sleep reversal  - Ammonia 58 ->100  - c/w lactulose, rifaximin   - neurochecks
- INR noted to be 4.32  - likely 2/2 liver disease   - INR 4.95 (9/21) -> 3.4 -> 3.42 -> 4.37 -> 4.63 -> 5.10 -> 5.10  - s/p vitK on 9/18, 9/22, 9/26, 9/27  - monitor PT/INR, for bleeds

## 2020-10-01 NOTE — PROGRESS NOTE ADULT - SUBJECTIVE AND OBJECTIVE BOX
complete note to follow · Subjective and Objective:   · Subjective and Objective:   INTERVAL HPI: Patient seen and examined at bedside; OOB to chair, denies bleeding. Events noted; Patient w/o complaint. He states he will "quit drinking liquor" and expressed he wants to live for himself and his family    PMH/PSH as above    FmHx/Soc Hx NC    ROS as above; pt is not able to provide detailed review of systems  General: Noncontributory; Skin/Breast: NC;Ophthalmologic:NC; ENMT: NC; Respiratory and Thorax: NC; Cardiovascular: NC; 	  Gastrointestinal: NC; Genitourinary:NC; 	Musculoskeletal:NC; Neurological: NC; Psychiatric: NC; Hematology/Lymphatics: NC; Endocrine: NC; Allergic/Immunologic: NC    MEDICATIONS  (STANDING):  chlordiazePOXIDE 25 milliGRAM(s) Oral every 12 hours  ergocalciferol 58889 Unit(s) Oral <User Schedule>  folic acid 1 milliGRAM(s) Oral daily  influenza   Vaccine 0.5 milliLiter(s) IntraMuscular once  lactulose Syrup 20 Gram(s) Oral four times a day  multivitamin 1 Tablet(s) Oral daily  mupirocin 2% Nasal 1 Application(s) Nasal two times a day  pantoprazole  Injectable 40 milliGRAM(s) IV Push two times a day  prednisoLONE    3 mG/mL Solution (ORAPRED) 40 milliGRAM(s) Oral daily  rifAXIMin 550 milliGRAM(s) Oral two times a day  thiamine Injectable 100 milliGRAM(s) IntraMuscular daily    MEDICATIONS  (PRN):  LORazepam   Injectable 2 milliGRAM(s) IV Push every 4 hours PRN Agitation      Vitals:  Vital Signs Last 24 Hrs  T(C): 36.7 (01 Oct 2020 12:40), Max: 36.8 (30 Sep 2020 21:07)  T(F): 98.1 (01 Oct 2020 12:40), Max: 98.2 (30 Sep 2020 21:07)  HR: 72 (01 Oct 2020 12:40) (70 - 80)  BP: 117/60 (01 Oct 2020 12:40) (114/65 - 129/80)  BP(mean): --  ABP: --  ABP(mean): --  RR: 18 (01 Oct 2020 12:40) (17 - 18)  SpO2: 98% (01 Oct 2020 12:40) (98% - 100%)          _________________  PHYSICAL EXAM:  ---------------------------  GEN: NAD; A and O x 3; jaundiced, OOB to chair  EYES: Scleral icterus  LUNGS: no wheezing; decreased bilateral air entry; no use of accessory muscles for breathing  HEART: Nl S1 S2; no M   ABDOMEN: Soft, Nontender, non distended  EXTREMITIES: no cyanosis; no edema; warm and dry  NERVOUS SYSTEM:  Awake and alert; no focal neuro  deficits  DERM: B/L UE large scattered ecchymosis with left hand edema with noted hematoma    _________________________________________________  LABS:             CBC Full  -  ( 01 Oct 2020 06:46 )  WBC Count : 10.88 K/uL  RBC Count : 2.54 M/uL  Hemoglobin : 8.7 g/dL  Hematocrit : 24.3 %  Platelet Count - Automated : 56 K/uL  Mean Cell Volume : 95.7 fl  Mean Cell Hemoglobin : 34.3 pg  Mean Cell Hemoglobin Concentration : 35.8 gm/dL  Auto Neutrophil # : 8.22 K/uL  Auto Lymphocyte # : 1.36 K/uL  Auto Monocyte # : 1.06 K/uL  Auto Eosinophil # : 0.14 K/uL  Auto Basophil # : 0.01 K/uL  Auto Neutrophil % : 75.6 %  Auto Lymphocyte % : 12.5 %  Auto Monocyte % : 9.7 %  Auto Eosinophil % : 1.3 %  Auto Basophil % : 0.1 %    10-01    132<L>  |  101  |  11  ----------------------------<  128<H>  4.0   |  27  |  0.78    Ca    8.2<L>      01 Oct 2020 06:46  Phos  3.3     10-01  Mg     1.5     10-01    TPro  6.5  /  Alb  1.9<L>  /  TBili  6.1<H>  /  DBili  x   /  AST  84<H>  /  ALT  57  /  AlkPhos  184<H>  10-01      Dir Antiglob IgG Interpretation: POS (09.22.20 @ 07:46) Dir Antiglob Polyspecific Interpretation: POS (09.22.20 @ 07:46)    Lactate Dehydrogenase, Serum in AM (09.24.20 @ 07:27)   Lactate Dehydrogenase, Serum: 405 U/L   Haptoglobin, Serum in AM (09.24.20 @ 10:28)   Haptoglobin, Serum: <20: Test Repeated mg/dL     CAPILLARY BLOOD GLUCOSEDir Antiglob IgG Interpretation: POS (09.22.20 @ 07:46)         Radiology:  < from: CT Head No Cont (09.24.20 @ 12:36) >  EXAM:  CT BRAIN                            PROCEDURE DATE:  09/24/2020          INTERPRETATION:  HISTORY:  Altered mental status  Evaluation demonstrates no evidence of mass-effect or midline shift. No intraparenchymal mass lesions or hemorrhage isidentified. There is no evidence of hydrocephalus. No extra-axial collections are noted.    The bone windows demonstrate no gross osseous abnormalities.    Impression:  1. Unremarkable noncontrast CT scan of the brain.                  MAXIMILIANO BIRD M.D., ATTENDING RADIOLOGIST  This document has been electronically signed. Sep 24 2020  1:00PM    < end of copied text >

## 2020-10-01 NOTE — PROGRESS NOTE ADULT - ASSESSMENT
Patient is a 35yo Male with Liver Cirrhosis, ETOH abuse, esophageal varices p/w weakness with nausea, hematemesis with blood in stool. Pt admitted with acute decompensated cirrhosis and ETOH withdrawal with anemia/ thrombocytopenia s/p brief ICU stay for worsening ETOH withdrawal.  s/p 3.5L paracentesis on 9/17. Neg for SBP. Pt had LEDA which resolved with albumin gtt. Pt found to have proteinuria and SAAG <1.1. Nephrology consulted for proteinuria.       1. Proteinuria- with low SAAG r/o nephrotic syndrome. Pt with no overt edema; signs of nephrotic syndrome. Cirrhotics usually have no or little proteinuria unless associated with kidney disease; ?secondary IgA nephropathy 2/2 ETOH abuse is a possibility, however pt had no microscopic hematuria on UA. UA with 30 protein an no blood. Spot UPr/ Cr 0.7 & 24 hr proteinuria 0.97; non nephrotic range.    Recc to repeat SAAG ?error.  Pt would be a poor candidate for biopsy with anemia and thrombocytopenia; high risk of bleeding. Recc to start Lisinipril 2.5mg PO qd, titrate as BP tolerates for antiproteinuric effects. .     2. Decompensated Cirrhosis- Plan as per Hepatology/ GI  3. Anemia/ Thrombocytopenia- due to liver cirrhosis/ ETOH abuse s/p 5 units prbc (total during hospitalization). Plan as per primary team  4. Vitamin D deficiency- 25 OH vit D 8 c/w weekly Ergo 50 k units  5. Hyponatremia- mild. Serum Osm 294; isotonic. Will check SIFE/ SPEP. Urine Osm 667 with Urine Na 56 and low serum uric acid; consistent with SIADH. Recc 1L fluid restriction/ day. Monitor serum Na.

## 2020-10-01 NOTE — PROGRESS NOTE ADULT - PROBLEM SELECTOR PLAN 10
Likely discharge home pending diagnostic paracentesis, and improvement in anemia/electrolytes  SW and CM following
- hold DVT ppx given high risk of bleeding

## 2020-10-01 NOTE — PROGRESS NOTE ADULT - PROBLEM SELECTOR PLAN 8
- INR noted to be 4.32  - likely 2/2 liver disease   - INR 4.95 (9/21) -> 3.4 -> 3.42 -> 4.37 -> 4.63 -> 5.10 -> 5.10  - s/p vitK on 9/18, 9/22, 9/26, 9/27  - monitor PT/INR, for bleeds

## 2020-10-02 NOTE — PROGRESS NOTE ADULT - ATTENDING COMMENTS
[facility] NEPHROLOGY  Rui Shore M.D.  Cal Montejo D.O.  Cristiana Vigil M.D.  Carie Mclean, MSN, ANP-C    Telephone: [phone]  Facsimile: [phone]    [address] [place] [address]

## 2020-10-02 NOTE — DISCHARGE NOTE PROVIDER - NSDCMRMEDTOKEN_GEN_ALL_CORE_FT
ciprofloxacin 500 mg oral tablet: 1 tab(s) orally once a day  ergocalciferol 50,000 intl units (1.25 mg) oral capsule: 1 cap(s) orally once a week every Tuesday  folic acid 1 mg oral tablet: 1 tab(s) orally once a day  furosemide 20 mg oral tablet: 1 tab(s) orally once a day  lactulose 10 g/15 mL oral syrup: 30 milliliter(s) orally 2 times a day   lisinopril 2.5 mg oral tablet: 1 tab(s) orally once a day  Multiple Vitamins oral tablet: 1 tab(s) orally once a day  nadolol 20 mg oral tablet: 1 tab(s) orally once a day  pantoprazole 40 mg oral delayed release tablet: 1 tab(s) orally once a day   prednisoLONE 15 mg/5 mL oral syrup: 13 ml orally once a day x 7 days then,   12 ml-Day 8   10 ml- Day 9  8 ml- Day 10  6 ml- Day 11  4 ml- Day 12  2 ml- Day 13  1 ml- Day 14    spironolactone 25 mg oral tablet: 2 tab(s) orally once a day  thiamine 100 mg oral tablet: 1 tab(s) orally once a day

## 2020-10-02 NOTE — PHARMACY EDUCATION NOTE - MEDICATION SAFETY
Interactions/Miss dose instruction/Purpose/Side effects/Medication precautions/Signs and symptoms to report/Storage and handling

## 2020-10-02 NOTE — DISCHARGE NOTE PROVIDER - PROVIDER TOKENS
FREE:[LAST:[---],PHONE:[(   )    -],FAX:[(   )    -],ADDRESS:[[facility] outpatient [facility]]]

## 2020-10-02 NOTE — DISCHARGE NOTE NURSING/CASE MANAGEMENT/SOCIAL WORK - PATIENT PORTAL LINK FT
You can access the FollowMyHealth Patient Portal offered by [facility] by registering at the following website: [email or web address]. By joining [facility]’s FollowMyHealth portal, you will also be able to view your health information using other applications (apps) compatible with our system.

## 2020-10-02 NOTE — PROGRESS NOTE ADULT - ASSESSMENT
Patient is a 33yo Male with Liver Cirrhosis, ETOH abuse, esophageal varices p/w weakness with nausea, hematemesis with blood in stool. Pt admitted with acute decompensated cirrhosis and ETOH withdrawal with anemia/ thrombocytopenia s/p brief ICU stay for worsening ETOH withdrawal.  s/p 3.5L paracentesis on 9/17. Neg for SBP. Pt had LEDA which resolved with albumin gtt. Pt found to have proteinuria and SAAG <1.1. Nephrology consulted for proteinuria.       1. Proteinuria- with low SAAG r/o nephrotic syndrome. Pt with no overt edema; signs of nephrotic syndrome. Cirrhotics usually have no or little proteinuria unless associated with kidney disease; ?secondary IgA nephropathy 2/2 ETOH abuse is a possibility, however pt had no microscopic hematuria on UA. UA with 30 protein an no blood. Spot UPr/ Cr 0.7 & 24 hr proteinuria 0.97; non nephrotic range.    Recc to repeat SAAG ?error.  Pt would be a poor candidate for biopsy with anemia and thrombocytopenia; high risk of bleeding. Recc to start Lisinipril 2.5mg PO qd, titrate as BP tolerates for antiproteinuric effects. .     2. Decompensated Cirrhosis- Plan as per Hepatology/ GI  3. Anemia/ Thrombocytopenia- due to liver cirrhosis/ ETOH abuse s/p 5 units prbc (total during hospitalization). Plan as per primary team  4. Vitamin D deficiency- 25 OH vit D 8 c/w weekly Ergo 50 k units  5. Hyponatremia- mild. Serum Osm 294; repeat Osm 282; isotonic. SIFE/ SPEP pending; Last TG wnl. Urine Osm 667 with Urine Na 56 and low serum uric acid; consistent with SIADH. c/w 1L fluid restriction/ day. Monitor serum Na.    If worsening Serum Na <130; can reduce Spironolactone and increase Lasix.

## 2020-10-02 NOTE — PROGRESS NOTE ADULT - SUBJECTIVE AND OBJECTIVE BOX
Full note to follow Chief Complaint:  Patient is a 34y old  Male who presents with a chief complaint of Alcohol abuse (02 Oct 2020 13:03)      Reason for consult: Alcoholic cirrhosis    Interval Events: Patient was seen and examined at bedside, patients niece interpreted over phone as per patients request. He denied any new complaints, no abdominal pain, N/V, has BM, no blood in it, denied melena, no fever, chills, CP, SOB, dysuria.    Hospital Medications:  ciprofloxacin     Tablet 500 milliGRAM(s) Oral daily  ergocalciferol 89161 Unit(s) Oral <User Schedule>  folic acid 1 milliGRAM(s) Oral daily  furosemide    Tablet 20 milliGRAM(s) Oral daily  influenza   Vaccine 0.5 milliLiter(s) IntraMuscular once  lactulose Syrup 20 Gram(s) Oral three times a day  lisinopril 2.5 milliGRAM(s) Oral daily  multivitamin 1 Tablet(s) Oral daily  mupirocin 2% Nasal 1 Application(s) Nasal two times a day  nadolol 20 milliGRAM(s) Oral daily  pantoprazole    Tablet 40 milliGRAM(s) Oral two times a day  rifAXIMin 550 milliGRAM(s) Oral two times a day  spironolactone 50 milliGRAM(s) Oral daily  thiamine 100 milliGRAM(s) Oral daily      ROS:   General:  No  fevers, chills, night sweats, fatigue  Eyes:  Good vision, no reported pain  ENT:  No sore throat, pain, runny nose  CV:  No pain, palpitations  Pulm:  No dyspnea, cough  GI:  See HPI, otherwise negative  :  No  incontinence, nocturia  Muscle:  No pain, weakness  Neuro:  No memory problems  Psych:  No insomnia, mood problems, depression  Endocrine:  No polyuria, polydipsia, cold/heat intolerance  Heme:  Ecchymosis, easy bruisability  Skin:  No rash    PHYSICAL EXAM:   Vital Signs:  Vital Signs Last 24 Hrs  T(C): 37.1 (02 Oct 2020 15:51), Max: 37.1 (02 Oct 2020 15:51)  T(F): 98.8 (02 Oct 2020 15:51), Max: 98.8 (02 Oct 2020 15:51)  HR: 76 (02 Oct 2020 15:51) (70 - 76)  BP: 138/74 (02 Oct 2020 15:51) (100/62 - 138/74)  BP(mean): --  RR: 18 (02 Oct 2020 15:51) (17 - 18)  SpO2: 100% (02 Oct 2020 15:51) (100% - 100%)  Daily     Daily     GENERAL: no acute distress  NEURO: alert, no asterixis, AAOX3  HEENT: anicteric sclera, no conjunctival pallor appreciated  CHEST: no respiratory distress, no accessory muscle use  CARDIAC: regular rate, rhythm  ABDOMEN: soft, non-tender, moderately distended with +fluid wave, ascites not tense, no rebound or guarding, BS+  EXTREMITIES: warm, well perfused, no edema  SKIN: no lesions noted    LABS: reviewed                        9.5    11.20 )-----------( 62       ( 02 Oct 2020 06:33 )             26.1     10-02    131<L>  |  99  |  10  ----------------------------<  143<H>  3.7   |  27  |  0.72    Ca    8.3<L>      02 Oct 2020 06:33  Phos  2.9     10-02  Mg     1.6     10-02    TPro  6.6  /  Alb  2.6<L>  /  TBili  x   /  DBili  x   /  AST  x   /  ALT  x   /  AlkPhos  x   10-02    LIVER FUNCTIONS - ( 02 Oct 2020 09:44 )  Alb: 2.6 g/dL / Pro: 6.6 g/dL / ALK PHOS: x     / ALT: x     / AST: x     / GGT: x             Interval Diagnostic Studies: see sunrise for full report

## 2020-10-02 NOTE — DISCHARGE NOTE PROVIDER - NSDCCPCAREPLAN_GEN_ALL_CORE_FT
PRINCIPAL DISCHARGE DIAGNOSIS  Diagnosis: Alcohol withdrawal syndrome with complication  Assessment and Plan of Treatment: You presented with feeling weak. You were admitted for alcohol withdrawal syndrome. You were initially admitted to the medicine floor, but eventually upgraded to ICU for elevated CIWA score. In ICU, you received Ativan as needed, as well as, Librium and Precedex. Later, you came back down to the medicine floor, with Ativan as needed and standing dose of librium. Librium was tapered off subsequently. You were also cared with multivitamin, folate, and thiamine. You were on enhanced supervision and/or constant observation throughout the stay. Frequent CIWA checked was also performed.   You are recommended to abstain from drinking alcohol  You are recommended to attend alcohol anonymous group        SECONDARY DISCHARGE DIAGNOSES  Diagnosis: Anemia  Assessment and Plan of Treatment: Anemia    Diagnosis: Elevated INR  Assessment and Plan of Treatment: Elevated INR    Diagnosis: Hepatic encephalopathy syndrome  Assessment and Plan of Treatment: Hepatic encephalopathy syndrome    Diagnosis: Cirrhosis of liver with ascites, unspecified hepatic cirrhosis type  Assessment and Plan of Treatment: Patient has liver Cirrhosis of liver with ascites, secondary to excessive alcohol use. Patient presented with jaundiced and scleral icterus. He had elevated MELD, Child-Bojorquez, and Maddreys score, which equates to poor prognosis. Given his elevated liver function tests, hepatitis panel was performed, which was negative. U/S of right upper quadrant showed liver cirrhosis with no focal lesion, cholelithiasis with mild GB wall thickening, no BD dilatation. In ICU, 2.8L of jorden fluid was drained, which showed low suspicion for spontaneous bacterial peritonitis (SBP). patient was prophylactically treated with Ciprofloxacin 500mg for SBP prophylaxis. Patient was also treated with N-acetyl cysteine (NAC), prednisolone, and furosemide/spironolactone. Patient was followed by a hepatologist, Dr. Morlaes, and a gastroenterologist, Dr. Gerber. Patient will be on a long-term SBP prophylasix with Ciprofloxacin 500mg PO once a day.  PLEASE FOLLOW UP WITH DR MAYO AT [facility] . MAKE APPOINTMENT -[phone]    Diagnosis: Cardiomegaly  Assessment and Plan of Treatment: Cardiomegaly    Diagnosis: LEDA (acute kidney injury)  Assessment and Plan of Treatment: LEDA (acute kidney injury)    Diagnosis: Portal hypertension with esophageal varices  Assessment and Plan of Treatment: Patient has a history of esophageal varices based on prior EGD done in June 2020. Patient was discharged with spironolactone, nadalol and lasix at the time, but was non-compliant with medication. This admission, patient    Diagnosis: Hyponatremia  Assessment and Plan of Treatment: Hyponatremia    Diagnosis: Thrombocytopenia  Assessment and Plan of Treatment:     Diagnosis: Liver function abnormality  Assessment and Plan of Treatment:      PRINCIPAL DISCHARGE DIAGNOSIS  Diagnosis: Alcohol withdrawal syndrome with complication  Assessment and Plan of Treatment: You presented with feeling weak. You were admitted for alcohol withdrawal syndrome. You were initially admitted to the medicine floor, but eventually upgraded to ICU for elevated CIWA score. In ICU, you received Ativan as needed, as well as, Librium and Precedex. Later, you came back down to the medicine floor, with Ativan as needed and standing dose of librium. Librium was tapered off subsequently. You were also cared with multivitamin, folate, and thiamine. You were on enhanced supervision and/or constant observation throughout the stay. Frequent CIWA checked was also performed.   You are recommended to abstain from drinking alcohol  You are recommended to attend alcohol anonymous group        SECONDARY DISCHARGE DIAGNOSES  Diagnosis: Portal hypertension with esophageal varices  Assessment and Plan of Treatment: You have a history of esophageal varices based on prior EGD done in June 2020. You were discharged with spironolactone, nadalol and lasix at the time, but were non-compliant with medication. This admission, you presented with unwitnessed UGIB, but EGD was not performed given his thrombocytopenia, elevated INR, and symptomatic anemia. Patient was treated with Protonix and Nadolol. Patient was followed by a gastroenterologist, Dr. Gerber.   You are recommended to follow up with a gastroenterologist  You are recommended to be compliant with your medications    Diagnosis: Thrombocytopenia  Assessment and Plan of Treatment: You noted to have a platelet count of ~16,000. You received 1 unit of platelet. No signs of DIC was identified; likely secondary to cirrohsis. Patient's platelet was monitored throughout the stay.    You are recommended to followed by a hematologist    Diagnosis: Hyponatremia  Assessment and Plan of Treatment: Your sodium level was noted be trending down, likely secondary multifactorial issues (diuretic, Cirrhosis, and/or SIADH). FeNA was 0.2 prior to diuretic treatment. Serum osmolality was 294. Urine Osmolality was 667. Patient is on <1L fluid restriction. Your sodium level was monitored throughout the stay. You were followed by a nephrologist, Dr. Vigil.   You are recommended to followed by a nephrologist.    Diagnosis: Cardiomegaly  Assessment and Plan of Treatment: Patient noted to have cardiomegaly on chest X-ray, likely secondary to alcohol use. EKG showed normal sinus rhythm, and echocardiogram showed EF of 55% w/ normal chambers and valves.    Diagnosis: Cirrhosis of liver with ascites, unspecified hepatic cirrhosis type  Assessment and Plan of Treatment: Patient has liver Cirrhosis of liver with ascites, secondary to excessive alcohol use. Patient presented with jaundiced and scleral icterus. He had elevated MELD, Child-Bojorquez, and Maddreys score, which equates to poor prognosis. Given his elevated liver function tests, hepatitis panel was performed, which was negative. U/S of right upper quadrant showed liver cirrhosis with no focal lesion, cholelithiasis with mild GB wall thickening, no BD dilatation. In ICU, 2.8L of jorden fluid was drained, which showed low suspicion for spontaneous bacterial peritonitis (SBP). patient was prophylactically treated with Ciprofloxacin 500mg for SBP prophylaxis. Patient was also treated with N-acetyl cysteine (NAC), prednisolone, and furosemide/spironolactone. Patient was followed by a hepatologist, Dr. Morales, and a gastroenterologist, Dr. Gerber. Patient will be on a long-term SBP prophylasix with Ciprofloxacin 500mg PO once a day.  PLEASE FOLLOW UP WITH DR MAYO AT [facility] . MAKE APPOINTMENT [phone]    Diagnosis: Hepatic encephalopathy syndrome  Assessment and Plan of Treatment: Patient noted to have hepatic encephalopathy. His ammonia was 58 and CT head was negative. Patient was treated with lactulose, rifaximin and thiamine. Encephalopathy improved, and frequent neurocheck was performed throughout the stay.    Diagnosis: Elevated INR  Assessment and Plan of Treatment: Patient noted to have INR level of 4.32, likely 2/2 cirrhosis. Patient received Vitamin K during the stay. PTT/PT/INR, as well as any signs of bleeding, was monitored during the stay.    Diagnosis: Anemia  Assessment and Plan of Treatment: Patient presented with hemoglobin (Hgb) level of 8.0, but no signs of active bleeding. Patient likely had anemia secondary to complication of cirrhosis. Anemia panel shows anemia of chronic disease. Folate and B12 were within normal limits. FOBT was negative. Patient received total of 4 units of pRBC during the admission (last one on 9/21). Patient was treated with thiamine, multivitamin and Venofer. Patient's CBC and vitals were check frequently during the admission.     PRINCIPAL DISCHARGE DIAGNOSIS  Diagnosis: Alcoholic cirrhosis of liver with ascites  Assessment and Plan of Treatment: You were diagnosed with Decompensated  Cirrhosis of liver with ascites likely  secondary to excessive alcohol use.. Ultrasound of right upper quadrant showed liver cirrhosis with ascites.   2.8 L of fluid was removed from the stomach and tested which was negative for any infection  -You were treated with N-acetyl cysteine (NAC), prednisolone, and furosemide/spironolactone and Antibiotics .  You were followed by hepatologist, Dr. Morales, and gastroenterologist, Dr. Gerber.   - You are strongly advised to stop drinking Alcohol and be compliant with all your medications   - You will need to follow up at Liver transplant center given the extent of your Liver disease   PLEASE FOLLOW UP WITH DR MAYO AT [facility] . MAKE APPOINTMENT [phone] for further treatment and prescription of the medications.      SECONDARY DISCHARGE DIAGNOSES  Diagnosis: Alcohol withdrawal syndrome with complication  Assessment and Plan of Treatment: You presented with feeling weak. You were admitted for alcohol withdrawal syndrome. You were initially admitted to the medicine floor, but eventually upgraded to ICU for elevated CIWA score. In ICU, you were treated with ativan  and Precedex. Later, you came back down to the medicine floor and was treated with Librium, multivitamin, folate, and thiamine. You were on enhanced supervision and/or constant observation throughout the stay.  You are recommended to abstain from drinking alcohol  You are recommended to attend alcohol anonymous group      Diagnosis: Anemia  Assessment and Plan of Treatment: You presented with hemoglobin (Hgb) level of 8.0, but no signs of active bleeding, likely secondary to complication of cirrhosis.  You  received total of 4 units of red blood cells  during the admission (last one on 9/21). Patient was treated with thiamine, multivitamin and Venofer.   Curently your hemoglobin is stable around 8 .    Diagnosis: Hepatic encephalopathy syndrome  Assessment and Plan of Treatment: You were diagnosed with  hepatic encephalopathy and treated with Rifaximin and Lactulose which has resolved now   -Continue taking the Lactulose as prescribed    Diagnosis: Portal hypertension with esophageal varices  Assessment and Plan of Treatment: You have a history of esophageal varices based on prior Endoscopy done in June 2020.  You were treated with Protonix and Nadolol and followed by gastroenterologist, Dr. Gerber.   You are recommended to be compliant with your medications    Diagnosis: Hyponatremia  Assessment and Plan of Treatment: Your sodium level was noted be trending down, likely secondary multifactorial issues (diuretic, Cirrhosis, and/or SIADH) which now has resolved .You were followed by nephrologist, Dr. Vigil.   - Continue to Keep your fluid intake to < 1 L/day    Diagnosis: Thrombocytopenia  Assessment and Plan of Treatment: You noted to have a platelet count of ~16,000. You received 1 unit of platelet, likely secondary to cirrohsis.  You latest Platelet count is stable around 03662.    Diagnosis: Vitamin D deficiency  Assessment and Plan of Treatment: Your Vitamin D level is 8  Continue with Ergocalciferol 69135 U once a week for 6 more  weeks,then get your Vitamin D rechecked. Then continue with daily 1000 U over the counter as maintainence dose.     PRINCIPAL DISCHARGE DIAGNOSIS  Diagnosis: Alcoholic cirrhosis of liver with ascites  Assessment and Plan of Treatment: You were diagnosed with Decompensated  Cirrhosis of liver with ascites likely  secondary to excessive alcohol use.. Ultrasound of right upper quadrant showed liver cirrhosis with ascites.   2.8 L of fluid was removed from the stomach and tested which was negative for any infection  -You were treated with N-acetyl cysteine (NAC), prednisolone, and furosemide/spironolactone and Antibiotics .  You were followed by hepatologist, Dr. Morales, and gastroenterologist, Dr. Gerber.   - You are strongly advised to stop drinking Alcohol and be compliant with all your medications   - You will need to follow up at Liver transplant center given the extent of your Liver disease   PLEASE FOLLOW UP WITH DR MAYO AT [facility] . MAKE APPOINTMENT -[phone] for further treatment and prescription of the medications. You are also given appointment to [facility].      SECONDARY DISCHARGE DIAGNOSES  Diagnosis: Alcohol withdrawal syndrome with complication  Assessment and Plan of Treatment: You presented with feeling weak. You were admitted for alcohol withdrawal syndrome. You were initially admitted to the medicine floor, but eventually upgraded to ICU for elevated CIWA score. In ICU, you were treated with ativan  and Precedex. Later, you came back down to the medicine floor and was treated with Librium, multivitamin, folate, and thiamine. You were on enhanced supervision and/or constant observation throughout the stay.  You are recommended to abstain from drinking alcohol  You are recommended to attend alcohol anonymous group      Diagnosis: Vitamin D deficiency  Assessment and Plan of Treatment: Your Vitamin D level is 8  Continue with Ergocalciferol 39840 U once a week for 6 more  weeks,then get your Vitamin D rechecked. Then continue with daily 1000 U over the counter as maintainence dose.    Diagnosis: Anemia  Assessment and Plan of Treatment: You presented with hemoglobin (Hgb) level of 8.0, but no signs of active bleeding, likely secondary to complication of cirrhosis.  You  received total of 4 units of red blood cells  during the admission (last one on 9/21). Patient was treated with thiamine, multivitamin and Venofer.   Curently your hemoglobin is stable around 8 .    Diagnosis: Hepatic encephalopathy syndrome  Assessment and Plan of Treatment: You were diagnosed with  hepatic encephalopathy and treated with Rifaximin and Lactulose which has resolved now   -Continue taking the Lactulose as prescribed and ensure 2-3 bowel movements/day    Diagnosis: Portal hypertension with esophageal varices  Assessment and Plan of Treatment: You have a history of esophageal varices based on prior Endoscopy done in June 2020.  You were treated with Protonix and Nadolol and followed by gastroenterologist, Dr. Gerber.   You are recommended to be compliant with your medications. Please come back to ER if you notice any signs of bleeding.    Diagnosis: Hyponatremia  Assessment and Plan of Treatment: Your sodium level was noted be trending down, likely secondary multifactorial issues (diuretic, Cirrhosis, and/or SIADH) which now has resolved .You were followed by nephrologist, Dr. Vigil.   - Continue to Keep your fluid intake to < 1 L/day    Diagnosis: Thrombocytopenia  Assessment and Plan of Treatment: You noted to have a platelet count of ~16,000. You received 1 unit of platelet, likely secondary to cirrohsis.  You latest Platelet count is stable around 08666.

## 2020-10-02 NOTE — DISCHARGE NOTE PROVIDER - HOSPITAL COURSE
Patient is a 34 year-old male, with past medical history of Alcohol abuse disorder, liver cirrhosis, and esophageal varices, presented with feeling weak. Patient states he drinks every day. He reports having severe nausea with bloody vomiting (only tea spoon some times) and blood in stools. Patient is admitted for alcohol withdrawal syndrome. Patient initially admitted to the medicine floor, but eventually upgraded to ICU for elevated CIWA score. In ICU, patient received Ativan as needed, as well as, Librium and Precedex. Later, he came back down to the medicine floor, with Ativan as needed and standing dose of librium. Librium was tapered off subsequently. He was also cared with multivitamin, folate, and thiamine. Patient was on enhanced supervision and/or constant observation throughout the stay. Frequent CIWA checked was also performed.     Patient has liver Cirrhosis of liver with ascites, secondary to excessive alcohol use. Patient presented with jaundiced and scleral icterus. He had elevated MELD, Child-Bojorquez, and Maddreys score, which equates to poor prognosis. Given his elevated liver function tests, hepatitis panel was performed, which was negative. U/S of right upper quadrant showed liver cirrhosis with no focal lesion, cholelithiasis with mild GB wall thickening, no BD dilatation. In ICU, 2.8L of jorden fluid was drained, which showed low suspicion for spontaneous bacterial peritonitis (SBP). patient was prophylactically treated with Ciprofloxacin 500mg for SBP prophylaxis. Patient was also treated with N-acetyl cysteine (NAC), prednisolone, and furosemide/spironolactone. Patient was followed by a hepatologist, Dr. Morales, and a gastroenterologist, Dr. Gerber. Patient will be on a long-term SBP prophylasix with Ciprofloxacin 500mg PO once a day.    Patient has a history of esophageal varices based on prior EGD done in June 2020. Patient was discharged with spironolactone, nadalol and lasix at the time, but was non-compliant with medication. This admission, patient presented with unwitnessed UGIB, but EGD was not performed given his thrombocytopenia, elevated INR, and symptomatic anemia. Patient was treated with Protonix and Nadolol. Patient was followed by a gastroenterologist, Dr. Gerber.     Patient presented with hemoglobin (Hgb) level of 8.0, but no signs of active bleeding. Patient likely had anemia secondary to complication of cirrhosis. Anemia panel shows anemia of chronic disease. Folate and B12 were within normal limits. FOBT was negative. Patient received total of 4 units of pRBC during the admission (last one on 9/21). Patient was treated with thiamine, multivitamin and Venofer. Patient's CBC and vitals were check frequently during the admission.     Patient noted to have hepatic encephalopathy. His ammonia was 58 and CT head was negative. Patient was treated with lactulose, rifaximin and thiamine. Encephalopathy improved, and frequent neurocheck was performed throughout the stay.    Patient noted to have a platelet count of ~16,000. He received 1 unit of platelet. No signs of DIC was identified; likely secondary to cirrohsis. Patient's platelet was monitored throughout the stay.      Patient noted to have INR level of 4.32, likely 2/2 cirrhosis. Patient received Vitamin K during the stay. PTT/PT/INR, as well as any signs of bleeding, was monitored during the stay.    Patient noted to sodium level trending down, likely secondary multifactorial issues (diuretic, Cirrhosis, and/or SIADH). FeNA was 0.2 prior to diuretic treatment. Serum osmolality was 294. Urine Osmolality was 667. Patient's      Problem/Plan - 2:  ·  Problem: Hyponatremia.  Plan: - h/o cirrhosis  - unlikely 2/2 SIADH  - Na 134 (9/29) -> 133 -> 132 (post-diuretic)  - FENa 0.2%  - serum osmolality 294  - urine osmolality 667  - c/w fluid restriction <1L for now   - monitor Na daily  - will d/c spironolactone if Na continues to trend down    - Dr. Musa rivera, onboard.                        Problem/Plan - 9:  ·  Problem: Cardiomegaly.  Plan: - noted to have cardiomegaly on CXR  - likely 2/2 EtOH  - EKG NSR  - Echo EF 55%, chambers and valves wnl.      Problem/Plan - 10:  Problem: Prophylactic measure. Plan; - hold DVT ppx given high risk of bleeding. Patient is a 34 year-old male, with past medical history of Alcohol abuse disorder, liver cirrhosis, and esophageal varices, presented with feeling weak. Patient states he drinks every day. He reports having severe nausea with bloody vomiting (only tea spoon some times) and blood in stools. Patient is admitted for alcohol withdrawal syndrome. Patient initially admitted to the medicine floor, but eventually upgraded to ICU for elevated CIWA score. In ICU, patient received Ativan as needed, as well as, Librium and Precedex. Later, he came back down to the medicine floor, with Ativan as needed and standing dose of librium. Librium was tapered off subsequently. He was also cared with multivitamin, folate, and thiamine. Patient was on enhanced supervision and/or constant observation throughout the stay. Frequent CIWA checked was also performed.     Patient has liver Cirrhosis of liver with ascites, secondary to excessive alcohol use. Patient presented with jaundiced and scleral icterus. He had elevated MELD, Child-Bojorquez, and Maddreys score, which equates to poor prognosis. Given his elevated liver function tests, hepatitis panel was performed, which was negative. U/S of right upper quadrant showed liver cirrhosis with no focal lesion, cholelithiasis with mild GB wall thickening, no BD dilatation. In ICU, 2.8L of jorden fluid was drained, which showed low suspicion for spontaneous bacterial peritonitis (SBP). patient was prophylactically treated with Ciprofloxacin 500mg for SBP prophylaxis. Patient was also treated with N-acetyl cysteine (NAC), prednisolone, and furosemide/spironolactone. Patient was followed by a hepatologist, Dr. Morales, and a gastroenterologist, Dr. Gerber. Patient will be on a long-term SBP prophylasix with Ciprofloxacin 500mg PO once a day.    Patient has a history of esophageal varices based on prior EGD done in June 2020. Patient was discharged with spironolactone, nadalol and lasix at the time, but was non-compliant with medication. This admission, patient presented with unwitnessed UGIB, but EGD was not performed given his thrombocytopenia, elevated INR, and symptomatic anemia. Patient was treated with Protonix and Nadolol. Patient was followed by a gastroenterologist, Dr. Gerber.     Patient presented with hemoglobin (Hgb) level of 8.0, but no signs of active bleeding. Patient likely had anemia secondary to complication of cirrhosis. Anemia panel shows anemia of chronic disease. Folate and B12 were within normal limits. FOBT was negative. Patient received total of 4 units of pRBC during the admission (last one on 9/21). Patient was treated with thiamine, multivitamin and Venofer. Patient's CBC and vitals were check frequently during the admission.     Patient noted to have hepatic encephalopathy. His ammonia was 58 and CT head was negative. Patient was treated with lactulose, rifaximin and thiamine. Encephalopathy improved, and frequent neurocheck was performed throughout the stay.    Patient noted to have a platelet count of ~16,000. He received 1 unit of platelet. No signs of DIC was identified; likely secondary to cirrohsis. Patient's platelet was monitored throughout the stay.      Patient noted to have INR level of 4.32, likely 2/2 cirrhosis. Patient received Vitamin K during the stay. PTT/PT/INR, as well as any signs of bleeding, was monitored during the stay.    Patient noted to sodium level trending down, likely secondary multifactorial issues (diuretic, Cirrhosis, and/or SIADH). FeNA was 0.2 prior to diuretic treatment. Serum osmolality was 294. Urine Osmolality was 667. Patient is on <1L fluid restriction. Patient sodium level was monitored throughout the stay. Patient was followed by a nephrologist, Dr. Vigil.     Patient noted to have cardiomegaly on chest X-ray, likely secondary to alcohol use. EKG showed normal sinus rhythm, and echocardiogram showed EF of 55% w/ normal chambers and valves. Patient is a 34 year-old male, with past medical history of Alcohol abuse disorder, liver cirrhosis, and esophageal varices, presented with feeling weak. Patient states he drinks every day. He reports having severe nausea with bloody vomiting (only tea spoon some times) and blood in stools. Patient is admitted for alcohol withdrawal syndrome. Patient initially admitted to the medicine floor, but eventually upgraded to ICU for elevated CIWA score. In ICU, patient received Ativan as needed, as well as, Librium and Precedex. Later, he came back down to the medicine floor, with Ativan as needed and standing dose of librium. Librium was tapered off subsequently. He was also cared with multivitamin, folate, and thiamine. Patient was on enhanced supervision and/or constant observation throughout the stay. Frequent CIWA checked was also performed.     Patient has liver Cirrhosis of liver with ascites, secondary to excessive alcohol use. Patient presented with jaundiced and scleral icterus. He had elevated MELD, Child-Bojorquez, and Maddreys score, which equates to poor prognosis. Given his elevated liver function tests, hepatitis panel was performed, which was negative. U/S of right upper quadrant showed liver cirrhosis with no focal lesion, cholelithiasis with mild GB wall thickening, no BD dilatation. In ICU, 2.8L of jorden fluid was drained, which showed low suspicion for spontaneous bacterial peritonitis (SBP). patient was prophylactically treated with Ciprofloxacin 500mg for SBP prophylaxis. Patient was also treated with N-acetyl cysteine (NAC), prednisolone, and furosemide/spironolactone. Patient was followed by a hepatologist, Dr. Morales, and a gastroenterologist, Dr. Gerber. Patient will be on a long-term SBP prophylasix with Ciprofloxacin 500mg PO once a day.    Patient has a history of esophageal varices based on prior EGD done in June 2020. Patient was discharged with spironolactone, nadalol and lasix at the time, but was non-compliant with medication. This admission, patient presented with unwitnessed UGIB, but EGD was not performed given his thrombocytopenia, elevated INR, and symptomatic anemia. Patient was treated with Protonix and Nadolol. Patient was followed by a gastroenterologist, Dr. Gerber.     Patient presented with hemoglobin (Hgb) level of 8.0, but no signs of active bleeding. Patient likely had anemia secondary to complication of cirrhosis. Anemia panel shows anemia of chronic disease. Folate and B12 were within normal limits. FOBT was negative. Patient received total of 4 units of pRBC during the admission (last one on 9/21). Patient was treated with thiamine, multivitamin and Venofer. Patient's CBC and vitals were check frequently during the admission.     Patient noted to have hepatic encephalopathy. His ammonia was 58 and CT head was negative. Patient was treated with lactulose, rifaximin and thiamine. Encephalopathy improved, and frequent neurocheck was performed throughout the stay.    Patient noted to have a platelet count of ~16,000. He received 1 unit of platelet. No signs of DIC was identified; likely secondary to cirrohsis. Patient's platelet was monitored throughout the stay.      Patient noted to have INR level of 4.32, likely 2/2 cirrhosis. Patient received Vitamin K during the stay. PTT/PT/INR, as well as any signs of bleeding, was monitored during the stay.    Patient noted to sodium level trending down, likely secondary multifactorial issues (diuretic, Cirrhosis, and/or SIADH). FeNA was 0.2 prior to diuretic treatment. Serum osmolality was 294. Urine Osmolality was 667. Patient is on <1L fluid restriction. Patient sodium level was monitored throughout the stay. Patient was followed by a nephrologist, Dr. Vigil.     Patient noted to have cardiomegaly on chest X-ray, likely secondary to alcohol use. EKG showed normal sinus rhythm, and echocardiogram showed EF of 55% w/ normal chambers and valves.     Patient was started on Steroids for alcoholic hepatitis. Anemia work up showed JOAQUIN, Zenia test was positive. Steroid was continued during hospital stay and tapered on discharged    Patient was counselled about Alcohol abstinence. He was motivated to quit, referral to substance abuse clinic at [facility] was given.

## 2020-10-02 NOTE — PROGRESS NOTE ADULT - SUBJECTIVE AND OBJECTIVE BOX
[facility] NEPHROLOGY- PROGRESS NOTE    Patient is a 35yo Male with Liver Cirrhosis, ETOH abuse, esophageal varices p/w weakness with nausea, hematemesis with blood in stool. Pt admitted with acute decompensated cirrhosis and ETOH withdrawal with anemia/ thrombocytopenia s/p brief ICU stay for worsening ETOH withdrawal.  s/p 3.5L paracentesis on . Neg for SBP. Pt had LEDA which resolved with albumin gtt. Pt found to have proteinuria and SAAG <1.1. Nephrology consulted for proteinuria.     Hospital Medications: Medications reviewed.    REVIEW OF SYSTEMS:   Denies any SOB, pain, n/v/d or abd pain.     VITALS:  T(F): 98.3 (10-02-20 @ 04:43), Max: 98.3 (10-02-20 @ 04:43)  HR: 74 (10-02-20 @ 04:43)  BP: 100/62 (10-02-20 @ 04:43)  RR: 17 (10-02-20 @ 04:43)  SpO2: 100% (10-02-20 @ 04:43)  Wt(kg): --    PHYSICAL EXAM:  Gen NAD  HEENT: +icterus  Cards: RRR, +S1/S2,  Resp: CTA ant  GI: soft, NT mod distention  Extremities: trace LE edema B/L    LABS:  10-02  131 <--, 132 <--, 133 <--, 134 <--, 135 <--, 136 <--, 135 <--  131<L>  |  99  |  10  ----------------------------<  143<H>  3.7   |  27  |  0.72    Ca    8.3<L>      02 Oct 2020 06:33  Phos  2.9     10-02  Mg     1.6     10-02    TPro  6.6  /  Alb      /  TBili      /  DBili      /  AST      /  ALT      /  AlkPhos      10-02    Creatinine Trend: 0.72 <--, 0.78 <--, 0.73 <--, 0.72 <--, 0.70 <--, 0.67 <--, 0.68 <--                        9.5    11.20 )-----------( 62       ( 02 Oct 2020 06:33 )             26.1     Urine Studies:  Urinalysis Basic - ( 01 Oct 2020 22:02 )    Color: Brown / Appearance: Clear / S.010 / pH:   Gluc:  / Ketone: Trace  / Bili: Moderate / Urobili: 1   Blood:  / Protein: Negative / Nitrite: Negative   Leuk Esterase: Negative / RBC: 2-5 /HPF / WBC 0-2 /HPF   Sq Epi:  / Non Sq Epi: Few /HPF / Bacteria: Moderate /HPF      Creatinine, Random Urine: 131 mg/dL ( @ 14:59)  Sodium, Random Urine: 56 mmol/L ( @ 14:59)  Osmolality, Random Urine: 667 mos/kg ( @ 14:59)

## 2020-10-02 NOTE — DISCHARGE NOTE PROVIDER - NSDCPNSUBOBJ_GEN_ALL_CORE
34y old  Male who presents with a chief complaint of Alcohol abuse (02 Oct 2020 13:03)    Patient was seen and examined at bedside   Denies any complains   used [id number]    INTERVAL HPI/OVERNIGHT EVENTS:  T(C): 37.1 (10-02-20 @ 15:51), Max: 37.1 (10-02-20 @ 15:51)  HR: 76 (10-02-20 @ 15:51) (70 - 80)  BP: 138/74 (10-02-20 @ 15:51) (100/62 - 138/74)  RR: 18 (10-02-20 @ 15:51) (17 - 18)  SpO2: 100% (10-02-20 @ 15:51) (98% - 100%)  Wt(kg): --  I&O's Summary      REVIEW OF SYSTEMS: denies fever, chills, SOB, palpitations, chest pain, abdominal pain, nausea, vomiting, diarrhea, constipation, dizziness    MEDICATIONS  (STANDING):  ciprofloxacin     Tablet 500 milliGRAM(s) Oral daily  ergocalciferol [id number] Unit(s) Oral <User Schedule>  folic acid 1 milliGRAM(s) Oral daily  furosemide    Tablet 20 milliGRAM(s) Oral daily  influenza   Vaccine 0.5 milliLiter(s) IntraMuscular once  lactulose Syrup 20 Gram(s) Oral three times a day  lisinopril 2.5 milliGRAM(s) Oral daily  multivitamin 1 Tablet(s) Oral daily  mupirocin 2% Nasal 1 Application(s) Nasal two times a day  nadolol 20 milliGRAM(s) Oral daily  pantoprazole    Tablet 40 milliGRAM(s) Oral two times a day  rifAXIMin 550 milliGRAM(s) Oral two times a day  spironolactone 50 milliGRAM(s) Oral daily  thiamine 100 milliGRAM(s) Oral daily    MEDICATIONS  (PRN):      PHYSICAL EXAM:  GENERAL: NAD, well-groomed, well-developed  HEAD:  Atraumatic, Normocephalic  EYES: icteric  NERVOUS SYSTEM:  Alert & Oriented X3, Good concentration; no focal deficit   CHEST/LUNG: Clear to auscultation bilaterally; No rales, rhonchi, wheezing, or rubs  HEART: Regular rate and rhythm; No murmurs, rubs, or gallops  ABDOMEN: Soft, Nontender, distended; Bowel sounds present  EXTREMITIES:  2+ Peripheral Pulses, 1+edema  SKIN: extensive bruise and ecchymosis     LABS:                        9.5    11.20 )-----------( 62       ( 02 Oct 2020 06:33 )             26.1     131<L>  |  99  |  10  ----------------------------<  143<H>  3.7   |  27  |  0.72    Ca    8.3<L>      02 Oct 2020 06:33  Phos  2.9     10-  Mg     1.6     10    TPro  6.6  /  Alb  x   /  TBili  x   /  DBili  x   /  AST  x   /  ALT  x   /  AlkPhos  x   1002    PT/INR - ( 02 Oct 2020 06:33 )   PT: 40.2 sec;   INR: 3.65 ratio         PTT - ( 02 Oct 2020 06:33 )  PTT:51.5 sec  Urinalysis Basic - ( 01 Oct 2020 22:02 )    Color: Brown / Appearance: Clear / S.010 / pH: x  Gluc: x / Ketone: Trace  / Bili: Moderate / Urobili: 1   Blood: x / Protein: Negative / Nitrite: Negative   Leuk Esterase: Negative / RBC: 2-5 /HPF / WBC 0-2 /HPF   Sq Epi: x / Non Sq Epi: Few /HPF / Bacteria: Moderate /HPF    A/P:  Acute decompensation of Cirrhosis of liver with ascites and esophageal varices   Normocytic Anemia, multifactorial, concern for AIHA  Thrombocytopenia  Alcohol induced hepatitis   Coagulopathy  Electrolyte imbalance   Alcohol withdrawal   Proteinuria     Plan:   Taper steroid on discharge, patient need to follow up as outpatient with GI and hepatology clinic and PCP. As he is uninsured and undocumented patient will follow up with [facility] outpatient clinic. Can also follow up with [facility]. Dr Pennington.   Patient is motivated to stop alcohol use, [facility] substance abuse clinic information was given to patient by ROSIE   Cont on low dose Lasix and spironolactone   No concern for DIC  Cont Lactulose rifaximin, ensure 2-3 BM every day, patient counselled to monitor for at least 2-3 BM everyday, also to return to ER for any signs of bleeding  Cipro for SBP ppx  Full code   Patient will need VIVO meds before discharge

## 2020-10-02 NOTE — PROGRESS NOTE ADULT - ASSESSMENT
Hospital course:  33yo male with hx of chronic alcohol abuse (actively drinking since age 20), decompensated cirrhosis with ascites (non-compliant with diuretics, no hx of LVP), hx of small esophageal varices and portal hypertensive gastropathy (last EGD 6/2020), (non-compliant with BB, never banded), with recent hospitalization (6/2020 for leg swelling, anemia) presented on 9/15/2020 with malaise, generalized weakness for 1-2 weeks, found to have sudden worsening of liver function, with MELD score 32 compared to 23 on recent prior admission, electrolyte abnormalities, and questionable blood in stool (reported black stools) and/or vomitus, but without any signs of overt bleeding in hospital. On admission had no signs of HE and no LEDA. Acute worsening might have been caused by heavy alcohol intake (high blood alcohol on admission), SBP was r/o by paracentesis, viral and infectious workup negative so far, denied taking any medications or herbal supplements.  He became agitated and was transferred to ICU on 9/17/2020 and was treated for alcohol withdrawal. He became hypotensive and bradycardic on Precedex.  Developed LEDA, but improved with volume expansion with albumin. Anemic, thrombocytopenic, coagulopathic,  s/p 1 U PLT, and 4U PRBC (9/18/19/20/21), without evidence of overt GI bleeding (FOBT neg too) and with no evidence of bleeding on CT abd/pelvis; with concern for AIHA (Zenia +);  now stable Hb; hematology consult appreciated. Patient also noted to have proteinuria, and unexpectedly ascites analysis showed SAAG < 1.1, with low total protein despite evidence of portal hypertension (EV, portal HTN gastropathy, recanalization of umbilical vein), however urine protein/Cr ration, 24 hr protein did not reach nephrotic level, nephrology consult appreciated. Patient is s/p NAC and on prednisolone given the acute decompensation and possible AH component (started on 9/24 after relative contraindications r/o / resolved); now it is used also for hematology indication. CT head was neg for bleeding. Patient is improving clinically, MELD slightly decreased, Lille score <0.45 at day#7.     A/P:  # Decompensated cirrhosis w ascites; small EV; MELD is 30  (from max 34)  -  would need transplant referral, but unfortunately still active drinker up till this admission, and was treated for  EtOH withdrawal during this admission, he was also not compliant with meds after June hospital discharge (was d/w [facility] transplant hepatology service attending last week)  - could refer for outpatient Transplant Eval to [address] [place] [address] but will need  involvement to arrange insurance for patient, for the meantime patient agreed to follow up either at [facility] or at [facility] clinic (phone number given to patient, patients name and phone number given to [facility] Liver team)    #ACLF - some improvement  (cirrhosis with development of LEDA during this hospitalization( 0.53-1.05) along with mental status change (EtOH withdrawal, sedation +/- HE - now resolved); etiology possibly heavy ETOH intake (elevated blood alcohol on admission); AH can be co-existent with cirrhosis)    - slight improvement in MELD (30 from 34), HE resolved, LEDA improved  - c/w monitoring LFTs closely, including INR - will need close outpatient follow up   - avoid hepatotoxic meds, avoid NSAIDs  - Lille score: 0.243 (<0.45), thus ideally can continue with steroid if no contraindication (optimal 28 days with 2-4 weeks taper after that, however in this patient outpatient follow up, close monitoring for infection, compliance and medication access is questionable, thus would consider taper down within the time frame till the hospital can provide free medication for him, or unless hematology recommends otherwise)     #Ascites- no SBP; despite signs of CSPH (EV, portal  hypertensive gastropathy on EGD, recanalization of umbilical vein), unexpectedly  SAAG was < 1.1 (1.0), nephrology consult appreciated  - Cr has been stable in last few days, now with resumed diuretics (50 mg Spironolactone+20mg Furosemide)   - Would need to continue with long term SBP prophylaxis (ascites protein < 1.5 g/dl and CPT >9 and bilirubin >3), was switched to cipro 500 mg daily    #LEDA (>0.3 mg /dl above his baseline w/in 48 hrs; /0.53-1.05 on 9/18)- responded to volume expansion w albumin (1.05-0.61), but not fully back to baseline yet (today 0.78, his baseline ~ 0.5), however has been stable in last few days  - C/w close monitoring renal function, electrolytes including Mg and P, and urine output - will need close outpatient follow up  - keep MAP >65-70mmHg  - as he was started on ACEi yesterday, will need close monitoring of BP and renal function    #Small EV and portal hypertensive gastropathy w/o evidence of active GI bleed  (last EGD 6/2020, no report available whether there were high risk signs, but he was started on BB in June), was non compliant w BB  - endoscopy was deferred during this admission (was no sign of overt active GI bleeding, no hematemesis, melena or hematochezia, FOBT neg), d/w Dr. Gerber (GI iss on board)  - keep Hb >7, c/w monitoring H/H and call GI if any bleeding   -c/w nadolol 20 mg (BB has mortality benefit), monitor BP, HR (~55-60/min)  - remains on PPI (as being on steroid), was switched to po (when off steroid, would carefully assess the need of PPI, as it changes the microbiome and might increase the risk of SBP)  - if he will be on BB and will be compliant, no need for repeat routine endoscopy (unless other indications), but if not taking BB, will need endoscopy in 1 year (6/2021)    #EtOH withdrawal + HE - resolved  - continue close monitoring of mental status and neurological exam  - c/w lactulose to titrate to have 2-3 soft BM/day   - would c/w rifaximin 550 mg bid   - avoid long acting BZD   - c/w folic, thiamin, monitor electrolytes    #Anemia, thrombocytopenia, coagulopathy  - possibly due to cirrhosis + EtOH caused BM suppression - however b/o recurrent slow drop in Hb after transfusions and requirement of total 4 U PRBC 9/18-9/21 without any evidence of overt bleeding, hematology was consulted, was concern for AIHA - steroid was recommended for hematology indication as well; also there was question of DIC, but factor VIII was elevated, thus coagulopathy most likely due to liver   -hematology consult appreciated; f/u hematology recommendations  (Hb stable; PLT >50)    #Etiology of cirrhosis  - while most likely ETOH cirrhosis, given the young age additional workup was sent  - ceruloplasmin < 20, (could be falsely low in end stage liver or protein-losing); 24hr urine copper wnl, <40;  opthalmology exam?  -[id number] nl;  ferritin 331 -EtOH, acute phase protein, would repeat iron studies when acute issues resolved  - total IgG elevated 4018, but LONNY neg, anti SLA neg, anti-SMA neg, anti-LKM neg, can repeat Immunglobulin panel     #HCM   - salt restriction, adequate calorie intake (30-35 kcal/bwkg) with 1.2-1.5g/bwkg protein intake, avoiding raw, un- or undercooked seafood, meat (educated about Vibrio vulnificus), total alcohol abstinence (emphasized mortality if he does not quit alcohol), and avoiding OTC meds, herbal supplements  - nutritionist consult appreciated   - HCC screening - last US and AFP in 6/2020, no focal mass, nl AFP, next due 12/2020  - vit D low - was started on vit D, c/w supplement   - Vaccinations:         Hep A immune        Hep B immune, not exposed         Flu vaccine         Would check whether got Pneumococcal vaccine and vaccinate if not yet      Poor prognosis, repeatedly counseled patient about alcohol abstinence.   Family involvement, discussion could be helpful. Primary team had discussed with patients family.     Consider psychiatry referral for assistance with alcohol cessation  Consider SW involvement to assist to apply for insurance       D/w primary team, pt, and his sister Mayra and his niece   Also d/w [facility] Hepatology where pt can be seen w/o insurance or w emergency medicaid, his contact information given with his permission, pt will be contacted from [facility]

## 2020-12-25 NOTE — H&P ADULT - ATTENDING COMMENTS
Patient is a 36 y/o alcoholic with cirrhosis ( meld score 32 ) who presents to the ED with a chief complaint of abdominal pain. He has severe anemia with HGB 14. The patient is also hypotensive ( 69/40 ). The patient was recently admitted ( Sept 2020 ) with alcohol withdrawal and review of that admission indicated that he has had EGD that showed small esophageal varices. The patient had blood transfusion ordered and eventhough he may respond with an improvement in his BP his lactate of almost 9 mmol/l could suggest sepsis. Will admit to ICU, transfuse 2nd unit PRBCs and treat with antibiotics. Dx- severe anemia, acute on chronic decompensated liver failure with hepatic encephalopathy. I reviewed all laboratory and roentgenographic data and any previous medical record from [facility] that existed. I also discussed the case with the ED attending or referring physician.   I have personally provided 30 minutes of critical care concurrently with the resident. This time excludes time spent teaching and time spent on separate procedures. I have reviewed the resident's documentation and agree with the assessment and plan of care.

## 2020-12-25 NOTE — ED PROVIDER NOTE - CRITICAL CARE PROVIDED
direct patient care (not related to procedure)/consult w/ pt's family directly relating to pts condition

## 2020-12-25 NOTE — H&P ADULT - HISTORY OF PRESENT ILLNESS
Patient is a 34yo M with hx of alcohol induced liver cirrhosis (previously MELD score 32), esophageal varices, hepatic encephalopathy, anemia, presented for abdominal pain and vomiting. Patient assessed bedside, is lethargic yet arousable but unable to provide further history. Spoke with sister Patient is a 36yo M with hx of alcohol induced liver cirrhosis (previously MELD score 32), esophageal varices, hepatic encephalopathy, anemia, presented for abdominal pain and vomiting. Patient assessed bedside, is lethargic yet arousable but unable to provide further history. Spoke with sister Kenna Armijo over the phone (#[phone]), who reports patient developed abdominal pain with worsening distension and increasing swelling of extremities since yesterday. Since this morning he has had about 5-6 episodes of vomiting blood with progressively increasing weakness, unable to get up form chair hence sent to the ER.   Of note patient was admitted to [facility] in September for etoh withdrawal. As per sister, he has not consumed alcohol since.   In ER, patient was noted to be hypotensive to systolic blood pressure of 70s. His labs were significant for h/h of 4.6/14, bun/cr 32/1.81, PT 60.4, INR 5.51, lactate 8.7 and ammonia 181. He was given 1l bolus and ICU consulted for acute sever anemia with decompensated liver cirrhosis.

## 2020-12-25 NOTE — ED PROVIDER NOTE - CLINICAL SUMMARY MEDICAL DECISION MAKING FREE TEXT BOX
Pt with hypotension and evidence of hepatic failure. Abd labs, fluids, supportive care, CT, ICU consult, antiemetics.

## 2020-12-25 NOTE — ED PROVIDER NOTE - OBJECTIVE STATEMENT
34 y/o M pt with a PMHx of Anemia, Liver cirrhosis, and ETOH abuse and no significant PSHx presents to ED c/o a 1 day Hx of nausea, generalized weakness and abd pain. Pt states he is no longer drinking. Pt was found to be hypotensive upon presentation to ER. Pt denies fever, SOB, chest pain, or any other acute complaints. NKDA. No other information available due to serious nature

## 2020-12-25 NOTE — ED ADULT TRIAGE NOTE - CHIEF COMPLAINT QUOTE
pt c/o abd pain and vomiting, appears lethargic, jaundiced w/ abd distension and LE edema, pt is hypotensive

## 2020-12-25 NOTE — H&P ADULT - NSHPPHYSICALEXAM_GEN_ALL_CORE
ICU Vital Signs Last 24 Hrs  T(C): 36.6 (25 Dec 2020 20:15), Max: 36.6 (25 Dec 2020 20:15)  T(F): 97.8 (25 Dec 2020 20:15), Max: 97.8 (25 Dec 2020 20:15)  HR: 95 (25 Dec 2020 20:15) (95 - 95)  BP: 91/60 (25 Dec 2020 20:15) (91/60 - 91/60)  RR: 16 (25 Dec 2020 20:15) (16 - 16)  SpO2: 95% (25 Dec 2020 20:15) (95% - 95%)      PHYSICAL EXAM:  GENERAL: ill-appearing male, lethargic  HEAD:  Atraumatic, Normocephalic  EYES: EOMI, PERRLA, conjunctival icterus +  NECK: Supple, No JVD  CHEST/LUNG: Bilateral air entry present; decreased breath sounds at bases  HEART: Regular rate and rhythm; No murmurs, rubs, or gallops  ABDOMEN: Distended, ascites ++; Bowel sounds present  EXTREMITIES:, No clubbing, cyanosis; 4+ bilateral pitting edema  NEUROLOGY: patient unable to cooperate with neuro exam

## 2020-12-25 NOTE — H&P ADULT - ASSESSMENT
ASSESSMENT AND PLAN:  36yo M with hx of alcohol induced liver cirrhosis (previously MELD score 32), esophageal varices, hepatic encephalopathy, anemia, presented for abdominal pain and vomiting blood. ICU consulted for gi bleed with severe anemia and decompensated liver cirrhosis.     1. GI bleed  2. Hypotension  3. Anemia  4. Hypoxic respiratory failure  5. Lactic acidosis  6. Decompensated liver cirrhosis  7. Thrombocytopenia  8. LEDA  9. Hepatic Encephalopathy    =================== Neuro============================  Hepatic Encephalopathy:      ================= Cardiovascular==========================  Hypotension:    ================- Pulm=================================  Hypoxic respiratory failure:    ==================ID===================================  Leucocytosis:    ================= Nephro================================  LEDA:    lactic Acidosis:    =================GI====================================  GI Bleed:    ================ Heme==================================  Anemia:    Thrombocytopenia:    =================Endocrine===============================  -no issues    ================= Skin/Catheters============================  No rashes. Peripheral IV lines.     - =================Prophylaxis =============================  SCD for DVT ppx, no chemoppx given gi bleed    ==================GOC==================================   FULL CODE ASSESSMENT AND PLAN:  34yo M with hx of alcohol induced liver cirrhosis (previously MELD score 32), esophageal varices, hepatic encephalopathy, anemia, presented for abdominal pain and vomiting blood. ICU consulted for gi bleed with severe anemia and decompensated liver cirrhosis.     1. GI bleed  2. Hypotension  3. Anemia  4. Hypoxic respiratory failure  5. Lactic acidosis  6. Decompensated liver cirrhosis  7. Thrombocytopenia  8. LEDA  9. Hepatic Encephalopathy    =================== Neuro============================  Hepatic Encephalopathy:      ================= Cardiovascular==========================  Hypotension:    ================- Pulm=================================  Hypoxic respiratory failure:      =================GI====================================  GI Bleed:  -presented s/p 5-6 episodes of vomiting blood, no more episodes in ER  -patient hypotensive in ER, hgb 4.8  -given hx of liver cirrhosis and esophageal varices, will start octreotide drip in addition to protonix drip  -s/p 1l bolus in ER, transfuse 2u prbc, goal hgb level b/w 7-8  -2 large bore ivs in place,   -monitor h/h q6 hrs  -GI consult Dr Gordon    ================= Nephro================================  LEDA:  -bun/cr noted 32/1.81, baseline renal function normal  -likely prerenal in setting of hypovolemia and third spacing  -f/u ua, lytes, monitor closely    Lactic Acidosis:  -lactate level of about 9,   -no fevers, wbc count at 11, less likely from sepsis  -given hypotension and likely hypovolemia s/t gi bleed, will volume resuscitate the patient  -expect delayed clearance given pt with liver failure  -repeat lactate level in 3 hours    ================ Heme==================================  Anemia:  -h/h noted 4.8/14, drop from prev level of 9.5/26  -likely from gi bleed  -plan as above    Thrombocytopenia:  -plt count 60, likely in setting of liver cirrhosis  -will send LDH, retic count levels    ==================ID===================================  Leucocytosis:  -wbc count of 11.23   -no fevers noted,   -low chances of SBP, will cover empirically    =================Endocrine===============================  -no issues    ================= Skin/Catheters============================  No rashes. Peripheral IV lines.     - =================Prophylaxis =============================  SCD for DVT ppx, no chemoppx given gi bleed    ==================GOC==================================   FULL CODE ASSESSMENT AND PLAN:  36yo M with hx of alcohol induced liver cirrhosis (previously MELD score 32), esophageal varices, hepatic encephalopathy, anemia, presented for abdominal pain and vomiting blood. ICU consulted for gi bleed with severe anemia and decompensated liver cirrhosis.     1. GI bleed  2. Hypotension  3. Anemia  4. Hypoxic respiratory failure  5. Lactic acidosis  6. Decompensated liver cirrhosis  7. Thrombocytopenia  8. LEDA  9. Hepatic Encephalopathy    =================== Neuro============================  Hepatic Encephalopathy:  -presented with lethargy, arouses to verbal stimulus but unable to follow commands  -ammonia level noted 181,  -likely precipitated by gi bleed  -will keep npo, aspiration precautions  -will hold off lactulose for now    ================= Cardiovascular==========================  Hypotension:  -noted to have systolic blood pressure in 60s, received 1l bolus of NS  -trop negative, ekg nsr, echo from recent admission with normal EF  -likely from hypovolemia in setting of gi bleed with contribution from third spacing in pt with liver failure  -will transfuse 2u prbc, monitor blood pressure closely  -consent for central line obtained from sister in case of a need for pressor support    ================- Pulm=================================  Hypoxic respiratory failure:  -saturating 100% on 5l NC, noted to have work of breathing  -CXR with poor inspiration likely d/t abdominal distension from large ascites  -will monitor closely, pt is low threshold for intubation    =================GI====================================  GI Bleed:  -presented s/p 5-6 episodes of vomiting blood, no more episodes in ER  -patient hypotensive in ER, hgb 4.8  -given hx of liver cirrhosis and esophageal varices, will start octreotide drip in addition to protonix drip  -s/p 1l bolus in ER, transfuse 2u prbc, goal hgb level b/w 7-8  -2 large bore ivs in place,   -monitor h/h q6 hrs  -GI consult Dr Gordon    Acute on chronic decompensated liver cirrhosis:  -known history of liver cirrhosis with MELD score of 32 on recent admission, alcohol induced  -last drink apparently in september 2020  -noted to have plt of 60, INR 5.5, PT 60.4, T bili 6.5,   -MELD score now calculated at 38 with 52.6% estimated 3-month mortality  -will defer paracentesis for now given patient unstable for procedure  -nadolol and diuretics on hold for now    ================= Nephro================================  LEDA:  -bun/cr noted 32/1.81, baseline renal function normal  -likely prerenal in setting of hypovolemia and third spacing  -f/u ua, lytes, monitor closely    Lactic Acidosis:  -lactate level of about 9,   -no fevers, wbc count at 11, less likely from sepsis  -given hypotension and likely hypovolemia s/t gi bleed, will volume resuscitate the patient  -expect delayed clearance given pt with liver failure  -repeat lactate level in 3 hours    ================ Heme==================================  Anemia:  -h/h noted 4.8/14, drop from prev level of 9.5/26  -likely from gi bleed  -plan as above    Thrombocytopenia:  -plt count 60, likely in setting of liver cirrhosis  -will send LDH, retic count levels    ==================ID===================================  Leucocytosis:  -wbc count of 11.23   -no fevers noted,   -low chances of SBP, will cover empirically    =================Endocrine===============================  -no issues    ================= Skin/Catheters============================  No rashes. Peripheral IV lines.     - =================Prophylaxis =============================  SCD for DVT ppx, no chemoppx given gi bleed    ==================GOC==================================   FULL CODE

## 2020-12-25 NOTE — H&P ADULT - CONVERSATION DETAILS
Explained to Ms Pierson in detail regarding patient's presenting condition with severe anemia from gi bleed and decompensation of liver cirrhosis with poor prognosis. Sister stated she is the only surrogate for patient in the [place], parents not being in the [place] and another sibling living far away who has not been in touch with patient. She reports understanding of current condition however would want everything to be done for patient including and intubation for mechanical ventilation. Consents obtained for blood transfusion and central line.

## 2020-12-25 NOTE — ED ADULT TRIAGE NOTE - OTHER COMPLAINTS
as per brother in law Gregorio pt has been vomiting blood today , with history of alcohol abuse with kidney problem

## 2020-12-25 NOTE — ED ADULT NURSE NOTE - NSIMPLEMENTINTERV_GEN_ALL_ED
Implemented All Fall Risk Interventions:  [place] to call system. Call bell, personal items and telephone within reach. Instruct patient to call for assistance. Room bathroom lighting operational. Non-slip footwear when patient is off stretcher. Physically safe environment: no spills, clutter or unnecessary equipment. Stretcher in lowest position, wheels locked, appropriate side rails in place. Provide visual cue, wrist band, yellow gown, etc. Monitor gait and stability. Monitor for mental status changes and reorient to person, place, and time. Review medications for side effects contributing to fall risk. Reinforce activity limits and safety measures with patient and family.

## 2020-12-25 NOTE — ED ADULT NURSE NOTE - OBJECTIVE STATEMENT
pt from home c/o of abdominal pain, nausea/vomiting today pt is lethargic open eyes with verbal stimuli, jaundice looking, bilateral lower extremities swelling, as per family, pt vomited blood today

## 2020-12-26 PROBLEM — K74.60 UNSPECIFIED CIRRHOSIS OF LIVER: Chronic | Status: ACTIVE | Noted: 2020-01-01

## 2020-12-26 PROBLEM — D64.9 ANEMIA, UNSPECIFIED: Chronic | Status: ACTIVE | Noted: 2020-01-01

## 2020-12-26 NOTE — ED ADULT NURSE REASSESSMENT NOTE - NS ED NURSE REASSESS COMMENT FT1
Patient transferred to ICU via stretcher with cardiac monitor, MD, RN, oxygen saturating above 93%, 2nd unit PRBC transfusing, BP 79/44 HR 74, on O2 5L, uneventful during transport to ICU bed, no increase distress noted.

## 2020-12-26 NOTE — CONSULT NOTE ADULT - ASSESSMENT
The etiology for GI bleeding in this patient is most likely due to:  1. Variceal bleeding  2. Peptic ulcer disease    * associated with coagulopathy and thrombocytopenia    B. Decompensated cirrhosis  C. Portal hypertension  D. Ascites    Suggestions:  1. Continue ICU monitoring  2. Monitor H/H closely  3. Transfuse PRBC as needed  4. Correct coagulopathy  5. Monitor platelets  6. Transfuse platelets as needed  7. Octreotide drip  8. Protonix IV  9. Avoid NSAID  10. EGD when stable  11. DT's precaution  12. Thiamine / Folate / MVI  13. Neurology evaluation  14. CT-scan of head  15. DVT prophylaxis

## 2020-12-26 NOTE — CHART NOTE - NSCHARTNOTEFT_GEN_A_CORE
Patient's blood pressure remained unstable overnight, initially started on phenylephrine in addition to 1st u of prbc with transient improvement in systolic blood pressure to 80s however blood pressure dropped quickly thereafter requiring uptitration of phenylephrine Patient's blood pressure remained unstable overnight, initially started on phenylephrine in addition to 1st u of prbc with transient improvement in systolic blood pressure to 80s however blood pressure dropped quickly thereafter requiring uptitration of phenylephrine with administration of 2nd u prbc. He was transferred to ICU and intubated for airway protection. Post intubation, CVC placed for administration of levophed despite being on max dose of phenylephrine, additional fluids given. Patient's blood pressure continues to be low, with systolic in 60s with the possibility of requirement of a third pressor. Attempted to reach sister Ms Armijo via [employer] (ID no [phone]) twice at her phone to update regarding the events however call went to voicemail, unable to leave a message since mailbox is full. Patient's blood pressure remained unstable overnight, initially started on phenylephrine in addition to 1st u of prbc with transient improvement in systolic blood pressure to 80s however blood pressure dropped quickly thereafter requiring uptitration of phenylephrine with administration of 2nd u prbc. He was transferred to ICU and intubated for airway protection. Post intubation, CVC placed for administration of levophed despite being on max dose of phenylephrine, additional fluids given. Patient's blood pressure continues to be low, with systolic in 60s with the possibility of requirement of a third pressor. Patient with very poor prognosis. Attempted to reach sister Ms Armijo via [employer] (ID no [phone]) twice at her phone to update regarding the events however call went to voicemail, unable to leave a message since mailbox is full.

## 2020-12-26 NOTE — PROCEDURE NOTE - ADDITIONAL PROCEDURE DETAILS
Blood Gas Profile - Venous (12.26.20 @ 04:47)   pH, Venous: 7.02  pCO2, Venous: 57 mmHg   pO2, Venous: 50 mmHg   HCO3, Venous: 14 mmol/L   Base Excess, Venous: -14.9 mmol/L   Oxygen Saturation, Venous: 67 %   FIO2, Venous: 100   Blood Gas Comments, Venous: VBG Drawn by MD

## 2020-12-26 NOTE — PROCEDURE NOTE - NSPROCDETAILS_GEN_ALL_CORE
guidewire recovered/lumen(s) aspirated and flushed/sterile dressing applied/sterile technique, catheter placed/ultrasound guidance with use of sterile gel and probe cove
location identified, draped/prepped, sterile technique used, needle inserted/introduced/connected to a pressurized flush line/sutured in place/all materials/supplies accounted for at end of procedure

## 2020-12-26 NOTE — CONSULT NOTE ADULT - SUBJECTIVE AND OBJECTIVE BOX
[  ] STAT REQUEST              [ x ] ROUTINE REQUEST    Patient is a 35 year old male with decompensated alcoholic cirrhosis and acte GI bleeding. GI consulted to evaluate.           HPI:  Patient is a 35 year old male past medical history significant for alcoholic cirrhosis (previously MELD score 32), esophageal varices, hepatic encephalopathy, anemia, presented with abdominal pain and vomiting bood.  patient is intubated on ventilator unable to provide any information. Patient's sister (Kenna Armijo) reported that "patient developed abdominal pain with worsening distension and increasing swelling of extremities since yesterday. Since this morning he has had about 5-6 episodes of vomiting blood with progressively increasing weakness, unable to get up form chair hence sent to the ER".   Patient was admitted to [facility] in September for Alcohol withdrawal.    In ER, patient was noted to be hypotensive to systolic blood pressure of 70s. His labs were significant for h/h of 4.6/14, bun/cr 32/1.81, PT 60.4, INR 5.51, lactate 8.7 and ammonia 181. At present time patient is intubated on ventilator still is hypotensive with systolic blood pressure of 80's regardless of pressors, and multiple PRBC, FFP and Platelet transfusion with dilated both pupils.       PAIN MANAGEMENT:  Pain Scale:                 ?/10  Pain Location:      Prior Colonoscopy:  No prior colonoscopy    PAST MEDICAL HISTORY  ETOH abuse  Cirrhosis  Portal HTN  Anemia  Varices  Hepatic encephalopathy         PAST SURGICAL HISTORY  No significant past surgical history reported        Allergies    No Known Allergies    Intolerances  None         MEDICATIONS  (STANDING):  cefTRIAXone   IVPB      chlorhexidine 0.12% Liquid 15 milliLiter(s) Oral Mucosa every 12 hours  chlorhexidine 4% Liquid 1 Application(s) Topical <User Schedule>  norepinephrine Infusion 0.05 MICROgram(s)/kG/Min (4.89 mL/Hr) IV Continuous <Continuous>  octreotide  Infusion 50 MICROgram(s)/Hr (10 mL/Hr) IV Continuous <Continuous>  pantoprazole Infusion 8 mG/Hr (10 mL/Hr) IV Continuous <Continuous>  phenylephrine    Infusion 6 MICROgram(s)/kG/Min (117 mL/Hr) IV Continuous <Continuous>  sodium bicarbonate  Injectable 100 milliEquivalent(s) IV Push once  vasopressin Infusion 0.04 Unit(s)/Min (2.4 mL/Hr) IV Continuous <Continuous>         SOCIAL HISTORY  Advanced Directives:       [ X ] Full Code       [  ] DNR  Marital Status:         [  ] M      [ X ] S      [  ] D       [  ] W  Children:       [  ] Yes      [ X ] No  Occupation:        [  ] Employed       [  ] Unemployed       [  ] Retired  Diet:       [ X ] Regular       [  ] PEG feeding          [  ] NG tube feeding  Drug Use:           [ X ] No                [  ] Yes  Alcohol:           [  ] No             [  ] Yes (socially)         [ X ] Yes (chronic)  Tobacco:           [ X ] Yes           [  ] No    FAMILY HISTORY  [ X ] Heart Disease            [ X ] Diabetes             [ X ] HTN             [  ] Colon Cancer             [  ] Stomach Cancer              [  ] Pancreatic Cancer      VITAL SIGNS   Vital Signs Last 24 Hrs  T(C): 32.9 (26 Dec 2020 01:25), Max: 36.6 (25 Dec 2020 20:15)  T(F): 91.2 (26 Dec 2020 01:25), Max: 97.8 (25 Dec 2020 20:15)  HR: 92 (26 Dec 2020 10:00) (61 - 141)  BP: 87/51 (26 Dec 2020 09:45) (39/30 - 112/69)  BP(mean): 61 (26 Dec 2020 09:45) (30 - 78)  RR: 25 (26 Dec 2020 10:00) (12 - 25)  SpO2: 88% (26 Dec 2020 10:00) (50% - 100%)  Daily Height in cm: 154.94 (25 Dec 2020 20:15)         CBC Full  -  ( 26 Dec 2020 05:41 )  WBC Count : 8.49 K/uL  RBC Count : 1.25 M/uL  Hemoglobin : 4.3 g/dL  Hematocrit : 13.4 %  Platelet Count - Automated : 22 K/uL  Mean Cell Volume : 107.2 fl  Mean Cell Hemoglobin : 34.4 pg  Mean Cell Hemoglobin Concentration : 32.1 gm/dL  Auto Neutrophil # : 5.23 K/uL  Auto Lymphocyte # : 2.49 K/uL  Auto Monocyte # : 0.26 K/uL  Auto Eosinophil # : 0.03 K/uL  Auto Basophil # : 0.01 K/uL  Auto Neutrophil % : 61.6 %  Auto Lymphocyte % : 29.3 %  Auto Monocyte % : 3.1 %  Auto Eosinophil % : 0.4 %  Auto Basophil % : 0.1 %      12-26    138  |  100  |  30<H>  ----------------------------<  104<H>  3.9   |  17<L>  |  1.94<H>    Ca    6.7<L>      26 Dec 2020 05:41  Phos  8.0     12-26  Mg     1.7     12-26    TPro  3.2<L>  /  Alb  0.8<L>  /  TBili  3.6<H>  /  DBili  x   /  AST  217<H>  /  ALT  61<H>  /  AlkPhos  109  12-26     PT/INR - ( 26 Dec 2020 05:41 )   PT: > 150 sec;   INR: >15 ratio      PTT - ( 26 Dec 2020 05:41 )  PTT:>200.0 sec    Iron - Total Binding Capacity.: 121 ug/dL   % Saturation, Iron: 91 %   Iron Total, Serum: 110 ug/dL   Unsaturated Iron Binding Capacity: 11 ug/dL     Urinalysis + Microscopic    Blood, Urine: Trace   Urine Appearance: Clear   Urobilinogen: 1   Specific Gravity: 1.010   Protein, Urine: Negative   pH Urine: 7.0   Leukocyte Esterase Concentration: Negative   Nitrite: Negative   Ketone - Urine: Trace   Bilirubin: Moderate   Color: Brown   Glucose Qualitative, Urine: Negative   Red Blood Cell - Urine: 2-5 /HPF   White Blood Cell - Urine: 0-2 /HPF   Epithelial Cells: Few /HPF   Hyaline Casts: 0-2 /LPF   Bacteria: Moderate /HPF        12 Lead ECG    Ventricular Rate 115 BPM    Atrial Rate 115 BPM    P-R Interval 184 ms    QRS Duration 108 ms    Q-T Interval 320 ms    QTC Calculation(Bazett) 442 ms    P Axis 23 degrees    R Axis 7 degrees    T Axis 53 degrees    Diagnosis Line Sinus tachycardia  Nonspecific T wave abnormality  Abnormal ECG         RADIOLOGY/IMAGING                EXAM:  CT ANGIO ABD PELV (W)AW IC                            PROCEDURE DATE:  09/21/2020          INTERPRETATION:  CLINICAL INFORMATION: GI bleed    COMPARISON: None    PROCEDURE:  CT of the Abdomen and Pelvis was performed with and without intravenous contrast.  Precontrast, Arterial and Delayed phases were performed.  Intravenous contrast: 90 ml Omnipaque 350. 10 ml discarded.  Oral contrast: None.  Sagittal and coronal reformats were performed.    FINDINGS:  LOWER CHEST: Bibasilar dependent atelectasis.    LIVER: Cirrhosis.  BILE DUCTS: Normal caliber.  GALLBLADDER: Distended gallbladder. Cholelithiasis.  SPLEEN: Within normal limits.  PANCREAS: Within normal limits.  ADRENALS: Within normal limits.  KIDNEYS/URETERS: Within normal limits.    BLADDER: Small amount of air in the bladder lumen; correlate for recent instrumentation..  REPRODUCTIVE ORGANS: Prostate within normal limits.    BOWEL: No evidence for active GI bleeding. Fluid-filled colon. No bowel obstruction. Appendix normal.  PERITONEUM: Moderate to large volume ascites.  VESSELS: Minimal atherosclerotic changes. Recanalized para umbilical vein.  RETROPERITONEUM/LYMPH NODES: No lymphadenopathy.  ABDOMINAL WALL: Within normal limits.  BONES: Old healed left L2 and L3 transverse process fractures. Degenerative changes of the thoracic spine..    IMPRESSION:  Cirrhosis with sequela of portal hypertension.    No evidence for active GI bleeding.

## 2020-12-26 NOTE — PROGRESS NOTE ADULT - SUBJECTIVE AND OBJECTIVE BOX
INTERVAL HPI/OVERNIGHT EVENTS: Pt got intubated in ICU soon after transfer. femoral line placed as pt continued to be hypotensive despite 1 unit of blood    PRESSORS: [ x] YES [ ] NO  WHICH:    ANTIBIOTICS:        x          DATE STARTED: 12/26  ANTIBIOTICS:                  DATE STARTED:  ANTIBIOTICS:                  DATE STARTED:    Antimicrobial:  cefTRIAXone   IVPB 1000 milliGRAM(s) IV Intermittent once  cefTRIAXone   IVPB        Cardiovascular:  phenylephrine    Infusion 0.3 MICROgram(s)/kG/Min IV Continuous <Continuous>    Pulmonary:    Hematalogic:    Other:  etomidate Injectable 10 milliGRAM(s) IV Push once  octreotide  Infusion 50 MICROgram(s)/Hr IV Continuous <Continuous>  pantoprazole  Injectable 80 milliGRAM(s) IV Push once  pantoprazole Infusion 8 mG/Hr IV Continuous <Continuous>  phytonadione  IVPB 10 milliGRAM(s) IV Intermittent once    cefTRIAXone   IVPB 1000 milliGRAM(s) IV Intermittent once  cefTRIAXone   IVPB      etomidate Injectable 10 milliGRAM(s) IV Push once  octreotide  Infusion 50 MICROgram(s)/Hr IV Continuous <Continuous>  pantoprazole  Injectable 80 milliGRAM(s) IV Push once  pantoprazole Infusion 8 mG/Hr IV Continuous <Continuous>  phenylephrine    Infusion 0.3 MICROgram(s)/kG/Min IV Continuous <Continuous>  phytonadione  IVPB 10 milliGRAM(s) IV Intermittent once    Drug Dosing Weight  Height (cm): 154.9 (25 Dec 2020 20:15)  Weight (kg): 104.3 (25 Dec 2020 20:19)  BMI (kg/m2): 43.5 (25 Dec 2020 20:19)  BSA (m2): 2 (25 Dec 2020 20:19)    CENTRAL LINE: [x ] YES [ ] NO  LOCATION:   DATE INSERTED: 12/26    OLIVO: [x ] YES [ ] NO    DATE INSERTED:    A-LINE:  [ ] YES [x ] NO  LOCATION:   DATE INSERTED:    ICU Vital Signs Last 24 Hrs  T(C): 32.9 (26 Dec 2020 01:25), Max: 36.6 (25 Dec 2020 20:15)  T(F): 91.2 (26 Dec 2020 01:25), Max: 97.8 (25 Dec 2020 20:15)  HR: 74 (26 Dec 2020 03:15) (61 - 95)  BP: 79/44 (26 Dec 2020 03:15) (50/23 - 91/60)  BP(mean): 47 (26 Dec 2020 01:31) (39 - 47)  ABP: --  ABP(mean): --  RR: 16 (26 Dec 2020 03:15) (16 - 16)  SpO2: 94% (26 Dec 2020 03:15) (94% - 100%)        PHYSICAL EXAM:  GENERAL: ill-appearing, sedated and intubated   HEAD:  Atraumatic, Normocephalic  EYES: EOMI, PERRLA, conjunctival icterus +  NECK: Supple, No JVD  CHEST/LUNG: Bilateral air entry present; decreased breath sounds at bases  HEART: Regular rate and rhythm; No murmurs, rubs, or gallops  ABDOMEN: Distended, ascites ++; Bowel sounds present  EXTREMITIES:, No clubbing, cyanosis; 4+ bilateral pitting edema  NEUROLOGY: sedated     LABS:  CBC Full  -  ( 25 Dec 2020 20:38 )  WBC Count : 11.23 K/uL  RBC Count : 1.35 M/uL  Hemoglobin : 4.6 g/dL  Hematocrit : 14.0 %  Platelet Count - Automated : 60 K/uL  Mean Cell Volume : 103.7 fl  Mean Cell Hemoglobin : 34.1 pg  Mean Cell Hemoglobin Concentration : 32.9 gm/dL  Auto Neutrophil # : 8.98 K/uL  Auto Lymphocyte # : 1.68 K/uL  Auto Monocyte # : 0.56 K/uL  Auto Eosinophil # : 0.00 K/uL  Auto Basophil # : 0.00 K/uL  Auto Neutrophil % : 80.0 %  Auto Lymphocyte % : 15.0 %  Auto Monocyte % : 5.0 %  Auto Eosinophil % : 0.0 %  Auto Basophil % : 0.0 %    12-25    131<L>  |  95<L>  |  32<H>  ----------------------------<  112<H>  3.7   |  19<L>  |  1.81<H>    Ca    7.9<L>      25 Dec 2020 20:38    TPro  5.2<L>  /  Alb  1.3<L>  /  TBili  6.5<H>  /  DBili  x   /  AST  53<H>  /  ALT  24  /  AlkPhos  176<H>  12-25    PT/INR - ( 25 Dec 2020 20:38 )   PT: 60.4 sec;   INR: 5.51 ratio         PTT - ( 25 Dec 2020 20:38 )  PTT:51.7 sec        RADIOLOGY & ADDITIONAL STUDIES REVIEWED:  ***    [ ]GOALS OF CARE DISCUSSION WITH PATIENT/FAMILY/PROXY:    CRITICAL CARE TIME SPENT: 35 minutes INTERVAL HPI/OVERNIGHT EVENTS: Pt got intubated in ICU soon after transfer. femoral line placed as pt continued to be hypotensive despite 1 unit of blood. Pt's was started on pheny, levo and vaso were added as pt continued to be hypotensive. Pt's ABG was acidotic 2 amps of bicarb given     PRESSORS: [ x] YES [ ] NO  WHICH:    ANTIBIOTICS:        x          DATE STARTED: 12/26  ANTIBIOTICS:                  DATE STARTED:  ANTIBIOTICS:                  DATE STARTED:    Antimicrobial:  cefTRIAXone   IVPB 1000 milliGRAM(s) IV Intermittent once  cefTRIAXone   IVPB        Cardiovascular:  phenylephrine    Infusion 0.3 MICROgram(s)/kG/Min IV Continuous <Continuous>    Pulmonary:    Hematalogic:    Other:  etomidate Injectable 10 milliGRAM(s) IV Push once  octreotide  Infusion 50 MICROgram(s)/Hr IV Continuous <Continuous>  pantoprazole  Injectable 80 milliGRAM(s) IV Push once  pantoprazole Infusion 8 mG/Hr IV Continuous <Continuous>  phytonadione  IVPB 10 milliGRAM(s) IV Intermittent once    cefTRIAXone   IVPB 1000 milliGRAM(s) IV Intermittent once  cefTRIAXone   IVPB      etomidate Injectable 10 milliGRAM(s) IV Push once  octreotide  Infusion 50 MICROgram(s)/Hr IV Continuous <Continuous>  pantoprazole  Injectable 80 milliGRAM(s) IV Push once  pantoprazole Infusion 8 mG/Hr IV Continuous <Continuous>  phenylephrine    Infusion 0.3 MICROgram(s)/kG/Min IV Continuous <Continuous>  phytonadione  IVPB 10 milliGRAM(s) IV Intermittent once    Drug Dosing Weight  Height (cm): 154.9 (25 Dec 2020 20:15)  Weight (kg): 104.3 (25 Dec 2020 20:19)  BMI (kg/m2): 43.5 (25 Dec 2020 20:19)  BSA (m2): 2 (25 Dec 2020 20:19)    CENTRAL LINE: [x ] YES [ ] NO  LOCATION:   DATE INSERTED: 12/26    OLIVO: [x ] YES [ ] NO    DATE INSERTED:    A-LINE:  [ ] YES [x ] NO  LOCATION:   DATE INSERTED:    ICU Vital Signs Last 24 Hrs  T(C): 32.9 (26 Dec 2020 01:25), Max: 36.6 (25 Dec 2020 20:15)  T(F): 91.2 (26 Dec 2020 01:25), Max: 97.8 (25 Dec 2020 20:15)  HR: 74 (26 Dec 2020 03:15) (61 - 95)  BP: 79/44 (26 Dec 2020 03:15) (50/23 - 91/60)  BP(mean): 47 (26 Dec 2020 01:31) (39 - 47)  ABP: --  ABP(mean): --  RR: 16 (26 Dec 2020 03:15) (16 - 16)  SpO2: 94% (26 Dec 2020 03:15) (94% - 100%)        PHYSICAL EXAM:  GENERAL: ill-appearing, sedated and intubated   HEAD:  Atraumatic, Normocephalic  EYES: EOMI, PERRLA, conjunctival icterus +  NECK: Supple, No JVD  CHEST/LUNG: Bilateral air entry present; decreased breath sounds at bases  HEART: Regular rate and rhythm; No murmurs, rubs, or gallops  ABDOMEN: Distended, ascites ++; Bowel sounds present  EXTREMITIES:, No clubbing, cyanosis; 4+ bilateral pitting edema  NEUROLOGY: sedated     LABS:  CBC Full  -  ( 25 Dec 2020 20:38 )  WBC Count : 11.23 K/uL  RBC Count : 1.35 M/uL  Hemoglobin : 4.6 g/dL  Hematocrit : 14.0 %  Platelet Count - Automated : 60 K/uL  Mean Cell Volume : 103.7 fl  Mean Cell Hemoglobin : 34.1 pg  Mean Cell Hemoglobin Concentration : 32.9 gm/dL  Auto Neutrophil # : 8.98 K/uL  Auto Lymphocyte # : 1.68 K/uL  Auto Monocyte # : 0.56 K/uL  Auto Eosinophil # : 0.00 K/uL  Auto Basophil # : 0.00 K/uL  Auto Neutrophil % : 80.0 %  Auto Lymphocyte % : 15.0 %  Auto Monocyte % : 5.0 %  Auto Eosinophil % : 0.0 %  Auto Basophil % : 0.0 %    12-25    131<L>  |  95<L>  |  32<H>  ----------------------------<  112<H>  3.7   |  19<L>  |  1.81<H>    Ca    7.9<L>      25 Dec 2020 20:38    TPro  5.2<L>  /  Alb  1.3<L>  /  TBili  6.5<H>  /  DBili  x   /  AST  53<H>  /  ALT  24  /  AlkPhos  176<H>  12-25    PT/INR - ( 25 Dec 2020 20:38 )   PT: 60.4 sec;   INR: 5.51 ratio         PTT - ( 25 Dec 2020 20:38 )  PTT:51.7 sec        RADIOLOGY & ADDITIONAL STUDIES REVIEWED:  ***    [ ]GOALS OF CARE DISCUSSION WITH PATIENT/FAMILY/PROXY:    CRITICAL CARE TIME SPENT: 35 minutes INTERVAL HPI/OVERNIGHT EVENTS: Pt got intubated in ICU soon after transfer. femoral line placed as pt continued to be hypotensive despite 1 unit of blood. Pt's was started on pheny, levo and vaso were added as pt continued to be hypotensive. Pt's ABG was acidotic 2 amps of bicarb given     PRESSORS: [ x] YES [ ] NO  WHICH:    ANTIBIOTICS:        x          DATE STARTED: 12/26  ANTIBIOTICS:                  DATE STARTED:  ANTIBIOTICS:                  DATE STARTED:    Antimicrobial:  cefTRIAXone   IVPB 1000 milliGRAM(s) IV Intermittent once  cefTRIAXone   IVPB        Cardiovascular:  phenylephrine    Infusion 0.3 MICROgram(s)/kG/Min IV Continuous <Continuous>    Pulmonary:    Hematalogic:    Other:  etomidate Injectable 10 milliGRAM(s) IV Push once  octreotide  Infusion 50 MICROgram(s)/Hr IV Continuous <Continuous>  pantoprazole  Injectable 80 milliGRAM(s) IV Push once  pantoprazole Infusion 8 mG/Hr IV Continuous <Continuous>  phytonadione  IVPB 10 milliGRAM(s) IV Intermittent once    cefTRIAXone   IVPB 1000 milliGRAM(s) IV Intermittent once  cefTRIAXone   IVPB      etomidate Injectable 10 milliGRAM(s) IV Push once  octreotide  Infusion 50 MICROgram(s)/Hr IV Continuous <Continuous>  pantoprazole  Injectable 80 milliGRAM(s) IV Push once  pantoprazole Infusion 8 mG/Hr IV Continuous <Continuous>  phenylephrine    Infusion 0.3 MICROgram(s)/kG/Min IV Continuous <Continuous>  phytonadione  IVPB 10 milliGRAM(s) IV Intermittent once    Drug Dosing Weight  Height (cm): 154.9 (25 Dec 2020 20:15)  Weight (kg): 104.3 (25 Dec 2020 20:19)  BMI (kg/m2): 43.5 (25 Dec 2020 20:19)  BSA (m2): 2 (25 Dec 2020 20:19)    CENTRAL LINE: [x ] YES [ ] NO  LOCATION:   DATE INSERTED: 12/26    OLIVO: [x ] YES [ ] NO    DATE INSERTED:    A-LINE:  [ ] YES [x ] NO  LOCATION:   DATE INSERTED:    ICU Vital Signs Last 24 Hrs  T(C): 32.9 (26 Dec 2020 01:25), Max: 36.6 (25 Dec 2020 20:15)  T(F): 91.2 (26 Dec 2020 01:25), Max: 97.8 (25 Dec 2020 20:15)  HR: 74 (26 Dec 2020 03:15) (61 - 95)  BP: 79/44 (26 Dec 2020 03:15) (50/23 - 91/60)  BP(mean): 47 (26 Dec 2020 01:31) (39 - 47)  ABP: --  ABP(mean): --  RR: 16 (26 Dec 2020 03:15) (16 - 16)  SpO2: 94% (26 Dec 2020 03:15) (94% - 100%)        PHYSICAL EXAM:  GENERAL: ill-appearing, sedated and intubated   HEAD:  Atraumatic, Normocephalic  EYES: Pupils dilated, non reactive, conjunctival icterus +  NECK: Supple, No JVD  CHEST/LUNG: Bilateral air entry present; decreased breath sounds at bases  HEART: Regular rate and rhythm; No murmurs, rubs, or gallops  ABDOMEN: Distended, ascites ++; Bowel sounds present  EXTREMITIES:, No clubbing, cyanosis; 4+ bilateral pitting edema  NEUROLOGY: Triggers ventilator, + cough reflex, does not withdraw to pain, no with    LABS:  CBC Full  -  ( 25 Dec 2020 20:38 )  WBC Count : 11.23 K/uL  RBC Count : 1.35 M/uL  Hemoglobin : 4.6 g/dL  Hematocrit : 14.0 %  Platelet Count - Automated : 60 K/uL  Mean Cell Volume : 103.7 fl  Mean Cell Hemoglobin : 34.1 pg  Mean Cell Hemoglobin Concentration : 32.9 gm/dL  Auto Neutrophil # : 8.98 K/uL  Auto Lymphocyte # : 1.68 K/uL  Auto Monocyte # : 0.56 K/uL  Auto Eosinophil # : 0.00 K/uL  Auto Basophil # : 0.00 K/uL  Auto Neutrophil % : 80.0 %  Auto Lymphocyte % : 15.0 %  Auto Monocyte % : 5.0 %  Auto Eosinophil % : 0.0 %  Auto Basophil % : 0.0 %    12-25    131<L>  |  95<L>  |  32<H>  ----------------------------<  112<H>  3.7   |  19<L>  |  1.81<H>    Ca    7.9<L>      25 Dec 2020 20:38    TPro  5.2<L>  /  Alb  1.3<L>  /  TBili  6.5<H>  /  DBili  x   /  AST  53<H>  /  ALT  24  /  AlkPhos  176<H>  12-25    PT/INR - ( 25 Dec 2020 20:38 )   PT: 60.4 sec;   INR: 5.51 ratio         PTT - ( 25 Dec 2020 20:38 )  PTT:51.7 sec        RADIOLOGY & ADDITIONAL STUDIES REVIEWED:  ***    [ ]GOALS OF CARE DISCUSSION WITH PATIENT/FAMILY/PROXY:    CRITICAL CARE TIME SPENT: 35 minutes

## 2020-12-26 NOTE — PROGRESS NOTE ADULT - ATTENDING COMMENTS
Assessment:  1. Acute respiratory failure  2. Shock  3. Gastrointestinal hemorrhage  4. Decompensation of cirrhosis   5. Acute blood loss anemia   6. Acute renal failure  7. Lactic acidosis   8. Encephalopathy - hepatic     Plan  - Patient with multiple organ failure, refractory shock on multiple vasopressors. Refractory lactic acidosis, likely due to lack of clearance. S/p 4 units PRBC, 2 FFP and 1 platelet. Also given cryoprecipitate given lower fibrinogen.   - Cont. to transfuse to maintain hgb >7   - GI consulted, though very unstable currently on intervention given severe shock and refractory acidosis   - Repeat blood work  - IV octreotide and protonix infusion   - Prophylactic antibiotics for SBP  - Sodium bicarbonate  - Monitor urine output  - Overall prognosis is poor given multiple organ failure at this time. Family aware, full code for now. Given maximal support, CPR is unlikely going to change out come in the event of a cardiac arrest. Assessment:  1. Acute respiratory failure  2. Shock  3. Gastrointestinal hemorrhage  4. Decompensation of cirrhosis   5. Acute blood loss anemia   6. Acute renal failure  7. Lactic acidosis   8. Encephalopathy - hepatic     Plan  - Patient with multiple organ failure, refractory shock on multiple vasopressors. Refractory lactic acidosis, likely due to lack of clearance. S/p 4 units PRBC, 2 FFP and 1 platelet. Also given cryoprecipitate given lower fibrinogen.   - Cont. to transfuse to maintain hgb >7   - GI consulted, though very unstable currently on intervention given severe shock and refractory acidosis   - Repeat blood work  - IV octreotide and protonix infusion   - Prophylactic antibiotics for SBP  - Sodium bicarbonate  - Monitor urine output  - Overall prognosis is poor given multiple organ failure at this time. MELD 40. Family aware, full code for now. Given maximal support, CPR is unlikely going to change out come in the event of a cardiac arrest.

## 2020-12-27 NOTE — CHART NOTE - NSCHARTNOTEFT_GEN_A_CORE
Pt continues to remain on maximal pressor support, repeat labs in evening showing critical H/H 4.2/14, INR 6, plt 25, and severe anion gap metabolic acidosis and worsening lactic acidosis. Pt was given sodium bicarbonate 100mg push, was noted to be hypoglycemic and was given dextrose push and was started on D10 infusion. Pt was given 2prbc, 1ffp and 1 platelet.    Pt sister Aleksander Pierson() called to update through  ( ID [id number] & [id number] ) and updated about current clinical condition. Sister requested to visit patient, arranged after discussing with nursing supervisor.    Detailed conversation done with sister again with nurse Moreland providing interpretation services, sister explained about prognosis. Regarding GOC discussion, sister wants to continue all current medical management but doesn't want chest compressions if patient heart stops. Sister has been making medical decisions with discussion with family as father is in [place]. Father contact information (Daphne Benjamin 52 [phone]).    Avelina drafted and placed in chart, attending informed.

## 2020-12-27 NOTE — PROGRESS NOTE ADULT - SUBJECTIVE AND OBJECTIVE BOX
INTERVAL HPI/OVERNIGHT EVENTS: Patient remained sedated ad intubated,. aptient is maxed on 3 pressors and persistently acidotic. Patient started on Bicarb drip.     PRESSORS: [x ] YES [ ] NO  WHICH:    ANTIBIOTICS:                      Antimicrobial:  cefTRIAXone   IVPB      cefTRIAXone   IVPB 1000 milliGRAM(s) IV Intermittent every 24 hours    Cardiovascular:  norepinephrine Infusion 0.05 MICROgram(s)/kG/Min IV Continuous <Continuous>  phenylephrine    Infusion 6 MICROgram(s)/kG/Min IV Continuous <Continuous>    Pulmonary:    Hematalogic:    Other:  chlorhexidine 0.12% Liquid 15 milliLiter(s) Oral Mucosa every 12 hours  chlorhexidine 4% Liquid 1 Application(s) Topical <User Schedule>  octreotide  Infusion 50 MICROgram(s)/Hr IV Continuous <Continuous>  pantoprazole Infusion 8 mG/Hr IV Continuous <Continuous>  sodium bicarbonate  Infusion 0.108 mEq/kG/Hr IV Continuous <Continuous>  vasopressin Infusion 0.04 Unit(s)/Min IV Continuous <Continuous>    cefTRIAXone   IVPB      cefTRIAXone   IVPB 1000 milliGRAM(s) IV Intermittent every 24 hours  chlorhexidine 0.12% Liquid 15 milliLiter(s) Oral Mucosa every 12 hours  chlorhexidine 4% Liquid 1 Application(s) Topical <User Schedule>  norepinephrine Infusion 0.05 MICROgram(s)/kG/Min IV Continuous <Continuous>  octreotide  Infusion 50 MICROgram(s)/Hr IV Continuous <Continuous>  pantoprazole Infusion 8 mG/Hr IV Continuous <Continuous>  phenylephrine    Infusion 6 MICROgram(s)/kG/Min IV Continuous <Continuous>  sodium bicarbonate  Infusion 0.108 mEq/kG/Hr IV Continuous <Continuous>  vasopressin Infusion 0.04 Unit(s)/Min IV Continuous <Continuous>    Drug Dosing Weight  Height (cm): 154.9 (25 Dec 2020 20:15)  Weight (kg): 104.3 (25 Dec 2020 20:19)  BMI (kg/m2): 43.5 (25 Dec 2020 20:19)  BSA (m2): 2 (25 Dec 2020 20:19)    CENTRAL LINE: [ x] YES [ ] NO  LOCATION:   DATE INSERTED:  REMOVE: [ ] YES [ ] NO  EXPLAIN:    OLIVO: [ x] YES [ ] NO    DATE INSERTED:  REMOVE:  [ ] YES [ ] NO  EXPLAIN:    A-LINE:  [ ] YES [ ] NO  LOCATION:   DATE INSERTED:  REMOVE:  [ ] YES [ ] NO  EXPLAIN:    PMH -reviewed admission note, no change since admission    ICU Vital Signs Last 24 Hrs  T(C): 31.9 (26 Dec 2020 13:00), Max: 32.9 (26 Dec 2020 01:25)  T(F): 89.4 (26 Dec 2020 13:00), Max: 91.2 (26 Dec 2020 01:25)  HR: 77 (27 Dec 2020 00:10) (63 - 141)  BP: 71/34 (26 Dec 2020 22:15) (39/30 - 120/95)  BP(mean): 42 (26 Dec 2020 22:15) (30 - 100)  ABP: --  ABP(mean): --  RR: 20 (26 Dec 2020 22:15) (12 - 25)  SpO2: 63% (27 Dec 2020 00:10) (50% - 100%)      ABG - ( 27 Dec 2020 00:36 )  pH, Arterial: 6.83  pH, Blood: x     /  pCO2: 56    /  pO2: 50    / HCO3: 9     / Base Excess: -22.0 /  SaO2: 66                    12-25 @ 07:01  -  12-26 @ 07:00  --------------------------------------------------------  IN: 208.4 mL / OUT: 0 mL / NET: 208.4 mL        Mode: AC/ CMV (Assist Control/ Continuous Mandatory Ventilation)  RR (machine): 20  TV (machine): 450  FiO2: 100  PEEP: 5  ITime: 1  MAP: 14  PIP: 39      PHYSICAL EXAM:    GENERAL: sedated and intubated  HEAD:  Atraumatic, Normocephalic  EYES: Icteric sclera  ENMT: No tonsillar erythema, exudates, or enlargement; Moist mucous membranes,   NECK: Supple, normal appearance, No JVD; Normal thyroid; Trachea midline  NERVOUS SYSTEM: patient sedated and intubated  CHEST/LUNG: No chest deformity; ; No rales, rhonchi, wheezing ETT present  HEART: Regular rate and rhythm; No murmurs, rubs, or gallops  ABDOMEN: Soft, Nontender, Nondistended; Bowel sounds present  EXTREMITIES:  2+ Peripheral Pulses, No clubbing, cyanosis, or edema  LYMPH: No lymphadenopathy noted  SKIN: No rashes or lesions; Good capillary refill      LABS:  CBC Full  -  ( 26 Dec 2020 23:22 )  WBC Count : 11.59 K/uL  RBC Count : 1.43 M/uL  Hemoglobin : 4.2 g/dL  Hematocrit : 14.3 %  Platelet Count - Automated : 25 K/uL  Mean Cell Volume : 100.0 fl  Mean Cell Hemoglobin : 29.4 pg  Mean Cell Hemoglobin Concentration : 29.4 gm/dL  Auto Neutrophil # : x  Auto Lymphocyte # : x  Auto Monocyte # : x  Auto Eosinophil # : x  Auto Basophil # : x  Auto Neutrophil % : x  Auto Lymphocyte % : x  Auto Monocyte % : x  Auto Eosinophil % : x  Auto Basophil % : x    12-26    136  |  101  |  27<H>  ----------------------------<  397<H>  5.7<H>   |  10<LL>  |  2.28<H>    Ca    5.7<LL>      26 Dec 2020 23:22  Phos  8.9     12-26  Mg     1.5     12-26    TPro  2.0<L>  /  Alb  0.6<L>  /  TBili  2.0<H>  /  DBili  x   /  AST  3518<H>  /  ALT  680<H>  /  AlkPhos  75  12-26    PT/INR - ( 26 Dec 2020 17:59 )   PT: 65.6 sec;   INR: 6.00 ratio         PTT - ( 26 Dec 2020 17:59 )  PTT:147.0 sec        RADIOLOGY & ADDITIONAL STUDIES REVIEWED:  ***    GOALS OF CARE DISCUSSION WITH PATIENT/FAMILY/PROXY:    CRITICAL CARE TIME SPENT: 35 minutes

## 2020-12-27 NOTE — DISCHARGE NOTE FOR THE EXPIRED PATIENT - HOSPITAL COURSE
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 Patient assessed bedside, was lethargic yet arousable but unable to provide further history. Spoke with sister Kenna Armijo over the phone (#[phone]), who reports patient developed abdominal pain with worsening distension and increasing swelling of extremities since yesterday. Since this morning he has had about 5-6 episodes of vomiting blood with progressively increasing weakness, unable to get up form chair hence sent to the ER.   Of note patient was admitted to [facility] in September for etoh withdrawal. As per sister, he has not consumed alcohol since.   In ER, patient was noted to be hypotensive to systolic blood pressure of 70s. His labs were significant for h/h of 4.6/14, bun/cr 32/1.81, PT 60.4, INR 5.51, lactate 8.7 and ammonia 181. He was given 1l bolus and ICU consulted for acute sever anemia with decompensated liver cirrhosis.    Patient continues to remain on maximal pressor support, repeat labs in evening showing critical H/H 4.2/14, INR 6, plt 25, and severe anion gap metabolic acidosis and worsening lactic acidosis. Pt was given sodium bicarbonate 100mg push, was noted to be hypoglycemic and was given dextrose push and was started on D10 infusion. Pt was given 2prbc, 1ffp and 1 platelet.    Pt sister Aleksander Pierson() called to update through  ( ID [id number] & [id number] ) and updated about current clinical condition. Sister requested to visit patient, arranged after discussing with nursing supervisor.    Detailed conversation done with sister again with nurse Moreland providing interpretation services, sister explained about prognosis. Regarding GOC discussion, sister wants to continue all current medical management but doesn't want chest compressions if patient heart stops. Sister has been making medical decisions with discussion with family as father is in [place]. Father contact information (Daphne Yuenor 52 [phone]).    Molst drafted and placed in chart, attending informed.  Patient was on maximal pressor support, with full vent support . Patient lost Pulse at 6:19 and was pronounced dead . Sister was notified. 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 Patient assessed bedside, was lethargic yet arousable but unable to provide further history. Spoke with sister Kenna Armijo over the phone (#[phone]), who reports patient developed abdominal pain with worsening distension and increasing swelling of extremities since yesterday. Since this morning he has had about 5-6 episodes of vomiting blood with progressively increasing weakness, unable to get up form chair hence sent to the ER.   Of note patient was admitted to [facility] in September for etoh withdrawal. As per sister, he has not consumed alcohol since.   In ER, patient was noted to be hypotensive to systolic blood pressure of 70s. His labs were significant for h/h of 4.6/14, bun/cr 32/1.81, PT 60.4, INR 5.51, lactate 8.7 and ammonia 181. He was given 1l bolus and ICU consulted for acute sever anemia with decompensated liver cirrhosis. Patient was intubated for airway protection nd hypoxia. Patient for started on pressors for persistent hypotension. 3rd vasopressor was added to stabilize the blood pressure. Patient continued to have coffee ground emesis , OG tube was placed to suction , patient was on octreotide infusion with  Protonix IV. CBC was done.  Patient was persistently acidotic he was also started on Bicarb infusion.     Patient continues to remain on maximal pressor support, repeat labs in evening showing critical H/H 4.2/14, INR 6, plt 25, and severe anion gap metabolic acidosis and worsening lactic acidosis. Pt was given sodium bicarbonate 100mg push, was noted to be hypoglycemic and was given dextrose push and was started on D10 infusion. Pt was given 2prbc, 1ffp and 1 platelet.    Pt sister Aleksander Pierson() called to update through  ( ID [id number] & [id number] ) and updated about current clinical condition. Sister requested to visit patient, arranged after discussing with nursing supervisor.    Detailed conversation done with sister again with nurse Moreland providing interpretation services, sister explained about prognosis. Regarding GOC discussion, sister wants to continue all current medical management but doesn't want chest compressions if patient heart stops. Sister has been making medical decisions with discussion with family as father is in [place]. Father contact information (Daphne Guevara 52 [phone]).    Christinest drafted and placed in chart, attending informed.  Patient was on maximal pressor support, with full vent support . Patient lost Pulse at 6:19 and was pronounced dead . Sister was notified.

## 2020-12-27 NOTE — PROGRESS NOTE ADULT - ASSESSMENT
ASSESSMENT AND PLAN:  34yo M with hx of alcohol induced liver cirrhosis (previously MELD score 32), esophageal varices, hepatic encephalopathy, anemia, presented for abdominal pain and vomiting blood. ICU consulted for gi bleed with severe anemia and decompensated liver cirrhosis.     1. GI bleed  2. Hypotension  3. Anemia  4. Hypoxic respiratory failure  5. Lactic acidosis  6. Decompensated liver cirrhosis  7. Thrombocytopenia  8. LEDA  9. Hepatic Encephalopathy    =================== Neuro============================  Hepatic Encephalopathy:  -presented with lethargy, arouses to verbal stimulus but unable to follow commands  -Intubated in icu as pt was hypotensive, altered unable to protect airway   -ammonia level noted 181,  -likely precipitated by gi bleed  -will keep npo, aspiration precautions    ================= Cardiovascular==========================  Hypotension:  -noted to have systolic blood pressure in 60s, received 1l bolus of NS  -trop negative, ekg nsr, echo from recent admission with normal EF  -Central line was placed and patient started on 3 pressors .    ================- Pulm=================================  Hypoxic respiratory failure:  -Patient intubated to protect airway and for hypoxia .  -continue with mechanical ventilation      =================GI====================================  GI Bleed:  -presented s/p 5-6 episodes of vomiting blood, no more episodes in ER  -patient hypotensive in ER, hgb 4.8  -given hx of liver cirrhosis and esophageal varices, -2 large bore ivs in place,   -monitor h/h q6 hrs  Patient on PPI and octreotide infusion.  Patient got 7 PRBC in total .  -OG tube to suction  -GI consult Dr Golyan    Acute on chronic decompensated liver cirrhosis:  -known history of liver cirrhosis with MELD score of 32 on recent admission, alcohol induced  -last drink apparently in september 2020  -noted to have plt of 60, INR 5.5, PT 60.4, T bili 6.5,   -MELD score now calculated at 38 with 52.6% estimated 3-month mortality  -Patient got FFP and cryo precipetate for elevated INR.    ================= Nephro================================  LEDA:  -bun/cr noted 32/1.81, baseline renal function normal  -likely prerenal in setting of hypovolemia and third spacing  -f/u ua, lytes, monitor closely    Lactic Acidosis:  -lactate level of about 8.7-> 13->16->19  -no fevers, wbc count at 11, less likely from sepsis  -given hypotension and likely hypovolemia s/t gi bleed, Patient on IV fluids.    -================ Heme==================================  Anemia:  -Patients Hb dropped 4.2.  in total got 7 PRBC     Thrombocytopenia:  Patient las platelet noted to be 25. will give him one platelet.  -==================ID===================================  Leucocytosis:  -wbc count of 11.23   -no fevers noted,     =================Endocrine===============================  -no issues    ================= Skin/Catheters============================  No rashes. Peripheral IV lines.     - =================Prophylaxis =============================  SCD for DVT ppx, no chemoppx given gi bleed    ==================GOC==================================   FULL CODE  Prognosis Guarded
ASSESSMENT AND PLAN:  34yo M with hx of alcohol induced liver cirrhosis (previously MELD score 32), esophageal varices, hepatic encephalopathy, anemia, presented for abdominal pain and vomiting blood. ICU consulted for gi bleed with severe anemia and decompensated liver cirrhosis.     1. GI bleed  2. Hypotension  3. Anemia  4. Hypoxic respiratory failure  5. Lactic acidosis  6. Decompensated liver cirrhosis  7. Thrombocytopenia  8. LEDA  9. Hepatic Encephalopathy    =================== Neuro============================  Hepatic Encephalopathy:  -presented with lethargy, arouses to verbal stimulus but unable to follow commands  -Intubated in icu as pt was hypotensive, altered unable to protect airway   -ammonia level noted 181,  -likely precipitated by gi bleed  -will keep npo, aspiration precautions  -will hold off lactulose for now    ================= Cardiovascular==========================  Hypotension:  -noted to have systolic blood pressure in 60s, received 1l bolus of NS  -trop negative, ekg nsr, echo from recent admission with normal EF  -likely from hypovolemia in setting of gi bleed with contribution from third spacing in pt with liver failure  -will transfuse 2u prbc, monitor blood pressure closely  -consent for central line obtained from sister in case of a need for pressor support    ================- Pulm=================================  Hypoxic respiratory failure:  -saturating 100% on 5l NC, noted to have work of breathing  -CXR with poor inspiration likely d/t abdominal distension from large ascites  -will monitor closely, pt is low threshold for intubation    =================GI====================================  GI Bleed:  -presented s/p 5-6 episodes of vomiting blood, no more episodes in ER  -patient hypotensive in ER, hgb 4.8  -given hx of liver cirrhosis and esophageal varices, will start octreotide drip in addition to protonix drip  -s/p 1l bolus in ER, transfuse 2u prbc, goal hgb level b/w 7-8  -2 large bore ivs in place,   -monitor h/h q6 hrs  -GI consult Dr Gordon    Acute on chronic decompensated liver cirrhosis:  -known history of liver cirrhosis with MELD score of 32 on recent admission, alcohol induced  -last drink apparently in september 2020  -noted to have plt of 60, INR 5.5, PT 60.4, T bili 6.5,   -MELD score now calculated at 38 with 52.6% estimated 3-month mortality  -will defer paracentesis for now given patient unstable for procedure  -nadolol and diuretics on hold for now    ================= Nephro================================  LEDA:  -bun/cr noted 32/1.81, baseline renal function normal  -likely prerenal in setting of hypovolemia and third spacing  -f/u ua, lytes, monitor closely    Lactic Acidosis:  -lactate level of about 9,   -no fevers, wbc count at 11, less likely from sepsis  -given hypotension and likely hypovolemia s/t gi bleed, will volume resuscitate the patient  -expect delayed clearance given pt with liver failure  -repeat lactate level in 3 hours    ================ Heme==================================  Anemia:  -h/h noted 4.8/14, drop from prev level of 9.5/26  -likely from gi bleed  -plan as above    Thrombocytopenia:  -plt count 60, likely in setting of liver cirrhosis  -will send LDH, retic count levels    ==================ID===================================  Leucocytosis:  -wbc count of 11.23   -no fevers noted,   -low chances of SBP, will cover empirically    =================Endocrine===============================  -no issues    ================= Skin/Catheters============================  No rashes. Peripheral IV lines.     - =================Prophylaxis =============================  SCD for DVT ppx, no chemoppx given gi bleed    ==================GOC==================================   FULL CODE

## 2020-12-28 LAB
CULTURE RESULTS: SIGNIFICANT CHANGE UP
ORGANISM # SPEC MICROSCOPIC CNT: SIGNIFICANT CHANGE UP
ORGANISM # SPEC MICROSCOPIC CNT: SIGNIFICANT CHANGE UP
SPECIMEN SOURCE: SIGNIFICANT CHANGE UP

## 2021-05-28 NOTE — DISCHARGE NOTE PROVIDER - NSDCFUADDAPPT_GEN_ALL_CORE_FT
PHYSICIAN NEXT STEPS:  Review Only    CHIEF COMPLAINT:  Chief Complaint/Protocol Used: COVID-19 - Diagnosed or Suspected (A)  Onset: 2 days       ASSESSMENT:  ? Onset: 2 days   ? Normal True  ? Covid-19 Exposure: denied  ? Onset: 2 days   ? Cough: yes productive clear-yellow   ? Fever: denied  ? Respiratory Status: denied   ? High Risk Disease: type 1 diabetes   ? Pregnancy: n/a  ? Other Symptoms: sinus pain and pressure, rib cage pain with coughing only   -------------------------------------------------------    DISPOSITION:  Disposition Recommendation: Go to ED Now (or PCP triage)  Questions that led to disposition:  ? Chest pain or pressure  Patient Directed To: Unspecified  Patient Intended Action: Go to Urgent Care Center      CALL NOTES:  05/28/2021 at 3:15 PM by Candace Barrera  ? No virtual appointments found for time frame. ACC RN called [place] triage nurse Cata who advised the patient to go the [facility]. ACC RN informed the triage nurse the patient had rib pain only with coughing. Patient agreed to go to the   [facility] today.    DISPOSITION OVERRIDE/PROVIDER CONSULT:  Disposition Override: N/A  Override Source: Unspecified  Consulted with PCP: No  Consulted with On-Call Physician: No    CALLER CONTACT INFO:  Name: Scar Velazquez (Self)  Phone 1: [phone] (Work Phone)  Phone 2: [phone] (Mobile) - Preferred  Phone 3: [phone] (Home Phone)      ENCOUNTER STARTED:  05/28/21 02:50:25 PM  ENCOUNTER ASSIGNED TO/CLOSED BY:  Candace Barrera @ 05/28/21 03:15:24 PM      -------------------------------------------------------    CARE ADVICE given per COVID-19 - Diagnosed or Suspected (A) guideline.  GO TO ED NOW (OR PCP TRIAGE):  * IF NO PCP (PRIMARY CARE PROVIDER) SECOND-LEVEL TRIAGE: You need to be seen within the next hour. Go to the ED/UCC at _____________ Hospital. Leave as soon as you can.  * IF PCP SECOND-LEVEL TRIAGE REQUIRED: You may need to be seen. Your doctor (or NP/PA)  will want to talk with you to decide what's best. I'll page the provider on-call now. If you haven't heard from the provider (or me) within 30 minutes, go directly to   the ED/C at _____________ Hospital.      UNDERSTANDS CARE ADVICE: Yes    AGREES WITH CARE ADVICE: Yes    WILL FOLLOW CARE ADVICE: Yes    -------------------------------------------------------   Call Gastroenterologist in one week to follow up.   Call first for an appointment

## 2022-08-04 NOTE — ED ADULT NURSE NOTE - CHIEF COMPLAINT QUOTE
Initial (On Arrival)
pt c/o abd pain and vomiting, appears lethargic, jaundiced w/ abd distension and LE edema, pt is hypotensive

## 2023-05-24 NOTE — H&P ADULT - NSHPLANGPREFER_GEN_A_CORE
[place] Niacinamide Counseling: I recommended taking niacin or niacinamide, also know as vitamin B3, twice daily. Recent evidence suggests that taking vitamin B3 (500 mg twice daily) can reduce the risk of actinic keratoses and non-melanoma skin cancers. Side effects of vitamin B3 include flushing and headache.

## 2023-08-09 NOTE — PROGRESS NOTE ADULT - PROBLEM SELECTOR PROBLEM 3
Medicare Wellness Visit  Plan for Preventive Care    A good way for you to stay healthy is to use preventive care.  Medicare covers many services that can help you stay healthy.* The goal of these services is to find any health problems as quickly as possible. Finding problems early can help make them easier to treat.  Your personal plan below lists the services you may need and when they are due.      Health Maintenance Summary     Breast Cancer Screening (Every 2 Years)  Ordered on 8/2/2023    Medicare Advantage- Medicare Wellness Visit (Yearly - January to December)  Overdue - never done    DTaP/Tdap/Td Vaccine (2 - Td or Tdap)  Postponed until 8/10/2023    Colorectal Cancer Screen- (Colonoscopy - Every 10 Years)  Postponed until 8/9/2024    Shingles Vaccine (2 of 3)  Postponed until 8/30/2024    Influenza Vaccine (1)  Next due on 9/1/2023    Depression Screening (Yearly)  Next due on 8/3/2024    Pneumococcal Vaccine 65+   Completed    Osteoporosis Screening   Completed    COVID-19 Vaccine   Completed    Hepatitis C Screening   Completed    Meningococcal Vaccine   Aged Out    Hepatitis B Vaccine (For Physician/[facility] Discussion)   Aged Out    HPV Vaccine   Aged Out           Preventive Care for Women and Men    Heart Screenings (Cardiovascular):  · Blood tests are used to check your cholesterol, lipid and triglyceride levels. High levels can increase your risk for heart disease and stroke. High levels can be treated with medications, diet and exercise. Lowering your levels can help keep your heart and blood vessels healthy.  Your provider will order these tests if they are needed.    · An ultrasound is done to see if you have an abdominal aortic aneurysm (AAA).  This is an enlargement of one of the main blood vessels that delivers blood to the body.   In the [place], 9,000 deaths are caused by AAA.  You may not even know you have this problem and as many as 1 in 3 people will have a serious problem if it is  not treated.  Early diagnosis allows for more effective treatment and cure.  If you have a family history of AAA or are a male age 65-75 who has smoked, you are at higher risk of an AAA.  Your provider can order this test, if needed.    Colorectal Screening:  · There are many tests that are used to check for cancer of your colon and rectum. You and your provider should discuss what test is best for you and when to have it done.  Options include:  · Screening Colonoscopy: exam of the entire colon, seen through a flexible lighted tube.  · Flexible Sigmoidoscopy: exam of the last third (sigmoid portion) of the colon and rectum, seen through a flexible lighted tube.  · Cologuard DNA stool test: a sample of your stool is used to screen for cancer and unseen blood in your stool.  · Fecal Occult Blood Test: a sample of your stool is studied to find any unseen blood    Flu Shot:  · An immunization that helps to prevent influenza (the flu). You should get this every year. The best time to get the shot is in the fall.    Pneumococcal Shot:  • Vaccines help prevent pneumococcal disease, which is any type of illness caused by Streptococcus pneumoniae bacteria. There are two kinds of pneumococcal vaccines available in the [place]:   o Pneumococcal conjugate vaccines (PCV20 or Cvnubna72®)  o Pneumococcal polysaccharide vaccine (PPSV23 or Qjorqrtnb95®)  · For those who have never received any pneumococcal conjugate vaccine, CDC recommends PVC20 for adults 65 years or older and adults 19 through 64 years old with certain medical conditions or risk factors.   · For those who have previously received PCV13, this should be followed by a dose of PPSV23.     Hepatitis B Shot:  · An immunization that helps to protect people from getting Hepatitis B. Hepatitis B is a virus that spreads through contact with infected blood or body fluids. Many people with the virus do not have symptoms.  The virus can lead to serious problems, such as  liver disease. Some people are at higher risk than others. Your doctor will tell you if you need this shot.     Diabetes Screening:  · A test to measure sugar (glucose) in your blood is called a fasting blood sugar. Fasting means you cannot have food or drink for at least 8 hours before the test. This test can detect diabetes long before you may notice symptoms.    Glaucoma Screening:  · Glaucoma screening is performed by your eye doctor. The test measures the fluid pressure inside your eyes to determine if you have glaucoma.     Hepatitis C Screening:  · A blood test to see if you have the hepatitis C virus.  Hepatitis C attacks the liver and is a major cause of chronic liver disease.  Medicare will cover a single screening for all adults born between 1945 & 1965, or high risk patients (people who have injected illegal drugs or people who have had blood transfusions).  High risk patients who continue to inject illegal drugs can be screened for Hepatitis C every year.    Smoking and Tobacco-Use Cessation Counseling:  · Tobacco is the single greatest cause of disease and early death in our country today. Medication and counseling together can increase a person’s chance of quitting for good.   · Medicare covers two quitting attempts per year, with four counseling sessions per attempt (eight sessions in a 12 month period)    Preventive Screening tests for Women    Screening Mammograms and Breast Exams:  · An x-ray of your breasts to check for breast cancer before you or your doctor may be able to feel it.  If breast cancer is found early it can usually be treated with success.    Pelvic Exams and Pap Tests:  · An exam to check for cervical and vaginal cancer. A Pap test is a lab test in which cells are taken from your cervix and sent to the lab to look for signs of cervical cancer. If cancer of the cervix is found early, chances for a cure are good. Testing can generally end at age 65, or if a woman has a hysterectomy  for a benign condition. Your provider may recommend more frequent testing if certain abnormal results are found.    Bone Mass Measurements:  · A painless x-ray of your bone density to see if you are at risk for a broken bone. Bone density refers to the thickness of bones or how tightly the bone tissue is packed.    Preventive Screening tests for Men    Prostate Screening:  · Should you have a prostate cancer test (PSA)?  It is up to you to decide if you want a prostate cancer test. Talk to your clinician to find out if the test is right for you.  Things for you to consider and talk about should include:  · Benefits and harms of the test  · Your family history  · How your race/ethnicity may influence the test  · If the test may impact other medical conditions you have  · Your values on screenings and treatments    *Medicare pays for many preventive services to keep you healthy. For some of these services, you might have to pay a deductible, coinsurance, and / or copayment.  The amounts vary depending on the type of services you need and the kind of Medicare health plan you have.    For further details on screenings offered by Medicare please visit: https://www.medicare.gov/coverage/preventive-screening-services    Anemia

## 2023-09-25 NOTE — H&P ADULT - NSHPROSALLOTHERNEGRD_GEN_ALL_CORE
All other review of systems negative, except as noted in HPI Colchicine Counseling:  Patient counseled regarding adverse effects including but not limited to stomach upset (nausea, vomiting, stomach pain, or diarrhea).  Patient instructed to limit alcohol consumption while taking this medication.  Colchicine may reduce blood counts especially with prolonged use.  The patient understands that monitoring of kidney function and blood counts may be required, especially at baseline. The patient verbalized understanding of the proper use and possible adverse effects of colchicine.  All of the patient's questions and concerns were addressed.